# Patient Record
Sex: FEMALE | Race: BLACK OR AFRICAN AMERICAN | Employment: FULL TIME | URBAN - METROPOLITAN AREA
[De-identification: names, ages, dates, MRNs, and addresses within clinical notes are randomized per-mention and may not be internally consistent; named-entity substitution may affect disease eponyms.]

---

## 2019-05-06 ENCOUNTER — APPOINTMENT (OUTPATIENT)
Dept: GENERAL RADIOLOGY | Age: 49
End: 2019-05-06
Attending: EMERGENCY MEDICINE
Payer: COMMERCIAL

## 2019-05-06 ENCOUNTER — HOSPITAL ENCOUNTER (EMERGENCY)
Age: 49
Discharge: HOME | End: 2019-05-06
Attending: EMERGENCY MEDICINE
Payer: COMMERCIAL

## 2019-05-06 VITALS
RESPIRATION RATE: 16 BRPM | HEART RATE: 80 BPM | TEMPERATURE: 98.2 F | BODY MASS INDEX: 26.53 KG/M2 | SYSTOLIC BLOOD PRESSURE: 141 MMHG | WEIGHT: 169 LBS | HEIGHT: 67 IN | OXYGEN SATURATION: 97 % | DIASTOLIC BLOOD PRESSURE: 76 MMHG

## 2019-05-06 DIAGNOSIS — M25.511 ACUTE PAIN OF RIGHT SHOULDER: ICD-10-CM
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
## 2019-05-06 PROCEDURE — 72050 X-RAY EXAM NECK SPINE 4/5VWS: CPT

## 2019-05-06 PROCEDURE — 99284 EMERGENCY DEPT VISIT MOD MDM: CPT | Performed by: NURSE PRACTITIONER

## 2019-05-06 PROCEDURE — A4565 SLINGS: HCPCS

## 2019-05-06 PROCEDURE — 73030 X-RAY EXAM OF SHOULDER: CPT

## 2019-05-06 PROCEDURE — 71100 X-RAY EXAM RIBS UNI 2 VIEWS: CPT

## 2019-05-06 RX ORDER — ATORVASTATIN CALCIUM 10 MG
10 TABLET ORAL DAILY
COMMUNITY

## 2019-05-06 RX ORDER — MELOXICAM 7.5 MG/1
7.5 TABLET ORAL DAILY
Qty: 7 TAB | Refills: 0 | Status: SHIPPED | OUTPATIENT
Start: 2019-05-06 | End: 2019-05-13

## 2019-05-06 RX ORDER — CYCLOBENZAPRINE HCL 5 MG
10 TABLET ORAL
Qty: 30 TAB | Refills: 0 | Status: SHIPPED | OUTPATIENT
Start: 2019-05-06
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
## 2019-05-06 NOTE — LETTER
[facility] EMERGENCY DEPT One [address] [id number] [phone] Work/School Note Date: 5/6/2019 To Whom It May concern: 
 
Claudio Seymour was seen and treated today in the emergency room by the following provider(s): 
Attending Provider: Omayra Dutton MD 
Nurse Practitioner: Serena Berger NP. Claudio Seymour may return to work on Wednesday. Sincerely, Jimenez Mayo NP

## 2019-05-06 NOTE — ED NOTES
I have reviewed discharge instructions with the patient. The patient verbalized understanding. Patient left ED via Discharge Method: ambulatory to Home with co-worker. Opportunity for questions and clarification provided. Patient given 3 scripts. To continue your aftercare when you leave the hospital, you may receive an automated call from our care team to check in on how you are doing. This is a free service and part of our promise to provide the best care and service to meet your aftercare needs.  If you have questions, or wish to unsubscribe from this service please call [phone]. Thank you for Choosing our [employer] Emergency Department.
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
## 2019-11-13 ENCOUNTER — PATIENT OUTREACH (OUTPATIENT)
Dept: OTHER | Age: 49
End: 2019-11-13

## 2019-11-13 NOTE — PROGRESS NOTES
Patient on report as eligible for Case Management. Unable to leave message on voicemail, as I received a message stating not accepting calls at this time. Will attempt to contact again to offer [phone] Management services. Home

## 2019-11-14 ENCOUNTER — PATIENT OUTREACH (OUTPATIENT)
Dept: OTHER | Age: 49
End: 2019-11-14

## 2019-11-14 NOTE — PROGRESS NOTES
Patient identified as eligible for [address] [place] [address] services. Second telephone outreach attempted. Left discreet voicemail with this CM confidential contact information. Will send UTR letter.

## 2019-11-14 NOTE — LETTER
11/14/2019 11:38 AM 
 
Ms. Osie Simmonds [address] [facility] [id number] Re: Osie Simmonds 1970 Dear Ms. Lindsay My name is Catherine Hawk, Employee Care Manager for [employer] and I have been trying to reach you. The Employee Care Management [facility]) program is a free-of-charge confidential service provided to our employees and their family members covered by the [employer]. The program will provide an employee and his/her family with the [employer] expertise to assist in navigating the health care delivery system, provider services, and their overall care needsso as to assure and improve health care interactions and enhance the quality of life. This program is designed to provide you with the opportunity to have a [employer] care manager partner with you for the following services: 
 
 1) when you come home from the hospital or emergency room 2) when help is needed to manage your disease 3) when you need assistance coordinating services or appointments [employer] is dedicated to empowering the good health of its community and improving the quality of care and care experiences for employees and their families. We are committed to safeguarding patient confidentiality and privacy, assuring that every employee has the respect he or she deserves in managing their health. The information shared with your care manager will not be shared with anyone else aside from those you identify as part of your care team, and will only be used to assist you with any identified care needs. Please contact me if you would like this service provided to you. Sincerely, Hanna Morales RN, BSN  Employee Care Manager [address] [place] [address] [facility] [phone]  F [phone]  [email or web address] [email or web address]

## 2019-11-21 ENCOUNTER — PATIENT OUTREACH (OUTPATIENT)
Dept: OTHER | Age: 49
End: 2019-11-21

## 2019-11-21 NOTE — PROGRESS NOTES
Thirdd attempt to reach patient for [facility] Program, and discharge assessment. Discreet VM left. UTR letter sent on 11/14. Will resolve if I don't hear back in the next few weeks.

## 2019-12-12 ENCOUNTER — DOCUMENTATION ONLY (OUTPATIENT)
Dept: OTHER | Age: 49
End: 2019-12-12

## 2020-06-24 ENCOUNTER — HOSPITAL ENCOUNTER (INPATIENT)
Age: 50
LOS: 5 days | Discharge: HOME | DRG: 177 | End: 2020-06-29
Attending: INTERNAL MEDICINE | Admitting: INTERNAL MEDICINE
Payer: COMMERCIAL

## 2020-06-24 DIAGNOSIS — I10 ESSENTIAL HYPERTENSION: ICD-10-CM

## 2020-06-24 DIAGNOSIS — Z99.89 OSA ON CPAP: ICD-10-CM

## 2020-06-24 DIAGNOSIS — U07.1 COVID-19 VIRUS INFECTION: ICD-10-CM

## 2020-06-24 DIAGNOSIS — G47.33 OSA ON CPAP: ICD-10-CM

## 2020-06-24 DIAGNOSIS — E11.9 TYPE 2 DIABETES MELLITUS WITHOUT COMPLICATION, WITHOUT LONG-TERM CURRENT USE OF INSULIN (HCC): ICD-10-CM

## 2020-06-24 DIAGNOSIS — J96.01 ACUTE RESPIRATORY FAILURE WITH HYPOXIA (HCC): ICD-10-CM

## 2020-06-24 PROBLEM — J96.90 RESPIRATORY FAILURE (HCC): Status: ACTIVE | Noted: 2020-06-24

## 2020-06-24 LAB
ALBUMIN SERPL-MCNC: 3.2 G/DL (ref 3.5–5)
ALBUMIN/GLOB SERPL: 0.8 {RATIO} (ref 1.2–3.5)
ALP SERPL-CCNC: 93 U/L (ref 50–136)
ALT SERPL-CCNC: 47 U/L (ref 12–65)
ANION GAP SERPL CALC-SCNC: 8 MMOL/L (ref 7–16)
APTT PPP: 26.4 SEC (ref 24.3–35.4)
AST SERPL-CCNC: 38 U/L (ref 15–37)
BASOPHILS # BLD: 0 K/UL (ref 0–0.2)
BASOPHILS NFR BLD: 0 % (ref 0–2)
BILIRUB SERPL-MCNC: 0.5 MG/DL (ref 0.2–1.1)
BUN SERPL-MCNC: 8 MG/DL (ref 6–23)
CALCIUM SERPL-MCNC: 8.2 MG/DL (ref 8.3–10.4)
CHLORIDE SERPL-SCNC: 104 MMOL/L (ref 98–107)
CO2 SERPL-SCNC: 29 MMOL/L (ref 21–32)
CREAT SERPL-MCNC: 0.69 MG/DL (ref 0.6–1)
D DIMER PPP FEU-MCNC: 1.25 UG/ML(FEU)
DIFFERENTIAL METHOD BLD: ABNORMAL
EOSINOPHIL # BLD: 0 K/UL (ref 0–0.8)
EOSINOPHIL NFR BLD: 0 % (ref 0.5–7.8)
ERYTHROCYTE [DISTWIDTH] IN BLOOD BY AUTOMATED COUNT: 14.9 % (ref 11.9–14.6)
FERRITIN SERPL-MCNC: 492 NG/ML (ref 8–388)
FIBRINOGEN PPP-MCNC: 645 MG/DL (ref 190–501)
GLOBULIN SER CALC-MCNC: 4.2 G/DL (ref 2.3–3.5)
GLUCOSE BLD STRIP.AUTO-MCNC: 95 MG/DL (ref 65–100)
GLUCOSE SERPL-MCNC: 93 MG/DL (ref 65–100)
HCT VFR BLD AUTO: 42.9 % (ref 35.8–46.3)
HGB BLD-MCNC: 13.7 G/DL (ref 11.7–15.4)
IMM GRANULOCYTES # BLD AUTO: 0 K/UL (ref 0–0.5)
IMM GRANULOCYTES NFR BLD AUTO: 0 % (ref 0–5)
INR PPP: 0.9
LDH SERPL L TO P-CCNC: 371 U/L (ref 100–190)
LYMPHOCYTES # BLD: 2.1 K/UL (ref 0.5–4.6)
LYMPHOCYTES NFR BLD: 35 % (ref 13–44)
MCH RBC QN AUTO: 26 PG (ref 26.1–32.9)
MCHC RBC AUTO-ENTMCNC: 31.9 G/DL (ref 31.4–35)
MCV RBC AUTO: 81.4 FL (ref 79.6–97.8)
MONOCYTES # BLD: 0.3 K/UL (ref 0.1–1.3)
MONOCYTES NFR BLD: 5 % (ref 4–12)
NEUTS SEG # BLD: 3.5 K/UL (ref 1.7–8.2)
NEUTS SEG NFR BLD: 60 % (ref 43–78)
NRBC # BLD: 0 K/UL (ref 0–0.2)
PLATELET # BLD AUTO: 296 K/UL (ref 150–450)
PLATELET COMMENTS,PCOM: ADEQUATE
PMV BLD AUTO: 12.5 FL (ref 9.4–12.3)
POTASSIUM SERPL-SCNC: 3.6 MMOL/L (ref 3.5–5.1)
PROT SERPL-MCNC: 7.4 G/DL (ref 6.3–8.2)
PROTHROMBIN TIME: 12.9 SEC (ref 12–14.7)
RBC # BLD AUTO: 5.27 M/UL (ref 4.05–5.2)
RBC MORPH BLD: ABNORMAL
SODIUM SERPL-SCNC: 141 MMOL/L (ref 136–145)
WBC # BLD AUTO: 5.9 K/UL (ref 4.3–11.1)
WBC MORPH BLD: ABNORMAL

## 2020-06-24 PROCEDURE — 83615 LACTATE (LD) (LDH) ENZYME: CPT

## 2020-06-24 PROCEDURE — 77030034850

## 2020-06-24 PROCEDURE — 74011000250 HC RX REV CODE- 250

## 2020-06-24 PROCEDURE — 85379 FIBRIN DEGRADATION QUANT: CPT

## 2020-06-24 PROCEDURE — 36415 COLL VENOUS BLD VENIPUNCTURE: CPT

## 2020-06-24 PROCEDURE — 65620000000 HC RM CCU GENERAL

## 2020-06-24 PROCEDURE — 85025 COMPLETE CBC W/AUTO DIFF WBC: CPT

## 2020-06-24 PROCEDURE — 99223 1ST HOSP IP/OBS HIGH 75: CPT | Performed by: INTERNAL MEDICINE

## 2020-06-24 PROCEDURE — 86900 BLOOD TYPING SEROLOGIC ABO: CPT

## 2020-06-24 PROCEDURE — 82962 GLUCOSE BLOOD TEST: CPT

## 2020-06-24 PROCEDURE — 80053 COMPREHEN METABOLIC PANEL: CPT

## 2020-06-24 PROCEDURE — 77010033711 HC HIGH FLOW OXYGEN

## 2020-06-24 PROCEDURE — 85610 PROTHROMBIN TIME: CPT

## 2020-06-24 PROCEDURE — 94640 AIRWAY INHALATION TREATMENT: CPT

## 2020-06-24 PROCEDURE — 85730 THROMBOPLASTIN TIME PARTIAL: CPT

## 2020-06-24 PROCEDURE — 74011250637 HC RX REV CODE- 250/637: Performed by: INTERNAL MEDICINE

## 2020-06-24 PROCEDURE — 94664 DEMO&/EVAL PT USE INHALER: CPT

## 2020-06-24 PROCEDURE — 82728 ASSAY OF FERRITIN: CPT

## 2020-06-24 PROCEDURE — 85384 FIBRINOGEN ACTIVITY: CPT

## 2020-06-24 RX ORDER — NALOXONE HYDROCHLORIDE 0.4 MG/ML
0.2 INJECTION, SOLUTION INTRAMUSCULAR; INTRAVENOUS; SUBCUTANEOUS AS NEEDED
Status: DISCONTINUED | OUTPATIENT
Start: 2020-06-24 | End: 2020-06-29 | Stop reason: HOSPADM

## 2020-06-24 RX ORDER — LOPERAMIDE HYDROCHLORIDE 2 MG/1
2 CAPSULE ORAL
Status: DISCONTINUED | OUTPATIENT
Start: 2020-06-24 | End: 2020-06-29 | Stop reason: HOSPADM

## 2020-06-24 RX ORDER — ALBUTEROL SULFATE 0.83 MG/ML
2.5 SOLUTION RESPIRATORY (INHALATION)
Status: DISCONTINUED | OUTPATIENT
Start: 2020-06-24 | End: 2020-06-29 | Stop reason: HOSPADM

## 2020-06-24 RX ORDER — ATORVASTATIN CALCIUM 10 MG/1
10 TABLET, FILM COATED ORAL DAILY
Status: DISCONTINUED | OUTPATIENT
Start: 2020-06-25 | End: 2020-06-29 | Stop reason: HOSPADM

## 2020-06-24 RX ORDER — ALBUTEROL SULFATE 0.83 MG/ML
SOLUTION RESPIRATORY (INHALATION)
Status: COMPLETED
Start: 2020-06-24 | End: 2020-06-24

## 2020-06-24 RX ORDER — HEPARIN SODIUM 5000 [USP'U]/ML
7500 INJECTION, SOLUTION INTRAVENOUS; SUBCUTANEOUS EVERY 8 HOURS
Status: DISCONTINUED | OUTPATIENT
Start: 2020-06-24 | End: 2020-06-24 | Stop reason: ALTCHOICE

## 2020-06-24 RX ORDER — ACETAMINOPHEN 325 MG/1
650 TABLET ORAL
Status: DISCONTINUED | OUTPATIENT
Start: 2020-06-24 | End: 2020-06-25

## 2020-06-24 RX ORDER — HYDROCODONE BITARTRATE AND HOMATROPINE METHYLBROMIDE 1.5; 5 MG/5ML; MG/5ML
5 SYRUP ORAL
Status: DISCONTINUED | OUTPATIENT
Start: 2020-06-24 | End: 2020-06-29 | Stop reason: HOSPADM

## 2020-06-24 RX ORDER — CYCLOBENZAPRINE HCL 10 MG
10 TABLET ORAL
Status: DISCONTINUED | OUTPATIENT
Start: 2020-06-24 | End: 2020-06-29 | Stop reason: HOSPADM

## 2020-06-24 RX ORDER — ACETAMINOPHEN 650 MG/1
650 SUPPOSITORY RECTAL
Status: DISCONTINUED | OUTPATIENT
Start: 2020-06-24 | End: 2020-06-29 | Stop reason: HOSPADM

## 2020-06-24 RX ORDER — ONDANSETRON 2 MG/ML
4 INJECTION INTRAMUSCULAR; INTRAVENOUS
Status: DISCONTINUED | OUTPATIENT
Start: 2020-06-24 | End: 2020-06-29 | Stop reason: HOSPADM

## 2020-06-24 RX ORDER — ENOXAPARIN SODIUM 100 MG/ML
40 INJECTION SUBCUTANEOUS EVERY 12 HOURS
Status: DISCONTINUED | OUTPATIENT
Start: 2020-06-25 | End: 2020-06-29 | Stop reason: HOSPADM

## 2020-06-24 RX ORDER — ALBUTEROL SULFATE 90 UG/1
2 AEROSOL, METERED RESPIRATORY (INHALATION)
Status: DISCONTINUED | OUTPATIENT
Start: 2020-06-24 | End: 2020-06-24 | Stop reason: SDUPTHER

## 2020-06-24 RX ORDER — SODIUM CHLORIDE 9 MG/ML
250 INJECTION, SOLUTION INTRAVENOUS AS NEEDED
Status: DISCONTINUED | OUTPATIENT
Start: 2020-06-24 | End: 2020-06-29 | Stop reason: HOSPADM

## 2020-06-24 RX ORDER — SODIUM CHLORIDE 9 MG/ML
50 INJECTION, SOLUTION INTRAVENOUS DAILY
Status: DISCONTINUED | OUTPATIENT
Start: 2020-06-25 | End: 2020-06-27

## 2020-06-24 RX ORDER — SODIUM CHLORIDE 0.9 % (FLUSH) 0.9 %
10 SYRINGE (ML) INJECTION AS NEEDED
Status: DISCONTINUED | OUTPATIENT
Start: 2020-06-24 | End: 2020-06-29 | Stop reason: HOSPADM

## 2020-06-24 RX ORDER — ACETAMINOPHEN 325 MG/1
650 TABLET ORAL
Status: DISCONTINUED | OUTPATIENT
Start: 2020-06-24 | End: 2020-06-29 | Stop reason: HOSPADM

## 2020-06-24 RX ADMIN — ALBUTEROL SULFATE 2.5 MG: 2.5 SOLUTION RESPIRATORY (INHALATION) at 21:50

## 2020-06-24 RX ADMIN — ACETAMINOPHEN 650 MG: 325 TABLET, FILM COATED ORAL at 23:50

## 2020-06-24 RX ADMIN — ALBUTEROL SULFATE 2.5 MG: 0.83 SOLUTION RESPIRATORY (INHALATION) at 21:50

## 2020-06-24 RX ADMIN — LOPERAMIDE HYDROCHLORIDE 2 MG: 2 CAPSULE ORAL at 23:50

## 2020-06-24 NOTE — PROGRESS NOTES
Patient arrived to CCU, transferred to bed, placed on monitor. NSR on monitor, BP stable, SpO2 95% on 10L HFNC, frequent nonproductive cough. Dual skin assessment completed with Janki LEE, patient's skin is intact, incontinent care provided and CHG bath given- patient c/o diarrhea and incontinence with coughing that started PTA.

## 2020-06-24 NOTE — PROGRESS NOTES
Dr. Yeni Salinas notified of admission. No orders in [facility] at this time.     Admissions department notified of arrival.

## 2020-06-24 NOTE — H&P
History and Physical/Consult  Seamus Carver    2020    Date of Admission:  2020      Subjective:     Patient is a 44 y.o. AA female presents with respiratory failure. Patient is 64-year-old female with multiple medical problems including hypertension, diabetes mellitus type 2, hyperlipidemia, obstructive sleep apnea, obesity, who presented to the hospital with complaints of fever, cough and shortness of breath. Patient also reported nausea and few episodes of vomiting. She says family member tested positive for COVID-19. Rapid test in the emergency room was positive. CT of the chest showed bilateral airspace disease in the lower lobe and groundglass opacity throughout the reminder of the lungs. Her inflammatory markers were elevated including CRP is 7.0, , ferritin 351, d-dimer 0.67. Interleukin-6 level was collected but results are pending. Patient oxygen saturation was in the high 80s. Was started on 2 L of oxygen via nasal cannula. Admitted to hospitalist service for further management. Patient was placed on proper isolation precautions. Discussed use of convalescent plasma and Remdesivir with the patient. She agreed to proceed with both. She had her first dose of Remdesivir yesterday 2020 and getting her second dose this afternoon. She also was transfused 1 unit of convalescent plasma around 4 AM today 2020. Patient oxygen requirements worsened last night, she was moved to the intensive care unit for close monitoring. Transitioned to Oxymizer and is currently on 12 L/min. Patient would like to be a transfer to [facility] in [facility]. She is being admitted to [facility] ICU for further care    Prior to Admission Medications   Prescriptions Last Dose Informant Patient Reported? Taking? Methyl Salicylate-Menthol (SALONPAS) 10-3 % ptmd   No No   Si Patch by Apply Externally route daily.    atorvastatin (LIPITOR) 10 mg tablet   Yes No   Sig: Take 10 mg by mouth daily.   cyclobenzaprine (FLEXERIL) 5 mg tablet   No No   Sig: Take 2 Tabs by mouth three (3) times daily as needed for Muscle Spasm(s). Facility-Administered Medications: None       Review of Systems  Denies:  sweats, fatigue, malaise, anorexia, weight loss   Denies: blurry vision, loss of vision, eye pain, photophobia  Denies: hearing loss, ringing in the ears, earache, epistaxis  Denies: chest pain, palpitations, syncope, orthopnea, paroxysmal nocturnal dyspnea, claudication  Denies: dysphagia, odynophagia, diarrhea, constipation, abdominal pain, jaundice, melena   Denies: frequency, dysuria, nocturia, urinary incontinence, stones, hematuria  Denies: polydipsia/polyuria, skin changes, temperature intolerance, unexpected weight gain  Denies: back pain, joint pain, joint swelling, muscle pain, muscle weakness  Denies: bleeding problems, blood transfusions, bruising, pallor, swollen lymph nodes  Denies: headache, dysarthria, blurred vision, diplopia,seizure, focal deficits. Admits to:  Fever, cough , SOB and nausea and vomiting.         Past Medical History:   Diagnosis Date    Arthritis     Diabetes ([facility]     Hypertension     Stroke [facility])      Past Surgical History:   Procedure Laterality Date    HX CHOLECYSTECTOMY      HX GYN  2007    Hysterectomy    HX HEENT  2002    Tonsilectomy    HX ORTHOPAEDIC  2015    left hip ligament repair    HX ORTHOPAEDIC  2017    Right hip ligament repair     Social History     Socioeconomic History    Marital status: SINGLE     Spouse name: Not on file    Number of children: Not on file    Years of education: Not on file    Highest education level: Not on file   Occupational History    Not on file   Social Needs    Financial resource strain: Not on file    Food insecurity     Worry: Not on file     Inability: Not on file    Transportation needs     Medical: Not on file     Non-medical: Not on file   Tobacco Use    Smoking status: Never Smoker    Smokeless tobacco: Never Used   Substance and Sexual Activity    Alcohol use: Never     Frequency: Never    Drug use: Never    Sexual activity: Not on file   Lifestyle    Physical activity     Days per week: Not on file     Minutes per session: Not on file    Stress: Not on file   Relationships    Social connections     Talks on phone: Not on file     Gets together: Not on file     Attends Holiness service: Not on file     Active member of club or organization: Not on file     Attends meetings of clubs or organizations: Not on file     Relationship status: Not on file    Intimate partner violence     Fear of current or ex partner: Not on file     Emotionally abused: Not on file     Physically abused: Not on file     Forced sexual activity: Not on file   Other Topics Concern    Not on file   Social History Narrative    Not on file     Family History   Problem Relation Age of Onset    Cancer Mother     Heart Disease Father     Heart Disease Sister     Diabetes Sister     Stroke Brother     Heart Disease Brother     Cancer Maternal Aunt      No Known Allergies    No current facility-administered medications for this encounter. COMPARISON:   CR I XR CHEST PA AND LAT 2/26/2016 2:22 PM     FINDINGS:   Lungs: Moderate patchy airspace disease/consolidation lower lobes bilaterally. Patchy ground-glass airspace disease throughout the remainder of the lungs   bilaterally. Pleural space: Unremarkable. No pneumothorax. No pleural effusion. Heart: Unremarkable. No cardiomegaly. No pericardial effusion. Aorta: Unremarkable. No aortic aneurysm. Lymph nodes: Calcified mediastinal nodes and several calcified lung granuloma   consistent with previous granulomatous disease. Mild bilateral hilar   mediastinal adenopathy. Gallbladder and bile ducts: Gallbladder surgically absent. Bones/joints: Unremarkable. No acute fracture. Soft tissues: Unremarkable. IMPRESSION:   1.  Mild bilateral hilar mediastinal adenopathy. 2. Moderate patchy airspace disease/consolidation lower lobes bilaterally. Patchy ground-glass airspace disease throughout the remainder of the lungs   bilaterally. THIS DOCUMENT HAS BEEN ELECTRONICALLY SIGNED BY JOSE LUIS Weber MD  Objective: There were no vitals filed for this visit. PHYSICAL EXAM     Gen- the patient is well developed and in no acute distress on HFNC at 10 L with O2sat of 94%  HEENT- PERRL, EOMI, no scleral icterus       nose without alar flaring or epistaxis                  oral muscosa moist without cyanosis  Neck- no JVD or retractions  Lungs- CTA anteriorly  Heart- RRR without M,G,R  Abd- soft and non-tender; with positive bowel sounds. Ext- warm without cyanosis. There is no lower leg edema. Skin- no jaundice or rashes, no wounds   Neuro- alert and oriented x 3. No gross sensorimotor deficits are present. Assessment:  (Medical Decision Making)      Patient Active Problem List   Diagnosis Code    Acute respiratory failure with hypoxia (HCC) J96.01    COVID-19 virus infection U07.1    Diabetes ([facility] E11.9    Hypertension I10       Plan:  (Medical Decision Making)        Hospital Problems  Never Reviewed          Codes Class Noted POA    * (Principal) Acute respiratory failure with hypoxia ([facility] ICD-10-CM: J96.01  ICD-9-CM: 518.81  6/24/2020 Unknown    Supplement O2 and prone if needed to keep O2 sat >90%    COVID-19 virus infection ICD-10-CM: U07.1  ICD-9-CM: 079.89  6/24/2020 Unknown    S/P 2 days of remdesivir- for 3 more days of Rx  Convalescent plasma  Dexamethasone if O2 requirement is increasing    Diabetes (HCC) ICD-10-CM: E11.9  ICD-9-CM: 250.00  Unknown Unknown    Diabetic diet and SSI, patient is on trulicity at home.    DVT prop[devin Henson MD

## 2020-06-25 LAB
ABO + RH BLD: NORMAL
ALBUMIN SERPL-MCNC: 3 G/DL (ref 3.5–5)
ALBUMIN/GLOB SERPL: 0.7 {RATIO} (ref 1.2–3.5)
ALP SERPL-CCNC: 85 U/L (ref 50–136)
ALT SERPL-CCNC: 44 U/L (ref 12–65)
ANION GAP SERPL CALC-SCNC: 2 MMOL/L (ref 7–16)
APPEARANCE UR: CLEAR
APTT PPP: 25.9 SEC (ref 24.3–35.4)
AST SERPL-CCNC: 29 U/L (ref 15–37)
BACTERIA URNS QL MICRO: ABNORMAL /HPF
BASOPHILS # BLD: 0 K/UL (ref 0–0.2)
BASOPHILS NFR BLD: 0 % (ref 0–2)
BILIRUB SERPL-MCNC: 0.5 MG/DL (ref 0.2–1.1)
BILIRUB UR QL: NEGATIVE
BLOOD GROUP ANTIBODIES SERPL: NORMAL
BUN SERPL-MCNC: 8 MG/DL (ref 6–23)
CALCIUM SERPL-MCNC: 8.3 MG/DL (ref 8.3–10.4)
CHLORIDE SERPL-SCNC: 106 MMOL/L (ref 98–107)
CO2 SERPL-SCNC: 36 MMOL/L (ref 21–32)
COLOR UR: ABNORMAL
CREAT SERPL-MCNC: 0.65 MG/DL (ref 0.6–1)
D DIMER PPP FEU-MCNC: 1.25 UG/ML(FEU)
DIFFERENTIAL METHOD BLD: ABNORMAL
EOSINOPHIL # BLD: 0.1 K/UL (ref 0–0.8)
EOSINOPHIL NFR BLD: 1 % (ref 0.5–7.8)
ERYTHROCYTE [DISTWIDTH] IN BLOOD BY AUTOMATED COUNT: 14.8 % (ref 11.9–14.6)
FIBRINOGEN PPP-MCNC: 597 MG/DL (ref 190–501)
GLOBULIN SER CALC-MCNC: 4.4 G/DL (ref 2.3–3.5)
GLUCOSE BLD STRIP.AUTO-MCNC: 104 MG/DL (ref 65–100)
GLUCOSE BLD STRIP.AUTO-MCNC: 131 MG/DL (ref 65–100)
GLUCOSE BLD STRIP.AUTO-MCNC: 140 MG/DL (ref 65–100)
GLUCOSE BLD STRIP.AUTO-MCNC: 98 MG/DL (ref 65–100)
GLUCOSE SERPL-MCNC: 97 MG/DL (ref 65–100)
GLUCOSE UR STRIP.AUTO-MCNC: NEGATIVE MG/DL
HCT VFR BLD AUTO: 43.8 % (ref 35.8–46.3)
HGB BLD-MCNC: 13.1 G/DL (ref 11.7–15.4)
HGB UR QL STRIP: ABNORMAL
IMM GRANULOCYTES # BLD AUTO: 0 K/UL (ref 0–0.5)
IMM GRANULOCYTES NFR BLD AUTO: 0 % (ref 0–5)
INR PPP: 1
KETONES UR QL STRIP.AUTO: 15 MG/DL
LEUKOCYTE ESTERASE UR QL STRIP.AUTO: NEGATIVE
LYMPHOCYTES # BLD: 1.8 K/UL (ref 0.5–4.6)
LYMPHOCYTES NFR BLD: 29 % (ref 13–44)
MCH RBC QN AUTO: 24.9 PG (ref 26.1–32.9)
MCHC RBC AUTO-ENTMCNC: 29.9 G/DL (ref 31.4–35)
MCV RBC AUTO: 83.1 FL (ref 79.6–97.8)
MONOCYTES # BLD: 0.4 K/UL (ref 0.1–1.3)
MONOCYTES NFR BLD: 6 % (ref 4–12)
MUCOUS THREADS URNS QL MICRO: ABNORMAL /LPF
NEUTS SEG # BLD: 4 K/UL (ref 1.7–8.2)
NEUTS SEG NFR BLD: 64 % (ref 43–78)
NITRITE UR QL STRIP.AUTO: NEGATIVE
NRBC # BLD: 0 K/UL (ref 0–0.2)
OTHER OBSERVATIONS,UCOM: ABNORMAL
PH UR STRIP: 6 [PH] (ref 5–9)
PLATELET # BLD AUTO: 316 K/UL (ref 150–450)
PMV BLD AUTO: 12.9 FL (ref 9.4–12.3)
POTASSIUM SERPL-SCNC: 3.2 MMOL/L (ref 3.5–5.1)
PROT SERPL-MCNC: 7.4 G/DL (ref 6.3–8.2)
PROT UR STRIP-MCNC: 30 MG/DL
PROTHROMBIN TIME: 13.2 SEC (ref 12–14.7)
RBC # BLD AUTO: 5.27 M/UL (ref 4.05–5.2)
RBC #/AREA URNS HPF: ABNORMAL /HPF
SODIUM SERPL-SCNC: 144 MMOL/L (ref 136–145)
SP GR UR REFRACTOMETRY: 1.03 (ref 1–1.02)
SPECIMEN EXP DATE BLD: NORMAL
UROBILINOGEN UR QL STRIP.AUTO: 1 EU/DL (ref 0.2–1)
WBC # BLD AUTO: 6.2 K/UL (ref 4.3–11.1)

## 2020-06-25 PROCEDURE — 74011000258 HC RX REV CODE- 258: Performed by: INTERNAL MEDICINE

## 2020-06-25 PROCEDURE — 85379 FIBRIN DEGRADATION QUANT: CPT

## 2020-06-25 PROCEDURE — 85610 PROTHROMBIN TIME: CPT

## 2020-06-25 PROCEDURE — 65620000000 HC RM CCU GENERAL

## 2020-06-25 PROCEDURE — 99291 CRITICAL CARE FIRST HOUR: CPT | Performed by: INTERNAL MEDICINE

## 2020-06-25 PROCEDURE — 74011250636 HC RX REV CODE- 250/636: Performed by: INTERNAL MEDICINE

## 2020-06-25 PROCEDURE — 74011000250 HC RX REV CODE- 250: Performed by: INTERNAL MEDICINE

## 2020-06-25 PROCEDURE — 82962 GLUCOSE BLOOD TEST: CPT

## 2020-06-25 PROCEDURE — 77010033711 HC HIGH FLOW OXYGEN

## 2020-06-25 PROCEDURE — 85730 THROMBOPLASTIN TIME PARTIAL: CPT

## 2020-06-25 PROCEDURE — 94761 N-INVAS EAR/PLS OXIMETRY MLT: CPT

## 2020-06-25 PROCEDURE — 74011250637 HC RX REV CODE- 250/637: Performed by: INTERNAL MEDICINE

## 2020-06-25 PROCEDURE — 87086 URINE CULTURE/COLONY COUNT: CPT

## 2020-06-25 PROCEDURE — 81001 URINALYSIS AUTO W/SCOPE: CPT

## 2020-06-25 PROCEDURE — 85384 FIBRINOGEN ACTIVITY: CPT

## 2020-06-25 PROCEDURE — 85025 COMPLETE CBC W/AUTO DIFF WBC: CPT

## 2020-06-25 PROCEDURE — 77030027138 HC INCENT SPIROMETER -A

## 2020-06-25 PROCEDURE — 80053 COMPREHEN METABOLIC PANEL: CPT

## 2020-06-25 RX ORDER — ASCORBIC ACID 500 MG
1000 TABLET ORAL 2 TIMES DAILY
Status: DISCONTINUED | OUTPATIENT
Start: 2020-06-25 | End: 2020-06-28

## 2020-06-25 RX ORDER — DEXAMETHASONE SODIUM PHOSPHATE 100 MG/10ML
6 INJECTION INTRAMUSCULAR; INTRAVENOUS DAILY
Status: DISCONTINUED | OUTPATIENT
Start: 2020-06-25 | End: 2020-06-29 | Stop reason: HOSPADM

## 2020-06-25 RX ORDER — UREA 10 %
220 LOTION (ML) TOPICAL 2 TIMES DAILY
Status: DISCONTINUED | OUTPATIENT
Start: 2020-06-25 | End: 2020-06-28

## 2020-06-25 RX ADMIN — Medication 2 SPRAY: at 22:36

## 2020-06-25 RX ADMIN — HYDROCODONE BITARTRATE AND HOMATROPINE METHYLBROMIDE 5 ML: 5; 1.5 SOLUTION ORAL at 05:04

## 2020-06-25 RX ADMIN — POTASSIUM BICARBONATE 40 MEQ: 782 TABLET, EFFERVESCENT ORAL at 17:20

## 2020-06-25 RX ADMIN — DEXAMETHASONE SODIUM PHOSPHATE 6 MG: 10 INJECTION INTRAMUSCULAR; INTRAVENOUS at 10:00

## 2020-06-25 RX ADMIN — ATORVASTATIN CALCIUM 10 MG: 10 TABLET, FILM COATED ORAL at 09:00

## 2020-06-25 RX ADMIN — LOPERAMIDE HYDROCHLORIDE 2 MG: 2 CAPSULE ORAL at 22:35

## 2020-06-25 RX ADMIN — Medication 220 MG: at 17:21

## 2020-06-25 RX ADMIN — Medication 2 SPRAY: at 03:00

## 2020-06-25 RX ADMIN — HYDROCODONE BITARTRATE AND HOMATROPINE METHYLBROMIDE 5 ML: 5; 1.5 SOLUTION ORAL at 22:35

## 2020-06-25 RX ADMIN — POTASSIUM BICARBONATE 40 MEQ: 782 TABLET, EFFERVESCENT ORAL at 10:00

## 2020-06-25 RX ADMIN — ENOXAPARIN SODIUM 40 MG: 40 INJECTION SUBCUTANEOUS at 17:21

## 2020-06-25 RX ADMIN — ACETAMINOPHEN 650 MG: 325 TABLET, FILM COATED ORAL at 22:35

## 2020-06-25 RX ADMIN — SODIUM CHLORIDE 50 ML: 900 INJECTION, SOLUTION INTRAVENOUS at 17:20

## 2020-06-25 RX ADMIN — LOPERAMIDE HYDROCHLORIDE 2 MG: 2 CAPSULE ORAL at 05:29

## 2020-06-25 RX ADMIN — Medication: at 23:00

## 2020-06-25 RX ADMIN — Medication 1000 MG: at 17:20

## 2020-06-25 RX ADMIN — Medication 1000 MG: at 14:06

## 2020-06-25 RX ADMIN — REMDESIVIR 100 MG: 5 INJECTION INTRAVENOUS at 17:21

## 2020-06-25 RX ADMIN — Medication: at 23:45

## 2020-06-25 RX ADMIN — ENOXAPARIN SODIUM 40 MG: 40 INJECTION SUBCUTANEOUS at 05:04

## 2020-06-25 RX ADMIN — Medication 2 SPRAY: at 20:00

## 2020-06-25 NOTE — PROGRESS NOTES
Follow up from a healthcare power of  consult earlier this afternoon. Called and spoke to patient by phone. Patient stated that she isn't feeling well and does not wish to complete a HCPOA at this time, will call spiritual care if she needs assistance.     Eugene MONREAL

## 2020-06-25 NOTE — ACP (ADVANCE CARE PLANNING)
Power of [employer] for [employer] received request from Case Management staff to offer assistance with the [address]. Spoke with staff to ensure that patient was sufficiently alert and oriented to proceed with consult. I called Ms. Montoya on the phone and she informed me that she did not have a [address] document and a pen. I informed Ms. Montoya that I needed to provide the HCPOA document and pen to the nurse in CCU to share with her and that I would call her back to proceed with completing the HCPOA document. Rev.  Slime Macias MDiv, BS  Board Certified

## 2020-06-25 NOTE — PROGRESS NOTES
Problem: Falls - Risk of  Goal: *Absence of Falls  Description: Document Oumar Marciano Fall Risk and appropriate interventions in the flowsheet.   Outcome: Progressing Towards Goal  Note: Fall Risk Interventions:  Mobility Interventions: Assess mobility with egress test, Bed/chair exit alarm, OT consult for ADLs, PT Consult for mobility concerns, PT Consult for assist device competence, Utilize walker, cane, or other assistive device, Strengthening exercises (ROM-active/passive)              Elimination Interventions: Bed/chair exit alarm, Call light in reach, Stay With Me (per policy), Toilet paper/wipes in reach, Toileting schedule/hourly rounds              Problem: Patient Education: Go to Patient Education Activity  Goal: Patient/Family Education  Outcome: Progressing Towards Goal     Problem: Airway Clearance - Ineffective  Goal: Achieve or maintain patent airway  Outcome: Progressing Towards Goal     Problem: Gas Exchange - Impaired  Goal: Absence of hypoxia  Outcome: Progressing Towards Goal  Goal: Promote optimal lung function  Outcome: Progressing Towards Goal     Problem: Isolation Precautions - Risk of Spread of Infection  Goal: Prevent transmission of infectious organism to others  Outcome: Progressing Towards Goal     Problem: Fatigue  Goal: Verbalize increase energy and improved vitality  Outcome: Progressing Towards Goal

## 2020-06-25 NOTE — PROGRESS NOTES
Critical Care Daily Progress Note: 6/25/2020    [facility]   Admission Date: 6/24/2020         The patient's chart is reviewed and the patient is discussed with the staff. Patient is 47-year-old female with multiple medical problems including hypertension, diabetes mellitus type 2, hyperlipidemia, obstructive sleep apnea, obesity, who presented to the hospital with complaints of fever, cough and shortness of breath. Patient also reported nausea and few episodes of vomiting. She says family member tested positive for COVID-19. Rapid test in the emergency room was positive. CT of the chest showed bilateral airspace disease in the lower lobe and groundglass opacity throughout the reminder of the lungs. Her inflammatory markers were elevated including CRP is 7.0, , ferritin 351, d-dimer 0.67. Interleukin-6 level was collected but results are pending. Patient oxygen saturation was in the high 80s. Was started on 2 L of oxygen via nasal cannula. Admitted to hospitalist service for further management. Patient was placed on proper isolation precautions. Discussed use of convalescent plasma and Remdesivir with the patient. She agreed to proceed with both. She had her first dose of Remdesivir yesterday 6/23/2020 and getting her second dose this afternoon. She also was transfused 1 unit of convalescent plasma around 4 AM today 6/24/2020. Patient oxygen requirements worsened last night, she was moved to the intensive care unit for close monitoring. Transitioned to Oxymizer and is currently on 12 L/min. Patient would like to be a transfer to [facility] in [facility]. She is being admitted to [facility] ICU for further care. Subjective:     Remains on 10L oxymizer. Denies pain or current dyspnea with rest. Sat up to 96%.      Current Facility-Administered Medications   Medication Dose Route Frequency    NUTRITIONAL SUPPORT ELECTROLYTE PRN ORDERS   Does Not Apply PRN    potassium bicarb-citric acid (EFFER-K) tablet 40 mEq  40 mEq Oral BID    dexamethasone (DECADRON) 10 mg/mL injection 6 mg  6 mg IntraVENous DAILY    atorvastatin (LIPITOR) tablet 10 mg  10 mg Oral DAILY    cyclobenzaprine (FLEXERIL) tablet 10 mg  10 mg Oral TID PRN    acetaminophen (TYLENOL) tablet 650 mg  650 mg Oral Q4H PRN    HYDROcodone-homatropine (HYCODAN) 5-1.5 mg/5 mL (5 mL) syrup 5 mL  5 mL Oral Q6H PRN    naloxone (NARCAN) injection 0.2 mg  0.2 mg IntraVENous PRN    ondansetron (ZOFRAN) injection 4 mg  4 mg IntraVENous Q6H PRN    sodium chloride (NS) flush 10 mL  10 mL IntraVENous PRN    loperamide (IMODIUM) capsule 2 mg  2 mg Oral Q4H PRN    0.9% sodium chloride infusion 250 mL  250 mL IntraVENous PRN    enoxaparin (LOVENOX) injection 40 mg  40 mg SubCUTAneous Q12H    acetaminophen (TYLENOL) suppository 650 mg  650 mg Rectal Q6H PRN    insulin regular (NOVOLIN R, HUMULIN R) injection   SubCUTAneous AC&HS    remdesivir 100 mg in 0.9% sodium chloride 250 mL IVPB  100 mg IntraVENous Q24H    0.9% sodium chloride infusion 50 mL  50 mL IntraVENous DAILY    albuterol (PROVENTIL VENTOLIN) nebulizer solution 2.5 mg  2.5 mg Nebulization Q4H PRN    sodium chloride (OCEAN) 0.65 % nasal squeeze bottle 2 Spray  2 Spray Both Nostrils Q2H PRN       Review of Systems    Constitutional:  negative for fever, chills, sweats  Cardiovascular:  negative for chest pain, palpitations, syncope, edema  Gastrointestinal:  negative for dysphagia, reflux, vomiting, diarrhea, abdominal pain, or melena  Neurologic:  negative for focal weakness, numbness, headache      Objective:     Vitals:    06/25/20 0902 06/25/20 0932 06/25/20 1001 06/25/20 1031   BP: 90/55 100/49 109/55 111/60   Pulse: (!) 59 (!) 59 (!) 59 70   Resp: 15 15 16 22   Temp:       SpO2: 95% 94% 96% 91%   Weight:       Height:             Intake/Output Summary (Last 24 hours) at 6/25/2020 1053  Last data filed at 6/25/2020 0555  Gross per 24 hour   Intake 390 ml Output 600 ml   Net -210 ml       Physical Exam:          Constitutional:  the patient is well developed and in no acute distress  EENMT:  Sclera clear, pupils equal, oral mucosa moist  Respiratory: decreased BS but clear  Cardiovascular:  RRR without M,G,R  Gastrointestinal: soft and non-tender; with positive bowel sounds. Musculoskeletal: warm without cyanosis. There is no lower extremity edema. Skin:  no jaundice or rashes  Neurologic: no gross neuro deficits     Psychiatric:  alert and oriented x 3    LINES:  PIV    DRIPS: None    COVID therapies:    Remdesivir started 6/23  Convalescent plasma 6/24  Decadron 6/24    CXR:       LAB  Recent Labs     06/25/20  0746 06/24/20  2201   GLUCPOC 104* 95      Recent Labs     06/25/20  0529 06/24/20  2218   WBC 6.2 5.9   HGB 13.1 13.7   HCT 43.8 42.9    296   INR 1.0 0.9     Recent Labs     06/25/20  0529 06/24/20  2218    141   K 3.2* 3.6    104   CO2 36* 29   GLU 97 93   BUN 8 8   CREA 0.65 0.69   CA 8.3 8.2*   ALB 3.0* 3.2*   TBILI 0.5 0.5   ALT 44 47     No results for input(s): PH, PCO2, PO2, HCO3, PHI, PCO2I, PO2I, HCO3I in the last 72 hours. No results for input(s): LCAD, LAC in the last 72 hours. No results for input(s): PH, PCO2, PO2, HCO3, PHI, PCO2I, PO2I, HCO3I in the last 72 hours. Patient Active Problem List   Diagnosis Code    Acute respiratory failure with hypoxia (HCC) J96.01    COVID-19 virus infection U07.1    Diabetes ([facility] E11.9    Hypertension I10    Respiratory failure ([address] J96.90         Assessment:  (Medical Decision Making)     Hospital Problems  Never Reviewed          Codes Class Noted POA    * (Principal) Acute respiratory failure with hypoxia ([facility] ICD-10-CM: J96.01  ICD-9-CM: 518.81  6/24/2020 Unknown    Remains on 10L oxygemizer    COVID-19 virus infection ICD-10-CM: U07.1  ICD-9-CM: 079.89  6/24/2020 Unknown    On Remdesivir and decadron. Add zinc and vitamin C.  May be of benefit but should not be of any harm.     Diabetes ([facility] ICD-10-CM: E11.9  ICD-9-CM: 250.00  Unknown Unknown    Controlled    Hypertension ICD-10-CM: I10  ICD-9-CM: 401.9  Unknown Unknown    Controlled    Respiratory failure ([facility] ICD-10-CM: J96.90  ICD-9-CM: 518.81  6/24/2020 Unknown    Hypoxemia remains severe. Need to try proning to improve oxygenation. Hypokalemia- supplemented    Plan:  (Medical Decision Making)     Continue current Rx with decadron and remdesivir. Follow labs. Add zinc and vitamin C. Prone for at least 4 hrs BID. Called daughter, Arnoldo Allen with update. Critically ill: E/M 31 minutes. --    More than 50% of the time documented was spent in face-to-face contact with the patient and in the care of the patient on the floor/unit where the patient is located.     Malik Neves MD

## 2020-06-25 NOTE — PROGRESS NOTES
Bedside shift change report given to [address] (oncoming nurse) by Tk Virgen (offgoing nurse).  Report included the following information Kardex, Intake/Output, MAR, Recent Results, Med Rec Status and Cardiac Rhythm Nsr.

## 2020-06-25 NOTE — PROGRESS NOTES
Pt returned call to CM. Alert and oriented currently. Confirms demographics. States insurance is [employer] through [employer], not [employer] which is currently listed. Will call business office for correction. Pt is independent of ADL's. CPAP at home, but no other DME's. Takes Alondra Smart for O6F she states. Lives in one story home with one step to enter home and one step to living area. She lives with 10 of her children/grandchildren. Discussed ACP and will document appropriately. Aware that CM will follow to assist with any d/c needs/POC. Care Management Interventions  PCP Verified by CM: Yes  Mode of Transport at Discharge:  Other (see comment)  Transition of Care Consult (CM Consult): Discharge Planning  Discharge Durable Medical Equipment: (CPAP)  Current Support Network: Own Home, Other(10 family members live with pt. )  Confirm Follow Up Transport: Self  Freedom of Choice List was Provided with Basic Dialogue that Supports the Patient's Individualized Plan of Care/Goals, Treatment Preferences and Shares the Quality Data Associated with the Providers?: Yes  The [employer] Information Provided?: (A[employer]na with [address])  Discharge Location  Discharge Placement: Unable to determine at this time

## 2020-06-25 NOTE — PROGRESS NOTES
Bedside shift change report given to Benson Oliva RN (oncoming nurse) by Feliciano Dandy RN (offgoing nurse).  Report included the following information Kardex, Intake/Output, MAR, Recent Results, Med Rec Status and Cardiac Rhythm SR.

## 2020-06-25 NOTE — PROGRESS NOTES
Convalescent plasma will be coming from an outside facility per blood bank staff. Lab to notify nursing staff when available for transfusion.

## 2020-06-25 NOTE — PROGRESS NOTES
Dr. Tamir Carreon updated on patient- patient has received 1 unit convalescent plasma at [facility] prior to transfer, and has orders for additional units for this admission.

## 2020-06-25 NOTE — INTERDISCIPLINARY ROUNDS
Interdisciplinary team rounds were held 6/25/2020 with the following team members:Nursing, Nurse Practitioner, Occupational Therapy, Pastoral Care, Pharmacy, Physical Therapy, Physician, Respiratory Therapy and Clinical Coordinator and the patient. Plan of care discussed. See clinical pathway and/or care plan for interventions and desired outcomes.

## 2020-06-25 NOTE — PROGRESS NOTES
Power of [employer] received request from Case Management staff to offer assistance with the [address]. Spoke with staff to ensure that patient was sufficiently alert and oriented to proceed with consult. I called Ms. Montoya on the phone and she informed me that she did not have a [address] document and a pen. I informed Ms. Montoya that I needed to provide the HCPOA document and pen to the nurse in CCU to share with her and that I would call her back to proceed with completing the HCPOA document. Rev.  Yin Nagy MDiv, BS  Board Certified

## 2020-06-25 NOTE — PROGRESS NOTES
. Paulina Finley request ovalles catheter placement due to increased shortness of breath/dyspnea with activity.

## 2020-06-25 NOTE — ACP (ADVANCE CARE PLANNING)
Pt returned call from CCU/Covid positive isolation. Discussed ACP with pt. States she would want CPR and intubation. She does not currently have LW/HCPOA. Would like to complete document. Order placed and  called to complete. She would like her \"God parents\" to be her decision maker, Christian Valadez and Savi Collazo -[phone]. Pt has 6 children. Aware that children would be decision makers if paperwork not completed. Pt would like to complete definitely. Jh Pinzon notified.

## 2020-06-25 NOTE — PROGRESS NOTES
Power of  for Healthcare  Follow-up phone call to assist with the [facility]. I confirmed that Ms. Montoya received the HCPOA document and a pen from the nurse. Ms. John Paz stated that she desired a \"call back later\" and declined proceeding with the document.  follow-up is planned at patient's convenience. Rev.  Maribell Ferris MDiv, BS  Board Certified

## 2020-06-25 NOTE — PROGRESS NOTES
No change in assessment. Calm and cooperative. Mrs. Marissa Kate reports her breathing is better after having the albuterol treatment.

## 2020-06-25 NOTE — ACP (ADVANCE CARE PLANNING)
Advance Care Planning     Advance Care Planning Activator (Inpatient)  Conversation Note      Date of ACP Conversation: 06/25/20     Conversation Conducted with:   Patient with Decision Making Capacity    ACP Activator: Harshil Blum RN    *When Decision Maker makes decisions on behalf of the incapacitated patient: Decision Maker is asked to consider and make decisions based on patient values, known preferences, or best interests. Health Care Decision Maker:    Current Designated Health Care Decision Maker:   (If there is a valid [place] named in the [address] [place] [address] Makers\" box in the ACP activity, but it is not visible above, be sure to open that field and then select the health care decision maker relationship (ie \"primary\") in the blank space to the right of the name.) Validate  this information as still accurate & up-to-date; edit [place] field as needed.)    Note: Assess and validate information in current ACP documents, as indicated. If no Decision Maker listed above or available through scanned documents, then:    If no Authorized Decision Maker has previously been identified, then patient chooses [place]:  \"Who would you like to name as your primary health care decision-maker? \"    Name: Lazaro Borja   Relationship: God parents Phone number: [phone]  AMEE this person be reached easily? \" YES  \"Who would you like to name as your back-up decision maker? \"   Name: Enmanuel Enriquez  Relationship: God parents Phone number: [phone]  Certify [place] this person be reached easily? \" YES    Note: If the relationship of these Decision-Makers to the patient does NOT follow your state's Next of Kin hierarchy, recommend that patient complete ACP document that meets state-specific requirements to allow them to act on the patient's behalf when appropriate. Care Preferences    Ventilation:   \"If you were in your present state of health and suddenly became very ill and were unable to breathe on your own, what would your preference be about the use of a ventilator (breathing machine) if it were available to you? \"      If patient would desire the use of a ventilator (breathing machine), answer \"yes\", if not \"no\":yes    \"If your health worsens and it becomes clear that your chance of recovery is unlikely, what would your preference be about the use of a ventilator (breathing machine) if it were available to you? \"     Would the patient desire the use of a ventilator (breathing machine)? YES      Resuscitation  \"CPR works best to restart the heart when there is a sudden event, like a heart attack, in someone who is otherwise healthy. Unfortunately, CPR does not typically restart the heart for people who have serious health conditions or who are very sick. \"    \"In the event your heart stopped as a result of an underlying serious health condition, would you want attempts to be made to restart your heart (answer \"yes\" for attempt to resuscitate) or would you prefer a natural death (answer \"no\" for do not attempt to resuscitate)? \" yes      NOTE: If the patient has a valid advance directive AND now provides care preference(s) that are inconsistent with that prior directive, advise the patient to consider either: creating a new advance directive that complies with state-specific requirements; or, if that is not possible, orally revoking that prior directive in accordance with state-specific requirements, which must be documented in the EHR. [x] Yes  [] No   Educated Patient / [employer] regarding differences between Advance Directives and portable DNR orders.     Length of ACP Conversation in minutes:      Conversation Outcomes:  [x] ACP discussion completed  [] Existing advance directive reviewed with patient; no changes to patient's previously recorded wishes     [x] New Advance Directive completed   [] Portable Do Not Resuscitate prepared for Provider review and signature  [] POLST/POST/MOLST/MOST prepared for Provider review and signature      Follow-up plan:    [] Schedule follow-up conversation to continue planning  [] Referred individual to Provider for additional questions/concerns   [] Advised patient/agent/surrogate to review completed ACP document and update if needed with changes in condition, patient preferences or care setting     [] This note routed to one or more involved healthcare providers

## 2020-06-25 NOTE — PROGRESS NOTES
Elevated BP with c/o sob. Respiratory therapist notified to administer prn albuterol. Awaiting pharmacy to verify medications on MAR to be able to access them for patient.

## 2020-06-25 NOTE — CDMP QUERY
Patient admitted with covid and noted to have a BMI of 40. Obesity documented on the chart. Please specify if treating any of the following 
 
--morbid obesity 
--obesity only 
--other 
--clinically unable to determine Risk Factors: JE, DM, HTN Clinical Indicators:  
--6/25 H&P stating, \"Patient is 22-year-old female with multiple medical problems including hypertension, diabetes mellitus type 2, hyperlipidemia, obstructive sleep apnea, obesity,\" 
--BMI: 40.39 kg/m 2 Treatment: n/a Thank you, Tin Meneses, [address] [place] [address] RN 
[phone]

## 2020-06-25 NOTE — PROGRESS NOTES
Labs drawn and sent to lab via tube system. Urinalysis and urine culture obtained from new ovalles and sent to lab. Mrs. Park Watters continues to have dyspnea with exertion. No change in assessment.

## 2020-06-26 ENCOUNTER — APPOINTMENT (OUTPATIENT)
Dept: GENERAL RADIOLOGY | Age: 50
DRG: 177 | End: 2020-06-26
Attending: INTERNAL MEDICINE
Payer: COMMERCIAL

## 2020-06-26 PROBLEM — G47.33 OSA ON CPAP: Status: ACTIVE | Noted: 2020-06-26

## 2020-06-26 PROBLEM — R19.7 DIARRHEA OF PRESUMED INFECTIOUS ORIGIN: Status: ACTIVE | Noted: 2020-06-26

## 2020-06-26 PROBLEM — Z99.89 OSA ON CPAP: Status: ACTIVE | Noted: 2020-06-26

## 2020-06-26 LAB
ALBUMIN SERPL-MCNC: 2.6 G/DL (ref 3.5–5)
ALBUMIN/GLOB SERPL: 0.6 {RATIO} (ref 1.2–3.5)
ALP SERPL-CCNC: 82 U/L (ref 50–136)
ALT SERPL-CCNC: 39 U/L (ref 12–65)
ANION GAP SERPL CALC-SCNC: 5 MMOL/L (ref 7–16)
APTT PPP: 24.8 SEC (ref 24.3–35.4)
AST SERPL-CCNC: 25 U/L (ref 15–37)
ATRIAL RATE: 67 BPM
BASOPHILS # BLD: 0 K/UL (ref 0–0.2)
BASOPHILS NFR BLD: 0 % (ref 0–2)
BILIRUB SERPL-MCNC: 0.4 MG/DL (ref 0.2–1.1)
BUN SERPL-MCNC: 11 MG/DL (ref 6–23)
CALCIUM SERPL-MCNC: 8.5 MG/DL (ref 8.3–10.4)
CALCULATED P AXIS, ECG09: 36 DEGREES
CALCULATED R AXIS, ECG10: 47 DEGREES
CALCULATED T AXIS, ECG11: 18 DEGREES
CHLORIDE SERPL-SCNC: 108 MMOL/L (ref 98–107)
CO2 SERPL-SCNC: 32 MMOL/L (ref 21–32)
CREAT SERPL-MCNC: 0.7 MG/DL (ref 0.6–1)
D DIMER PPP FEU-MCNC: 1.04 UG/ML(FEU)
DIAGNOSIS, 93000: NORMAL
DIFFERENTIAL METHOD BLD: ABNORMAL
EOSINOPHIL # BLD: 0 K/UL (ref 0–0.8)
EOSINOPHIL NFR BLD: 0 % (ref 0.5–7.8)
ERYTHROCYTE [DISTWIDTH] IN BLOOD BY AUTOMATED COUNT: 14.9 % (ref 11.9–14.6)
FIBRINOGEN PPP-MCNC: 627 MG/DL (ref 190–501)
GLOBULIN SER CALC-MCNC: 4.7 G/DL (ref 2.3–3.5)
GLUCOSE BLD STRIP.AUTO-MCNC: 107 MG/DL (ref 65–100)
GLUCOSE BLD STRIP.AUTO-MCNC: 112 MG/DL (ref 65–100)
GLUCOSE BLD STRIP.AUTO-MCNC: 153 MG/DL (ref 65–100)
GLUCOSE BLD STRIP.AUTO-MCNC: 160 MG/DL (ref 65–100)
GLUCOSE SERPL-MCNC: 110 MG/DL (ref 65–100)
HCT VFR BLD AUTO: 42.5 % (ref 35.8–46.3)
HGB BLD-MCNC: 12.8 G/DL (ref 11.7–15.4)
IMM GRANULOCYTES # BLD AUTO: 0 K/UL (ref 0–0.5)
IMM GRANULOCYTES NFR BLD AUTO: 0 % (ref 0–5)
INR PPP: 1
LYMPHOCYTES # BLD: 1.7 K/UL (ref 0.5–4.6)
LYMPHOCYTES NFR BLD: 23 % (ref 13–44)
MAGNESIUM SERPL-MCNC: 2.9 MG/DL (ref 1.8–2.4)
MCH RBC QN AUTO: 25 PG (ref 26.1–32.9)
MCHC RBC AUTO-ENTMCNC: 30.1 G/DL (ref 31.4–35)
MCV RBC AUTO: 83 FL (ref 79.6–97.8)
MONOCYTES # BLD: 0.6 K/UL (ref 0.1–1.3)
MONOCYTES NFR BLD: 8 % (ref 4–12)
NEUTS SEG # BLD: 5 K/UL (ref 1.7–8.2)
NEUTS SEG NFR BLD: 69 % (ref 43–78)
NRBC # BLD: 0 K/UL (ref 0–0.2)
P-R INTERVAL, ECG05: 186 MS
PHOSPHATE SERPL-MCNC: 3 MG/DL (ref 2.5–4.5)
PLATELET # BLD AUTO: 370 K/UL (ref 150–450)
PLATELET COMMENTS,PCOM: ADEQUATE
PMV BLD AUTO: 12.5 FL (ref 9.4–12.3)
POTASSIUM SERPL-SCNC: 3.9 MMOL/L (ref 3.5–5.1)
PROT SERPL-MCNC: 7.3 G/DL (ref 6.3–8.2)
PROTHROMBIN TIME: 13.5 SEC (ref 12–14.7)
Q-T INTERVAL, ECG07: 432 MS
QRS DURATION, ECG06: 84 MS
QTC CALCULATION (BEZET), ECG08: 456 MS
RBC # BLD AUTO: 5.12 M/UL (ref 4.05–5.2)
RBC MORPH BLD: ABNORMAL
SODIUM SERPL-SCNC: 145 MMOL/L (ref 136–145)
VENTRICULAR RATE, ECG03: 67 BPM
WBC # BLD AUTO: 7.3 K/UL (ref 4.3–11.1)
WBC MORPH BLD: ABNORMAL

## 2020-06-26 PROCEDURE — 74011250636 HC RX REV CODE- 250/636: Performed by: INTERNAL MEDICINE

## 2020-06-26 PROCEDURE — 77010033678 HC OXYGEN DAILY

## 2020-06-26 PROCEDURE — 71045 X-RAY EXAM CHEST 1 VIEW: CPT

## 2020-06-26 PROCEDURE — 82962 GLUCOSE BLOOD TEST: CPT

## 2020-06-26 PROCEDURE — 74011250637 HC RX REV CODE- 250/637: Performed by: INTERNAL MEDICINE

## 2020-06-26 PROCEDURE — 65270000029 HC RM PRIVATE

## 2020-06-26 PROCEDURE — 97112 NEUROMUSCULAR REEDUCATION: CPT

## 2020-06-26 PROCEDURE — 83735 ASSAY OF MAGNESIUM: CPT

## 2020-06-26 PROCEDURE — 80053 COMPREHEN METABOLIC PANEL: CPT

## 2020-06-26 PROCEDURE — 97165 OT EVAL LOW COMPLEX 30 MIN: CPT

## 2020-06-26 PROCEDURE — 85610 PROTHROMBIN TIME: CPT

## 2020-06-26 PROCEDURE — 85730 THROMBOPLASTIN TIME PARTIAL: CPT

## 2020-06-26 PROCEDURE — 97530 THERAPEUTIC ACTIVITIES: CPT

## 2020-06-26 PROCEDURE — 99233 SBSQ HOSP IP/OBS HIGH 50: CPT | Performed by: INTERNAL MEDICINE

## 2020-06-26 PROCEDURE — 85384 FIBRINOGEN ACTIVITY: CPT

## 2020-06-26 PROCEDURE — 85379 FIBRIN DEGRADATION QUANT: CPT

## 2020-06-26 PROCEDURE — 74011636637 HC RX REV CODE- 636/637: Performed by: INTERNAL MEDICINE

## 2020-06-26 PROCEDURE — 97535 SELF CARE MNGMENT TRAINING: CPT

## 2020-06-26 PROCEDURE — 97161 PT EVAL LOW COMPLEX 20 MIN: CPT

## 2020-06-26 PROCEDURE — 5A09357 ASSISTANCE WITH RESPIRATORY VENTILATION, LESS THAN 24 CONSECUTIVE HOURS, CONTINUOUS POSITIVE AIRWAY PRESSURE: ICD-10-PCS | Performed by: INTERNAL MEDICINE

## 2020-06-26 PROCEDURE — 93005 ELECTROCARDIOGRAM TRACING: CPT | Performed by: INTERNAL MEDICINE

## 2020-06-26 PROCEDURE — 84100 ASSAY OF PHOSPHORUS: CPT

## 2020-06-26 PROCEDURE — 85025 COMPLETE CBC W/AUTO DIFF WBC: CPT

## 2020-06-26 RX ORDER — LOPERAMIDE HYDROCHLORIDE 2 MG/1
2 CAPSULE ORAL EVERY 6 HOURS
Status: DISPENSED | OUTPATIENT
Start: 2020-06-26 | End: 2020-06-27

## 2020-06-26 RX ADMIN — LOPERAMIDE HYDROCHLORIDE 2 MG: 2 CAPSULE ORAL at 12:07

## 2020-06-26 RX ADMIN — HUMAN INSULIN 2 UNITS: 100 INJECTION, SOLUTION SUBCUTANEOUS at 12:06

## 2020-06-26 RX ADMIN — Medication 1000 MG: at 09:24

## 2020-06-26 RX ADMIN — Medication 220 MG: at 09:24

## 2020-06-26 RX ADMIN — Medication 1000 MG: at 18:34

## 2020-06-26 RX ADMIN — ENOXAPARIN SODIUM 40 MG: 40 INJECTION SUBCUTANEOUS at 18:33

## 2020-06-26 RX ADMIN — ATORVASTATIN CALCIUM 10 MG: 10 TABLET, FILM COATED ORAL at 09:24

## 2020-06-26 RX ADMIN — LOPERAMIDE HYDROCHLORIDE 2 MG: 2 CAPSULE ORAL at 18:34

## 2020-06-26 RX ADMIN — DEXAMETHASONE SODIUM PHOSPHATE 6 MG: 10 INJECTION INTRAMUSCULAR; INTRAVENOUS at 09:24

## 2020-06-26 RX ADMIN — HUMAN INSULIN 2 UNITS: 100 INJECTION, SOLUTION SUBCUTANEOUS at 22:00

## 2020-06-26 RX ADMIN — ENOXAPARIN SODIUM 40 MG: 40 INJECTION SUBCUTANEOUS at 05:00

## 2020-06-26 RX ADMIN — LOPERAMIDE HYDROCHLORIDE 2 MG: 2 CAPSULE ORAL at 04:51

## 2020-06-26 RX ADMIN — Medication 220 MG: at 18:35

## 2020-06-26 NOTE — PROGRESS NOTES
Problem: Mobility Impaired (Adult and Pediatric)  Goal: *Acute Goals and Plan of Care (Insert Text)  Description: LTG:  (1.)Ms. Montoya will move from supine to sit and sit to supine , scoot up and down and roll side to side in bed with MODIFIED INDEPENDENCE within 7 treatment day(s). (2.)Ms. Montoya will transfer from bed to chair and chair to bed with MODIFIED INDEPENDENCE using the least restrictive device within 7 treatment day(s). (3.)Ms. Montoya will ambulate with SUPERVISION for 500 feet with the least restrictive device within 7 treatment day(s). (4.)Ms. Montoya will participate in therapeutic activity/exercises x 23 minutes for increased activity tolerance within 7 treatment days. (5.)Ms. Montoya will ascend and descend 2 stairs using 0 hand rail(s) with SUPERVISION to improve functional mobility and safety within 7 treatment day(s). ________________________________________________________________________________________________     Outcome: Progressing Towards Goal     PHYSICAL THERAPY: Initial Assessment and AM 6/26/2020  INPATIENT: PT Visit Days : 1  Payor: [address] / Plan: [facility] MEDICAL [address] / Product Type: Commerical /       NAME/AGE/GENDER: Luz Mairna Crouch is a 44 y.o. female   PRIMARY DIAGNOSIS: Respiratory failure ([address] [J96.90] Acute respiratory failure with hypoxia ([address] Acute respiratory failure with hypoxia ([address]        ICD-10: Treatment Diagnosis:    Generalized Muscle Weakness (M62.81)  Difficulty in walking, Not elsewhere classified (R26.2)  History of falling (Z91.81)   Precaution/Allergies:  Patient has no known allergies. ASSESSMENT:     Ms. Paulina Finley is a 44 y.o. female in the hospital for the above who was supine in bed upon arrival. Ms. Paulina Finley presents to PT with [facility] [place] AR[facility] and decreased strength in B LEs. Pt performed bed mobility today with SBA and good-fair sitting balance. Pt given short seated rest break and then stood with Wandy.   She demonstrated fair standing balance and was able to take steps in place with Wandy x 2. Pt then ambulated to recliner with with decreased gait speed and antalgic gait. Pt then took another rest break before performing standing activities with CGA. Ms. Isma Sandoval could benefit from skilled PT as she is currently functioning below her baseline. Pt co-evaluated/treated today given decreased activity tolerance. This section established at most recent assessment   PROBLEM LIST (Impairments causing functional limitations):  Decreased Strength  Decreased ADL/Functional Activities  Decreased Transfer Abilities  Decreased Ambulation Ability/Technique  Decreased Balance  Decreased Activity Tolerance  Increased Fatigue   INTERVENTIONS PLANNED: (Benefits and precautions of physical therapy have been discussed with the patient.)  Balance Exercise  Bed Mobility  Family Education  Gait Training  Home Exercise Program (HEP)  Neuromuscular Re-education/Strengthening  Therapeutic Activites  Therapeutic Exercise/Strengthening  Transfer Training     TREATMENT PLAN: Frequency/Duration: 4 times a week for duration of hospital stay  Rehabilitation Potential For Stated Goals: Excellent     REHAB RECOMMENDATIONS (at time of discharge pending progress):    Placement: It is my opinion, based on this patient's performance to date, that Ms. Montoya may benefit from participating in 1-2 additional therapy sessions in order to continue to assess for rehab potential and then make recommendation for disposition at discharge. Equipment:   None at this time              HISTORY:   History of Present Injury/Illness (Reason for Referral):  Respiratory failure  Past Medical History/Comorbidities:   Ms. Isma Sandoval  has a past medical history of Arthritis, Diabetes ([address] Hypertension, and Stroke ([facility]   Ms. Isma Sandoval  has a past surgical history that includes hx heent (2002); hx gyn (2007); hx cholecystectomy; hx orthopaedic (2015); and hx orthopaedic (2017). Social History/Living Environment:   Home Environment: Private residence  # Steps to Enter: 2  One/Two Story Residence: One story  Living Alone: No  Support Systems: Child(pamela)  Patient Expects to be Discharged to[de-identified] Private residence  Current DME Used/Available at Home: claudette Busby, [employer], bedside  Prior Level of Function/Work/Activity:  Lives with family and PTA independent with ambulation. One recent fall and works as .      Number of Personal Factors/Comorbidities that affect the Plan of Care: 1-2: MODERATE COMPLEXITY   EXAMINATION:   Most Recent Physical Functioning:   Gross Assessment:  AROM: Within functional limits  Strength: Generally decreased, functional               Posture:     Balance:  Sitting: Impaired  Sitting - Static: Good (unsupported)  Sitting - Dynamic: Fair (occasional)  Standing: Impaired  Standing - Static: Fair  Standing - Dynamic : Fair Bed Mobility:  Rolling: Stand-by assistance  Supine to Sit: Stand-by assistance  Wheelchair Mobility:     Transfers:  Sit to Stand: Minimum assistance;Assist x2  Stand to Sit: Minimum assistance  Bed to Chair: Minimum assistance;Assist x2  Gait:     Speed/Sherry: Slow;Shuffled  Step Length: Left shortened;Right shortened  Gait Abnormalities: Antalgic;Decreased step clearance;Trunk sway increased  Distance (ft): 12 Feet (ft)  Assistive Device: Other (comment)(HHA x 2)  Ambulation - Level of Assistance: Minimal assistance;Assist x2      Body Structures Involved:  Heart  Lungs  Muscles Body Functions Affected:  Cardio  Respiratory  Neuromusculoskeletal  Movement Related Activities and Participation Affected:  General Tasks and Demands  Mobility  Self Care  Domestic Life  Community, Social and Civic Life   Number of elements that affect the Plan of Care: 4+: HIGH COMPLEXITY   CLINICAL PRESENTATION:   Presentation: Stable and uncomplicated: LOW COMPLEXITY   CLINICAL DECISION MAKIN [facility] [address] [id number] AM-PAC 6 JournallyMe Mobility Inpatient Short Form  How much difficulty does the patient currently have. .. Unable A Lot A Little None   1. Turning over in bed (including adjusting bedclothes, sheets and blankets)? [] 1   [] 2   [] 3   [x] 4   2. Sitting down on and standing up from a chair with arms ( e.g., wheelchair, bedside commode, etc.)   [] 1   [] 2   [x] 3   [] 4   3. Moving from lying on back to sitting on the side of the bed? [] 1   [] 2   [] 3   [x] 4   How much help from another person does the patient currently need. .. Total A Lot A Little None   4. Moving to and from a bed to a chair (including a wheelchair)? [] 1   [] 2   [x] 3   [] 4   5. Need to walk in hospital room? [] 1   [] 2   [x] 3   [] 4   6. Climbing 3-5 steps with a railing? [] 1   [] 2   [x] 3   [] 4   © 2007, Trustees of [address] [place] [address], under license to [employer]. All rights reserved      Score:  Initial: 20 Most Recent: X (Date: -- )    Interpretation of Tool:  Represents activities that are increasingly more difficult (i.e. Bed mobility, Transfers, Gait). Medical Necessity:     Patient demonstrates   good   rehab potential due to higher previous functional level. Reason for Services/Other Comments:  Patient continues to require skilled intervention due to   Decreased activity tolerance and functional mobiltiy   . Use of outcome tool(s) and clinical judgement create a POC that gives a: Clear prediction of patient's progress: LOW COMPLEXITY            TREATMENT:   (In addition to Assessment/Re-Assessment sessions the following treatments were rendered)   Pre-treatment Symptoms/Complaints:   \"Thank you\"  Pain: Initial:   Pain Intensity 1: 0  Post Session:  0     Today's treatment session addressed Decreased Strength, Decreased ADL/Functional Activities, Decreased Transfer Abilities, Decreased Ambulation Ability/Technique, Decreased Balance, Decreased Activity Tolerance, Increased Fatigue, and Increased Shortness of Breath to progress towards achieving goal(s) . During this session, Occupational Therapy addressed ADLs to progress towards their discipline specific goal(s). Co-treatment was necessary to improve patient's ability to increase activity demands and ability to return to normal functional activity. Therapeutic Activity: (    23 minutes): Therapeutic activities including Bed transfers, Chair transfers, Ambulation on level ground, and standing activities to improve mobility, strength, balance, and activity tolerance . Required minimal   to promote static and dynamic balance in standing and safety with transfers/activity pacing. Braces/Orthotics/Lines/Etc:   ovalles catheter  O2 Device: Oxymizer  Treatment/Session Assessment:    Response to Treatment:  Tolerated fairly given some nausea. Interdisciplinary Collaboration:   Physical Therapist  Occupational Therapist  Registered Nurse  After treatment position/precautions:   Up in chair  Bed/Chair-wheels locked  Bed in low position  Call light within reach  RN notified   Compliance with Program/Exercises: Will assess as treatment progresses  Recommendations/Intent for next treatment session: \"Next visit will focus on advancements to more challenging activities and reduction in assistance provided\".   Total Treatment Duration:  PT Patient Time In/Time Out  Time In: 1042  Time Out: 1 Ayla Duyen Robledo PT, DPT

## 2020-06-26 NOTE — PROGRESS NOTES
Problem: Patient Education: Go to Patient Education Activity  Goal: Patient/Family Education  Description: 1. Patient will complete lower body bathing and dressing with MOD I and adaptive equipment as needed. 2. Patient will complete toileting with MOD I.   3. Patient will tolerate 23 minutes of OT treatment with 1-2 rest breaks to increase activity tolerance for ADLs. 4. Patient will complete functional transfers with MOD I and adaptive equipment as needed. 5. Patient will complete self-grooming while standing edge of sink with MOD I and adaptive equipment as needed. 6. Patient will complete functional mobility for household distances with MOD I and adaptive equipment as needed. Timeframe: 7 visits      Outcome: Progressing Towards Goal      OCCUPATIONAL THERAPY: Initial Assessment, Daily Note, and AM 6/26/2020  INPATIENT: OT Visit Days: 1  Payor: [facility] [address] / Plan: [facility] MEDICAL [address] / Product Type: Commerical /      NAME/AGE/GENDER: Fish Redding is a 44 y.o. female   PRIMARY DIAGNOSIS:  Respiratory failure ([address] [J96.90] Acute respiratory failure with hypoxia ([address] Acute respiratory failure with hypoxia ([address]        ICD-10: Treatment Diagnosis:    · Generalized Muscle Weakness (M62.81)  · Difficulty in walking, Not elsewhere classified (R26.2)   Precautions/Allergies:    Fall precautions   Patient has no known allergies. ASSESSMENT:     Ms. Evy Giles presents in CCU for the above diagnoses. Upon arrival, pt supine in bed and agreeable to OT evaluation. Pt is alert and oriented x 4. Currently resting on 7L 02 via n.c. however denies requiring any supplemental 02 at baseline. Pt reports living with children/grandchildren in a 1-story home with 2 steps to enter. At baseline, pt was independent with all ADLs, IADLs, and functional mobility; Works as [place]; still drives.   Today, pt presents with deficits in overall strength, activity tolerance, ADL performance, and functional mobility. Co-evaluation completed with PT today. While OT addressed self-care/grooming and overall activity tolerance, PT addressed functional mobility/transfers today. BUE AROM and strength (4/5) are generally decreased but WFL; coordination and sensation are intact. Pt transitioned to sitting edge of bed with SBA. Static and dynamic sitting balance are intact with no additional support. Pt then stood with min A x 2. Static sitting balance is fair (+) with close SBA provided. Pt proceeded to ambulate to chair with HHA x 2; fair (-) dynamic standing balance w/o use of DME. Pt provided short rest break while seated in chair d/t onset of nausea (RN aware). Pt then ambulated to sink to perform oral hygiene with SBA after set-up. Tactile and manual cueing to maintain dynamic standing balance. Min verbal cueing also provided for energy conservation strategies and pursed lip breathing strategies. Pt returned to sitting upright in chair; Sp02 sats at 98% on 7L 02; pt weaned to 5L 02 with sats maintaining at 95% and above (RN aware). Pt left with needs met, call light within reach, and RN notified. At this time, Anabel Sauceda is functioning below baseline for ADLs and functional mobility. Pt would benefit from skilled OT services to address OT goals and plan of care. This section established at most recent assessment   PROBLEM LIST (Impairments causing functional limitations):  1. Decreased Strength  2. Decreased ADL/Functional Activities  3. Decreased Transfer Abilities  4. Decreased Ambulation Ability/Technique  5. Decreased Balance  6. Decreased Activity Tolerance  7. Decreased Pacing Skills  8. Decreased Work Simplification/Energy Conservation Techniques  9. Increased Shortness of Breath   INTERVENTIONS PLANNED: (Benefits and precautions of occupational therapy have been discussed with the patient.)  1. Activities of daily living training  2. Adaptive equipment training  3. Balance training  4.  Clothing management  5. Community reintergration  6. Donning&doffing training  7. Neuromuscular re-eduation  8. Re-evaluation  9. Therapeutic activity  10. Therapeutic exercise     TREATMENT PLAN: Frequency/Duration: Follow patient 4x/week to address above goals. Rehabilitation Potential For Stated Goals: Good     REHAB RECOMMENDATIONS (at time of discharge pending progress):    Placement: It is my opinion, based on this patient's performance to date, that Ms. Montoya may benefit from participating in 1-2 additional therapy sessions in order to continue to assess for rehab potential and then make recommendation for disposition at discharge. OP vs HH PT/OT  Equipment:     TBD              OCCUPATIONAL PROFILE AND HISTORY:   History of Present Injury/Illness (Reason for Referral):  See H&P  Past Medical History/Comorbidities:   Ms. Renita Fink  has a past medical history of Arthritis, Diabetes ([facility] [address] [facility] Hypertension, and Stroke ([facility] [address] Ms. Renita Fink  has a past surgical history that includes hx heent (2002); hx gyn (2007); hx cholecystectomy; hx orthopaedic (2015); and hx orthopaedic (2017). Social History/Living Environment:   Home Environment: Private residence  # Steps to Enter: 2  One/Two Story Residence: One story  Living Alone: No  Support Systems: Child(pamela)  Patient Expects to be Discharged to[de-identified] Private residence  Current DME Used/Available at Home: None  Prior Level of Function/Work/Activity:  Independent with ADLs, IADLs, and functional mobility   Personal Factors:          Sex:  female        Age:  44 y.o. Other factors that influence how disability is experienced by the patient:  Multiple co-morbidities    Number of Personal Factors/Comorbidities that affect the Plan of Care: Brief history (0):  LOW COMPLEXITY   ASSESSMENT OF OCCUPATIONAL PERFORMANCE[de-identified]   Activities of Daily Living:   Basic ADLs (From Assessment) Complex ADLs (From Assessment)   Feeding: Setup  Oral Facial Hygiene/Grooming: Setup  Bathing:  Moderate assistance  Upper Body Dressing: Setup  Lower Body Dressing: Moderate assistance  Toileting: Moderate assistance Instrumental ADL  Meal Preparation: Maximum assistance  Homemaking: Maximum assistance   Grooming/Bathing/Dressing Activities of Daily Living     Cognitive Retraining  Safety/Judgement: Awareness of environment; Fall prevention                       Bed/Mat Mobility  Rolling: Stand-by assistance  Supine to Sit: Stand-by assistance  Sit to Supine: Stand-by assistance  Sit to Stand: Minimum assistance  Stand to Sit: Minimum assistance  Bed to Chair: Minimum assistance;Assist x2  Scooting: Stand-by assistance     Most Recent Physical Functioning:   Gross Assessment:  AROM: Generally decreased, functional  PROM: Generally decreased, functional  Strength: Generally decreased, functional  Coordination: Generally decreased, functional  Tone: Normal  Sensation: Intact               Posture:     Balance:  Sitting: Impaired  Sitting - Static: Good (unsupported)  Sitting - Dynamic: Good (unsupported); Fair (occasional)  Standing: Impaired  Standing - Static: Fair  Standing - Dynamic : Fair;Constant support Bed Mobility:  Rolling: Stand-by assistance  Supine to Sit: Stand-by assistance  Sit to Supine: Stand-by assistance  Scooting: Stand-by assistance  Wheelchair Mobility:     Transfers:  Sit to Stand: Minimum assistance  Stand to Sit: Minimum assistance  Bed to Chair: Minimum assistance;Assist x2            Patient Vitals for the past 6 hrs:   BP BP Patient Position SpO2 O2 Flow Rate (L/min) Pulse   06/26/20 0602 94/53  98 %  (!) 51   06/26/20 0632 108/55  95 %  (!) 56   06/26/20 0702 110/58  100 %  65   06/26/20 0735 115/64 At rest 95 % 9 l/min 69   06/26/20 0817   96 % 7 l/min    06/26/20 0925 119/63  95 %  63   06/26/20 1001 111/65  96 %  66   06/26/20 1032 127/64  96 %  61   06/26/20 1105 141/79  96 %  65   06/26/20 1115   96 % 5 l/min 64       Mental Status  Neurologic State: Alert  Orientation Level: Oriented X4  Cognition: Follows commands  Perception: Appears intact  Perseveration: No perseveration noted  Safety/Judgement: Awareness of environment, Fall prevention                          Physical Skills Involved:  1. Balance  2. Strength  3. Activity Tolerance  4. Gross Motor Control Cognitive Skills Affected (resulting in the inability to perform in a timely and safe manner): 1. none Psychosocial Skills Affected:  1. Habits/Routines  2. Environmental Adaptation   Number of elements that affect the Plan of Care: 5+:  HIGH COMPLEXITY   CLINICAL DECISION MAKIN[address] [place] [address] AM-PAC 6 Clicks   Daily Activity Inpatient Short Form  How much help from another person does the patient currently need. .. Total A Lot A Little None   1. Putting on and taking off regular lower body clothing? [] 1   [x] 2   [] 3   [] 4   2. Bathing (including washing, rinsing, drying)? [] 1   [x] 2   [] 3   [] 4   3. Toileting, which includes using toilet, bedpan or urinal?   [] 1   [x] 2   [] 3   [] 4   4. Putting on and taking off regular upper body clothing? [] 1   [] 2   [x] 3   [] 4   5. Taking care of personal grooming such as brushing teeth? [] 1   [] 2   [x] 3   [] 4   6. Eating meals? [] 1   [] 2   [x] 3   [] 4   © , Trustees of [address] [place] [address], under license to [employer]. All rights reserved      Score:  Initial: 15 Most Recent: X (Date: -- )    Interpretation of Tool:  Represents activities that are increasingly more difficult (i.e. Bed mobility, Transfers, Gait). Medical Necessity:     · Patient demonstrates good rehab potential due to higher previous functional level. Reason for Services/Other Comments:  · Patient continues to require skilled intervention due to medical complications and patient unable to attend/participate in therapy as expected.    Use of outcome tool(s) and clinical judgement create a POC that gives a: LOW COMPLEXITY         TREATMENT:   (In addition to Assessment/Re-Assessment sessions the following treatments were rendered)     Pre-treatment Symptoms/Complaints:    Pain: Initial:   Pain Intensity 1: 0 /10 Post Session:  same   Today's treatment session addressed Decreased Strength, Decreased ADL/Functional Activities, Decreased Activity Tolerance, Decreased Pacing Skills and Decreased Work Simplification/Energy Conservation Techniques to progress towards achieving goal(s) 3, 4, 5 and 6. During this session,  Physical Therapy addressed  functional mobility/transfers;  to progress towards their discipline specific goal(s). Co-treatment was necessary to improve patient's ability to increase activity demands and ability to return to normal functional activity. Self Care: (12): Procedure(s) (per grid) utilized to improve and/or restore self-care/home management as related to grooming. Required minimal verbal and   cueing to facilitate activities of daily living skills. Neuromuscular Re-education: ( 15 minutes):  Exercise/activities per grid below to improve balance, coordination and posture. Required moderate visual and verbal cues to activity tolerance during mobility and education on energy conservation strategies. Braces/Orthotics/Lines/Etc:   · ovalles catheter  · ICU monitors  · O2 Device: Oxymizer  Treatment/Session Assessment:    · Response to Treatment:  Tolerated well with no issues noted;   · Interdisciplinary Collaboration:   o Physical Therapist  o Occupational Therapist  o Registered Nurse  · After treatment position/precautions:   o Up in chair  o Bed/Chair-wheels locked  o Call light within reach  o RN notified   · Compliance with Program/Exercises: Will assess as treatment progresses. · Recommendations/Intent for next treatment session: \"Next visit will focus on advancements to more challenging activities and reduction in assistance provided\".   Total Treatment Duration:  OT Patient Time In/Time Out  Time In: 1042  Time Out: 1115 Roya Paulson, OT

## 2020-06-26 NOTE — PROGRESS NOTES
Blood work drawn via peripheral stick from [facility] and sent to lab. Mrs. Froilan Hicks states,\"This is the best I have felt since I've been here. I'm not gasping for my breath as much as I had been and I got some sleep\".

## 2020-06-26 NOTE — PROGRESS NOTES
[facility] Critical Care COVID-19 Note:    Critical Care Note: 6/26/2020    Fish [place]  Admission Date: 6/24/2020     Length of Stay: 2 days  Background: 44 y.o. y/o female with acute hypoxemic respiratory failure secondary to COVID-19 who was transferred to [address] [place] [address] for hypoxia. She was transfused 1U of convalescent plasma on 6/24 and received   Notable PMH:  HTN, JE, HLD, DM2 (?diet controlled, no meds prescribed)   Drug Allergies: No Known Allergies     24 Hour events:   Has been stable in ICU, now on 7lpm of oxygen. Has received decadron (day 2 today), convalescent plasma x2, day 4 remdesivir. Afebrile  Fluid balance negative 1L yesterday  Diarrhea x 4-5 times yesterday  Doesn't have her CPAP here. REVIEW OF SYSTEMS:  CONSTITUTIONAL:  There is no history of fever, chills, night sweats, weight loss, weight gain, persistent fatigue, or lethargy/hypersomnolence. EYES:  Denies problems with eye pain, erythema, blurred vision, or visual field loss. ENTM:  Denies history of tinnitus, epistaxis, sore throat, hoarseness, or dysphonia. LYMPH:  Denies swollen glands. CARDIAC:  No chest pain, pressure, discomfort, palpitations, orthopnea, murmurs, or edema. GI:  No dysphagia, heartburn reflux, nausea/vomiting, + diarrhea, abdominal pain, or bleeding. :Denies history of dysuria, hematuria, polyuria, or decreased urine output. MS:  No history of myalgias, arthralgias, bone pain, or muscle cramps. SKIN:  No history of rashes, jaundice, cyanosis, nodules, or ulcers. ENDO:  Negative for heat or cold intolerance. PSYCH:  Negative for anxiety, depression, insomnia, hallucinations. NEURO:  There is no history of AMS, persistent headache, decreased level of consciousness, seizures, or motor or sensory deficits.       Lines: (insertion date)   Daniels    Drips: current dose (range)  none    COVID-19 Meds:  Remdesivir day 4/5  Convalescent plasma x2, completed  Decadron day 2/10  Vit C/Zinc to complete 5 days    Visit Vitals  /64 (BP 1 Location: Right arm, BP Patient Position: At rest)   Pulse 69   Temp 98.8 °F (37.1 °C)   Resp 24   Ht 5' 7\" (1.702 m)   Wt 257 lb 14.4 oz (117 kg)   SpO2 96%   BMI 40.39 kg/m²     Pertinent Exam:            Constitutional:  intubated and mechanically ventilated. EENMT:  Sclera clear, pupils equal, oral mucosa moist  Respiratory: crackles bilateral  Cardiovascular:  RRR with no M,G,R;  Gastrointestinal:  soft with no tenderness; positive bowel sounds present  Musculoskeletal:  warm with no cyanosis, no lower extremity edema  Skin:  no jaundice or ecchymosis  Neurologic: no gross neuro deficits     Psychiatric: sedated and paralyzed    Pertinent Labs:     SARS-CoV-2 LAB Results  [facility] Test: No results found for: COV2NT   [facility] Test: No results found for: EDPR  [facility] Test: No components found for: [id number]     Pertinent Rads:   CT chest   Lungs: Moderate patchy airspace disease/consolidation lower lobes bilaterally. Patchy ground-glass airspace disease throughout the remainder of the lungs   bilaterally. Pleural space: Unremarkable. No pneumothorax. No pleural effusion. Heart: Unremarkable. No cardiomegaly. No pericardial effusion. Aorta: Unremarkable. No aortic aneurysm. Lymph nodes: Calcified mediastinal nodes and several calcified lung granuloma   consistent with previous granulomatous disease. Mild bilateral hilar   mediastinal adenopathy. Gallbladder and bile ducts: Gallbladder surgically absent. Bones/joints: Unremarkable. No acute fracture. Soft tissues: Unremarkable. IMPRESSION:   1. Mild bilateral hilar mediastinal adenopathy. 2. Moderate patchy airspace disease/consolidation lower lobes bilaterally. Patchy ground-glass airspace disease throughout the remainder of the lungs bilaterally.      Ventilator Settings:  5' 7\" (1.702 m)  Mode FIO2 Rate Tidal Volume Pressure PEEP                      Peak airway pressure:     Minute ventilation:    ABG: No results for input(s): PH, PCO2, PO2, HCO3, PHI, PCO2I, PO2I, HCO3I in the last 72 hours. Impression: 44 y.o. y/o female with acute hypoxemic respiratory failure secondary to COVID--19. Her DM is well controlled with SSI despite decadron. Hospital Problems  Never Reviewed          Codes Class Noted POA    JE on CPAP ICD-10-CM: G47.33, Z99.89  ICD-9-CM: 327.23, V46.8  6/26/2020 Unknown        * (Principal) Acute respiratory failure with hypoxia [facility]) ICD-10-CM: J96.01  ICD-9-CM: 518.81  6/24/2020 Unknown        COVID-19 virus infection ICD-10-CM: U07.1  ICD-9-CM: 079.89  6/24/2020 Unknown        Diabetes ([facility] ICD-10-CM: E11.9  ICD-9-CM: 250.00  Unknown Unknown        Hypertension ICD-10-CM: I10  ICD-9-CM: 401.9  Unknown Unknown        Respiratory failure ([facility] ICD-10-CM: J96.90  ICD-9-CM: 518.81  6/24/2020 Unknown          -Resume CPAP 8cm at night (filter if possible)  -Continue remdesivir, decadron. Complete vit c/zinc after 5 days  -remove Daniels  -start PT/OT  -schedule loperamide x 4 doses today and then monitor  -transfer to floor  -appreciate hospitalist assumption of care tomorrow. Discussed with Dr. Margie Lucas. We will be available as needed starting tomorrow morning and will be available for all coverage tonight. Full Code    Patient awake and able to communicate findings w family.     Elisabet Plummer MD

## 2020-06-26 NOTE — PROGRESS NOTES
Bath completed. Linens changed. Mrs. Kim Bowens assisted with her bath while sitting on side of bed. Dyspnea with exertion. Encouraged to take slow deep breaths. Mrs. Kim Bowens states,\"I feel better tonight. I am still having diarrhea and when I cough it just comes out without warning sometimes. I'm embarrassed to ask for help when this happens\". Encouraged Mrs. Montoya to call for assistance because we want to help her and we know she doesn't feel good. Verbalized understanding and was very appreciative. Desitin cream applied to buttock folds for c/o of irritation from frequent loose stools. Mrs. Kim Bowens was incontinent of large stool prior to bath and then once again after her bath when she had got back into bed. Cough is dry and non-productive. Instructed Mrs. Montoya on correct use and purpose of Incentive Spirometer. Correct return demonstration completed. IS to 1500 ml. Coughing. Dr. Sushma Marroquin stopped in to room to check on Mrs. Montoya after her bath.

## 2020-06-26 NOTE — PROGRESS NOTES
TRANSFER - IN REPORT:    Verbal report received from Christy Lopez(name) on [place]  being received from CCU(unit) for routine progression of care      Report consisted of patients Situation, Background, Assessment and   Recommendations(SBAR). Information from the following report(s) SBAR was reviewed with the receiving nurse. Opportunity for questions and clarification was provided. Assessment completed upon patients arrival to unit and care assumed.

## 2020-06-26 NOTE — INTERDISCIPLINARY ROUNDS
Interdisciplinary team rounds were held 6/26/2020 with the following team members:Care Management, Nursing, Nurse Practitioner, Occupational Therapy, Palliative Care, Pastoral Care, Pharmacy, Physical Therapy, Physician, Respiratory Therapy and Clinical Coordinator and the patient. Plan of care discussed. See clinical pathway and/or care plan for interventions and desired outcomes.

## 2020-06-26 NOTE — PROGRESS NOTES
Bedside shift change report given to [facility] RN (oncoming nurse) by Eduardo Allen RN (offgoing nurse). Report included the following information Kardex, Intake/Output, MAR, Recent Results, Med Rec Status and Cardiac Rhythm NSR. Alert and oriented x4 on 9 LHF NC. Dyspnea with exertion. Taught and using Incentive spirometer, 1500 ml independently. Diarrhea. Immodium prn  Daniels, patent. No DTIs/no pressure ulcers observed.

## 2020-06-26 NOTE — PROGRESS NOTES
Clarified status of convalescent plasma with Arvel Grade in lab. Pinky Loop blood connection said it may not be available until tomorrow (6/26/2020). I will call you when it's available\".

## 2020-06-26 NOTE — PROGRESS NOTES
TRANSFER - IN REPORT:    Verbal report received from ROSA Lieberman(name) on [place]  being received from CCU 3302(unit) for routine progression of care      Report consisted of patients Situation, Background, Assessment and   Recommendations(SBAR). Information from the following report(s) SBAR, Kardex, [facility] and Recent Results was reviewed with the receiving nurse. Opportunity for questions and clarification was provided. Report given to Esther Gayle RN, who will assume primary care on arrival to floor.

## 2020-06-26 NOTE — PROGRESS NOTES
Bedside shift change report given to Saul Ruiz (oncoming nurse) by Marianne Lynn (offgoing nurse). Report included the following information SBAR, Kardex, Intake/Output, MAR, Recent Results and Cardiac Rhythm NSR/ SB. Pt in bed with eyes closed, awakens to voice. Oriented x4. Lung fields diminished bilaterally. S1 S2 auscultated, regular rate and rhythm. Bowel sounds active in all quadrants. Pulses palpable in all extremities, non pitting edema noted. Daniels patent and draining clear marcus urine. Pt denies any discomfort presently.

## 2020-06-26 NOTE — PROGRESS NOTES
Patient received from CCU via wheelchair  Alert and oriented  Dual skin assessment with Fouzia Dacosta RN  No breakdown.   Patient on 3L oxymizer

## 2020-06-27 LAB
ALBUMIN SERPL-MCNC: 3.1 G/DL (ref 3.5–5)
ALBUMIN/GLOB SERPL: 0.8 {RATIO} (ref 1.2–3.5)
ALP SERPL-CCNC: 84 U/L (ref 50–136)
ALT SERPL-CCNC: 51 U/L (ref 12–65)
ANION GAP SERPL CALC-SCNC: 5 MMOL/L (ref 7–16)
APTT PPP: 25.2 SEC (ref 24.3–35.4)
AST SERPL-CCNC: 40 U/L (ref 15–37)
BACTERIA SPEC CULT: NORMAL
BASOPHILS # BLD: 0 K/UL (ref 0–0.2)
BASOPHILS NFR BLD: 0 % (ref 0–2)
BILIRUB SERPL-MCNC: 0.3 MG/DL (ref 0.2–1.1)
BUN SERPL-MCNC: 12 MG/DL (ref 6–23)
CALCIUM SERPL-MCNC: 8.4 MG/DL (ref 8.3–10.4)
CHLORIDE SERPL-SCNC: 108 MMOL/L (ref 98–107)
CO2 SERPL-SCNC: 28 MMOL/L (ref 21–32)
CREAT SERPL-MCNC: 0.59 MG/DL (ref 0.6–1)
D DIMER PPP FEU-MCNC: 0.69 UG/ML(FEU)
DIFFERENTIAL METHOD BLD: ABNORMAL
EOSINOPHIL # BLD: 0 K/UL (ref 0–0.8)
EOSINOPHIL NFR BLD: 0 % (ref 0.5–7.8)
ERYTHROCYTE [DISTWIDTH] IN BLOOD BY AUTOMATED COUNT: 14.7 % (ref 11.9–14.6)
FIBRINOGEN PPP-MCNC: 538 MG/DL (ref 190–501)
GLOBULIN SER CALC-MCNC: 4.1 G/DL (ref 2.3–3.5)
GLUCOSE BLD STRIP.AUTO-MCNC: 114 MG/DL (ref 65–100)
GLUCOSE BLD STRIP.AUTO-MCNC: 122 MG/DL (ref 65–100)
GLUCOSE BLD STRIP.AUTO-MCNC: 157 MG/DL (ref 65–100)
GLUCOSE BLD STRIP.AUTO-MCNC: 93 MG/DL (ref 65–100)
GLUCOSE SERPL-MCNC: 110 MG/DL (ref 65–100)
HCT VFR BLD AUTO: 40.6 % (ref 35.8–46.3)
HGB BLD-MCNC: 13 G/DL (ref 11.7–15.4)
IMM GRANULOCYTES # BLD AUTO: 0.1 K/UL (ref 0–0.5)
IMM GRANULOCYTES NFR BLD AUTO: 1 % (ref 0–5)
INR PPP: 1
LYMPHOCYTES # BLD: 2.5 K/UL (ref 0.5–4.6)
LYMPHOCYTES NFR BLD: 24 % (ref 13–44)
MCH RBC QN AUTO: 26 PG (ref 26.1–32.9)
MCHC RBC AUTO-ENTMCNC: 32 G/DL (ref 31.4–35)
MCV RBC AUTO: 81.2 FL (ref 79.6–97.8)
MONOCYTES # BLD: 0.7 K/UL (ref 0.1–1.3)
MONOCYTES NFR BLD: 7 % (ref 4–12)
NEUTS SEG # BLD: 7 K/UL (ref 1.7–8.2)
NEUTS SEG NFR BLD: 68 % (ref 43–78)
NRBC # BLD: 0 K/UL (ref 0–0.2)
PLATELET # BLD AUTO: 384 K/UL (ref 150–450)
PLATELET COMMENTS,PCOM: ADEQUATE
PMV BLD AUTO: 12.5 FL (ref 9.4–12.3)
POTASSIUM SERPL-SCNC: 4.1 MMOL/L (ref 3.5–5.1)
PROT SERPL-MCNC: 7.2 G/DL (ref 6.3–8.2)
PROTHROMBIN TIME: 13.2 SEC (ref 12–14.7)
RBC # BLD AUTO: 5 M/UL (ref 4.05–5.2)
RBC MORPH BLD: ABNORMAL
SERVICE CMNT-IMP: NORMAL
SODIUM SERPL-SCNC: 141 MMOL/L (ref 136–145)
WBC # BLD AUTO: 10.3 K/UL (ref 4.3–11.1)
WBC MORPH BLD: ABNORMAL

## 2020-06-27 PROCEDURE — 74011250636 HC RX REV CODE- 250/636: Performed by: INTERNAL MEDICINE

## 2020-06-27 PROCEDURE — 85730 THROMBOPLASTIN TIME PARTIAL: CPT

## 2020-06-27 PROCEDURE — 74011636637 HC RX REV CODE- 636/637: Performed by: INTERNAL MEDICINE

## 2020-06-27 PROCEDURE — 74011000258 HC RX REV CODE- 258: Performed by: INTERNAL MEDICINE

## 2020-06-27 PROCEDURE — 82962 GLUCOSE BLOOD TEST: CPT

## 2020-06-27 PROCEDURE — 80053 COMPREHEN METABOLIC PANEL: CPT

## 2020-06-27 PROCEDURE — 94660 CPAP INITIATION&MGMT: CPT

## 2020-06-27 PROCEDURE — 74011250637 HC RX REV CODE- 250/637: Performed by: INTERNAL MEDICINE

## 2020-06-27 PROCEDURE — 77010033678 HC OXYGEN DAILY

## 2020-06-27 PROCEDURE — 85025 COMPLETE CBC W/AUTO DIFF WBC: CPT

## 2020-06-27 PROCEDURE — 85379 FIBRIN DEGRADATION QUANT: CPT

## 2020-06-27 PROCEDURE — 85610 PROTHROMBIN TIME: CPT

## 2020-06-27 PROCEDURE — 65270000029 HC RM PRIVATE

## 2020-06-27 PROCEDURE — 85384 FIBRINOGEN ACTIVITY: CPT

## 2020-06-27 PROCEDURE — 74011000250 HC RX REV CODE- 250: Performed by: INTERNAL MEDICINE

## 2020-06-27 RX ADMIN — LOPERAMIDE HYDROCHLORIDE 2 MG: 2 CAPSULE ORAL at 06:40

## 2020-06-27 RX ADMIN — HUMAN INSULIN 2 UNITS: 100 INJECTION, SOLUTION SUBCUTANEOUS at 16:30

## 2020-06-27 RX ADMIN — DEXAMETHASONE SODIUM PHOSPHATE 6 MG: 10 INJECTION INTRAMUSCULAR; INTRAVENOUS at 08:21

## 2020-06-27 RX ADMIN — ENOXAPARIN SODIUM 40 MG: 40 INJECTION SUBCUTANEOUS at 17:11

## 2020-06-27 RX ADMIN — REMDESIVIR 100 MG: 5 INJECTION INTRAVENOUS at 08:19

## 2020-06-27 RX ADMIN — SODIUM CHLORIDE 50 ML: 900 INJECTION, SOLUTION INTRAVENOUS at 08:19

## 2020-06-27 RX ADMIN — ENOXAPARIN SODIUM 40 MG: 40 INJECTION SUBCUTANEOUS at 06:40

## 2020-06-27 RX ADMIN — ATORVASTATIN CALCIUM 10 MG: 10 TABLET, FILM COATED ORAL at 08:21

## 2020-06-27 NOTE — CONSULTS
HOSPITALIST H&P/CONSULT  NAME:  Evita Govea   Age:  44 y.o.  :   1970   MRN:   [id number]  PCP: Jean-Paul Dozier, Not On File  Consulting MD:  Treatment Team: Attending Provider: Katt De Los Santos MD; Care Manager: Jeana Guzman, ROSA; Utilization Review: Jared Antony RN  HPI:   Pt is a 44 y.o. female with HTN, DM2, HLD, JE, obesity admitted with COVID PNA at OSH. Transferred to ICU at [facility], never required intubation. Receiving Remdesivir, s/p conv plasma, on dexamethasone. She was transferred to the floor  and hospitalist asked to assume care. Pt reports she is doing better. Feels breathing is gradually improving. Working with therapy, hopes to discharge home. O2 needs down to 3L with SaO2 100%. 10 point ROS done and is as stated in HPI or otherwise negative  Past Medical History:   Diagnosis Date    Arthritis     Diabetes ([facility] [address]     Hypertension     Stroke [facility])       Past Surgical History:   Procedure Laterality Date    HX CHOLECYSTECTOMY      HX GYN  2007    Hysterectomy    HX HEENT  2002    Tonsilectomy    HX ORTHOPAEDIC  2015    left hip ligament repair    HX ORTHOPAEDIC  2017    Right hip ligament repair      Prior to Admission Medications   Prescriptions Last Dose Informant Patient Reported? Taking? Methyl Salicylate-Menthol (SALONPAS) 10-3 % ptmd   No No   Si Patch by Apply Externally route daily. atorvastatin (LIPITOR) 10 mg tablet   Yes No   Sig: Take 10 mg by mouth daily. cyclobenzaprine (FLEXERIL) 5 mg tablet   No No   Sig: Take 2 Tabs by mouth three (3) times daily as needed for Muscle Spasm(s).       Facility-Administered Medications: None     No Known Allergies   Social History     Tobacco Use    Smoking status: Never Smoker    Smokeless tobacco: Never Used   Substance Use Topics    Alcohol use: Never     Frequency: Never      Family History   Problem Relation Age of Onset    Cancer Mother     Heart Disease Father     Heart Disease Sister     Diabetes Sister     Stroke Brother     Heart Disease Brother     Cancer Maternal Aunt       All history personally reviewed  Objective:     Patient Vitals for the past 24 hrs:   Temp Pulse Resp BP SpO2   06/27/20 0734 97.7 °F (36.5 °C) (!) 54 16 99/64 100 %   06/27/20 0322 97.7 °F (36.5 °C) (!) 54 15 123/75 98 %   06/26/20 2255 98.1 °F (36.7 °C) 63 24 113/75 96 %   06/26/20 2200     94 %   06/26/20 1916 97.4 °F (36.3 °C) 66 22 124/75 95 %   06/26/20 1527 98.1 °F (36.7 °C) 80 22 133/83 97 %   06/26/20 1433  77 19 114/66 96 %   06/26/20 1403  74 22 116/77 94 %   06/26/20 1333  67 15 112/65 94 %   06/26/20 1302  73 20 116/58 96 %   06/26/20 1233  74 19 117/69 97 %   06/26/20 1210 98.1 °F (36.7 °C) 67 (!) 31 134/74 96 %   06/26/20 1115  64 25  96 %   06/26/20 1105  65  141/79 96 %   06/26/20 1054    118/67    06/26/20 1053  61 (!) 32  94 %   06/26/20 1032  61 19 127/64 96 %   06/26/20 1001  66 18 111/65 96 %   06/26/20 0925  63 21 119/63 95 %     Oxygen Therapy  O2 Sat (%): 100 % (06/27/20 0734)  Pulse via Oximetry: 72 beats per minute (06/26/20 2200)  O2 Device: Oxymizer (06/27/20 0734)  O2 Flow Rate (L/min): 3 l/min (06/27/20 0734)  FIO2 (%): 35 % (06/26/20 2200)  Physical Exam:  General:    Alert, cooperative, no distress, appears stated age. Head:   Normocephalic, without obvious abnormality, atraumatic. Nose:  Nares normal. No drainage or sinus tenderness. Lungs:   Crackles bilaterally  Heart:   Regular rate and rhythm,  no murmur, rub or gallop. Abdomen:   Soft, non-tender. Not distended. Bowel sounds normal.   Extremities: No cyanosis. No edema. No clubbing  Skin:     Texture, turgor normal. No rashes or lesions.   Not Jaundiced  Neurologic: Alert and oriented x 3, no focal deficits   Data Review:   Recent Results (from the past 24 hour(s))   EKG, 12 LEAD, INITIAL    Collection Time: 06/26/20 11:32 AM   Result Value Ref Range    Ventricular Rate 67 BPM    Atrial Rate 67 BPM    P-R Interval 186 ms    QRS Duration 84 ms    Q-T Interval 432 ms    QTC Calculation (Bezet) 456 ms    Calculated P Axis 36 degrees    Calculated R Axis 47 degrees    Calculated T Axis 18 degrees    Diagnosis       Normal sinus rhythm  Nonspecific T wave abnormality  Abnormal ECG  No previous ECGs available  Confirmed by LOUISA ECHAVARRIA (), Rich Ludwig ([id number]) on 6/26/2020 12:25:20 PM     GLUCOSE, POC    Collection Time: 06/26/20 12:05 PM   Result Value Ref Range    Glucose (POC) 153 (H) 65 - 100 mg/dL   GLUCOSE, POC    Collection Time: 06/26/20  4:37 PM   Result Value Ref Range    Glucose (POC) 112 (H) 65 - 100 mg/dL   GLUCOSE, POC    Collection Time: 06/26/20  8:33 PM   Result Value Ref Range    Glucose (POC) 160 (H) 65 - 100 mg/dL   PROTHROMBIN TIME + INR    Collection Time: 06/27/20  3:51 AM   Result Value Ref Range    Prothrombin time 13.2 12.0 - 14.7 sec    INR 1.0     PTT    Collection Time: 06/27/20  3:51 AM   Result Value Ref Range    aPTT 25.2 24.3 - 35.4 SEC   FIBRINOGEN    Collection Time: 06/27/20  3:51 AM   Result Value Ref Range    Fibrinogen 538 (H) 190 - 501 mg/dL   D DIMER    Collection Time: 06/27/20  3:51 AM   Result Value Ref Range    D DIMER 0.69 (HH) <0.56 ug/ml(FEU)   CBC WITH AUTOMATED DIFF    Collection Time: 06/27/20  3:51 AM   Result Value Ref Range    WBC 10.3 4.3 - 11.1 K/uL    RBC 5.00 4.05 - 5.2 M/uL    HGB 13.0 11.7 - 15.4 g/dL    HCT 40.6 35.8 - 46.3 %    MCV 81.2 79.6 - 97.8 FL    MCH 26.0 (L) 26.1 - 32.9 PG    MCHC 32.0 31.4 - 35.0 g/dL    RDW 14.7 (H) 11.9 - 14.6 %    PLATELET 989 950 - 777 K/uL    MPV 12.5 (H) 9.4 - 12.3 FL    ABSOLUTE NRBC 0.00 0.0 - 0.2 K/uL    DF PENDING    METABOLIC PANEL, COMPREHENSIVE    Collection Time: 06/27/20  3:51 AM   Result Value Ref Range    Sodium 141 136 - 145 mmol/L    Potassium 4.1 3.5 - 5.1 mmol/L    Chloride 108 (H) 98 - 107 mmol/L    CO2 28 21 - 32 mmol/L    Anion gap 5 (L) 7 - 16 mmol/L    Glucose 110 (H) 65 - 100 mg/dL    BUN 12 6 - 23 MG/DL    Creatinine 0.59 (L) 0.6 - 1.0 MG/DL    GFR est AA >60 >60 ml/min/1.73m2    GFR est non-AA >60 >60 ml/min/1.73m2    Calcium 8.4 8.3 - 10.4 MG/DL    Bilirubin, total 0.3 0.2 - 1.1 MG/DL    ALT (SGPT) 51 12 - 65 U/L    AST (SGOT) 40 (H) 15 - 37 U/L    Alk. phosphatase 84 50 - 136 U/L    Protein, total 7.2 6.3 - 8.2 g/dL    Albumin 3.1 (L) 3.5 - 5.0 g/dL    Globulin 4.1 (H) 2.3 - 3.5 g/dL    A-G Ratio 0.8 (L) 1.2 - 3.5     GLUCOSE, POC    Collection Time: 06/27/20  6:01 AM   Result Value Ref Range    Glucose (POC) 93 65 - 100 mg/dL     Imaging Radu Guiles /Studies   Xr Chest Sngl V    Result Date: 6/26/2020  IMPRESSION: Small areas of ill-defined groundglass infiltrate left lower and midlung        Assessment and Plan:      Active Hospital Problems    Diagnosis Date Noted    JE on CPAP 06/26/2020    Diarrhea of presumed infectious origin 06/26/2020    Acute respiratory failure with hypoxia ([facility] 06/24/2020    COVID-19 virus infection 06/24/2020    Respiratory failure ([facility] 06/24/2020    Diabetes ([facility]     Hypertension        Acute hypoxic resp failure  COVID PNA  - s/p conv plasma 6/24  - Remdesivir started 6/23  - Dexamethasone through 7/4  - Vit C/Zinc  - wean O2 as tolerated    HTN  BP ok on no meds  - monitor    DM2  - SSI    Proph: Lovenox  Code status: full code  Dispo: hopefully home in 2-3 days  Risk: moderate    Signed By: Mao Chan MD     June 27, 2020

## 2020-06-27 NOTE — PROGRESS NOTES
Patient stable with no complaints at this time. Patient is resting in bed with eyes closed. Respirations even and unlabored with heart rate regular. Call light within reach and patient instructed to call if assistance is needed.   Report to be given to oncoming RN 11-7

## 2020-06-27 NOTE — PROGRESS NOTES
Patient resting in bed, alert and oriented, cooperative with care. Patient on 3 liters of Oxygen via NC. Patient denies pain or distress, safety measures in place, call light within reach.

## 2020-06-27 NOTE — PROGRESS NOTES
Date of Outreach Update:  Birgit Montoya's chart was reviewed. Pt not physically assessed due to droplet plus precautions. MEWS Score: 2 (06/27/20 1524)    Vitals:    06/27/20 0322 06/27/20 0734 06/27/20 1110 06/27/20 1524   BP: 123/75 99/64 118/78 99/62   Pulse: (!) 54 (!) 54 (!) 59 (!) 57   Resp: 15 16 16 18   Temp: 97.7 °F (36.5 °C) 97.7 °F (36.5 °C) 97.9 °F (36.6 °C) 98.2 °F (36.8 °C)   SpO2: 98% 100% 97% 99%   Weight:       Height:              Previous Outreach assessment, chart, labs, and progress notes have been reviewed. There have been no significant clinical changes since the completion of the last dated Outreach assessment. Will continue to follow up per outreach protocol.     Signed By:   Naif Cervantes RN    June 27, 2020 7:15 PM

## 2020-06-27 NOTE — PROGRESS NOTES
Patient resting in bed with no complaints at this time. Patient is alert and orientated with no distress noted. IV intact and patent with no s/s of infection noted. Respirations even and unlabored with heart rate regular. Patient able to ambulate independently without assistance, although patient instructed to call for assistance. Bed in low locked position with call light within reach. Patient instructed to call if assistance is needed. Will continue to monitor.

## 2020-06-27 NOTE — PROGRESS NOTES
END OF SHIFT NOTE:    INTAKE/OUTPUT  06/26 0701 - 06/27 0700  In: 720 [P.O.:720]  Out: 1125 [Urine:1125]  Voiding: YES  Catheter: NO  Drain:              Flatus: Patient does have flatus present. Stool:  0 occurrences. Characteristics:  Stool Assessment  Stool Color: Brown  Stool Appearance: Loose  Stool Amount: Small  Stool Source/Status: Incontinence, Rectum    Emesis: 0 occurrences. Characteristics:        VITAL SIGNS  Patient Vitals for the past 12 hrs:   Temp Pulse Resp BP SpO2   06/27/20 0322 97.7 °F (36.5 °C) (!) 54 15 123/75 98 %   06/26/20 2255 98.1 °F (36.7 °C) 63 24 113/75 96 %   06/26/20 2200     94 %   06/26/20 1916 97.4 °F (36.3 °C) 66 22 124/75 95 %       Pain Assessment  Pain Intensity 1: 0 (06/26/20 2008)  Pain Location 1: Chest, Abdomen(\"I'm sore from all this trouble trying to breathe\".)  Pain Intervention(s) 1: Medication (see MAR)  Patient Stated Pain Goal: 0    Ambulating  Yes weakly; holding onto furniture to make it to restroom. Dry cough. Slept intermittently last night. Shift report given to oncoming nurse at the bedside.     Saida Hernandez RN

## 2020-06-27 NOTE — PROGRESS NOTES
Critical Care Outreach Nurse Progress Report:    Subjective: Chart reviewed secondary to transfer from CCU. Patient not physically assessed due to droplet plus precautions. MEWS Score: 2 (06/26/20 2255)    Vitals:    06/26/20 2200 06/26/20 2255 06/27/20 0322 06/27/20 0734   BP:  113/75 123/75 99/64   Pulse:  63 (!) 54 (!) 54   Resp:  24 15 16   Temp:  98.1 °F (36.7 °C) 97.7 °F (36.5 °C) 97.7 °F (36.5 °C)   SpO2: 94% 96% 98% 100%   Weight:       Height:            LAB DATA:    Recent Labs     06/27/20  0351 06/26/20  0433 06/25/20  0529    145 144   K 4.1 3.9 3.2*   * 108* 106   CO2 28 32 36*   AGAP 5* 5* 2*   * 110* 97   BUN 12 11 8   CREA 0.59* 0.70 0.65   GFRAA >60 >60 >60   GFRNA >60 >60 >60   CA 8.4 8.5 8.3   MG  --  2.9*  --    PHOS  --  3.0  --    ALB 3.1* 2.6* 3.0*   TP 7.2 7.3 7.4   GLOB 4.1* 4.7* 4.4*   AGRAT 0.8* 0.6* 0.7*   ALT 51 39 44        Recent Labs     06/27/20  0351 06/26/20  0433 06/25/20  0529   WBC 10.3 7.3 6.2   HGB 13.0 12.8 13.1   HCT 40.6 42.5 43.8    370 316        Objective:     Pain Intensity 1: 0 (06/27/20 0730)  Pain Location 1: Chest, Abdomen(\"I'm sore from all this trouble trying to breathe\".)  Pain Intervention(s) 1: Medication (see MAR)  Patient Stated Pain Goal: 0    Assessment: No new concerns identified. Plan: Will continue to follow per outreach protocol.

## 2020-06-28 LAB
GLUCOSE BLD STRIP.AUTO-MCNC: 102 MG/DL (ref 65–100)
GLUCOSE BLD STRIP.AUTO-MCNC: 134 MG/DL (ref 65–100)
GLUCOSE BLD STRIP.AUTO-MCNC: 201 MG/DL (ref 65–100)
GLUCOSE BLD STRIP.AUTO-MCNC: 209 MG/DL (ref 65–100)

## 2020-06-28 PROCEDURE — 74011250637 HC RX REV CODE- 250/637: Performed by: INTERNAL MEDICINE

## 2020-06-28 PROCEDURE — 74011250636 HC RX REV CODE- 250/636: Performed by: INTERNAL MEDICINE

## 2020-06-28 PROCEDURE — 74011636637 HC RX REV CODE- 636/637: Performed by: INTERNAL MEDICINE

## 2020-06-28 PROCEDURE — 74011000258 HC RX REV CODE- 258: Performed by: INTERNAL MEDICINE

## 2020-06-28 PROCEDURE — 65270000029 HC RM PRIVATE

## 2020-06-28 PROCEDURE — 82962 GLUCOSE BLOOD TEST: CPT

## 2020-06-28 PROCEDURE — 74011000250 HC RX REV CODE- 250: Performed by: INTERNAL MEDICINE

## 2020-06-28 RX ADMIN — ENOXAPARIN SODIUM 40 MG: 40 INJECTION SUBCUTANEOUS at 05:58

## 2020-06-28 RX ADMIN — ENOXAPARIN SODIUM 40 MG: 40 INJECTION SUBCUTANEOUS at 16:41

## 2020-06-28 RX ADMIN — HYDROCODONE BITARTRATE AND HOMATROPINE METHYLBROMIDE 5 ML: 5; 1.5 SOLUTION ORAL at 12:20

## 2020-06-28 RX ADMIN — DEXAMETHASONE SODIUM PHOSPHATE 6 MG: 10 INJECTION INTRAMUSCULAR; INTRAVENOUS at 08:07

## 2020-06-28 RX ADMIN — ATORVASTATIN CALCIUM 10 MG: 10 TABLET, FILM COATED ORAL at 08:07

## 2020-06-28 RX ADMIN — Medication 10 ML: at 21:32

## 2020-06-28 RX ADMIN — REMDESIVIR 100 MG: 5 INJECTION INTRAVENOUS at 10:20

## 2020-06-28 RX ADMIN — Medication 10 ML: at 05:58

## 2020-06-28 RX ADMIN — HUMAN INSULIN 4 UNITS: 100 INJECTION, SOLUTION SUBCUTANEOUS at 16:30

## 2020-06-28 NOTE — PROGRESS NOTES
[facility] CRITICAL CARE OUTREACH NURSE PROGRESS REPORT      SUBJECTIVE: Called to assess patient secondary to outreach protocol. MEWS Score: 1 (06/27/20 2224)  Vitals:    06/27/20 1524 06/27/20 1925 06/27/20 2224 06/28/20 0349   BP: 99/62 105/48 154/80 100/65   Pulse: (!) 57 63 84 (!) 46   Resp: 18 20 20 20   Temp: 98.2 °F (36.8 °C) 97.8 °F (36.6 °C) 97.8 °F (36.6 °C) 98.8 °F (37.1 °C)   SpO2: 99% 100% 94% 100%   Weight:       Height:            LAB DATA:    Recent Labs     06/27/20 0351 06/26/20 0433 06/25/20  0529    145 144   K 4.1 3.9 3.2*   * 108* 106   CO2 28 32 36*   AGAP 5* 5* 2*   * 110* 97   BUN 12 11 8   CREA 0.59* 0.70 0.65   GFRAA >60 >60 >60   GFRNA >60 >60 >60   CA 8.4 8.5 8.3   MG  --  2.9*  --    PHOS  --  3.0  --    ALB 3.1* 2.6* 3.0*   TP 7.2 7.3 7.4   GLOB 4.1* 4.7* 4.4*   AGRAT 0.8* 0.6* 0.7*   ALT 51 39 44        Recent Labs     06/27/20 0351 06/26/20  0433 06/25/20  0529   WBC 10.3 7.3 6.2   HGB 13.0 12.8 13.1   HCT 40.6 42.5 43.8    370 316          OBJECTIVE: Pt chart accessed remotely due to Covid-19. Pain Assessment  Pain Intensity 1: 0 (06/27/20 2224)  Pain Location 1: Chest, Abdomen(\"I'm sore from all this trouble trying to breathe\".)  Pain Intervention(s) 1: Medication (see MAR)  Patient Stated Pain Goal: 0                                 ASSESSMENT:  Pt vitals are stable. 3L NC O2 requirements. HR noted to be bradycardic at rest.     PLAN:  Will continue to monitor per protocol.

## 2020-06-28 NOTE — PROGRESS NOTES
SBAR from [facility]. Patient in stable condition with resps even/unlabored. NAD noted. Patient on oxygen via oxymizer. Call light within reach, patient encouraged to call nurse prn assist. Will continue to monitor per policy. Droplet plus precautions intact. Appropriate PPE available and in use.

## 2020-06-28 NOTE — PROGRESS NOTES
Patient resting in bed, alert and oriented, cooperative with care. Patient denies pain or distress, breathing even and unlabored, safety measures in place, call light within reach.

## 2020-06-28 NOTE — PROGRESS NOTES
Hospitalist Progress Note    2020  Admit Date: 2020  6:57 PM   NAME: Larry Coffman   :  1970   MRN:  [id number]   Attending: Jovita Silverio MD  PCP:  Tamera Garces, Not On File    SUBJECTIVE:   Pt is a 44 y.o. female with HTN, DM2, HLD, JE, obesity admitted with COVID PNA at OSH. Transferred to ICU at [facility], never required intubation. Receiving Remdesivir, s/p conv plasma, on dexamethasone. She was transferred to the floor . Pt reports she is feeling better every day. SOB, appetite, strength all improving.       Review of Systems negative with exception of pertinent positives noted above  PHYSICAL EXAM     Patient Vitals for the past 24 hrs:   Temp Pulse Resp BP SpO2   20 0823 97.9 °F (36.6 °C) (!) 58 18 137/77 100 %   20 0410  66  124/82    20 0349 98.8 °F (37.1 °C) (!) 46 20 100/65 100 %   20 2224 97.8 °F (36.6 °C) 84 20 154/80 94 %   20 1925 97.8 °F (36.6 °C) 63 20 105/48 100 %   20 1524 98.2 °F (36.8 °C) (!) 57 18 99/62 99 %   20 1110 97.9 °F (36.6 °C) (!) 59 16 118/78 97 %     Oxygen Therapy  O2 Sat (%): 100 % (20 0823)  Pulse via Oximetry: 72 beats per minute (20)  O2 Device: Oxymizer (20)  O2 Flow Rate (L/min): 3 l/min (20 0734)  FIO2 (%): 35 % (20)    Intake/Output Summary (Last 24 hours) at 2020 0920  Last data filed at 2020 0830  Gross per 24 hour   Intake 360 ml   Output 1300 ml   Net -940 ml      General:          Alert, cooperative, no distress, appears stated age. Lungs:             Crackles bilaterally  Heart:              Regular rate and rhythm,  no murmur, rub or gallop. Abdomen:        Soft, non-tender. Not distended. Bowel sounds normal.   Extremities:     No cyanosis. No edema. No clubbing  Skin:                Texture, turgor normal. No rashes or lesions.   Not Jaundiced  Neurologic:      Alert and oriented x 3, no focal deficits     Recent Results (from the past 24 hour(s)) GLUCOSE, POC    Collection Time: 06/27/20 11:44 AM   Result Value Ref Range    Glucose (POC) 122 (H) 65 - 100 mg/dL   GLUCOSE, POC    Collection Time: 06/27/20  4:01 PM   Result Value Ref Range    Glucose (POC) 157 (H) 65 - 100 mg/dL   GLUCOSE, POC    Collection Time: 06/27/20  8:47 PM   Result Value Ref Range    Glucose (POC) 114 (H) 65 - 100 mg/dL   GLUCOSE, POC    Collection Time: 06/28/20  5:57 AM   Result Value Ref Range    Glucose (POC) 102 (H) 65 - 100 mg/dL       Micro: All Micro Results     Procedure Component Value Units Date/Time    CULTURE, URINE [[id number]] Collected:  06/25/20 0529    Order Status:  Completed Specimen:  Urine Updated:  06/27/20 0732     Special Requests: NO SPECIAL REQUESTS        Culture result: NO GROWTH 2 DAYS             Other studies last 24 hours:  No results found. All imaging, labs reviewed.     ASSESSMENT      Hospital Problems as of 6/28/2020 Never Reviewed          Codes Class Noted - Resolved POA    JE on CPAP ICD-10-CM: G47.33, Z99.89  ICD-9-CM: 327.23, V46.8  6/26/2020 - Present Unknown        Diarrhea of presumed infectious origin ICD-10-CM: R19.7  ICD-9-CM: 009.3  6/26/2020 - Present Unknown        Acute respiratory failure with hypoxia [facility]) ICD-10-CM: J96.01  ICD-9-CM: 518.81  6/24/2020 - Present Unknown        * (Principal) COVID-19 virus infection ICD-10-CM: U07.1  ICD-9-CM: 079.89  6/24/2020 - Present Unknown        Diabetes ([facility] ICD-10-CM: E11.9  ICD-9-CM: 250.00  Unknown - Present Unknown        Hypertension ICD-10-CM: I10  ICD-9-CM: 401.9  Unknown - Present Unknown        Respiratory failure ([facility] ICD-10-CM: J96.90  ICD-9-CM: 518.81  6/24/2020 - Present Unknown            Acute hypoxic resp failure  COVID PNA  - s/p conv plasma 6/24  - Remdesivir started 6/23  - Dexamethasone through 7/4  - completed Vit C/Zinc  - wean O2 as tolerated  - PT/OT     HTN  BP ok on no meds  - monitor     DM2  - SSI     Proph: Lovenox  Code status: full code    Signed By: Raymond Ames MD     June 28, 2020

## 2020-06-28 NOTE — PROGRESS NOTES
Patient remains in stable condition with respirations even/unlabored. No acute distress noted. No needs noted or voiced at this time. Safety measures in place. Patient on oxymizer. Call light remains within reach. Preparing to give report to oncoming shift.

## 2020-06-28 NOTE — PROGRESS NOTES
Date of Outreach Update:  Birgit Montoya's chart was reviewed. Patient not physically assessed due to droplet plus precautions. MEWS Score: 1 (06/28/20 1106)  Vitals:    06/28/20 0349 06/28/20 0410 06/28/20 0823 06/28/20 1106   BP: 100/65 124/82 137/77 115/70   Pulse: (!) 46 66 (!) 58 (!) 54   Resp: 20 18 18   Temp: 98.8 °F (37.1 °C)  97.9 °F (36.6 °C) 98 °F (36.7 °C)   SpO2: 100%  100% 100%   Weight:  115.7 kg (255 lb 1.6 oz)     Height:             Pain Assessment  Pain Intensity 1: 0 (06/28/20 0807)  Pain Location 1: Chest, Abdomen(\"I'm sore from all this trouble trying to breathe\".)  Pain Intervention(s) 1: Medication (see MAR)  Patient Stated Pain Goal: 0      Previous Outreach assessment has been reviewed. There have been no significant clinical changes since the completion of the last dated Outreach assessment. Will continue to follow up per outreach protocol.     Signed By:   Xavi Santiago RN    June 28, 2020 11:13 AM

## 2020-06-29 VITALS
HEART RATE: 61 BPM | DIASTOLIC BLOOD PRESSURE: 80 MMHG | WEIGHT: 255.1 LBS | RESPIRATION RATE: 19 BRPM | HEIGHT: 67 IN | BODY MASS INDEX: 40.04 KG/M2 | TEMPERATURE: 98.1 F | SYSTOLIC BLOOD PRESSURE: 120 MMHG | OXYGEN SATURATION: 95 %

## 2020-06-29 LAB
GLUCOSE BLD STRIP.AUTO-MCNC: 112 MG/DL (ref 65–100)
GLUCOSE BLD STRIP.AUTO-MCNC: 121 MG/DL (ref 65–100)

## 2020-06-29 PROCEDURE — 74011250636 HC RX REV CODE- 250/636: Performed by: INTERNAL MEDICINE

## 2020-06-29 PROCEDURE — 77010033678 HC OXYGEN DAILY

## 2020-06-29 PROCEDURE — 74011250637 HC RX REV CODE- 250/637: Performed by: INTERNAL MEDICINE

## 2020-06-29 PROCEDURE — 82962 GLUCOSE BLOOD TEST: CPT

## 2020-06-29 PROCEDURE — 94760 N-INVAS EAR/PLS OXIMETRY 1: CPT

## 2020-06-29 RX ORDER — HYDROCODONE BITARTRATE AND HOMATROPINE METHYLBROMIDE 1.5; 5 MG/5ML; MG/5ML
5 SYRUP ORAL
Qty: 100 ML | Refills: 0 | Status: SHIPPED | OUTPATIENT
Start: 2020-06-29 | End: 2020-07-04

## 2020-06-29 RX ORDER — DEXAMETHASONE 6 MG/1
6 TABLET ORAL
Qty: 5 TAB | Refills: 0 | Status: SHIPPED | OUTPATIENT
Start: 2020-06-29 | End: 2020-07-04

## 2020-06-29 RX ADMIN — DEXAMETHASONE SODIUM PHOSPHATE 6 MG: 10 INJECTION INTRAMUSCULAR; INTRAVENOUS at 08:31

## 2020-06-29 RX ADMIN — ENOXAPARIN SODIUM 40 MG: 40 INJECTION SUBCUTANEOUS at 05:47

## 2020-06-29 RX ADMIN — ATORVASTATIN CALCIUM 10 MG: 10 TABLET, FILM COATED ORAL at 08:31

## 2020-06-29 RX ADMIN — Medication 10 ML: at 05:48

## 2020-06-29 NOTE — PROGRESS NOTES
Patient refuses to wear V60 at night while sleeping. Patient feels the pressure is to much and that the oxygen she is on is enough. Patient states she does wear CPAP at home, but does not want to wear our machine. Patient has been weaned to 4L oxymizer and her oxygen saturation is 98% on current settings. Will continue to monitor throughout the evening.

## 2020-06-29 NOTE — PROGRESS NOTES
Awaiting RT to do ambulatory sat so patient can be discharged. RT hasn't been to see patient yet. RT notified to come see patient.

## 2020-06-29 NOTE — PROGRESS NOTES
Patient discharged to POV via WC. Pt in stable condition. Resps even/unlabored. NAD. No further needs noted.

## 2020-06-29 NOTE — PROGRESS NOTES
Chart accessed at the request of the patient regarding COVID result. She requested the result be forwarded to her employer/insurance company. Unfortunately, the positive result is in [place] as it was performed at an outside facility and is view only. Explained this information to the patient and she voices understanding. States she will contact the testing facility.

## 2020-06-29 NOTE — DISCHARGE INSTRUCTIONS
Patient Education     Patient Education        10 Things to Do When You Have COVID-19    Stay home. Don't go to school, work, or public areas. And don't use public transportation, ride-shares, or taxis unless you have no choice. Leave your home only if you need to get medical care. But call the doctor's office first so they know you're coming. And wear a cloth face cover. Ask before leaving isolation. Talk with your doctor or other health professional about when it will be safe for you to leave isolation. Wear a cloth face cover when you are around other people. It can help stop the spread of the virus when you cough or sneeze. Limit contact with people in your home. If possible, stay in a separate bedroom and use a separate bathroom. Avoid contact with pets and other animals. If possible, have a friend or family member care for them while you're sick. Cover your mouth and nose with a tissue when you cough or sneeze. Then throw the tissue in the trash right away. Wash your hands often, especially after you cough or sneeze. Use soap and water, and scrub for at least 20 seconds. If soap and water aren't available, use an alcohol-based hand . Don't share personal household items. These include bedding, towels, cups and glasses, and eating utensils. Clean and disinfect your home every day. Use household  or disinfectant wipes or sprays. Take special care to clean things that you grab with your hands. These include doorknobs, remote controls, phones, and handles on your refrigerator and microwave. And don't forget countertops, tabletops, bathrooms, and computer keyboards. Take acetaminophen (Tylenol) to relieve fever and body aches. Read and follow all instructions on the label. Current as of: May 8, 2020               Content Version: 12.5  © 8867-8232 Healthwise, Incorporated.    Care instructions adapted under license by [employer] (which disclaims liability or warranty for this information). If you have questions about a medical condition or this instruction, always ask your healthcare professional. [place] [address] any warranty or liability for your use of this information. Learning How To Care for Someone Who Has COVID-19  Things to know  Most people who get COVID-19 will recover with time and home care. Here are some things to know if you're caring for someone who's sick. · Treat the symptoms. Common symptoms include a fever, coughing, and feeling short of breath. Urge the person to get extra rest and drink plenty of fluids to replace fluids lost from fever. To reduce a fever, offer acetaminophen (such as Tylenol). It may also help with muscle aches. Read and follow all instructions on the label. · Watch for signs that the illness is getting worse. The person may need medical care if they're getting sicker (for example, if it's hard to breathe). But call the doctor's office before you go. They can tell you what to do. Call 911 or emergency services if the person has any of these symptoms:  ? Severe trouble breathing or shortness of breath. ? Constant pain or pressure in their chest.  ? Confusion, or trouble thinking clearly. ? A blue tint to their lips or face. Some people are more likely to get very sick and need medical care. Call the doctor as soon as symptoms start if the person you're caring for is over 72, smokes, or has a serious health problem, like asthma, heart disease, diabetes, or an immune system problem. · Protect yourself and others. The virus spreads easily from person to person, so take extra care to avoid catching or spreading the infection. ? Keep the sick person away from others as much as you can. § Have the person stay in one room. If you can, give them their own bathroom to use. § Have only one person take care of them. Keep other people--and pets--out of the sickroom.   § Have the person wear a cloth face cover around other people. This includes when anyone is in the room with them or if they leave their room (for example, to go to the bathroom). If the face cover makes it harder for the sick person to breathe, the other person should wear a face cover. § Don't share personal items. These include dishes, cups, towels, and bedding. ? Wash your hands often and well. Use soap and water, and scrub for at least 20 seconds. This is especially important after you've been around the sick person or touched things they've touched. If soap and water aren't handy, use a hand  with at least 60% alcohol. ? Avoid touching your mouth, nose, and eyes. ? Take care with the person's laundry. It's okay to wash the sick person's laundry with yours. If you have them, wear disposable gloves when handling their dirty laundry, and wash your hands well after you touch it. Wash items in the warmest water allowed for the fabric type, and dry them completely. ? Clean high-touch items every day and anytime the sick person touches them. These include doorknobs, light switches, toilets, counters, and remote controls. Use a household disinfectant or a homemade bleach solution. (Follow the directions on the label.) If the sick person has their own room, have them disinfect it every day. ? Limit visitors to your home. To help protect family and friends, stay in touch with them only by phone or computer. Current as of: May 8, 2020               Content Version: 12.5  © 2006-2020 Healthwise, Incorporated. Care instructions adapted under license by [employer] (which disclaims liability or warranty for this information). If you have questions about a medical condition or this instruction, always ask your healthcare professional. [address] any warranty or liability for your use of this information.

## 2020-06-29 NOTE — PROGRESS NOTES
SBAR from [place], [facility]. Patient in stable condition with resps even/unlabored. NAD noted. Patient on oxymizer at this time. Call light within reach, patient encouraged to call nurse prn assist. Will continue to monitor per policy. Droplet plus precautions intact. Appropriate PPE available and in use.

## 2020-06-29 NOTE — PROGRESS NOTES
MD entered room and patient had taken oxygen off completely. MD checked patient SpO2 and patient is 97% on room air. Oxygen left off.

## 2020-06-29 NOTE — DISCHARGE SUMMARY
Hospitalist Discharge Summary     Patient ID:  Fredrick Johnson  [id number]  1 y.o.  1970  Admit date: 6/24/2020  6:57 PM  Discharge date and time: 6/29/2020  Attending: Lincoln Floyd MD  PCP:  Raquel Cha, Not On File  Treatment Team: Attending Provider: Lincoln Floyd MD; Utilization Review: Esperanza Samson RN; Consulting Provider: Lincoln Floyd MD; Physical Therapist: Tyrel Quiñones PT, DPT; Occupational Therapist: Philip Epley, OT; Care Manager: Efraín Clemons    Principal Diagnosis COVID-19 virus infection   Principal Problem:    COVID-19 virus infection (6/24/2020)    Active Problems:    Acute respiratory failure with hypoxia ([facility]) (6/24/2020)      Diabetes ([facility]) ()      Hypertension ()      Respiratory failure ([facility]) (6/24/2020)      JE on CPAP (6/26/2020)      Diarrhea of presumed infectious origin (6/26/2020)             Hospital Course:  Please refer to the admission H&P for details of presentation. In summary, Pt is a 51 y. o. female with HTN, DM2, HLD, JE, obesity admitted with COVID PNA at OSH. Transferred to ICU at [facility], never required intubation. Receiving Remdesivir, s/p conv plasma, on dexamethasone. She was transferred to the floor 6/26. She has continued to improve and on day of discharge, SaO2 good on RA both at rest and with ambulation. She is ambulating about her room independently and looking forward to discharge.     Acute hypoxic resp failure  COVID PNA  - s/p conv plasma 6/24  - Remdesivir started 6/23  - Dexamethasone through 7/4  - completed Vit C/Zinc  - Xarelto 10mg daily x 2 weeks at discharge for thromboembolic prophylaxis in setting of COVID    Significant Diagnostic Studies:   XR CHEST SNGL V   Final Result   IMPRESSION: Small areas of ill-defined groundglass infiltrate left lower and   midlung              Labs: Results:       Chemistry Recent Labs     06/27/20  0351   *      K 4.1   *   CO2 28   BUN 12   CREA 0.59*   CA 8.4   AGAP 5*   AP 84 TP 7.2   ALB 3.1*   GLOB 4.1*   AGRAT 0.8*      CBC w/Diff Recent Labs     06/27/20 0351   WBC 10.3   RBC 5.00   HGB 13.0   HCT 40.6      GRANS 68   LYMPH 24   EOS 0*      Cardiac Enzymes No results for input(s): CPK, CKND1, MARIAELENA in the last 72 hours. No lab exists for component: CKRMB, TROIP   Coagulation Recent Labs     06/27/20 0351   PTP 13.2   INR 1.0   APTT 25.2       Lipid Panel No results found for: CHOL, CHOLPOCT, CHOLX, CHLST, CHOLV, 824968, HDL, HDLP, LDL, LDLC, DLDLP, 869322, VLDLC, VLDL, TGLX, TRIGL, TRIGP, TGLPOCT, CHHD, CHHDX   BNP No results for input(s): BNPP in the last 72 hours. Liver Enzymes Recent Labs     06/27/20 0351   TP 7.2   ALB 3.1*   AP 84      Thyroid Studies No results found for: T4, T3U, TSH, TSHEXT         Discharge Exam:  Visit Vitals  /80   Pulse 61   Temp 98.1 °F (36.7 °C)   Resp 19   Ht 5' 7\" (1.702 m)   Wt 115.7 kg (255 lb 1.6 oz)   SpO2 95%   BMI 39.95 kg/m²     General:          Alert, cooperative, no distress, appears stated age. [place]  Lungs:             Crackles bilaterally  Heart:              Regular rate and rhythm,  no murmur, rub or gallop. Abdomen:        Soft, non-tender. Not distended.  Bowel sounds normal.   Extremities:     No cyanosis.  No edema. No clubbing  Skin:                Texture, turgor normal. No rashes or lesions.  Not Jaundiced  Neurologic:      Alert and oriented x 3, no focal deficits     Disposition: home  Discharge Condition: stable  Patient Instructions:   Current Discharge Medication List      START taking these medications    Details   dexAMETHasone (DECADRON) 6 mg tablet Take 1 Tab by mouth Daily (before breakfast) for 5 days. Qty: 5 Tab, Refills: 0      HYDROcodone-homatropine (HYCODAN) 5-1.5 mg/5 mL (5 mL) syrup Take 5 mL by mouth every six (6) hours as needed for Cough for up to 5 days. Max Daily Amount: 20 mL.   Qty: 100 mL, Refills: 0    Associated Diagnoses: [id number] virus infection      rivaroxaban (Xarelto) 10 mg tablet Take 1 Tab by mouth daily (with breakfast). Qty: 14 Tab, Refills: 0         CONTINUE these medications which have NOT CHANGED    Details   atorvastatin (LIPITOR) 10 mg tablet Take 10 mg by mouth daily. cyclobenzaprine (FLEXERIL) 5 mg tablet Take 2 Tabs by mouth three (3) times daily as needed for Muscle Spasm(s). Qty: 30 Tab, Refills: 0      Methyl Salicylate-Menthol (SALONPAS) 10-3 % ptmd 1 Patch by Apply Externally route daily.   Qty: 12 Patch, Refills: 0             Activity: Activity as tolerated  Diet: Regular Diet  Wound Care: None needed    Follow-up with PCP in 1 week  ·     Time spent to discharge patient 31 minutes  Signed:  Dean Nuno MD  6/29/2020  12:03 PM

## 2020-06-29 NOTE — PROGRESS NOTES
Patient is discharging to home today with no social or CM needs. SW did provide Xarelto copay assistance card as this is a new prescription for patient. Care Management Interventions  PCP Verified by CM: Yes  Mode of Transport at Discharge: Other (see comment)  Transition of Care Consult (CM Consult): Discharge Planning  Discharge Durable Medical Equipment: No  Physical Therapy Consult: Yes  Occupational Therapy Consult: Yes  Current Support Network: Own Home, Other(10 family members live with pt.  Katy Li [phone] -and [phone])  Confirm Follow Up Transport: Self  Freedom of Choice List was Provided with Basic Dialogue that Supports the Patient's Individualized Plan of Care/Goals, Treatment Preferences and Shares the Quality Data Associated with the Providers?: Yes  [employer] Information Provided?: ([employer] with [facility])  Discharge Location  Discharge Placement: Home

## 2020-06-29 NOTE — PROGRESS NOTES
Oxygen Qualifier       Room air: SpO2 with O2 and liter flow   Resting SpO2  98%  not needed   Ambulating SpO2  99%  not needed       Completed by:    Adelia Stapleton

## 2020-06-29 NOTE — PROGRESS NOTES
Discharge instructions explained to patient. patient verbalized understanding of all instructions both written and verbal. Discharge medications reviewed and Rx given as appropriate. All questions asked/answered. PIV removed and bandage applied.

## 2020-06-30 ENCOUNTER — PATIENT OUTREACH (OUTPATIENT)
Dept: OTHER | Age: 50
End: 2020-06-30

## 2020-06-30 ENCOUNTER — PATIENT OUTREACH (OUTPATIENT)
Dept: CASE MANAGEMENT | Age: 50
End: 2020-06-30

## 2020-06-30 NOTE — PROGRESS NOTES
Patient contacted regarding COVID-19 diagnosis. Discussed COVID-19 related testing which was available at this time. Test results were positive. Patient informed of results, if available? yes     LPN Care Coordinator  contacted the patient by telephone to perform post discharge assessment. Verified name and  with patient as identifiers. Provided introduction to self, and explanation of the CTN/ACM role, and reason for call due to risk factors for infection and/or exposure to COVID-19. Symptoms reviewed with patient who verbalized the following symptoms: cough. Due to no new or worsening symptoms encounter was not routed to provider for escalation. Discussed follow-up appointments. If no appointment was previously scheduled, appointment scheduling offered: yes  [address] [place] [address] follow up appointment(s): No future appointments. Non-[facility] follow up appointment(s): None scheduled       Patient has following risk factors of: acute respiratory failure and diabetes. CC reviewed discharge instructions, medical action plan and red flags such as increased shortness of breath, increasing fever and signs of decompensation with patient who verbalized understanding. Discussed exposure protocols and quarantine with CDC Guidelines What to do if you are sick with coronavirus disease .  Patient was given an opportunity for questions and concerns. The patient agrees to contact the [facility] exposure line [phone], [facility] department [address] [place] [address]: ([phone]) and PCP office for questions related to their healthcare. CC provided contact information for future needs. Reviewed and educated patient on any new and changed medications related to discharge diagnosis.     Patient/family/caregiver given information for [facility] and agrees to enroll no  Patient's preferred e-mail:  N/A  Patient's preferred phone number: [phone]  Based on Loop alert triggers, patient will be contacted by nurse care manager for worsening symptoms. Plan for follow-up call in 5-7 days based on severity of symptoms and risk factors.

## 2020-06-30 NOTE — PROGRESS NOTES
Patient identified as eligible for Employee Care Management. Care Manager contacted the patient, verified  and zip code as identifiers. Provided introduction and explanation of the Nurse Care Manager role. Agreed to CM follow-up and assistance. CM initial assessment completed. See Notes from [facility], LPN, for COVID assessment; This CM will continue FU on COVID in addition to other chronic conditions ipacting current clinical status;      47-year-old female presented to [facility] ED on 20 with complaints of fever, cough and shortness of breath., nausea and few episodes of vomiting. · Positive COVID-19 Rapid test [place];  · CT chest:  bilateral airspace disease BLL and groundglass opacity throughout the reminder of the lungs;  · Elevated inflammatory markers   · CRP is 7.0, ,   · ferritin 351,   · d-dimer 0.67.   · O2 saturation high 80s, started O2  2 LPM NC;  · Andreea:    · convalescent plasma 1 unit;   · Remdesivir first dose 2020;  · Resp Failure, oxygen needs worsened, ICU;    · Oxymizer currently on 12 L/min. Transfer from [facility] to [facility] ICU on 20;  Diagnosis:  COVID Pneumonia  · Continues Remdesivir  · Continues Dexamethasone through ;  · Completed Vit C / Zinc and convalescent plasma 1u;   · Tx to floor, SaO2 good on RA and ambulation;  · DC to Home on 2020;        Past Medical History:   Diagnosis Date    Arthritis     Diabetes ([facility]     Hypertension     Stroke ([facility]          Sepsis Condition Focused Assessment    Patient reported vital signs and important numbers to know:    Last /68   Pulse for 60 seconds 98   Temp 99.2   Pain 0-10 None   Location/pain characteristics None   Last daily weight in lbs 255lbs  (BMI 39)     In the last 24 hour have you experienced;     Fever no    Low body temperature no    Chills or shaking no    Sweating no    Fast heart rate no    Fast breathing no    Dizziness/lightheadedness no    Confusion or unusual change in mental status no    Diarrhea yes    Nausea no    Vomiting no    Shortness of breath or difficulty breathing Reports much improved, but SOB with ambulation    Less urine output no    Cold, clammy, and pale skin no     Skin rash or skin color changes no  Skin- any open wounds or incisions? No  Recent urinary catheter? Yes  Does patient have incentive spirometer? yes  If yes, how frequently is patient using incentive spirometer? hourly    Medications:   New Medications at Discharge:   · Xarelto 10mg po every day x 14 days;  · New, review of side effects to monitor and report;  · Review proper administration;   · Teach back successful, knowledgeable;  · Dexamethasone 6mg po ac breakfast x 5 days;  · Reviewed BS elevations with this;  · Check BS readings more freq;  · Hycodan syrup 5cc prn cough;  · Review side effects, drowsiness; Changed Medications at Discharge:  None  Discontinued Medications at Discharge:  None      Barriers Identified / Support system:  patient      Discharge Instructions:  Reviewed discharge instructions with patient. Patient verbalizes understanding of discharge instructions and importance of follow-up care. Barriers/Challenges to Care:   []Decline in memory    []  Language barrier     []  Emotional   [x]  Limited mobility  []  Lack of motivation   [] Vision, hearing or cognitive impairment  [x] Knowledge    [] Financial  []  Lack of support  []  Pain []  Other   []  None    CM Intitial Plan of Care (Support to self management skills)    1.  Altered  Gas Exchange  Goal:  Demonstrates behaviors to self-manage symptoms;  · groundglass pattern on recent CT Chest  · +COVID pneumonia;  · Action Plan:   Noted some SOB and hoarseness while speaking during our call;   Teach patient to monitor what increases and decreases his symptoms;   Teach and review techniques to improve endurance with breathing, conserve energy;   Assess possible need for Pulmonary Rehab at later date after COVID / symptoms resolved;    2. Activity Intolerance    Goal: Demonstrates improved endurance in activity with self-management of SOB;   · Review of energy improving Actions:  · Reach for a high fiber, high protein snack instead of simple sugar carbs;  · Avoid or limit caffeine and energy drinks;  · Be active and participate in at least 10 minutes of exercise a day;  · Keep hydrated with water and not sodas or fruit juices;  · Encourage a daily multivitamin;    · Encourage at least 8 hours of sleep each night    Review and discussion of initial plan of care with patient, who has provided input to plan, verbalized understanding and agrees with current goals. Upcoming appointments:   · Initial FU appt with PCP at [facility] on 07/10/2020;  · May need to schedule FU COVID testing as well;    Reviewed [place] Loop Information with patient explaining the purpose and how it can be used to update our clinical team of changes in symptoms. Patient agreed to enrollment, providing:  Patient's preferred e-mail:  [email or web address]  Patient's preferred phone number: [phone]  Based on Loop alert triggers, patient will be contacted by nurse care manager for worsening symptoms. Patient provided opportunity to ask further questions. No other clinical, social, or functional needs identified. Patient verbalized understanding of all information discussed. Patient received my contact information for any further questions, concerns, or needs.     Plan next call:  FU I 1-2 days for support with continued SOB;

## 2020-07-01 ENCOUNTER — PATIENT OUTREACH (OUTPATIENT)
Dept: OTHER | Age: 50
End: 2020-07-01

## 2020-07-01 NOTE — PROGRESS NOTES
Patient contacted in follow up of hospital stay for +COVID. Care Transition Nurse/ Ambulatory Care Manager contacted the  patient by telephone to perform follow-up assessment. Verified name and  with patient as identifiers. Patient has following risk factors of: pneumonia, acute respiratory failure, immunocompromised and diabetes. Symptoms reviewed with patient who verbalized the following symptoms: fatigue, shortness of breath, diarrhea and no worsening symptoms. Reports she is feeling no worse today, was able to sleep some during the night, continues with SOB with walking/activity, noted continued hoarseness with speaking;  FU appt remains on  at this time;        Due to no new or worsening symptoms encounter was not routed to provider for escalation. Provider PCP, Dr. Malcolm Valente, [place], [place],  is outside [facility]. Education reviewed with patient regarding infection prevention and monitoring for worsening signs and symptoms of  COVID-19 as well as when to seek medical attention. patient verbalized understanding. Discussed exposure protocols and quarantine from [address] you at higher risk for severe illness  and given an opportunity for questions and concerns. The patient agrees to contact her PCP office for questions related to their healthcare. [facility] provided contact information for future reference. From CDC: Are you at higher risk for severe illness? · Wash your hands often. · Avoid close contact (6 feet, which is about two arm lengths) with people who are sick. · Put distance between yourself and other people if COVID-19 is spreading in your community. · Clean and disinfect frequently touched surfaces. · Avoid all cruise travel and non-essential air travel. · Call your healthcare professional if you have concerns about COVID-19 and your underlying condition or if you are sick.     For more information on steps you can take to protect yourself, see CDC's How to [place] for follow-up call in 3-5 days based on severity of symptoms and risk factors.

## 2020-07-09 ENCOUNTER — PATIENT OUTREACH (OUTPATIENT)
Dept: OTHER | Age: 50
End: 2020-07-09

## 2020-07-13 ENCOUNTER — PATIENT OUTREACH (OUTPATIENT)
Dept: OTHER | Age: 50
End: 2020-07-13

## 2020-07-13 NOTE — PROGRESS NOTES
Patient contacted regarding COVID-19 diagnosis. Discussed COVID-19 related testing which was available at this time. Test results were positive. Patient informed of results, if available? yes     Ambulatory Care Manager contacted the patient by telephone to perform post discharge assessment. Verified name and  with patient as identifiers. Provided introduction to self, and explanation of the CTN/ACM role, and reason for call due to risk factors for infection and/or exposure to COVID-19. Symptoms reviewed, patient verbalized the following symptoms: some continued shortness of breath during with activity, but returning slowly to PLOF. No new symptoms;    Reports better, much improved; Saw her PCP, Dr. Jaye Hernandez, in [place], [place], last Friday; Had FU COVID testing, results remain positive for that test 07/10/20; Next FU testing with be through [facility], with whom patient has already initiated FU. Next COVID testing this Friday;  Needs two negative FU results; Patient has following risk factors of: pneumonia, acute respiratory failure, immunocompromised and diabetes.      Ambulatory Care Manager reviewed red flags such as increased shortness of breath, increasing fever and signs of decompensation with patient who verbalized understanding. Discussed exposure protocols and quarantine with CDC Guidelines What to do if you are sick with coronavirus disease .  Patient was given an opportunity for questions and concerns. The patient agrees to contact the [facility] exposure line [phone], [address]: ([phone]) and PCP office for questions related to their healthcare. CTN/ACM provided contact information for future needs. Reviewed and educated patient on any new and changed medications related to discharge diagnosis.  There were no changes;    Plan for follow-up call in 5-7 days   FU on next COVID testing in one week;

## 2020-07-23 ENCOUNTER — PATIENT OUTREACH (OUTPATIENT)
Dept: OTHER | Age: 50
End: 2020-07-23

## 2020-07-23 NOTE — PROGRESS NOTES
Patient contacted in follow up of 2020 COVID-19 + test results; Discussed repeat COVID-19 testing which was available at this time. Test results were negative. Patient informed of results by lab, yes     Ambulatory Care Manager contacted the patient by telephone to perform follow-up assessment. Verified name and  with patient as identifiers. 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 Symptoms reviewed with patient who verbalized the following symptoms: nasal drip;. Due to no new or worsening symptoms encounter was not routed to provider for escalation. Education provided regarding infection prevention, and signs and symptoms of COVID-19 and when to seek medical attention with patient who verbalized understanding. Discussed exposure protocols and quarantine from [address] you at higher risk for severe illness  and given an opportunity for questions and concerns. The patient agrees to contact the COVID-19 hotline [phone] or PCP office for questions related to their healthcare. CTN/ACM provided contact information for future reference. From CDC: Are you at higher risk for severe illness?  Wash your hands often.  Avoid close contact (6 feet, which is about two arm lengths) with people who are sick.  Put distance between yourself and other people if COVID-19 is spreading in your community.  Clean and disinfect frequently touched surfaces.  Avoid all cruise travel and non-essential air travel.  Call your healthcare professional if you have concerns about COVID-19 and your underlying condition or if you are sick. For more information on steps you can take to protect yourself, see CDC's How to [place] for follow-up call in 3-5 days based on severity of symptoms and risk factors.   Call on Monday to see if symptoms improved so she could return to work following her negative initial COVID results;

## 2020-07-27 ENCOUNTER — PATIENT OUTREACH (OUTPATIENT)
Dept: OTHER | Age: 50
End: 2020-07-27

## 2020-07-28 ENCOUNTER — PATIENT OUTREACH (OUTPATIENT)
Dept: OTHER | Age: 50
End: 2020-07-28

## 2020-07-28 NOTE — PROGRESS NOTES
Patient contacted in follow up of 2020 COVID-19 + test results; Discussed repeat COVID-19 testing which was available at this time. Test results were negative. Patient informed of results by lab, yes      Ambulatory Care Manager contacted the patient by telephone to perform follow-up assessment. Verified name and  with patient as identifiers. 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   Ready to resolve if no new needs;

## 2020-08-11 ENCOUNTER — PATIENT OUTREACH (OUTPATIENT)
Dept: OTHER | Age: 50
End: 2020-08-11

## 2020-08-11 NOTE — PROGRESS NOTES
Patient resolved from Transition of Care episode on 08/11/2020  Discussed COVID-19 related testing which was available at this time. Test results were negative. Patient informed of results, if available? yes     Patient/family has been provided the following resources and education related to COVID-19:                         Signs, symptoms and red flags related to COVID-19            CDC exposure and quarantine guidelines            [facility] exposure contact - [phone]            Contact for their local Department of Health                 Patient currently reports that the following symptoms have improved:  Asymptomatic and has returned to work. No further outreach scheduled with this CTN/ACM/LPN/HC. Episode of Care resolved. Patient has this CTN/ACM/LPN/HC contact information if future needs arise.

## 2021-07-23 LAB
LEFT VENTRICULAR EJECTION FRACTION MODE: NORMAL
LV EF: 55 %

## 2022-03-18 PROBLEM — Z99.89 OSA ON CPAP: Status: ACTIVE | Noted: 2020-06-26

## 2022-03-18 PROBLEM — J96.01 ACUTE RESPIRATORY FAILURE WITH HYPOXIA (HCC): Status: ACTIVE | Noted: 2020-06-24

## 2022-03-18 PROBLEM — G47.33 OSA ON CPAP: Status: ACTIVE | Noted: 2020-06-26

## 2022-03-19 PROBLEM — U07.1 COVID-19 VIRUS INFECTION: Status: ACTIVE | Noted: 2020-06-24

## 2022-03-19 PROBLEM — R19.7 DIARRHEA OF PRESUMED INFECTIOUS ORIGIN: Status: ACTIVE | Noted: 2020-06-26

## 2022-03-19 PROBLEM — J96.90 RESPIRATORY FAILURE (HCC): Status: ACTIVE | Noted: 2020-06-24

## 2022-10-01 ENCOUNTER — HOSPITAL ENCOUNTER (EMERGENCY)
Dept: ULTRASOUND IMAGING | Age: 52
Discharge: HOME | End: 2022-10-04

## 2022-10-01 ENCOUNTER — APPOINTMENT (OUTPATIENT)
Dept: GENERAL RADIOLOGY | Age: 52
End: 2022-10-01

## 2022-10-01 ENCOUNTER — HOSPITAL ENCOUNTER (EMERGENCY)
Age: 52
Discharge: HOME | End: 2022-10-01
Attending: EMERGENCY MEDICINE

## 2022-10-01 VITALS
SYSTOLIC BLOOD PRESSURE: 143 MMHG | HEART RATE: 86 BPM | OXYGEN SATURATION: 97 % | TEMPERATURE: 98.2 F | BODY MASS INDEX: 43.16 KG/M2 | DIASTOLIC BLOOD PRESSURE: 93 MMHG | HEIGHT: 67 IN | RESPIRATION RATE: 16 BRPM | WEIGHT: 275 LBS

## 2022-10-01 DIAGNOSIS — M79.604 RIGHT LEG PAIN: Primary | ICD-10-CM

## 2022-10-01 LAB
ALBUMIN SERPL-MCNC: 4.1 G/DL (ref 3.5–5)
ALBUMIN/GLOB SERPL: 1.1 {RATIO} (ref 1.2–3.5)
ALP SERPL-CCNC: 117 U/L (ref 50–136)
ALT SERPL-CCNC: 31 U/L (ref 12–65)
ANION GAP SERPL CALC-SCNC: 4 MMOL/L (ref 4–13)
AST SERPL-CCNC: 16 U/L (ref 15–37)
BASOPHILS # BLD: 0.1 K/UL (ref 0–0.2)
BASOPHILS NFR BLD: 1 % (ref 0–2)
BILIRUB SERPL-MCNC: 0.3 MG/DL (ref 0.2–1.1)
BUN SERPL-MCNC: 10 MG/DL (ref 6–23)
CALCIUM SERPL-MCNC: 9.6 MG/DL (ref 8.3–10.4)
CHLORIDE SERPL-SCNC: 105 MMOL/L (ref 101–110)
CO2 SERPL-SCNC: 29 MMOL/L (ref 21–32)
CREAT SERPL-MCNC: 0.8 MG/DL (ref 0.6–1)
DIFFERENTIAL METHOD BLD: ABNORMAL
EOSINOPHIL # BLD: 0.2 K/UL (ref 0–0.8)
EOSINOPHIL NFR BLD: 2 % (ref 0.5–7.8)
ERYTHROCYTE [DISTWIDTH] IN BLOOD BY AUTOMATED COUNT: 15.9 % (ref 11.9–14.6)
GLOBULIN SER CALC-MCNC: 3.9 G/DL (ref 2.3–3.5)
GLUCOSE SERPL-MCNC: 126 MG/DL (ref 65–100)
HCT VFR BLD AUTO: 44.4 % (ref 35.8–46.3)
HGB BLD-MCNC: 13.8 G/DL (ref 11.7–15.4)
IMM GRANULOCYTES # BLD AUTO: 0 K/UL (ref 0–0.5)
IMM GRANULOCYTES NFR BLD AUTO: 0 % (ref 0–5)
INR PPP: 0.9
LYMPHOCYTES # BLD: 3.4 K/UL (ref 0.5–4.6)
LYMPHOCYTES NFR BLD: 30 % (ref 13–44)
MCH RBC QN AUTO: 26.2 PG (ref 26.1–32.9)
MCHC RBC AUTO-ENTMCNC: 31.1 G/DL (ref 31.4–35)
MCV RBC AUTO: 84.4 FL (ref 79.6–97.8)
MONOCYTES # BLD: 0.8 K/UL (ref 0.1–1.3)
MONOCYTES NFR BLD: 7 % (ref 4–12)
NEUTS SEG # BLD: 6.6 K/UL (ref 1.7–8.2)
NEUTS SEG NFR BLD: 60 % (ref 43–78)
NRBC # BLD: 0 K/UL (ref 0–0.2)
PLATELET # BLD AUTO: 331 K/UL (ref 150–450)
PMV BLD AUTO: 11.8 FL (ref 9.4–12.3)
POTASSIUM SERPL-SCNC: 3.5 MMOL/L (ref 3.5–5.1)
PROT SERPL-MCNC: 8 G/DL (ref 6.3–8.2)
PROTHROMBIN TIME: 12.4 SEC (ref 12.6–14.5)
RBC # BLD AUTO: 5.26 M/UL (ref 4.05–5.2)
SODIUM SERPL-SCNC: 138 MMOL/L (ref 136–145)
WBC # BLD AUTO: 11 K/UL (ref 4.3–11.1)

## 2022-10-01 PROCEDURE — 85025 COMPLETE CBC W/AUTO DIFF WBC: CPT

## 2022-10-01 PROCEDURE — 99284 EMERGENCY DEPT VISIT MOD MDM: CPT

## 2022-10-01 PROCEDURE — 80053 COMPREHEN METABOLIC PANEL: CPT

## 2022-10-01 PROCEDURE — 73590 X-RAY EXAM OF LOWER LEG: CPT

## 2022-10-01 PROCEDURE — 85610 PROTHROMBIN TIME: CPT

## 2022-10-01 PROCEDURE — 93971 EXTREMITY STUDY: CPT

## 2022-10-01 RX ORDER — DICLOFENAC POTASSIUM 50 MG/1
50 TABLET, FILM COATED ORAL 3 TIMES DAILY
Qty: 15 TABLET | Refills: 3 | Status: SHIPPED | OUTPATIENT
Start: 2022-10-01

## 2022-10-01 ASSESSMENT — ENCOUNTER SYMPTOMS
VOMITING: 0
BACK PAIN: 0
ABDOMINAL PAIN: 0
COLOR CHANGE: 0
NAUSEA: 0
CHEST TIGHTNESS: 0
SHORTNESS OF BREATH: 0
COUGH: 0

## 2022-10-01 ASSESSMENT — PAIN - FUNCTIONAL ASSESSMENT: PAIN_FUNCTIONAL_ASSESSMENT: 0-10

## 2022-10-01 ASSESSMENT — PAIN DESCRIPTION - LOCATION: LOCATION: LEG

## 2022-10-01 ASSESSMENT — PAIN DESCRIPTION - ORIENTATION: ORIENTATION: RIGHT

## 2022-10-01 ASSESSMENT — PAIN SCALES - GENERAL: PAINLEVEL_OUTOF10: 10

## 2022-10-01 NOTE — ED NOTES
I have reviewed discharge instructions with the patient. The patient verbalized understanding. Patient left ED via Discharge Method: ambulatory to Home    Opportunity for questions and clarification provided. Patient given 1 scripts. To continue your aftercare when you leave the hospital, you may receive an automated call from our care team to check in on how you are doing.  This is a free service and part of our promise to provide the best care and service to meet your aftercare needs. \" If you have questions, or wish to unsubscribe from this service please call [phone].  Thank you for Choosing our [facility].       Gina Agustin RN  10/01/22 [id number]

## 2022-10-01 NOTE — DISCHARGE INSTRUCTIONS
Your ultrasound was negative for blood clot and your x-ray was negative also. Please follow-up with your primary care doctor, as you may need further work-up to include an MRI of the extremity. Please return to the ED for fever, increased swelling, shortness of breath, or other concerns.

## 2022-10-01 NOTE — ED TRIAGE NOTES
Pt arrives via pov with complaints of right lower leg pain and swelling. Reports noticed it yesterday. Reports painful with ambulation.

## 2022-10-01 NOTE — ED PROVIDER NOTES
Emergency Department Provider Note                   PCP:                None Provider               Age: 45 y.o. Sex: female       ICD-10-CM    1. Right leg pain  M79.604           DISPOSITION Decision To Discharge 10/01/2022 06:36:35 PM        MDM  Number of Diagnoses or Management Options  Right leg pain: new, needed workup  Diagnosis management comments: 45year old female presents with anterior right leg pain, negative venous duplex and tib/fib in the ED. Will discharge patient home with pain medication and follow up with primary care. Amount and/or Complexity of Data Reviewed  Clinical lab tests: ordered and reviewed  Tests in the radiology section of CPT®: ordered and reviewed    Risk of Complications, Morbidity, and/or Mortality  Presenting problems: moderate  Diagnostic procedures: moderate  Management options: moderate    Patient Progress  Patient progress: stable       ED Course as of 10/01/22 1849   Sat Oct 01, 2022   1730 Venous duplex negative for DVT or Baker's cyst.  Will obtain tib-fib x-ray. [CJ]   1823 Tib/fib reviewed by Dr. Kobe Fregoso and unremarkable. Will discharge pt and follow-up with primary care. [CJ]      ED Course User Index  [CJ] MILAN Boone - CNP        Orders Placed This Encounter   Procedures    XR TIBIA FIBULA RIGHT (2 VIEWS)    CBC with Auto Differential    CMP    Protime-INR    Saline lock IV    Vascular duplex lower extremity venous right        Medications - No data to display    New Prescriptions    DICLOFENAC (CATAFLAM) 50 MG TABLET    Take 1 tablet by mouth 3 times daily        Judie Escalona is a 45 y.o. female who presents to the Emergency Department with chief complaint of    Chief Complaint   Patient presents with    Leg Pain      57-year-old female presents with right anterior lower leg pain onset 2 days ago. Reports the pain is shooting in nature and worse with palpation or bearing weight. She denies injury to the extremity.   States that she has a history of bilateral lower extremity swelling which she was previously on Lasix for but has not filled the prescription recently. She feels that the swelling is different than her previous swelling. Denies wound, rash, drainage from the site. The history is provided by the patient. Leg Injury  Pain details:     Quality:  Aching and shooting    Radiates to:  Does not radiate    Severity:  Moderate    Onset quality:  Sudden    Duration:  2 days    Timing:  Constant    Progression:  Worsening  Chronicity:  New  Relieved by:  Nothing  Worsened by:  Bearing weight, exercise and activity  Ineffective treatments:  None tried  Associated symptoms: swelling    Associated symptoms: no back pain, no decreased ROM, no fatigue, no fever, no itching, no muscle weakness, no neck pain, no numbness, no stiffness and no tingling    Risk factors: obesity    Risk factors: no concern for non-accidental trauma, no frequent fractures, no known bone disorder and no recent illness        Review of Systems   Constitutional:  Positive for activity change. Negative for diaphoresis, fatigue and fever. Respiratory:  Negative for cough, chest tightness and shortness of breath. Cardiovascular:  Positive for leg swelling (Anterior right lower leg). Negative for chest pain and palpitations. Gastrointestinal:  Negative for abdominal pain, nausea and vomiting. Genitourinary:  Negative for difficulty urinating, dysuria, flank pain, frequency, hematuria, menstrual problem, urgency and vaginal bleeding. Musculoskeletal:  Positive for arthralgias. Negative for back pain, joint swelling, myalgias, neck pain and stiffness. Skin:  Negative for color change, itching, pallor, rash and wound. Neurological:  Negative for dizziness, weakness, numbness and headaches. Psychiatric/Behavioral:  Negative for agitation, behavioral problems and confusion. The patient is not nervous/anxious. All other systems reviewed and are negative.     Past Medical History:   Diagnosis Date    Arthritis     Diabetes ([address]     Hypertension     Stroke [facility])         Past Surgical History:   Procedure Laterality Date    CHOLECYSTECTOMY      GYN  2007    Hysterectomy    HEENT  2002    Tonsilectomy    ORTHOPEDIC SURGERY  2015    left hip ligament repair    ORTHOPEDIC SURGERY  2017    Right hip ligament repair        Family History   Problem Relation Age of Onset    Heart Disease Brother     Heart Disease Father     Cancer Mother     Diabetes Sister     Cancer Maternal Aunt     Heart Disease Sister     Stroke Brother         Social History     Socioeconomic History    Marital status: Single     Spouse name: None    Number of children: None    Years of education: None    Highest education level: None   Tobacco Use    Smoking status: Never    Smokeless tobacco: Never   Substance and Sexual Activity    Alcohol use: Never    Drug use: Never         Patient has no known allergies. Previous Medications    ATORVASTATIN (LIPITOR) 10 MG TABLET    Take 10 mg by mouth daily    CYCLOBENZAPRINE (FLEXERIL) 5 MG TABLET    Take 10 mg by mouth 3 times daily as needed    RIVAROXABAN (XARELTO) 10 MG TABS TABLET    Take 10 mg by mouth daily (with breakfast)        Vitals signs and nursing note reviewed. Patient Vitals for the past 4 hrs:   Temp Pulse Resp BP SpO2   10/01/22 1815 -- 86 16 (!) 143/93 97 %   10/01/22 1604 98.2 °F (36.8 °C) 84 16 (!) 160/106 99 %          Physical Exam  Vitals and nursing note reviewed. Constitutional:       General: She is not in acute distress. Appearance: Normal appearance. She is not ill-appearing, toxic-appearing or diaphoretic. HENT:      Head: Normocephalic and atraumatic. Right Ear: External ear normal.      Left Ear: External ear normal.      Nose: Nose normal. No congestion or rhinorrhea. Mouth/Throat:      Mouth: Mucous membranes are moist.      Pharynx: No oropharyngeal exudate or posterior oropharyngeal erythema.    Eyes: Extraocular Movements: Extraocular movements intact. Pupils: Pupils are equal, round, and reactive to light. Cardiovascular:      Rate and Rhythm: Normal rate and regular rhythm. Pulses: Normal pulses. Heart sounds: Normal heart sounds. No murmur heard. No friction rub. No gallop. Pulmonary:      Effort: Pulmonary effort is normal. No respiratory distress. Breath sounds: Normal breath sounds. No stridor. No wheezing, rhonchi or rales. Abdominal:      General: Abdomen is flat. Bowel sounds are normal. There is no distension. Palpations: Abdomen is soft. There is no mass. Musculoskeletal:         General: Swelling present. No tenderness, deformity or signs of injury. Normal range of motion. Cervical back: Normal range of motion. No rigidity or tenderness. Right lower leg: Swelling present. No edema. Left lower leg: No edema. Legs:       Comments: Well defined compressible mass, no rash, no ecchymosis   Skin:     General: Skin is warm and dry. Coloration: Skin is not jaundiced or pale. Findings: No bruising or erythema. Neurological:      General: No focal deficit present. Mental Status: She is alert and oriented to person, place, and time. Cranial Nerves: No cranial nerve deficit. Sensory: No sensory deficit. Motor: No weakness. Coordination: Coordination normal.   Psychiatric:         Mood and Affect: Mood normal.         Behavior: Behavior normal.         Thought Content: Thought content normal.         Judgment: Judgment normal.        Procedures    Results for orders placed or performed during the hospital encounter of 10/01/22   XR TIBIA FIBULA RIGHT (2 VIEWS)    Narrative    History: Anterior right leg pain with swelling    2 views right leg    FINDINGS: No fracture, dislocation, or additional acute bony abnormality. Degenerative change of the patellofemoral compartment noted. Impression    No acute findings. CBC with Auto Differential   Result Value Ref Range    WBC 11.0 4.3 - 11.1 K/uL    RBC 5.26 (H) 4.05 - 5.2 M/uL    Hemoglobin 13.8 11.7 - 15.4 g/dL    Hematocrit 44.4 35.8 - 46.3 %    MCV 84.4 79.6 - 97.8 FL    MCH 26.2 26.1 - 32.9 PG    MCHC 31.1 (L) 31.4 - 35.0 g/dL    RDW 15.9 (H) 11.9 - 14.6 %    Platelets 201 645 - 315 K/uL    MPV 11.8 9.4 - 12.3 FL    nRBC 0.00 0.0 - 0.2 K/uL    Differential Type AUTOMATED      Seg Neutrophils 60 43 - 78 %    Lymphocytes 30 13 - 44 %    Monocytes 7 4.0 - 12.0 %    Eosinophils % 2 0.5 - 7.8 %    Basophils 1 0.0 - 2.0 %    Immature Granulocytes 0 0.0 - 5.0 %    Segs Absolute 6.6 1.7 - 8.2 K/UL    Absolute Lymph # 3.4 0.5 - 4.6 K/UL    Absolute Mono # 0.8 0.1 - 1.3 K/UL    Absolute Eos # 0.2 0.0 - 0.8 K/UL    Basophils Absolute 0.1 0.0 - 0.2 K/UL    Absolute Immature Granulocyte 0.0 0.0 - 0.5 K/UL   CMP   Result Value Ref Range    Sodium 138 136 - 145 mmol/L    Potassium 3.5 3.5 - 5.1 mmol/L    Chloride 105 101 - 110 mmol/L    CO2 29 21 - 32 mmol/L    Anion Gap 4 4 - 13 mmol/L    Glucose 126 (H) 65 - 100 mg/dL    BUN 10 6 - 23 MG/DL    Creatinine 0.80 0.6 - 1.0 MG/DL    GFR African American >60 >60 ml/min/1.73m2    GFR Non- >60 >60 ml/min/1.73m2    Calcium 9.6 8.3 - 10.4 MG/DL    Total Bilirubin 0.3 0.2 - 1.1 MG/DL    ALT 31 12 - 65 U/L    AST 16 15 - 37 U/L    Alk Phosphatase 117 50 - 136 U/L    Total Protein 8.0 6.3 - 8.2 g/dL    Albumin 4.1 3.5 - 5.0 g/dL    Globulin 3.9 (H) 2.3 - 3.5 g/dL    Albumin/Globulin Ratio 1.1 (L) 1.2 - 3.5     Protime-INR   Result Value Ref Range    Protime 12.4 (L) 12.6 - 14.5 sec    INR 0.9     Vascular duplex lower extremity venous right    Narrative    HISTORY: Right lower extremity swelling and pain    Exam: Right lower extremity ultrasound    Technique: Realtime grayscale and color Doppler imaging is performed in the  longitudinal and transverse planes.     FINDINGS: There is normal flow, augmentation, and compressibility within the  deep venous system from the groin to popliteal fossa. Posterior tibial vein and  peroneal vein are also normal. No Baker's cyst.    IMPRESSIONS: No evidence of deep venous thrombosis within the right lower  extremity          XR TIBIA FIBULA RIGHT (2 VIEWS)   Final Result   No acute findings. Vascular duplex lower extremity venous right   Final Result                          Voice dictation software was used during the making of this note. This software is not perfect and grammatical and other typographical errors may be present. This note has not been completely proofread for errors.      Pablito Jackson, APRN - CNP  10/01/22 0168

## 2023-09-13 ENCOUNTER — OFFICE VISIT (OUTPATIENT)
Dept: OCCUPATIONAL MEDICINE | Age: 53
End: 2023-09-13

## 2023-09-13 VITALS
SYSTOLIC BLOOD PRESSURE: 121 MMHG | HEIGHT: 67 IN | RESPIRATION RATE: 18 BRPM | WEIGHT: 293 LBS | BODY MASS INDEX: 45.99 KG/M2 | DIASTOLIC BLOOD PRESSURE: 81 MMHG | TEMPERATURE: 97.8 F | OXYGEN SATURATION: 94 % | HEART RATE: 80 BPM

## 2023-09-13 DIAGNOSIS — I50.32 CHRONIC HEART FAILURE WITH PRESERVED EJECTION FRACTION (HCC): ICD-10-CM

## 2023-09-13 DIAGNOSIS — M54.50 ACUTE LEFT-SIDED LOW BACK PAIN WITHOUT SCIATICA: ICD-10-CM

## 2023-09-13 DIAGNOSIS — I50.32 CHRONIC HEART FAILURE WITH PRESERVED EJECTION FRACTION (HCC): Primary | ICD-10-CM

## 2023-09-13 DIAGNOSIS — M79.89 SWELLING OF BOTH HANDS: ICD-10-CM

## 2023-09-13 DIAGNOSIS — R73.03 PREDIABETES: ICD-10-CM

## 2023-09-13 DIAGNOSIS — M79.89 SWELLING OF BOTH LOWER EXTREMITIES: ICD-10-CM

## 2023-09-13 PROBLEM — R29.898 WEAKNESS OF RIGHT LOWER EXTREMITY: Status: ACTIVE | Noted: 2018-03-21

## 2023-09-13 PROBLEM — I87.8 VENOUS STASIS: Status: ACTIVE | Noted: 2017-08-02

## 2023-09-13 PROBLEM — E55.9 VITAMIN D DEFICIENCY: Status: ACTIVE | Noted: 2019-11-19

## 2023-09-13 PROBLEM — T14.8XXA MUSCLE STRAIN: Status: ACTIVE | Noted: 2022-06-28

## 2023-09-13 PROBLEM — I69.920 APHASIA FOLLOWING UNSPECIFIED CEREBROVASCULAR DISEASE: Status: ACTIVE | Noted: 2018-03-05

## 2023-09-13 PROBLEM — F32.0 CURRENT MILD EPISODE OF MAJOR DEPRESSIVE DISORDER WITHOUT PRIOR EPISODE (HCC): Status: ACTIVE | Noted: 2018-05-17

## 2023-09-13 PROBLEM — G62.9 PERIPHERAL NEUROPATHY: Status: ACTIVE | Noted: 2023-01-17

## 2023-09-13 PROBLEM — F43.21 GRIEF REACTION: Status: ACTIVE | Noted: 2018-04-18

## 2023-09-13 PROBLEM — R41.841 COGNITIVE COMMUNICATION DEFICIT: Status: ACTIVE | Noted: 2018-03-27

## 2023-09-13 PROBLEM — R25.2 MUSCLE CRAMPS: Status: ACTIVE | Noted: 2021-07-23

## 2023-09-13 PROBLEM — Z87.39 HISTORY OF AVASCULAR NECROSIS OF CAPITAL FEMORAL EPIPHYSIS: Status: ACTIVE | Noted: 2021-06-07

## 2023-09-13 PROBLEM — G47.33 OBSTRUCTIVE SLEEP APNEA SYNDROME: Status: ACTIVE | Noted: 2018-06-15

## 2023-09-13 PROBLEM — M25.852 FEMOROACETABULAR IMPINGEMENT OF LEFT HIP: Status: ACTIVE | Noted: 2017-04-03

## 2023-09-13 PROBLEM — R73.9 HYPERGLYCEMIA: Status: ACTIVE | Noted: 2017-08-02

## 2023-09-13 PROBLEM — I10 HYPERTENSION: Status: ACTIVE | Noted: 2021-10-01

## 2023-09-13 PROBLEM — I63.9 STROKE OF UNUSUAL ETIOLOGY (HCC): Status: ACTIVE | Noted: 2018-03-05

## 2023-09-13 PROBLEM — E11.9 TYPE 2 DIABETES MELLITUS WITHOUT COMPLICATION (HCC): Status: ACTIVE | Noted: 2018-12-05

## 2023-09-13 PROBLEM — F43.20 GRIEF REACTION: Status: ACTIVE | Noted: 2018-04-18

## 2023-09-13 PROBLEM — R60.0 BILATERAL EDEMA OF LOWER EXTREMITY: Status: ACTIVE | Noted: 2017-08-02

## 2023-09-13 PROBLEM — M87.059 AVASCULAR NECROSIS OF BONE OF HIP (HCC): Status: ACTIVE | Noted: 2017-01-30

## 2023-09-13 PROBLEM — E78.1 HYPERTRIGLYCERIDEMIA: Status: ACTIVE | Noted: 2018-03-05

## 2023-09-13 PROBLEM — R15.9 INCONTINENCE OF FECES: Status: ACTIVE | Noted: 2021-07-07

## 2023-09-13 PROBLEM — E66.01 MORBID OBESITY (HCC): Status: ACTIVE | Noted: 2020-06-23

## 2023-09-13 LAB
BASOPHILS # BLD: 0 K/UL (ref 0–0.2)
BASOPHILS NFR BLD: 0 % (ref 0–2)
DIFFERENTIAL METHOD BLD: ABNORMAL
EOSINOPHIL # BLD: 0.3 K/UL (ref 0–0.8)
EOSINOPHIL NFR BLD: 2 % (ref 0.5–7.8)
ERYTHROCYTE [DISTWIDTH] IN BLOOD BY AUTOMATED COUNT: 16.2 % (ref 11.9–14.6)
ERYTHROCYTE [SEDIMENTATION RATE] IN BLOOD: 26 MM/HR (ref 0–30)
HCT VFR BLD AUTO: 42.4 % (ref 35.8–46.3)
HGB BLD-MCNC: 12.7 G/DL (ref 11.7–15.4)
IMM GRANULOCYTES # BLD AUTO: 0.1 K/UL (ref 0–0.5)
IMM GRANULOCYTES NFR BLD AUTO: 1 % (ref 0–5)
LYMPHOCYTES # BLD: 3.4 K/UL (ref 0.5–4.6)
LYMPHOCYTES NFR BLD: 27 % (ref 13–44)
MCH RBC QN AUTO: 25.7 PG (ref 26.1–32.9)
MCHC RBC AUTO-ENTMCNC: 30 G/DL (ref 31.4–35)
MCV RBC AUTO: 85.8 FL (ref 82–102)
MONOCYTES # BLD: 0.7 K/UL (ref 0.1–1.3)
MONOCYTES NFR BLD: 5 % (ref 4–12)
NEUTS SEG # BLD: 8.2 K/UL (ref 1.7–8.2)
NEUTS SEG NFR BLD: 65 % (ref 43–78)
NRBC # BLD: 0 K/UL (ref 0–0.2)
PLATELET # BLD AUTO: 325 K/UL (ref 150–450)
PMV BLD AUTO: 12.2 FL (ref 9.4–12.3)
RBC # BLD AUTO: 4.94 M/UL (ref 4.05–5.2)
WBC # BLD AUTO: 12.5 K/UL (ref 4.3–11.1)

## 2023-09-13 PROCEDURE — 3079F DIAST BP 80-89 MM HG: CPT

## 2023-09-13 PROCEDURE — 3074F SYST BP LT 130 MM HG: CPT

## 2023-09-13 PROCEDURE — 99214 OFFICE O/P EST MOD 30 MIN: CPT

## 2023-09-13 RX ORDER — DICLOFENAC POTASSIUM 50 MG/1
50 TABLET, FILM COATED ORAL 3 TIMES DAILY
Qty: 21 TABLET | Refills: 0 | Status: SHIPPED | OUTPATIENT
Start: 2023-09-13 | End: 2023-09-20

## 2023-09-13 RX ORDER — POTASSIUM CHLORIDE 750 MG/1
20 CAPSULE, EXTENDED RELEASE ORAL DAILY
Qty: 180 CAPSULE | Refills: 0 | Status: SHIPPED | OUTPATIENT
Start: 2023-09-13 | End: 2023-12-12

## 2023-09-13 RX ORDER — FUROSEMIDE 20 MG/1
20 TABLET ORAL DAILY
Qty: 30 TABLET | Refills: 24 | COMMUNITY
Start: 2021-09-09 | End: 2023-09-13 | Stop reason: SDUPTHER

## 2023-09-13 RX ORDER — POTASSIUM CHLORIDE 750 MG/1
20 CAPSULE, EXTENDED RELEASE ORAL DAILY
COMMUNITY
Start: 2021-09-09 | End: 2023-09-13 | Stop reason: SDUPTHER

## 2023-09-13 RX ORDER — FUROSEMIDE 20 MG/1
20 TABLET ORAL 2 TIMES DAILY
Qty: 180 TABLET | Refills: 0 | Status: SHIPPED | OUTPATIENT
Start: 2023-09-13 | End: 2023-12-12

## 2023-09-13 RX ORDER — CYCLOBENZAPRINE HCL 5 MG
10 TABLET ORAL 2 TIMES DAILY PRN
Qty: 28 TABLET | Refills: 0 | Status: SHIPPED | OUTPATIENT
Start: 2023-09-13 | End: 2023-09-20

## 2023-09-13 ASSESSMENT — ENCOUNTER SYMPTOMS
CONSTIPATION: 0
VOMITING: 0
SHORTNESS OF BREATH: 1
CHEST TIGHTNESS: 0
BOWEL INCONTINENCE: 0
WHEEZING: 0
SORE THROAT: 0
DIARRHEA: 0
NAUSEA: 0
COUGH: 0
ABDOMINAL PAIN: 0
RHINORRHEA: 0
BACK PAIN: 1

## 2023-09-13 ASSESSMENT — PATIENT HEALTH QUESTIONNAIRE - PHQ9
2. FEELING DOWN, DEPRESSED OR HOPELESS: 0
1. LITTLE INTEREST OR PLEASURE IN DOING THINGS: 0
SUM OF ALL RESPONSES TO PHQ QUESTIONS 1-9: 0
SUM OF ALL RESPONSES TO PHQ9 QUESTIONS 1 & 2: 0
SUM OF ALL RESPONSES TO PHQ QUESTIONS 1-9: 0

## 2023-09-14 ENCOUNTER — TELEPHONE (OUTPATIENT)
Dept: OCCUPATIONAL MEDICINE | Age: 53
End: 2023-09-14

## 2023-09-14 LAB
ALBUMIN SERPL-MCNC: 3.5 G/DL (ref 3.5–5)
ALBUMIN/GLOB SERPL: 0.8 (ref 0.4–1.6)
ALP SERPL-CCNC: 112 U/L (ref 50–136)
ALT SERPL-CCNC: 44 U/L (ref 12–65)
ANION GAP SERPL CALC-SCNC: 5 MMOL/L (ref 2–11)
AST SERPL-CCNC: 17 U/L (ref 15–37)
BILIRUB SERPL-MCNC: 0.3 MG/DL (ref 0.2–1.1)
BUN SERPL-MCNC: 8 MG/DL (ref 6–23)
CALCIUM SERPL-MCNC: 9 MG/DL (ref 8.3–10.4)
CHLORIDE SERPL-SCNC: 107 MMOL/L (ref 101–110)
CO2 SERPL-SCNC: 27 MMOL/L (ref 21–32)
CREAT SERPL-MCNC: 1 MG/DL (ref 0.6–1)
CRP SERPL-MCNC: 1.1 MG/DL (ref 0–0.9)
EST. AVERAGE GLUCOSE BLD GHB EST-MCNC: 143 MG/DL
GLOBULIN SER CALC-MCNC: 4.2 G/DL (ref 2.8–4.5)
GLUCOSE SERPL-MCNC: 97 MG/DL (ref 65–100)
HBA1C MFR BLD: 6.6 % (ref 4.8–5.6)
NT PRO BNP: 23 PG/ML (ref 5–125)
POTASSIUM SERPL-SCNC: 3.7 MMOL/L (ref 3.5–5.1)
PROT SERPL-MCNC: 7.7 G/DL (ref 6.3–8.2)
SODIUM SERPL-SCNC: 139 MMOL/L (ref 133–143)

## 2023-09-14 NOTE — TELEPHONE ENCOUNTER
Called patient to follow up on her back pain and report results of blood work done yesterday. Patient states that she feels a little better and that she slept somewhat better last night with the Flexeril. Overall, her back pain is better than what it's been. Reviewed patient's blood work with her over the phone - explained that it is overall good with the patient having no issues with her kidney or liver function, BNP level normal, electrolytes normal, and slight elevation in CRP but normal ESR (inflammatory markers). Patient's hemoglobin A1C is now in the diabetic range at 6.6% and she will need to follow up with her primary care provider regarding this.      Recent Results (from the past 24 hour(s))   CBC with Auto Differential    Collection Time: 09/13/23 10:14 PM   Result Value Ref Range    WBC 12.5 (H) 4.3 - 11.1 K/uL    RBC 4.94 4.05 - 5.2 M/uL    Hemoglobin 12.7 11.7 - 15.4 g/dL    Hematocrit 42.4 35.8 - 46.3 %    MCV 85.8 82 - 102 FL    MCH 25.7 (L) 26.1 - 32.9 PG    MCHC 30.0 (L) 31.4 - 35.0 g/dL    RDW 16.2 (H) 11.9 - 14.6 %    Platelets 219 558 - 507 K/uL    MPV 12.2 9.4 - 12.3 FL    nRBC 0.00 0.0 - 0.2 K/uL    Differential Type AUTOMATED      Neutrophils % 65 43 - 78 %    Lymphocytes % 27 13 - 44 %    Monocytes % 5 4.0 - 12.0 %    Eosinophils % 2 0.5 - 7.8 %    Basophils % 0 0.0 - 2.0 %    Immature Granulocytes 1 0.0 - 5.0 %    Neutrophils Absolute 8.2 1.7 - 8.2 K/UL    Lymphocytes Absolute 3.4 0.5 - 4.6 K/UL    Monocytes Absolute 0.7 0.1 - 1.3 K/UL    Eosinophils Absolute 0.3 0.0 - 0.8 K/UL    Basophils Absolute 0.0 0.0 - 0.2 K/UL    Absolute Immature Granulocyte 0.1 0.0 - 0.5 K/UL   Comprehensive Metabolic Panel    Collection Time: 09/13/23 10:15 PM   Result Value Ref Range    Sodium 139 133 - 143 mmol/L    Potassium 3.7 3.5 - 5.1 mmol/L    Chloride 107 101 - 110 mmol/L    CO2 27 21 - 32 mmol/L    Anion Gap 5 2 - 11 mmol/L    Glucose 97 65 - 100 mg/dL    BUN 8 6 - 23 MG/DL    Creatinine 1.00 0.6 -

## 2023-09-18 ENCOUNTER — OFFICE VISIT (OUTPATIENT)
Age: 53
End: 2023-09-18
Payer: COMMERCIAL

## 2023-09-18 VITALS
BODY MASS INDEX: 45.99 KG/M2 | SYSTOLIC BLOOD PRESSURE: 136 MMHG | HEART RATE: 76 BPM | HEIGHT: 67 IN | WEIGHT: 293 LBS | DIASTOLIC BLOOD PRESSURE: 88 MMHG

## 2023-09-18 DIAGNOSIS — E78.1 HYPERTRIGLYCERIDEMIA: ICD-10-CM

## 2023-09-18 DIAGNOSIS — I10 PRIMARY HYPERTENSION: Primary | ICD-10-CM

## 2023-09-18 DIAGNOSIS — R06.02 SHORTNESS OF BREATH: ICD-10-CM

## 2023-09-18 PROCEDURE — 99204 OFFICE O/P NEW MOD 45 MIN: CPT | Performed by: INTERNAL MEDICINE

## 2023-09-18 PROCEDURE — 3075F SYST BP GE 130 - 139MM HG: CPT | Performed by: INTERNAL MEDICINE

## 2023-09-18 PROCEDURE — 3079F DIAST BP 80-89 MM HG: CPT | Performed by: INTERNAL MEDICINE

## 2023-09-18 PROCEDURE — 93000 ELECTROCARDIOGRAM COMPLETE: CPT | Performed by: INTERNAL MEDICINE

## 2023-09-18 NOTE — PROGRESS NOTES
[address] [address] [address]  [address], [facility], [address]  PHONE: [phone]           HISTORY AND PHYSICAL          SUBJECTIVE:   Candelaria Cottrell is a 52 y.o. female seen for a consultation visit regarding the following:     Chief Complaint   Patient presents with    New Patient    Shortness of Breath    Swelling            HPI:    Orthopnea since covid 2021. Le edema as well . No pnd. Jackson increased as well. No cp. NO palpitations or syncope. She has severe dyspnea on exertion with any activity and has to stop even walking short distances      Allergies   Allergen Reactions    Metformin Rash    Penicillins Rash     Past Medical History:   Diagnosis Date    Arthritis     Diabetes ([address] )     Hypertension     Stroke [facility])      Past Surgical History:   Procedure Laterality Date    CHOLECYSTECTOMY      GYN  2007    Hysterectomy    HEENT  2002    Tonsilectomy    ORTHOPEDIC SURGERY  2015    left hip ligament repair    ORTHOPEDIC SURGERY  2017    Right hip ligament repair     Family History   Problem Relation Age of Onset    Heart Disease Brother     Heart Disease Father     Cancer Mother     Diabetes Sister     Cancer Maternal Aunt     Heart Disease Sister     Stroke Brother      Social History     Tobacco Use    Smoking status: Never    Smokeless tobacco: Never   Substance Use Topics    Alcohol use: Never         Current Outpatient Medications:     Misc.  Devices (CPAP MACHINE) MISC, Use as Instructed, Disp: , Rfl:     diclofenac (CATAFLAM) 50 MG tablet, Take 1 tablet by mouth 3 times daily for 7 days, Disp: 21 tablet, Rfl: 0    cyclobenzaprine (FLEXERIL) 5 MG tablet, Take 2 tablets by mouth 2 times daily as needed for Muscle spasms, Disp: 28 tablet, Rfl: 0    potassium chloride (MICRO-K) 10 MEQ extended release capsule, Take 2 capsules by mouth daily, Disp: 180 capsule, Rfl: 0    furosemide (LASIX) 20 MG tablet, Take 1 tablet by mouth 2 times daily, Disp: 180 tablet, Rfl: 0    ROS:    Constitution:

## 2023-10-26 ENCOUNTER — OFFICE VISIT (OUTPATIENT)
Dept: FAMILY MEDICINE CLINIC | Facility: CLINIC | Age: 53
End: 2023-10-26
Payer: COMMERCIAL

## 2023-10-26 VITALS
BODY MASS INDEX: 45.96 KG/M2 | SYSTOLIC BLOOD PRESSURE: 124 MMHG | OXYGEN SATURATION: 98 % | HEIGHT: 67 IN | WEIGHT: 292.8 LBS | HEART RATE: 80 BPM | TEMPERATURE: 98.5 F | DIASTOLIC BLOOD PRESSURE: 86 MMHG

## 2023-10-26 DIAGNOSIS — E55.9 VITAMIN D DEFICIENCY: ICD-10-CM

## 2023-10-26 DIAGNOSIS — M62.830 BACK SPASM: ICD-10-CM

## 2023-10-26 DIAGNOSIS — Z23 FLU VACCINE NEED: ICD-10-CM

## 2023-10-26 DIAGNOSIS — Z12.31 BREAST CANCER SCREENING BY MAMMOGRAM: ICD-10-CM

## 2023-10-26 DIAGNOSIS — I10 PRIMARY HYPERTENSION: Primary | ICD-10-CM

## 2023-10-26 DIAGNOSIS — K13.79 SORE MOUTH: ICD-10-CM

## 2023-10-26 DIAGNOSIS — Z12.11 COLON CANCER SCREENING: ICD-10-CM

## 2023-10-26 DIAGNOSIS — E66.01 MORBID OBESITY (HCC): ICD-10-CM

## 2023-10-26 DIAGNOSIS — E11.9 TYPE 2 DIABETES MELLITUS WITHOUT COMPLICATION, WITHOUT LONG-TERM CURRENT USE OF INSULIN (HCC): ICD-10-CM

## 2023-10-26 DIAGNOSIS — G47.33 OBSTRUCTIVE SLEEP APNEA SYNDROME: ICD-10-CM

## 2023-10-26 DIAGNOSIS — F32.0 CURRENT MILD EPISODE OF MAJOR DEPRESSIVE DISORDER WITHOUT PRIOR EPISODE (HCC): ICD-10-CM

## 2023-10-26 PROBLEM — R19.7 DIARRHEA OF PRESUMED INFECTIOUS ORIGIN: Status: RESOLVED | Noted: 2020-06-26 | Resolved: 2023-10-26

## 2023-10-26 PROBLEM — J96.01 ACUTE RESPIRATORY FAILURE WITH HYPOXIA (HCC): Status: RESOLVED | Noted: 2020-06-24 | Resolved: 2023-10-26

## 2023-10-26 PROBLEM — J96.90 RESPIRATORY FAILURE (HCC): Status: RESOLVED | Noted: 2020-06-24 | Resolved: 2023-10-26

## 2023-10-26 LAB
CREAT UR-MCNC: 271 MG/DL
MICROALBUMIN UR-MCNC: 4.21 MG/DL
MICROALBUMIN/CREAT UR-RTO: 16 MG/G (ref 0–30)

## 2023-10-26 PROCEDURE — 3078F DIAST BP <80 MM HG: CPT | Performed by: NURSE PRACTITIONER

## 2023-10-26 PROCEDURE — 3044F HG A1C LEVEL LT 7.0%: CPT | Performed by: NURSE PRACTITIONER

## 2023-10-26 PROCEDURE — 99204 OFFICE O/P NEW MOD 45 MIN: CPT | Performed by: NURSE PRACTITIONER

## 2023-10-26 PROCEDURE — 3074F SYST BP LT 130 MM HG: CPT | Performed by: NURSE PRACTITIONER

## 2023-10-26 PROCEDURE — 90471 IMMUNIZATION ADMIN: CPT | Performed by: NURSE PRACTITIONER

## 2023-10-26 PROCEDURE — 90674 CCIIV4 VAC NO PRSV 0.5 ML IM: CPT | Performed by: NURSE PRACTITIONER

## 2023-10-26 RX ORDER — CYCLOBENZAPRINE HCL 5 MG
5 TABLET ORAL 3 TIMES DAILY PRN
Qty: 30 TABLET | Refills: 0 | Status: SHIPPED | OUTPATIENT
Start: 2023-10-26 | End: 2023-11-05

## 2023-10-26 SDOH — HEALTH STABILITY: PHYSICAL HEALTH: ON AVERAGE, HOW MANY MINUTES DO YOU ENGAGE IN EXERCISE AT THIS LEVEL?: 0 MIN

## 2023-10-26 SDOH — ECONOMIC STABILITY: HOUSING INSECURITY
IN THE LAST 12 MONTHS, WAS THERE A TIME WHEN YOU DID NOT HAVE A STEADY PLACE TO SLEEP OR SLEPT IN A SHELTER (INCLUDING NOW)?: NO

## 2023-10-26 SDOH — ECONOMIC STABILITY: FOOD INSECURITY: WITHIN THE PAST 12 MONTHS, YOU WORRIED THAT YOUR FOOD WOULD RUN OUT BEFORE YOU GOT MONEY TO BUY MORE.: NEVER TRUE

## 2023-10-26 SDOH — HEALTH STABILITY: PHYSICAL HEALTH: ON AVERAGE, HOW MANY DAYS PER WEEK DO YOU ENGAGE IN MODERATE TO STRENUOUS EXERCISE (LIKE A BRISK WALK)?: 5 DAYS

## 2023-10-26 SDOH — ECONOMIC STABILITY: FOOD INSECURITY: WITHIN THE PAST 12 MONTHS, THE FOOD YOU BOUGHT JUST DIDN'T LAST AND YOU DIDN'T HAVE MONEY TO GET MORE.: NEVER TRUE

## 2023-10-26 SDOH — ECONOMIC STABILITY: INCOME INSECURITY: HOW HARD IS IT FOR YOU TO PAY FOR THE VERY BASICS LIKE FOOD, HOUSING, MEDICAL CARE, AND HEATING?: NOT VERY HARD

## 2023-10-26 ASSESSMENT — COLUMBIA-SUICIDE SEVERITY RATING SCALE - C-SSRS
7. DID THIS OCCUR IN THE LAST THREE MONTHS: NO
3. HAVE YOU BEEN THINKING ABOUT HOW YOU MIGHT KILL YOURSELF?: NO
4. HAVE YOU HAD THESE THOUGHTS AND HAD SOME INTENTION OF ACTING ON THEM?: NO
5. HAVE YOU STARTED TO WORK OUT OR WORKED OUT THE DETAILS OF HOW TO KILL YOURSELF? DO YOU INTEND TO CARRY OUT THIS PLAN?: NO

## 2023-10-26 ASSESSMENT — PATIENT HEALTH QUESTIONNAIRE - PHQ9
5. POOR APPETITE OR OVEREATING: 0
SUM OF ALL RESPONSES TO PHQ QUESTIONS 1-9: 0
SUM OF ALL RESPONSES TO PHQ QUESTIONS 1-9: 0
9. THOUGHTS THAT YOU WOULD BE BETTER OFF DEAD, OR OF HURTING YOURSELF: 0
3. TROUBLE FALLING OR STAYING ASLEEP: 0
7. TROUBLE CONCENTRATING ON THINGS, SUCH AS READING THE NEWSPAPER OR WATCHING TELEVISION: 0
6. FEELING BAD ABOUT YOURSELF - OR THAT YOU ARE A FAILURE OR HAVE LET YOURSELF OR YOUR FAMILY DOWN: 0
SUM OF ALL RESPONSES TO PHQ QUESTIONS 1-9: 0
SUM OF ALL RESPONSES TO PHQ QUESTIONS 1-9: 0
SUM OF ALL RESPONSES TO PHQ9 QUESTIONS 1 & 2: 0
10. IF YOU CHECKED OFF ANY PROBLEMS, HOW DIFFICULT HAVE THESE PROBLEMS MADE IT FOR YOU TO DO YOUR WORK, TAKE CARE OF THINGS AT HOME, OR GET ALONG WITH OTHER PEOPLE: 0
8. MOVING OR SPEAKING SO SLOWLY THAT OTHER PEOPLE COULD HAVE NOTICED. OR THE OPPOSITE, BEING SO FIGETY OR RESTLESS THAT YOU HAVE BEEN MOVING AROUND A LOT MORE THAN USUAL: 0
4. FEELING TIRED OR HAVING LITTLE ENERGY: 0
SUM OF ALL RESPONSES TO PHQ QUESTIONS 1-9: 0
2. FEELING DOWN, DEPRESSED OR HOPELESS: 0
SUM OF ALL RESPONSES TO PHQ QUESTIONS 1-9: 0
1. LITTLE INTEREST OR PLEASURE IN DOING THINGS: 0

## 2023-10-26 ASSESSMENT — ENCOUNTER SYMPTOMS
VOMITING: 0
DIARRHEA: 1
CONSTIPATION: 0
BACK PAIN: 1
NAUSEA: 0
BLOOD IN STOOL: 0
ABDOMINAL PAIN: 0

## 2023-10-26 ASSESSMENT — SOCIAL DETERMINANTS OF HEALTH (SDOH)
WITHIN THE LAST YEAR, HAVE TO BEEN RAPED OR FORCED TO HAVE ANY KIND OF SEXUAL ACTIVITY BY YOUR PARTNER OR EX-PARTNER?: NO
WITHIN THE LAST YEAR, HAVE YOU BEEN KICKED, HIT, SLAPPED, OR OTHERWISE PHYSICALLY HURT BY YOUR PARTNER OR EX-PARTNER?: NO
WITHIN THE LAST YEAR, HAVE YOU BEEN AFRAID OF YOUR PARTNER OR EX-PARTNER?: NO
WITHIN THE LAST YEAR, HAVE YOU BEEN HUMILIATED OR EMOTIONALLY ABUSED IN OTHER WAYS BY YOUR PARTNER OR EX-PARTNER?: NO

## 2023-10-26 NOTE — PROGRESS NOTES
complication, without long-term current use of insulin (HCC)  -     Microalbumin / Creatinine Urine Ratio; Future  4. Obstructive sleep apnea syndrome  5. Morbid obesity ([address])  6. Vitamin D deficiency  7. Breast cancer screening by mammogram  -     [facility] DIGITAL SCREEN W OR WO CAD BILATERAL; Future  8. Colon cancer screening  -     [address] [facility]  9. Sore mouth  -     Magic Mouthwash (MIRACLE MOUTHWASH); Swish and spit 5 mLs 4 times daily as needed for Irritation, Disp-240 mL, R-0Note to Pharmacy: Mix with 1 part diphenhydramine 12.5 mg per 5 ml elixir, 1 part Maalox, and 1 part 2% viscous lidocaine. Normal  10. Back spasm  -     cyclobenzaprine (FLEXERIL) 5 MG tablet; Take 1 tablet by mouth 3 times daily as needed for Muscle spasms, Disp-30 tablet, R-0Normal  11. Flu vaccine need  -     Influenza, FLUCELVAX, (age 10 mo+), IM, Preservative Free, 0.5 mL   BP stable, no changes. Pt denies current depression. Pt unable to tolerate metformin. Added Dulaglutide; no hx or family hx of endocrine or thyroid cancer; no hx of pancreatitis. Pt has f/u with pulmonary regarding cpap. Recheck labs in December including vit d level. Encouraged healthy diet; diabetic diet and regular exercise. Follow up:  sent Mounjaro due to Dulaglutide PA denied by insurance. Other: Due to significant time constraints, very limited time, we could not discuss any further health maintenance topics today. Patient informed, we will call with blood work results within one week. If you have not heard regarding results in over a week, please contact office. You can also review results on [employer]. Follow-up and Dispositions    Return in about 2 months (around 12/26/2023) for Diabetes, Medicare Wellness.          MILAN Alexis - CNP

## 2023-10-27 ENCOUNTER — TELEPHONE (OUTPATIENT)
Dept: FAMILY MEDICINE CLINIC | Facility: CLINIC | Age: 53
End: 2023-10-27

## 2023-10-27 DIAGNOSIS — E11.9 TYPE 2 DIABETES MELLITUS WITHOUT COMPLICATION, WITHOUT LONG-TERM CURRENT USE OF INSULIN (HCC): Primary | ICD-10-CM

## 2023-10-27 RX ORDER — TIRZEPATIDE 5 MG/.5ML
5 INJECTION, SOLUTION SUBCUTANEOUS WEEKLY
Qty: 2 ML | Refills: 1 | Status: SHIPPED | OUTPATIENT
Start: 2023-10-27 | End: 2023-12-28 | Stop reason: SDUPTHER

## 2023-10-27 RX ORDER — TIRZEPATIDE 2.5 MG/.5ML
2.5 INJECTION, SOLUTION SUBCUTANEOUS WEEKLY
Qty: 2 ML | Refills: 0 | Status: SHIPPED | OUTPATIENT
Start: 2023-10-27 | End: 2023-12-28 | Stop reason: DRUGHIGH

## 2023-10-27 NOTE — TELEPHONE ENCOUNTER
PA denied for her trulicity as her plan will not cover it. CM has list of approved medications. If you could try sending Katia Arroyo I will follow up with the pharmacy.

## 2023-11-08 ENCOUNTER — OFFICE VISIT (OUTPATIENT)
Age: 53
End: 2023-11-08
Payer: COMMERCIAL

## 2023-11-08 VITALS
SYSTOLIC BLOOD PRESSURE: 130 MMHG | DIASTOLIC BLOOD PRESSURE: 86 MMHG | HEART RATE: 72 BPM | WEIGHT: 293 LBS | BODY MASS INDEX: 45.99 KG/M2 | HEIGHT: 67 IN

## 2023-11-08 DIAGNOSIS — E78.1 HYPERTRIGLYCERIDEMIA: ICD-10-CM

## 2023-11-08 DIAGNOSIS — I50.32 CHRONIC HEART FAILURE WITH PRESERVED EJECTION FRACTION (HCC): Primary | ICD-10-CM

## 2023-11-08 DIAGNOSIS — I10 PRIMARY HYPERTENSION: ICD-10-CM

## 2023-11-08 PROCEDURE — 3075F SYST BP GE 130 - 139MM HG: CPT | Performed by: INTERNAL MEDICINE

## 2023-11-08 PROCEDURE — 3079F DIAST BP 80-89 MM HG: CPT | Performed by: INTERNAL MEDICINE

## 2023-11-08 PROCEDURE — 99213 OFFICE O/P EST LOW 20 MIN: CPT | Performed by: INTERNAL MEDICINE

## 2023-11-16 ENCOUNTER — OFFICE VISIT (OUTPATIENT)
Dept: GASTROENTEROLOGY | Age: 53
End: 2023-11-16

## 2023-11-16 ENCOUNTER — PREP FOR PROCEDURE (OUTPATIENT)
Dept: GASTROENTEROLOGY | Age: 53
End: 2023-11-16

## 2023-11-16 VITALS
HEIGHT: 67 IN | OXYGEN SATURATION: 99 % | HEART RATE: 68 BPM | BODY MASS INDEX: 45.83 KG/M2 | WEIGHT: 292 LBS | SYSTOLIC BLOOD PRESSURE: 143 MMHG | TEMPERATURE: 98 F | DIASTOLIC BLOOD PRESSURE: 86 MMHG

## 2023-11-16 DIAGNOSIS — Z12.11 ENCOUNTER FOR SCREENING COLONOSCOPY: ICD-10-CM

## 2023-11-16 DIAGNOSIS — Z12.11 ENCOUNTER FOR SCREENING COLONOSCOPY: Primary | ICD-10-CM

## 2023-11-16 RX ORDER — SODIUM CHLORIDE 0.9 % (FLUSH) 0.9 %
5-40 SYRINGE (ML) INJECTION EVERY 12 HOURS SCHEDULED
Status: CANCELLED | OUTPATIENT
Start: 2023-11-16

## 2023-11-16 RX ORDER — SODIUM CHLORIDE 9 MG/ML
25 INJECTION, SOLUTION INTRAVENOUS PRN
Status: CANCELLED | OUTPATIENT
Start: 2023-11-16

## 2023-11-16 RX ORDER — ONDANSETRON 4 MG/1
4 TABLET, ORALLY DISINTEGRATING ORAL EVERY 4 HOURS PRN
Qty: 10 TABLET | Refills: 0 | Status: SHIPPED | OUTPATIENT
Start: 2023-11-16

## 2023-11-16 RX ORDER — SODIUM CHLORIDE 0.9 % (FLUSH) 0.9 %
5-40 SYRINGE (ML) INJECTION PRN
Status: CANCELLED | OUTPATIENT
Start: 2023-11-16

## 2023-11-16 NOTE — PROGRESS NOTES
Brianne Johns (:  1970) is a 52 y.o. female new patient referred to our office for evaluation of the following chief complaint(s):  Follow-up and New Patient           ASSESSMENT/PLAN:  1. Encounter for screening colonoscopy  -     ondansetron (ZOFRAN-ODT) 4 MG disintegrating tablet; Take 1 tablet by mouth every 4 hours as needed for Nausea or Vomiting, Disp-10 tablet, R-0Normal       Schedule surveillance. Will give PRN Zofran for concerns around tolerating the prep. Subjective   SUBJECTIVE/OBJECTIVE  Brianne Johns is a 52y.o. year old female with PMH is pertinent for HTN, NIDDM, HFpEF, CVA 2018, EMIL[place], [place] with BMI 46.36, hypertriglyceridemia, arthritis, peripheral neuropathy. Surgical history is pertinent for cholecystectomy, hysterectomy. Med list is pertinent for [facility] weekly. Patient was referred to our office for colon cancer screening. Referral notes reviewed from 10/26. No prior colonoscopy records discovered on chart review. Stress test 23 reviewed; low risk. Today patient denies abdominal pain, nausea, vomiting, diarrhea, constipation, melena, hematochezia, indigestion, reflux, dysphagia, odynophagia. Patient denies family hx of GI malignancy or IBD. Denies hx tobacco or alcohol use. Denies [address] She takes Aleve 2 pills 2-3 days a week for hip pain.      Past Medical History:   Diagnosis Date    Acute respiratory failure with hypoxia ([address]) 2020    Arthritis     Diabetes ([address])     Diarrhea of presumed infectious origin 2020    Hypertension     Respiratory failure ([address]) 2020    Stroke [facility])        Past Surgical History:   Procedure Laterality Date    CHOLECYSTECTOMY      GYN  2007    Hysterectomy    HEENT  2002    Tonsilectomy    ORTHOPEDIC SURGERY  2015    left hip ligament repair    ORTHOPEDIC SURGERY  2017    Right hip ligament repair        Allergies   Allergen Reactions    Metformin Rash    Penicillins Rash        Family History   Problem Relation Age of Onset    Heart

## 2023-11-20 ENCOUNTER — TELEPHONE (OUTPATIENT)
Dept: GASTROENTEROLOGY | Age: 53
End: 2023-11-20

## 2023-11-20 NOTE — TELEPHONE ENCOUNTER
PA completed and Cover my Meds determined No prior PA needed for this medication ondansetron (ZOFRAN-ODT) 4 MG disintegrating tablet

## 2023-12-04 NOTE — PERIOP NOTE
Patient name, , and procedure verified. Type: 1a; abbreviated assessment per anesthesia guidelines    Labs per anesthesia: none    Instructed will receive notificattion the day before procedure by the GI Lab of time of arrival for [facility] cases, and by Pre-op Department for [place] [address] OF [facility] cases. Arrival times should be called by 5 pm. If no phone is received the patient should contact their respective hospital. The GI lab telephone number is [phone] and ES Pre-op is [phone]. Follow diet and prep instructions per office including NPO status. If patient has NOT received instructions from office patient is advised to call surgeon office, verbalizes understanding. Bath or shower the night before and the am of surgery with non-mositurizing soap. No lotions, oils, powders, cologne on skin. No make up, eye make up or jewelry. Wear loose fitting comfortable, clean clothing. Must have adult present in building the entire time . TAKE ONLY THE FOLLOWING MEDICATION ON THE DAY OF THE PROCEDURE : none    MEDICATIONS TO HOLD : mounjaro    The following discharge instructions reviewed : medication given during procedure may cause drowsiness for several hours, therefore, patient must not drive or operate machinery for remainder of the day. Patient may not drink alcohol on the day of your procedure, and should resume regular diet and activity unless otherwise directed. You may experience abdominal distention for several hours that is relieved by the passage of gas. Contact your physician if you have any of the following: fever or chills, severe abdominal pain or excessive amount of bleeding or a large amount when having a bowel movement. Occasional specks of blood with bowel movement would not be unusual.      You may be required to pay a deductible or co-pay on the day of your procedure.  You can pre-pay by calling [phone] if your surgery is at the [facility] or [phone] if your surgery is at the [facility]

## 2023-12-06 NOTE — PROGRESS NOTES
Patient contacted to verify procedure, doctor, NPO orders, and place/time of arrival ([id number] for 0945 procedure). Contact made with patient. No s/sx of COVID. Medications and history reviewed in chart by this RN, no concerns noted.

## 2023-12-07 ENCOUNTER — HOSPITAL ENCOUNTER (OUTPATIENT)
Age: 53
Setting detail: OUTPATIENT SURGERY
Discharge: HOME | End: 2023-12-07
Attending: INTERNAL MEDICINE | Admitting: INTERNAL MEDICINE
Payer: COMMERCIAL

## 2023-12-07 ENCOUNTER — ANESTHESIA (OUTPATIENT)
Dept: ENDOSCOPY | Age: 53
End: 2023-12-07
Payer: COMMERCIAL

## 2023-12-07 ENCOUNTER — ANESTHESIA EVENT (OUTPATIENT)
Dept: ENDOSCOPY | Age: 53
End: 2023-12-07
Payer: COMMERCIAL

## 2023-12-07 VITALS
OXYGEN SATURATION: 100 % | HEIGHT: 67 IN | BODY MASS INDEX: 45.83 KG/M2 | RESPIRATION RATE: 16 BRPM | SYSTOLIC BLOOD PRESSURE: 123 MMHG | DIASTOLIC BLOOD PRESSURE: 63 MMHG | TEMPERATURE: 98.2 F | HEART RATE: 69 BPM | WEIGHT: 292 LBS

## 2023-12-07 LAB
GLUCOSE BLD STRIP.AUTO-MCNC: 98 MG/DL (ref 65–100)
SERVICE CMNT-IMP: NORMAL

## 2023-12-07 PROCEDURE — 2580000003 HC RX 258: Performed by: ANESTHESIOLOGY

## 2023-12-07 PROCEDURE — 3700000001 HC ADD 15 MINUTES (ANESTHESIA): Performed by: INTERNAL MEDICINE

## 2023-12-07 PROCEDURE — 2709999900 HC NON-CHARGEABLE SUPPLY: Performed by: INTERNAL MEDICINE

## 2023-12-07 PROCEDURE — 2580000003 HC RX 258

## 2023-12-07 PROCEDURE — 7100000010 HC PHASE II RECOVERY - FIRST 15 MIN: Performed by: INTERNAL MEDICINE

## 2023-12-07 PROCEDURE — 82962 GLUCOSE BLOOD TEST: CPT

## 2023-12-07 PROCEDURE — 6360000002 HC RX W HCPCS

## 2023-12-07 PROCEDURE — 3700000000 HC ANESTHESIA ATTENDED CARE: Performed by: INTERNAL MEDICINE

## 2023-12-07 PROCEDURE — 7100000011 HC PHASE II RECOVERY - ADDTL 15 MIN: Performed by: INTERNAL MEDICINE

## 2023-12-07 PROCEDURE — 3609027000 HC COLONOSCOPY: Performed by: INTERNAL MEDICINE

## 2023-12-07 PROCEDURE — 2500000003 HC RX 250 WO HCPCS

## 2023-12-07 RX ORDER — SODIUM CHLORIDE 0.9 % (FLUSH) 0.9 %
5-40 SYRINGE (ML) INJECTION PRN
Status: DISCONTINUED | OUTPATIENT
Start: 2023-12-07 | End: 2023-12-07 | Stop reason: HOSPADM

## 2023-12-07 RX ORDER — LIDOCAINE HYDROCHLORIDE 10 MG/ML
1 INJECTION, SOLUTION INFILTRATION; PERINEURAL ONCE
Status: DISCONTINUED | OUTPATIENT
Start: 2023-12-07 | End: 2023-12-07 | Stop reason: HOSPADM

## 2023-12-07 RX ORDER — SODIUM CHLORIDE 0.9 % (FLUSH) 0.9 %
5-40 SYRINGE (ML) INJECTION EVERY 12 HOURS SCHEDULED
Status: DISCONTINUED | OUTPATIENT
Start: 2023-12-07 | End: 2023-12-07 | Stop reason: HOSPADM

## 2023-12-07 RX ORDER — SODIUM CHLORIDE 9 MG/ML
25 INJECTION, SOLUTION INTRAVENOUS PRN
Status: DISCONTINUED | OUTPATIENT
Start: 2023-12-07 | End: 2023-12-07 | Stop reason: HOSPADM

## 2023-12-07 RX ORDER — LIDOCAINE HYDROCHLORIDE 20 MG/ML
INJECTION, SOLUTION EPIDURAL; INFILTRATION; INTRACAUDAL; PERINEURAL PRN
Status: DISCONTINUED | OUTPATIENT
Start: 2023-12-07 | End: 2023-12-07 | Stop reason: SDUPTHER

## 2023-12-07 RX ORDER — SODIUM CHLORIDE, SODIUM LACTATE, POTASSIUM CHLORIDE, CALCIUM CHLORIDE 600; 310; 30; 20 MG/100ML; MG/100ML; MG/100ML; MG/100ML
INJECTION, SOLUTION INTRAVENOUS CONTINUOUS
Status: DISCONTINUED | OUTPATIENT
Start: 2023-12-07 | End: 2023-12-07 | Stop reason: HOSPADM

## 2023-12-07 RX ORDER — SODIUM CHLORIDE, SODIUM LACTATE, POTASSIUM CHLORIDE, CALCIUM CHLORIDE 600; 310; 30; 20 MG/100ML; MG/100ML; MG/100ML; MG/100ML
INJECTION, SOLUTION INTRAVENOUS CONTINUOUS PRN
Status: DISCONTINUED | OUTPATIENT
Start: 2023-12-07 | End: 2023-12-07 | Stop reason: SDUPTHER

## 2023-12-07 RX ORDER — PROPOFOL 10 MG/ML
INJECTION, EMULSION INTRAVENOUS PRN
Status: DISCONTINUED | OUTPATIENT
Start: 2023-12-07 | End: 2023-12-07 | Stop reason: SDUPTHER

## 2023-12-07 RX ORDER — SODIUM CHLORIDE 9 MG/ML
INJECTION, SOLUTION INTRAVENOUS PRN
Status: DISCONTINUED | OUTPATIENT
Start: 2023-12-07 | End: 2023-12-07 | Stop reason: HOSPADM

## 2023-12-07 RX ADMIN — PROPOFOL 180 MCG/KG/MIN: 10 INJECTION, EMULSION INTRAVENOUS at 09:55

## 2023-12-07 RX ADMIN — SODIUM CHLORIDE, SODIUM LACTATE, POTASSIUM CHLORIDE, AND CALCIUM CHLORIDE: 600; 310; 30; 20 INJECTION, SOLUTION INTRAVENOUS at 09:52

## 2023-12-07 RX ADMIN — LIDOCAINE HYDROCHLORIDE 50 MG: 20 INJECTION, SOLUTION EPIDURAL; INFILTRATION; INTRACAUDAL; PERINEURAL at 09:54

## 2023-12-07 RX ADMIN — PROPOFOL 100 MG: 10 INJECTION, EMULSION INTRAVENOUS at 09:54

## 2023-12-07 RX ADMIN — SODIUM CHLORIDE, POTASSIUM CHLORIDE, SODIUM LACTATE AND CALCIUM CHLORIDE: 600; 310; 30; 20 INJECTION, SOLUTION INTRAVENOUS at 09:38

## 2023-12-07 ASSESSMENT — PAIN - FUNCTIONAL ASSESSMENT: PAIN_FUNCTIONAL_ASSESSMENT: NONE - DENIES PAIN

## 2023-12-07 NOTE — PROCEDURES
Operative Report    Patient: Jay Parsons MRN: [id number]      YOB: 1970  Age: 52 y.o. Sex: female            Indications:  screening for colon cancer [unfilled]     Preoperative Evaluation: The patient was evaluated prior to the procedure in the GI lab admission area, the patient ASA was recorded . Consent was obtained from the patient with the risk of perforation bleeding and aspiration. Anesthesia: MONICA-per anesthesia    Complications: None; patient tolerated the procedure well. EBL -insignificant      Procedure: The patient was sedated in the left lateral decubitus position. Scope was advanced from the rectum to the cecum. Evaluation was performed to the cecum twice. The scope was withdrawn to the rectum, retroflexed view was performed. The rectal exam was normal.  Preparation was good Hastings score of 2/3/2:7 . Findings:   Exam to the cecum. Normal cecum ascending and transverse mucosa. 5 mm flat whitish descending colon polyp that was cold snared blood loss. Retrieval.  Normal sigmoid and rectal mucosa      Postoperative Diagnosis: Descending colon polyp    05418 Colonoscopy, Flexible; with removal of tumor(s), polyp(s), or other lesion(s) by snare technique      Recommendations:   - Repeat colonoscopy in 7 years.       Signed By:  Chika Sullivan MD     December 7, 2023

## 2023-12-07 NOTE — INTERVAL H&P NOTE
Update History & Physical    The patient's History and Physical of November 16,  was reviewed with the patient and I examined the patient. There was no change. The surgical site was confirmed by the patient and me. Plan: The risks, benefits, expected outcome, and alternative to the recommended procedure have been discussed with the patient. Patient understands and wants to proceed with the procedure.      Electronically signed by Tia Lagunas MD on 12/7/2023 at 9:32 AM

## 2023-12-13 ENCOUNTER — TELEPHONE (OUTPATIENT)
Dept: FAMILY MEDICINE CLINIC | Facility: CLINIC | Age: 53
End: 2023-12-13

## 2023-12-15 NOTE — TELEPHONE ENCOUNTER
Patient last seen 10/26/23. Mounjaro started at that visit d/t insurance not covering Dulaglutide. Called [facility] & spoke to pharmacist, who states they have refills remaining on patient's Mounjaro. Should be filled & ready for  tomorrow after 11am.   Called patient, notifying of above. She verbalized understanding & thanked.

## 2023-12-16 PROBLEM — Z12.11 ENCOUNTER FOR SCREENING COLONOSCOPY: Status: RESOLVED | Noted: 2023-11-16 | Resolved: 2023-12-16

## 2023-12-28 ENCOUNTER — OFFICE VISIT (OUTPATIENT)
Dept: FAMILY MEDICINE CLINIC | Facility: CLINIC | Age: 53
End: 2023-12-28
Payer: COMMERCIAL

## 2023-12-28 ENCOUNTER — TELEPHONE (OUTPATIENT)
Dept: GASTROENTEROLOGY | Age: 53
End: 2023-12-28

## 2023-12-28 VITALS
OXYGEN SATURATION: 98 % | TEMPERATURE: 98.3 F | SYSTOLIC BLOOD PRESSURE: 118 MMHG | DIASTOLIC BLOOD PRESSURE: 80 MMHG | HEART RATE: 68 BPM

## 2023-12-28 DIAGNOSIS — E78.1 HYPERTRIGLYCERIDEMIA: ICD-10-CM

## 2023-12-28 DIAGNOSIS — E78.5 DYSLIPIDEMIA (HIGH LDL; LOW HDL): ICD-10-CM

## 2023-12-28 DIAGNOSIS — E55.9 VITAMIN D DEFICIENCY: ICD-10-CM

## 2023-12-28 DIAGNOSIS — I10 PRIMARY HYPERTENSION: ICD-10-CM

## 2023-12-28 DIAGNOSIS — E11.9 TYPE 2 DIABETES MELLITUS WITHOUT COMPLICATION, WITHOUT LONG-TERM CURRENT USE OF INSULIN (HCC): ICD-10-CM

## 2023-12-28 DIAGNOSIS — J02.9 ACUTE PHARYNGITIS, UNSPECIFIED ETIOLOGY: Primary | ICD-10-CM

## 2023-12-28 PROBLEM — U07.1 COVID-19 VIRUS INFECTION: Status: RESOLVED | Noted: 2020-06-24 | Resolved: 2023-12-28

## 2023-12-28 PROBLEM — M87.059 AVASCULAR NECROSIS OF BONE OF HIP (HCC): Status: RESOLVED | Noted: 2017-01-30 | Resolved: 2023-12-28

## 2023-12-28 LAB
25(OH)D3 SERPL-MCNC: 8.1 NG/ML (ref 30–100)
ANION GAP SERPL CALC-SCNC: 3 MMOL/L (ref 2–11)
BUN SERPL-MCNC: 8 MG/DL (ref 6–23)
CALCIUM SERPL-MCNC: 9.3 MG/DL (ref 8.3–10.4)
CHLORIDE SERPL-SCNC: 109 MMOL/L (ref 103–113)
CHOLEST SERPL-MCNC: 183 MG/DL
CO2 SERPL-SCNC: 28 MMOL/L (ref 21–32)
CREAT SERPL-MCNC: 0.7 MG/DL (ref 0.6–1)
ERYTHROCYTE [DISTWIDTH] IN BLOOD BY AUTOMATED COUNT: 15.8 % (ref 11.9–14.6)
EXP DATE SOLUTION: NORMAL
EXP DATE SWAB: NORMAL
EXPIRATION DATE: NORMAL
GLUCOSE SERPL-MCNC: 117 MG/DL (ref 65–100)
GROUP A STREP ANTIGEN, POC: NEGATIVE
HBA1C MFR BLD: 6.6 %
HCT VFR BLD AUTO: 42.1 % (ref 35.8–46.3)
HDLC SERPL: 5.4
HGB BLD-MCNC: 13 G/DL (ref 11.7–15.4)
INFLUENZA A ANTIGEN, POC: NEGATIVE
INFLUENZA B ANTIGEN, POC: NEGATIVE
LDLC SERPL CALC-MCNC: 110.4 MG/DL
LOT NUMBER POC: NORMAL
LOT NUMBER SOLUTION: NORMAL
LOT NUMBER SWAB: NORMAL
MCH RBC QN AUTO: 26 PG (ref 26.1–32.9)
MCHC RBC AUTO-ENTMCNC: 30.9 G/DL (ref 31.4–35)
MCV RBC AUTO: 84.2 FL (ref 82–102)
NRBC # BLD: 0 K/UL (ref 0–0.2)
PLATELET # BLD AUTO: 333 K/UL (ref 150–450)
PMV BLD AUTO: 12.1 FL (ref 9.4–12.3)
POTASSIUM SERPL-SCNC: 4.5 MMOL/L (ref 3.5–5.1)
RBC # BLD AUTO: 5 M/UL (ref 4.05–5.2)
SARS-COV-2 RNA, POC: NEGATIVE
SODIUM SERPL-SCNC: 140 MMOL/L (ref 136–146)
TRIGL SERPL-MCNC: 193 MG/DL (ref 35–150)
VALID INTERNAL CONTROL, POC: YES
VALID INTERNAL CONTROL, POC: YES
VLDLC SERPL CALC-MCNC: 38.6 MG/DL (ref 6–23)
WBC # BLD AUTO: 9 K/UL (ref 4.3–11.1)

## 2023-12-28 PROCEDURE — 3074F SYST BP LT 130 MM HG: CPT | Performed by: NURSE PRACTITIONER

## 2023-12-28 PROCEDURE — 87880 STREP A ASSAY W/OPTIC: CPT | Performed by: NURSE PRACTITIONER

## 2023-12-28 PROCEDURE — 87635 SARS-COV-2 COVID-19 AMP PRB: CPT | Performed by: NURSE PRACTITIONER

## 2023-12-28 PROCEDURE — 83036 HEMOGLOBIN GLYCOSYLATED A1C: CPT | Performed by: NURSE PRACTITIONER

## 2023-12-28 PROCEDURE — 3044F HG A1C LEVEL LT 7.0%: CPT | Performed by: NURSE PRACTITIONER

## 2023-12-28 PROCEDURE — 99214 OFFICE O/P EST MOD 30 MIN: CPT | Performed by: NURSE PRACTITIONER

## 2023-12-28 PROCEDURE — 3079F DIAST BP 80-89 MM HG: CPT | Performed by: NURSE PRACTITIONER

## 2023-12-28 PROCEDURE — 87502 INFLUENZA DNA AMP PROBE: CPT | Performed by: NURSE PRACTITIONER

## 2023-12-28 RX ORDER — ERGOCALCIFEROL 1.25 MG/1
50000 CAPSULE ORAL WEEKLY
Qty: 12 CAPSULE | Refills: 0 | Status: SHIPPED | OUTPATIENT
Start: 2023-12-28

## 2023-12-28 RX ORDER — ROSUVASTATIN CALCIUM 5 MG/1
5 TABLET, COATED ORAL NIGHTLY
Qty: 30 TABLET | Refills: 3 | Status: SHIPPED | OUTPATIENT
Start: 2023-12-28

## 2023-12-28 RX ORDER — TIRZEPATIDE 5 MG/.5ML
5 INJECTION, SOLUTION SUBCUTANEOUS WEEKLY
Qty: 6 ML | Refills: 2 | Status: SHIPPED | OUTPATIENT
Start: 2023-12-28

## 2023-12-28 NOTE — PROGRESS NOTES
Shakira Bo is a 52 y.o. female who presents today for the following:  Chief Complaint   Patient presents with    Follow-up     Pt presents today for a FU    Congestion     Pt presents with congestion in her chest, cough, sore throat, diarrhea, chills and excessive perspiration x 4 days. Allergies   Allergen Reactions    Metformin Rash and Angioedema    Penicillins Anaphylaxis and Rash     Throat swelling and thick tongue. Current Outpatient Medications   Medication Sig Dispense Refill    Tirzepatide (MOUNJARO) 5 MG/0.5ML SOPN SC injection Inject 0.5 mLs into the skin once a week 6 mL 2    ondansetron (ZOFRAN-ODT) 4 MG disintegrating tablet Take 1 tablet by mouth every 4 hours as needed for Nausea or Vomiting 10 tablet 0    Misc. Devices (CPAP MACHINE) MISC Use as Instructed      Magic Mouthwash (MIRACLE MOUTHWASH) Swish and spit 5 mLs 4 times daily as needed for Irritation (Patient not taking: Reported on 12/7/2023) 240 mL 0     No current facility-administered medications for this visit.        Past Medical History:   Diagnosis Date    Acute respiratory failure with hypoxia ([address]) 06/24/2020    Arthritis     Avascular necrosis of bone of hip ([address]) 01/30/2017    CHF (congestive heart failure) ([address])     [address] Echo LVEF 55-65%    COVID-19 virus infection 06/24/2020    Diabetes ([address])     Diarrhea of presumed infectious origin 06/26/2020    H/O echocardiogram 07/2021    LVEF 55-65%    Hypertension     EMILIA on CPAP     Respiratory failure ([address]) 06/24/2020    Stroke ([address]) 03/2018    no residual       Past Surgical History:   Procedure Laterality Date    CHOLECYSTECTOMY  2008    COLONOSCOPY N/A 12/7/2023    COLORECTAL CANCER SCREENING, NOT HIGH RISK/Polyp performed by Kulwant Salmeron MD at [facility] ([facility])  2007    ORTHOPEDIC SURGERY  2015    left hip ligament repair    ORTHOPEDIC SURGERY  2017    Right hip ligament repair    TONSILLECTOMY  1999       Social History

## 2023-12-28 NOTE — TELEPHONE ENCOUNTER
Called pt and LVM with results from colonoscopy and to call back with any questions. Procedure: The patient was sedated in the left lateral decubitus position. Scope was advanced from the rectum to the cecum. Evaluation was performed to the cecum twice. The scope was withdrawn to the rectum, retroflexed view was performed. The rectal exam was normal.  Preparation was good Oral score of 2/3/2:7 . Findings:   Exam to the cecum. Normal cecum ascending and transverse mucosa. 5 mm flat whitish descending colon polyp that was cold snared blood loss. Retrieval.  Normal sigmoid and rectal mucosa     Postoperative Diagnosis: Descending colon polyp     Recommendations:   - Repeat colonoscopy in 7 years.

## 2023-12-28 NOTE — PATIENT INSTRUCTIONS
Counseled patient that symptoms are consistent with viral URI. These type of infections typically resolve within 1-2 weeks. No antibiotics are needed and will not be helpful. Recommend supportive care of humidifier, honey, mucinex, tylenol, ibuprofen, rest and increased fluid intake. Advised to call for fever, worsening shortness of breath, chest pain, worsening persistent sinus pain or severe ear pain. Cough may linger for several weeks. Advised to call if symptoms worsen.

## 2023-12-29 NOTE — RESULT ENCOUNTER NOTE
Her LDL or bad cholesterol was elevated and triglycerides elevated and good cholesterol HDL was low.  I sent in a prescription rosuvastatin to take every evening for her for her cholesterol and for primary prevention of heart disease with her diabetes.  Her vitamin D was low and so I sent in a prescription for vitamin D supplement to take weekly for 12 weeks and then it is recommended that she take a daily over-the-counter supplement 1000 units daily.  She should include calcium rich foods in her diet as well.  Other labs look good.    Please let me know if patient has any further questions or concerns.

## 2024-01-30 ENCOUNTER — TELEPHONE (OUTPATIENT)
Dept: FAMILY MEDICINE CLINIC | Facility: CLINIC | Age: 54
End: 2024-01-30

## 2024-01-30 NOTE — TELEPHONE ENCOUNTER
Patient called to request a refill for the med Mounjaro 5 mg, patient stated she is currently out of the med. Patient would like to have it sent to the [facility] at [address] [place] [address].

## 2024-01-31 NOTE — TELEPHONE ENCOUNTER
Spoke with pharmacy. Pt has refills available but due to the nationwide shortage, the pharmacy does not have any in stock. Informed pt to look around and let us know if she finds pharmacy with Rx in stock and we will send there.

## 2024-04-29 ASSESSMENT — PATIENT HEALTH QUESTIONNAIRE - PHQ9
6. FEELING BAD ABOUT YOURSELF - OR THAT YOU ARE A FAILURE OR HAVE LET YOURSELF OR YOUR FAMILY DOWN: NOT AT ALL
4. FEELING TIRED OR HAVING LITTLE ENERGY: NOT AT ALL
SUM OF ALL RESPONSES TO PHQ9 QUESTIONS 1 & 2: 1
5. POOR APPETITE OR OVEREATING: NOT AT ALL
3. TROUBLE FALLING OR STAYING ASLEEP: NOT AT ALL
9. THOUGHTS THAT YOU WOULD BE BETTER OFF DEAD, OR OF HURTING YOURSELF: NOT AT ALL
2. FEELING DOWN, DEPRESSED OR HOPELESS: NOT AT ALL
SUM OF ALL RESPONSES TO PHQ QUESTIONS 1-9: 1
5. POOR APPETITE OR OVEREATING: NOT AT ALL
7. TROUBLE CONCENTRATING ON THINGS, SUCH AS READING THE NEWSPAPER OR WATCHING TELEVISION: NOT AT ALL
2. FEELING DOWN, DEPRESSED OR HOPELESS: NOT AT ALL
SUM OF ALL RESPONSES TO PHQ QUESTIONS 1-9: 1
10. IF YOU CHECKED OFF ANY PROBLEMS, HOW DIFFICULT HAVE THESE PROBLEMS MADE IT FOR YOU TO DO YOUR WORK, TAKE CARE OF THINGS AT HOME, OR GET ALONG WITH OTHER PEOPLE: NOT DIFFICULT AT ALL
SUM OF ALL RESPONSES TO PHQ QUESTIONS 1-9: 1
10. IF YOU CHECKED OFF ANY PROBLEMS, HOW DIFFICULT HAVE THESE PROBLEMS MADE IT FOR YOU TO DO YOUR WORK, TAKE CARE OF THINGS AT HOME, OR GET ALONG WITH OTHER PEOPLE: NOT DIFFICULT AT ALL
8. MOVING OR SPEAKING SO SLOWLY THAT OTHER PEOPLE COULD HAVE NOTICED. OR THE OPPOSITE - BEING SO FIDGETY OR RESTLESS THAT YOU HAVE BEEN MOVING AROUND A LOT MORE THAN USUAL: NOT AT ALL
1. LITTLE INTEREST OR PLEASURE IN DOING THINGS: SEVERAL DAYS
1. LITTLE INTEREST OR PLEASURE IN DOING THINGS: SEVERAL DAYS
3. TROUBLE FALLING OR STAYING ASLEEP: NOT AT ALL
8. MOVING OR SPEAKING SO SLOWLY THAT OTHER PEOPLE COULD HAVE NOTICED. OR THE OPPOSITE, BEING SO FIGETY OR RESTLESS THAT YOU HAVE BEEN MOVING AROUND A LOT MORE THAN USUAL: NOT AT ALL
9. THOUGHTS THAT YOU WOULD BE BETTER OFF DEAD, OR OF HURTING YOURSELF: NOT AT ALL
6. FEELING BAD ABOUT YOURSELF - OR THAT YOU ARE A FAILURE OR HAVE LET YOURSELF OR YOUR FAMILY DOWN: NOT AT ALL
SUM OF ALL RESPONSES TO PHQ QUESTIONS 1-9: 1
SUM OF ALL RESPONSES TO PHQ QUESTIONS 1-9: 1
4. FEELING TIRED OR HAVING LITTLE ENERGY: NOT AT ALL
7. TROUBLE CONCENTRATING ON THINGS, SUCH AS READING THE NEWSPAPER OR WATCHING TELEVISION: NOT AT ALL

## 2024-04-30 ENCOUNTER — OFFICE VISIT (OUTPATIENT)
Dept: FAMILY MEDICINE CLINIC | Facility: CLINIC | Age: 54
End: 2024-04-30
Payer: COMMERCIAL

## 2024-04-30 ENCOUNTER — HOSPITAL ENCOUNTER (OUTPATIENT)
Dept: GENERAL RADIOLOGY | Age: 54
Discharge: HOME | End: 2024-05-03
Payer: COMMERCIAL

## 2024-04-30 VITALS
HEART RATE: 76 BPM | TEMPERATURE: 98.2 F | OXYGEN SATURATION: 97 % | DIASTOLIC BLOOD PRESSURE: 96 MMHG | SYSTOLIC BLOOD PRESSURE: 124 MMHG | WEIGHT: 290 LBS | BODY MASS INDEX: 45.42 KG/M2

## 2024-04-30 DIAGNOSIS — M79.89 LEG SWELLING: ICD-10-CM

## 2024-04-30 DIAGNOSIS — E11.9 TYPE 2 DIABETES MELLITUS WITHOUT COMPLICATION, WITHOUT LONG-TERM CURRENT USE OF INSULIN (HCC): Primary | ICD-10-CM

## 2024-04-30 DIAGNOSIS — F32.0 CURRENT MILD EPISODE OF MAJOR DEPRESSIVE DISORDER WITHOUT PRIOR EPISODE (HCC): ICD-10-CM

## 2024-04-30 DIAGNOSIS — M54.50 CHRONIC LOW BACK PAIN, UNSPECIFIED BACK PAIN LATERALITY, UNSPECIFIED WHETHER SCIATICA PRESENT: ICD-10-CM

## 2024-04-30 DIAGNOSIS — R06.00 DYSPNEA, UNSPECIFIED TYPE: ICD-10-CM

## 2024-04-30 DIAGNOSIS — G62.9 PERIPHERAL POLYNEUROPATHY: ICD-10-CM

## 2024-04-30 DIAGNOSIS — M25.50 MULTIPLE JOINT PAIN: ICD-10-CM

## 2024-04-30 DIAGNOSIS — R06.2 WHEEZING: ICD-10-CM

## 2024-04-30 DIAGNOSIS — E55.9 VITAMIN D DEFICIENCY: ICD-10-CM

## 2024-04-30 DIAGNOSIS — G89.29 CHRONIC LOW BACK PAIN, UNSPECIFIED BACK PAIN LATERALITY, UNSPECIFIED WHETHER SCIATICA PRESENT: ICD-10-CM

## 2024-04-30 DIAGNOSIS — E78.1 HYPERTRIGLYCERIDEMIA: ICD-10-CM

## 2024-04-30 DIAGNOSIS — I10 PRIMARY HYPERTENSION: ICD-10-CM

## 2024-04-30 DIAGNOSIS — Z86.73 HISTORY OF STROKE: ICD-10-CM

## 2024-04-30 DIAGNOSIS — J84.10 LUNG GRANULOMA (HCC): ICD-10-CM

## 2024-04-30 DIAGNOSIS — E11.9 TYPE 2 DIABETES MELLITUS WITHOUT COMPLICATION, WITHOUT LONG-TERM CURRENT USE OF INSULIN (HCC): ICD-10-CM

## 2024-04-30 DIAGNOSIS — E78.5 DYSLIPIDEMIA (HIGH LDL; LOW HDL): ICD-10-CM

## 2024-04-30 LAB
25(OH)D3 SERPL-MCNC: 11.3 NG/ML (ref 30–100)
ALBUMIN SERPL-MCNC: 4.1 G/DL (ref 3.5–5)
ALBUMIN/GLOB SERPL: 1.2 (ref 1–1.9)
ALP SERPL-CCNC: 109 U/L (ref 35–104)
ALT SERPL-CCNC: 34 U/L (ref 12–65)
ANION GAP SERPL CALC-SCNC: 11 MMOL/L (ref 9–18)
AST SERPL-CCNC: 26 U/L (ref 15–37)
BILIRUB SERPL-MCNC: 0.3 MG/DL (ref 0–1.2)
BUN SERPL-MCNC: 9 MG/DL (ref 6–23)
CALCIUM SERPL-MCNC: 9.5 MG/DL (ref 8.8–10.2)
CHLORIDE SERPL-SCNC: 105 MMOL/L (ref 98–107)
CHOLEST SERPL-MCNC: 200 MG/DL (ref 0–200)
CO2 SERPL-SCNC: 26 MMOL/L (ref 20–28)
CREAT SERPL-MCNC: 0.73 MG/DL (ref 0.6–1.1)
GLOBULIN SER CALC-MCNC: 3.4 G/DL (ref 2.3–3.5)
GLUCOSE SERPL-MCNC: 109 MG/DL (ref 70–99)
HBA1C MFR BLD: 6.3 %
HDLC SERPL-MCNC: 37 MG/DL (ref 40–60)
HDLC SERPL: 5.4 (ref 0–5)
LDLC SERPL CALC-MCNC: 130 MG/DL (ref 0–100)
MAGNESIUM SERPL-MCNC: 2 MG/DL (ref 1.8–2.4)
NT PRO BNP: <36 PG/ML (ref 0–125)
POTASSIUM SERPL-SCNC: 4.3 MMOL/L (ref 3.5–5.1)
PROT SERPL-MCNC: 7.5 G/DL (ref 6.3–8.2)
SODIUM SERPL-SCNC: 142 MMOL/L (ref 136–145)
TRIGL SERPL-MCNC: 163 MG/DL (ref 0–150)
TSH W FREE THYROID IF ABNORMAL: 1.66 UIU/ML (ref 0.27–4.2)
VLDLC SERPL CALC-MCNC: 33 MG/DL (ref 6–23)

## 2024-04-30 PROCEDURE — 3074F SYST BP LT 130 MM HG: CPT | Performed by: NURSE PRACTITIONER

## 2024-04-30 PROCEDURE — 3080F DIAST BP >= 90 MM HG: CPT | Performed by: NURSE PRACTITIONER

## 2024-04-30 PROCEDURE — 99214 OFFICE O/P EST MOD 30 MIN: CPT | Performed by: NURSE PRACTITIONER

## 2024-04-30 PROCEDURE — 83036 HEMOGLOBIN GLYCOSYLATED A1C: CPT | Performed by: NURSE PRACTITIONER

## 2024-04-30 PROCEDURE — 71046 X-RAY EXAM CHEST 2 VIEWS: CPT

## 2024-04-30 RX ORDER — GABAPENTIN 300 MG/1
300 CAPSULE ORAL 3 TIMES DAILY
Qty: 270 CAPSULE | Refills: 1 | Status: SHIPPED | OUTPATIENT
Start: 2024-04-30 | End: 2024-10-27

## 2024-04-30 RX ORDER — ASPIRIN 81 MG/1
81 TABLET ORAL DAILY
Qty: 90 TABLET | Refills: 1 | Status: SHIPPED | OUTPATIENT
Start: 2024-04-30

## 2024-04-30 RX ORDER — ALBUTEROL SULFATE 90 UG/1
2 AEROSOL, METERED RESPIRATORY (INHALATION) 4 TIMES DAILY PRN
Qty: 18 G | Refills: 1 | Status: SHIPPED | OUTPATIENT
Start: 2024-04-30

## 2024-04-30 RX ORDER — HYDROCHLOROTHIAZIDE 12.5 MG/1
12.5 CAPSULE, GELATIN COATED ORAL EVERY MORNING
Qty: 30 CAPSULE | Refills: 0 | Status: SHIPPED | OUTPATIENT
Start: 2024-04-30

## 2024-04-30 RX ORDER — ERGOCALCIFEROL 1.25 MG/1
50000 CAPSULE ORAL WEEKLY
Qty: 12 CAPSULE | Refills: 0 | Status: SHIPPED | OUTPATIENT
Start: 2024-04-30

## 2024-04-30 RX ORDER — ROSUVASTATIN CALCIUM 10 MG/1
10 TABLET, COATED ORAL NIGHTLY
Qty: 90 TABLET | Refills: 2 | Status: SHIPPED | OUTPATIENT
Start: 2024-04-30

## 2024-04-30 RX ORDER — FUROSEMIDE 10 MG/ML
10 INJECTION INTRAMUSCULAR; INTRAVENOUS
COMMUNITY
End: 2024-04-30 | Stop reason: ALTCHOICE

## 2024-04-30 RX ORDER — LIDOCAINE 50 MG/G
1 PATCH TOPICAL DAILY
Qty: 30 PATCH | Refills: 0 | Status: SHIPPED | OUTPATIENT
Start: 2024-04-30 | End: 2024-05-30

## 2024-04-30 ASSESSMENT — ENCOUNTER SYMPTOMS
BACK PAIN: 1
GASTROINTESTINAL NEGATIVE: 1
SHORTNESS OF BREATH: 1
SORE THROAT: 0
VOICE CHANGE: 1
EYES NEGATIVE: 1
WHEEZING: 1
TROUBLE SWALLOWING: 0
COUGH: 1

## 2024-04-30 NOTE — RESULT ENCOUNTER NOTE
Imaging was normal for acute findings.  Stable benign calcified granulomas in both lungs.  We will have pulmonary evaluate and make recommendations.  If patient has any additional questions or concerns, please let me know.

## 2024-04-30 NOTE — PROGRESS NOTES
Tricia Sellers is a 54 y.o. female who presents today for the following:  Chief Complaint   Patient presents with    Follow-up     Pt presents today for FU. Pt would like to switch off her mounjaro. Pt has been having some swelling in feet and hands.        Allergies   Allergen Reactions    Metformin Rash and Angioedema    Penicillins Anaphylaxis and Rash     Throat swelling and thick tongue.       Current Outpatient Medications   Medication Sig Dispense Refill    Semaglutide,0.25 or 0.5MG/DOS, 2 MG/3ML SOPN Inject 0.25 mg subcutaneously every 7 days for four weeks, then increase dose to 0.5 mg every 7 days for four weeks.  Call for increase in dose if tolerated thereafter. 3 mL 1    gabapentin (NEURONTIN) 300 MG capsule Take 1 capsule by mouth 3 times daily for 180 days. Start 300 mg 1 cap tonight, then 300 mg 1 cap twice a day tomorrow, and then increase to 300 mg 1 cap three times a day thereafter if tolerated.  May cause drowsiness.  Avoid driving or operating hazardous machinery if drowsy. 270 capsule 1    albuterol sulfate HFA (VENTOLIN HFA) 108 (90 Base) MCG/ACT inhaler Inhale 2 puffs into the lungs 4 times daily as needed for Wheezing 18 g 1    hydroCHLOROthiazide 12.5 MG capsule Take 1 capsule by mouth every morning 30 capsule 0    aspirin 81 MG EC tablet Take 1 tablet by mouth daily 90 tablet 1    lidocaine (LIDODERM) 5 % Place 1 patch onto the skin daily 12 hours on, 12 hours off. 30 patch 0    Misc. Devices (CPAP MACHINE) MISC Use as Instructed      vitamin D (ERGOCALCIFEROL) 1.25 MG (03712 UT) CAPS capsule Take 1 capsule by mouth once a week 12 capsule 0    rosuvastatin (CRESTOR) 10 MG tablet Take 1 tablet by mouth nightly 90 tablet 2     No current facility-administered medications for this visit.       Past Medical History:   Diagnosis Date    Acute respiratory failure with hypoxia ([facility]) 06/24/2020    Arthritis     Avascular necrosis of bone of hip ([facility]) 01/30/2017    CHF (congestive heart

## 2024-05-01 ENCOUNTER — TELEPHONE (OUTPATIENT)
Dept: FAMILY MEDICINE CLINIC | Facility: CLINIC | Age: 54
End: 2024-05-01

## 2024-05-01 LAB
RHEUMATOID FACT SER QL LA: POSITIVE
RHEUMATOID FACT TITR SER LA: NORMAL {TITER}

## 2024-05-01 NOTE — RESULT ENCOUNTER NOTE
Vit d improved, but still low.  I resent refill on the weekly dose to take for 12 weeks and then she should take a vit d supplement 1000 units daily on week 13.  LDL elevated, HDL low, and triglycerides elevated.  Avoid fried, fast, or processed foods and foods high in saturated fat/cholesterol.  Consume dietary fiber 20-30 grams daily.  Exercise 150 minutes/week.  I increased her crestor to 10 mg.    Please let me know if patient has any further questions or concerns.

## 2024-05-02 LAB — ANA SER QL: NEGATIVE

## 2024-05-06 NOTE — RESULT ENCOUNTER NOTE
Rheumatoid factor positive and LIZET autoimmune marker negative.  Referred to [facility] rheumatology.  Please reach out if not scheduled in the next 10 business days.    Please let me know if patient has any further questions or concerns.

## 2024-05-15 ENCOUNTER — OFFICE VISIT (OUTPATIENT)
Dept: FAMILY MEDICINE CLINIC | Facility: CLINIC | Age: 54
End: 2024-05-15
Payer: COMMERCIAL

## 2024-05-15 VITALS
SYSTOLIC BLOOD PRESSURE: 120 MMHG | WEIGHT: 293 LBS | HEART RATE: 90 BPM | BODY MASS INDEX: 46.36 KG/M2 | DIASTOLIC BLOOD PRESSURE: 84 MMHG | OXYGEN SATURATION: 96 % | TEMPERATURE: 98.6 F

## 2024-05-15 DIAGNOSIS — E11.40 TYPE 2 DIABETES MELLITUS WITH DIABETIC NEUROPATHY, WITHOUT LONG-TERM CURRENT USE OF INSULIN (HCC): ICD-10-CM

## 2024-05-15 DIAGNOSIS — G62.9 PERIPHERAL POLYNEUROPATHY: ICD-10-CM

## 2024-05-15 DIAGNOSIS — M54.50 CHRONIC LOW BACK PAIN, UNSPECIFIED BACK PAIN LATERALITY, UNSPECIFIED WHETHER SCIATICA PRESENT: ICD-10-CM

## 2024-05-15 DIAGNOSIS — M79.89 LEG SWELLING: ICD-10-CM

## 2024-05-15 DIAGNOSIS — R06.00 DYSPNEA, UNSPECIFIED TYPE: ICD-10-CM

## 2024-05-15 DIAGNOSIS — I10 PRIMARY HYPERTENSION: ICD-10-CM

## 2024-05-15 DIAGNOSIS — M25.50 MULTIPLE JOINT PAIN: ICD-10-CM

## 2024-05-15 DIAGNOSIS — G89.29 CHRONIC LOW BACK PAIN, UNSPECIFIED BACK PAIN LATERALITY, UNSPECIFIED WHETHER SCIATICA PRESENT: ICD-10-CM

## 2024-05-15 DIAGNOSIS — Z79.899 ENCOUNTER FOR MONITORING DIURETIC THERAPY: ICD-10-CM

## 2024-05-15 DIAGNOSIS — Z51.81 ENCOUNTER FOR MONITORING DIURETIC THERAPY: ICD-10-CM

## 2024-05-15 DIAGNOSIS — Z79.899 ON POTASSIUM WASTING DIURETIC THERAPY: ICD-10-CM

## 2024-05-15 DIAGNOSIS — I10 PRIMARY HYPERTENSION: Primary | ICD-10-CM

## 2024-05-15 DIAGNOSIS — E11.9 TYPE 2 DIABETES MELLITUS WITHOUT COMPLICATION, WITHOUT LONG-TERM CURRENT USE OF INSULIN (HCC): ICD-10-CM

## 2024-05-15 PROBLEM — I50.32 CHRONIC HEART FAILURE WITH PRESERVED EJECTION FRACTION (HCC): Status: RESOLVED | Noted: 2020-10-26 | Resolved: 2024-05-15

## 2024-05-15 LAB
ANION GAP SERPL CALC-SCNC: 11 MMOL/L (ref 9–18)
BUN SERPL-MCNC: 12 MG/DL (ref 6–23)
CALCIUM SERPL-MCNC: 9.7 MG/DL (ref 8.8–10.2)
CHLORIDE SERPL-SCNC: 101 MMOL/L (ref 98–107)
CO2 SERPL-SCNC: 26 MMOL/L (ref 20–28)
CREAT SERPL-MCNC: 0.99 MG/DL (ref 0.6–1.1)
GLUCOSE SERPL-MCNC: 145 MG/DL (ref 70–99)
POTASSIUM SERPL-SCNC: 4.1 MMOL/L (ref 3.5–5.1)
SODIUM SERPL-SCNC: 139 MMOL/L (ref 136–145)

## 2024-05-15 PROCEDURE — 3079F DIAST BP 80-89 MM HG: CPT | Performed by: NURSE PRACTITIONER

## 2024-05-15 PROCEDURE — 99214 OFFICE O/P EST MOD 30 MIN: CPT | Performed by: NURSE PRACTITIONER

## 2024-05-15 PROCEDURE — 3074F SYST BP LT 130 MM HG: CPT | Performed by: NURSE PRACTITIONER

## 2024-05-15 RX ORDER — FUROSEMIDE 20 MG/1
20 TABLET ORAL DAILY PRN
COMMUNITY

## 2024-05-15 RX ORDER — HYDROCHLOROTHIAZIDE 12.5 MG/1
12.5 CAPSULE, GELATIN COATED ORAL EVERY MORNING
Qty: 90 CAPSULE | Refills: 2 | Status: SHIPPED | OUTPATIENT
Start: 2024-05-15

## 2024-05-15 RX ORDER — POTASSIUM CHLORIDE 750 MG/1
10 TABLET, EXTENDED RELEASE ORAL DAILY
Qty: 90 TABLET | Refills: 2 | Status: SHIPPED | OUTPATIENT
Start: 2024-05-15

## 2024-05-15 ASSESSMENT — ENCOUNTER SYMPTOMS
WHEEZING: 1
COUGH: 1

## 2024-05-15 NOTE — PROGRESS NOTES
Tricia Sellers is a 54 y.o. female who presents today for the following:  Chief Complaint   Patient presents with    Follow-up     Pt presents today for FU. PT was not able to  ozempic wondering about switching back to trulicity.        Allergies   Allergen Reactions    Metformin Rash and Angioedema    Penicillins Anaphylaxis and Rash     Throat swelling and thick tongue.       Current Outpatient Medications   Medication Sig Dispense Refill    dulaglutide (TRULICITY) 0.75 MG/0.5ML SOPN SC injection Inject 0.5 mLs into the skin once a week Call week before last dose if tolerating to get refill and dose increased. 2 mL 0    hydroCHLOROthiazide 12.5 MG capsule Take 1 capsule by mouth every morning 90 capsule 2    potassium chloride (KLOR-CON M) 10 MEQ extended release tablet Take 1 tablet by mouth daily 90 tablet 2    furosemide (LASIX) 20 MG tablet Take 1 tablet by mouth daily as needed (leg swelling) Pt instructed to take home lasix 20 mg 1 tab orally daily as needed for breakthrough swelling.      gabapentin (NEURONTIN) 300 MG capsule Take 1 capsule by mouth 3 times daily for 180 days. Start 300 mg 1 cap tonight, then 300 mg 1 cap twice a day tomorrow, and then increase to 300 mg 1 cap three times a day thereafter if tolerated.  May cause drowsiness.  Avoid driving or operating hazardous machinery if drowsy. 270 capsule 1    albuterol sulfate HFA (VENTOLIN HFA) 108 (90 Base) MCG/ACT inhaler Inhale 2 puffs into the lungs 4 times daily as needed for Wheezing 18 g 1    aspirin 81 MG EC tablet Take 1 tablet by mouth daily 90 tablet 1    vitamin D (ERGOCALCIFEROL) 1.25 MG (66011 UT) CAPS capsule Take 1 capsule by mouth once a week 12 capsule 0    rosuvastatin (CRESTOR) 10 MG tablet Take 1 tablet by mouth nightly 90 tablet 2    Misc. Devices (CPAP MACHINE) MISC Use as Instructed      Semaglutide,0.25 or 0.5MG/DOS, 2 MG/3ML SOPN Inject 0.25 mg subcutaneously every 7 days for four weeks, then increase dose to

## 2024-05-16 NOTE — RESULT ENCOUNTER NOTE
Labs look good.  Added potassium 10 meq 1 tab daily.  Sent to pharmacy.  Please let me know if pt has any additional questions.  Keep scheduled appointment.

## 2024-06-03 ENCOUNTER — APPOINTMENT (OUTPATIENT)
Dept: CT IMAGING | Age: 54
End: 2024-06-03
Payer: COMMERCIAL

## 2024-06-03 ENCOUNTER — APPOINTMENT (OUTPATIENT)
Dept: GENERAL RADIOLOGY | Age: 54
End: 2024-06-03
Payer: COMMERCIAL

## 2024-06-03 ENCOUNTER — HOSPITAL ENCOUNTER (EMERGENCY)
Age: 54
Discharge: HOME | End: 2024-06-03
Attending: STUDENT IN AN ORGANIZED HEALTH CARE EDUCATION/TRAINING PROGRAM
Payer: COMMERCIAL

## 2024-06-03 VITALS
BODY MASS INDEX: 45.04 KG/M2 | SYSTOLIC BLOOD PRESSURE: 144 MMHG | RESPIRATION RATE: 20 BRPM | WEIGHT: 287 LBS | TEMPERATURE: 98.3 F | HEART RATE: 77 BPM | DIASTOLIC BLOOD PRESSURE: 104 MMHG | HEIGHT: 67 IN | OXYGEN SATURATION: 97 %

## 2024-06-03 DIAGNOSIS — M25.552 LEFT HIP PAIN: Primary | ICD-10-CM

## 2024-06-03 PROCEDURE — 99284 EMERGENCY DEPT VISIT MOD MDM: CPT

## 2024-06-03 PROCEDURE — 73502 X-RAY EXAM HIP UNI 2-3 VIEWS: CPT

## 2024-06-03 PROCEDURE — 6370000000 HC RX 637 (ALT 250 FOR IP): Performed by: STUDENT IN AN ORGANIZED HEALTH CARE EDUCATION/TRAINING PROGRAM

## 2024-06-03 PROCEDURE — 73700 CT LOWER EXTREMITY W/O DYE: CPT

## 2024-06-03 RX ORDER — HYDROCODONE BITARTRATE AND ACETAMINOPHEN 5; 325 MG/1; MG/1
1 TABLET ORAL
Status: COMPLETED | OUTPATIENT
Start: 2024-06-03 | End: 2024-06-03

## 2024-06-03 RX ORDER — ACETAMINOPHEN 500 MG
1000 TABLET ORAL
Status: DISCONTINUED | OUTPATIENT
Start: 2024-06-03 | End: 2024-06-03

## 2024-06-03 RX ADMIN — HYDROCODONE BITARTRATE AND ACETAMINOPHEN 1 TABLET: 5; 325 TABLET ORAL at 17:13

## 2024-06-03 ASSESSMENT — PAIN SCALES - GENERAL
PAINLEVEL_OUTOF10: 10
PAINLEVEL_OUTOF10: 8
PAINLEVEL_OUTOF10: 8

## 2024-06-03 ASSESSMENT — PAIN DESCRIPTION - LOCATION
LOCATION: HIP

## 2024-06-03 ASSESSMENT — PAIN DESCRIPTION - ORIENTATION
ORIENTATION: LEFT

## 2024-06-03 ASSESSMENT — PAIN DESCRIPTION - DESCRIPTORS: DESCRIPTORS: DULL

## 2024-06-03 ASSESSMENT — LIFESTYLE VARIABLES
HOW OFTEN DO YOU HAVE A DRINK CONTAINING ALCOHOL: NEVER
HOW MANY STANDARD DRINKS CONTAINING ALCOHOL DO YOU HAVE ON A TYPICAL DAY: PATIENT DOES NOT DRINK

## 2024-06-03 ASSESSMENT — PAIN - FUNCTIONAL ASSESSMENT: PAIN_FUNCTIONAL_ASSESSMENT: 0-10

## 2024-06-03 NOTE — ED TRIAGE NOTES
Pt arrives from 9th floor.  Pt reports getting up to walk and having no strength in left leg.  Pt stumbled but did not fall.  Pt was caught by RN.  Pt reports left hip pain and acute left leg swelling after incident.

## 2024-06-03 NOTE — DISCHARGE INSTRUCTIONS
Alternate Tylenol and Motrin as needed for discomfort.  Follow-up with orthopedics this week.  Use your crutches and walker for ambulating.  Return to work when you feel you are able to ambulate safely and are not in significant discomfort.

## 2024-06-03 NOTE — ED NOTES
Patient mobility status  with mild difficulty. Provider aware     I have reviewed discharge instructions with the patient.  The patient verbalized understanding.    Patient left ED via Discharge Method: wheelchair to Home with coworker    Opportunity for questions and clarification provided.     Patient given 0 scripts.           Neelam Lazo RN  06/03/24 1920

## 2024-06-04 ENCOUNTER — CARE COORDINATION (OUTPATIENT)
Dept: OTHER | Facility: CLINIC | Age: 54
End: 2024-06-04

## 2024-06-04 NOTE — CARE COORDINATION
Ambulatory Care Coordination Note    LPN CC attempted to reach patient for introduction to Associate Care Management related to ER visit on 6/3/24 for hip pain. HIPAA compliant message left requesting a return phone call at patient convenience.     Plan for follow-up call in 1-2 days      Future Appointments   Date Time Provider Department Center   8/12/2024  8:30 AM [facility]   8/20/2024  8:50 AM Lois Menchaca MD [facility] AMB

## 2024-06-05 ENCOUNTER — CARE COORDINATION (OUTPATIENT)
Dept: OTHER | Facility: CLINIC | Age: 54
End: 2024-06-05

## 2024-06-05 ENCOUNTER — TELEPHONE (OUTPATIENT)
Dept: ORTHOPEDIC SURGERY | Age: 54
End: 2024-06-05

## 2024-06-05 NOTE — CARE COORDINATION
Ambulatory Care Coordination Note     2024 11:20 AM     Patient Current Location:  Home: [facility] [place] [address]     This patient was received as a referral from [facility] .    ACM contacted the patient by telephone. Verified name and  with patient as identifiers. Provided introduction to self, and explanation of the ACM role.   Patient accepted care management services at this time.          ACM: Preeti Whitaker LPN     Challenges to be reviewed by the provider   Additional needs identified to be addressed with provider Yes  Needs assistance with scheduling appointment with Orthopedic Surgeon               Method of communication with provider: phone.    Care Summary Note: Patient is 53 yo female seen in the ER at [facility] on 6/3/24 due to sudden onset of left hip pain with weakness of leg and difficulty walking, after standing from seated position. Patient has history of previous surgery to hip. Work up in ER with xray and CT of hip did not show a fx or acute problem. Patient was discharged with plans for referral to Orthopedic Surgeon and advised to use crutches or walker for ambulating, which she has at home and patient elected to use OTC pain medications.   Patient reports to day she continues to have pain and is taking Tylenol and Advil. [facility] with Dr. Martinez, who performed previous surgery, however was unable to schedule with that office. Patient agreeable for [facility] to call [facility] to schedule visit. Appointment scheduled for  at 2:40pm. Patient was added to cancellation list in hopes of a sooner appointment. Patient notified of appointment.     Offered patient enrollment in the Remote Patient Monitoring (RPM) program for in-home monitoring: Patient is not eligible for RPM program because: insurance coverage.     Assessments Completed:       2024    11:29 AM   Amb Fall Risk Assessment and TUG

## 2024-06-05 NOTE — TELEPHONE ENCOUNTER
ENMAM to schedule appointment with Dr. Calderon tomorrow morning, 6/6/24. Left direct call back number.

## 2024-06-06 ENCOUNTER — OFFICE VISIT (OUTPATIENT)
Dept: ORTHOPEDIC SURGERY | Age: 54
End: 2024-06-06
Payer: COMMERCIAL

## 2024-06-06 DIAGNOSIS — M25.552 LEFT HIP PAIN: Primary | ICD-10-CM

## 2024-06-06 PROCEDURE — 99203 OFFICE O/P NEW LOW 30 MIN: CPT | Performed by: ORTHOPAEDIC SURGERY

## 2024-06-06 RX ORDER — METHYLPREDNISOLONE 4 MG/1
TABLET ORAL
Qty: 1 KIT | Refills: 0 | Status: SHIPPED | OUTPATIENT
Start: 2024-06-06 | End: 2024-06-12

## 2024-06-06 NOTE — PROGRESS NOTES
Progress Note    Patient: Tricia Sellers MRN: [id number]  SSN: [id number]    YOB: 1970  Age: 54 y.o.  Sex: female        6/6/2024      Subjective:     Patient is here today complaining of significant left hip pain.  She has had issues with both of her hips in the past.  She has a history of having a right hip arthroscopy by Dr. Martinez at [address] [place] [address] in 2017 on the right side in 2015 on the left side.  It also looks like she had an ultrasound-guided injection on her right hip in July 2021.  She says she has not fallen or done anything she did sort of stepped awkwardly so she cannot had this strain to her left hip and since that time is really been bothering her quite a bit.  She says her leg was swollen and she thinks the swelling is gotten a bit better the pain is getting little bit better and she is able to bear weight little more comfortably.  She has had plain film x-rays as well as a CT scan that showed no evidence of any fractures.  She does have some degenerative changes in both hips and she says has been told this before right little bit worse than left.  She does localize the pain more down in her groin and on the side of her hip.  She does not have any gout that she knows of.  She says she does have family members that have had some episodes of gout before.    Objective:     There were no vitals filed for this visit.       Physical Exam:     Skin - no open wounds or pressure sores  Motor and sensory function intact in LEFT LOWER extremity  Pulses palpable in LEFT LOWER extremity     XRAY FINDINGS:  I have reviewed her plain film x-rays as well as her CT scan.  This showed no evidence of any fractures or dislocations.  She does have findings consistent with her history with some degenerative changes in both hips right little worse than the left.    Assessment:     Left hip pain with history of hip impingement    Plan:     Soft talked about a couple of things.  That she does have this history I

## 2024-06-08 DIAGNOSIS — E11.9 TYPE 2 DIABETES MELLITUS WITHOUT COMPLICATION, WITHOUT LONG-TERM CURRENT USE OF INSULIN (HCC): ICD-10-CM

## 2024-06-10 RX ORDER — DULAGLUTIDE 0.75 MG/.5ML
INJECTION, SOLUTION SUBCUTANEOUS
Qty: 2 ML | Refills: 0 | Status: SHIPPED | OUTPATIENT
Start: 2024-06-10

## 2024-06-13 ENCOUNTER — HOSPITAL ENCOUNTER (OUTPATIENT)
Age: 54
Discharge: HOME | End: 2024-06-15
Payer: COMMERCIAL

## 2024-06-13 DIAGNOSIS — M25.552 LEFT HIP PAIN: ICD-10-CM

## 2024-06-13 PROCEDURE — 73721 MRI JNT OF LWR EXTRE W/O DYE: CPT

## 2024-06-19 ENCOUNTER — CARE COORDINATION (OUTPATIENT)
Dept: OTHER | Facility: CLINIC | Age: 54
End: 2024-06-19

## 2024-06-19 NOTE — CARE COORDINATION
Ambulatory Care Coordination Note    ACM attempted to reach patient for care management follow up call regarding hip pain. HIPAA compliant message left requesting a return phone call at patient convenience.     Plan for follow-up call in 10-14 days    Future Appointments   Date Time Provider Department Center   7/11/2024  2:40 PM Cinthya Lyons PA [facility] AMB   8/12/2024  8:30 AM Brenda Valencia MD [facility] AMB   8/20/2024  8:50 AM Lois Menchaca MD [facility] AMB

## 2024-06-20 DIAGNOSIS — I10 PRIMARY HYPERTENSION: ICD-10-CM

## 2024-06-20 DIAGNOSIS — M79.89 LEG SWELLING: ICD-10-CM

## 2024-06-20 RX ORDER — HYDROCHLOROTHIAZIDE 12.5 MG/1
12.5 CAPSULE, GELATIN COATED ORAL EVERY MORNING
Qty: 90 CAPSULE | Refills: 2 | OUTPATIENT
Start: 2024-06-20

## 2024-07-05 ENCOUNTER — CARE COORDINATION (OUTPATIENT)
Dept: OTHER | Facility: CLINIC | Age: 54
End: 2024-07-05

## 2024-07-05 NOTE — CARE COORDINATION
Surgery [phone]    8/12/2024 8:30 AM (Arrive by 8:15 AM) Brenda Valencia MD Rheumatology [phone]    8/20/2024 8:50 AM Lois Menchaca MD Pulmonology [phone]            Follow Up:   No further Ambulatory Care Management follow-up scheduled at this time.  patient  has Ambulatory Care Manager's contact information for any further questions, concerns or needs.

## 2024-07-11 ENCOUNTER — OFFICE VISIT (OUTPATIENT)
Dept: ORTHOPEDIC SURGERY | Age: 54
End: 2024-07-11
Payer: COMMERCIAL

## 2024-07-11 VITALS — BODY MASS INDEX: 45.99 KG/M2 | WEIGHT: 293 LBS | HEIGHT: 67 IN

## 2024-07-11 DIAGNOSIS — M70.62 GREATER TROCHANTERIC BURSITIS OF BOTH HIPS: ICD-10-CM

## 2024-07-11 DIAGNOSIS — M70.61 GREATER TROCHANTERIC BURSITIS OF BOTH HIPS: ICD-10-CM

## 2024-07-11 DIAGNOSIS — M25.552 LEFT HIP PAIN: Primary | ICD-10-CM

## 2024-07-11 DIAGNOSIS — M16.11 PRIMARY OSTEOARTHRITIS OF RIGHT HIP: ICD-10-CM

## 2024-07-11 PROCEDURE — 99204 OFFICE O/P NEW MOD 45 MIN: CPT | Performed by: PHYSICIAN ASSISTANT

## 2024-07-11 PROCEDURE — 20610 DRAIN/INJ JOINT/BURSA W/O US: CPT | Performed by: PHYSICIAN ASSISTANT

## 2024-07-11 RX ORDER — TRIAMCINOLONE ACETONIDE 40 MG/ML
40 INJECTION, SUSPENSION INTRA-ARTICULAR; INTRAMUSCULAR ONCE
Status: COMPLETED | OUTPATIENT
Start: 2024-07-11 | End: 2024-07-11

## 2024-07-11 RX ADMIN — TRIAMCINOLONE ACETONIDE 40 MG: 40 INJECTION, SUSPENSION INTRA-ARTICULAR; INTRAMUSCULAR at 15:23

## 2024-07-11 NOTE — PROGRESS NOTES
Name: Tricia Sellers  YOB: 1970  Gender: female  MRN: [id number]    CC:  Bilateral hip pain    HPI: Tricia Sellers is a 54 y.o. female who presents with bilateral hip pain    She has had pain for a long time. She had surgery on both hips to repair \" torn ligaments\" by Dr. Jere Martinez in 2016. She has pain with walking, sitting, and standing.  She states her pain is on the outside of her hips it is gets worse with walking.  She states that she does feel weaker with her gait.  She notes that laying on both her sides have increased pain.    ROS/Meds/PSH/PMH/FH/SH: I personally reviewed the patients standard intake form.  Below are the pertinents    Tobacco:  reports that she has never smoked. She has never used smokeless tobacco.  Diabetes: diabetic-insulin dependent  Other: History of stroke, hypertension, obstructive sleep apnea, obesity    Physical Examination:  General: no acute distress  Lungs: breathing easily  CV: regular rhythm by pulse  Right Hip: No pain with logroll.  Tender to palpate along the greater trochanteric bursa.  5 out of 5 abduction and adduction strength when compared to opposite side.  No pain with MAURILIO or FADIR in the groin but she does have pain along her TFL with these movements.  Calf is soft and nontender to palpation.  Dorsi and plantarflexion mechanism intact.    Left Hip: No pain with logroll.  Tender to palpate along the greater trochanteric bursa.  5 out of 5 abduction and adduction strength when compared to opposite side.  No pain with MAURILIO or FADIR in the groin but she does have pain along her TFL with these movements.  Calf is soft and nontender to palpation.  Dorsi and plantarflexion mechanism intact.    Imaging:     MRI of the left hip from June 13, 2024 reviewed.  No acute findings present.  No specific labral tear noted.  No avascular necrosis.  Mild arthritic changes present. No joint effusion noted.     X-rays reviewed from outside source noted today on

## 2024-07-12 ENCOUNTER — TELEPHONE (OUTPATIENT)
Dept: ORTHOPEDIC SURGERY | Age: 54
End: 2024-07-12

## 2024-07-12 NOTE — TELEPHONE ENCOUNTER
I called patient today to make her a 2 month follow up with Cinthya Lyons. Her mailbox is full and I was unable to leave a message. She needs a 2 month follow up.

## 2024-07-22 ENCOUNTER — EVALUATION (OUTPATIENT)
Age: 54
End: 2024-07-22
Payer: COMMERCIAL

## 2024-07-22 DIAGNOSIS — I10 PRIMARY HYPERTENSION: ICD-10-CM

## 2024-07-22 DIAGNOSIS — M79.89 LEG SWELLING: ICD-10-CM

## 2024-07-22 DIAGNOSIS — E11.9 TYPE 2 DIABETES MELLITUS WITHOUT COMPLICATION, WITHOUT LONG-TERM CURRENT USE OF INSULIN (HCC): ICD-10-CM

## 2024-07-22 DIAGNOSIS — M25.552 BILATERAL HIP PAIN: Primary | ICD-10-CM

## 2024-07-22 DIAGNOSIS — M70.61 GREATER TROCHANTERIC BURSITIS OF BOTH HIPS: ICD-10-CM

## 2024-07-22 DIAGNOSIS — M25.552 LEFT HIP PAIN: ICD-10-CM

## 2024-07-22 DIAGNOSIS — M25.551 BILATERAL HIP PAIN: Primary | ICD-10-CM

## 2024-07-22 DIAGNOSIS — M70.62 GREATER TROCHANTERIC BURSITIS OF BOTH HIPS: ICD-10-CM

## 2024-07-22 DIAGNOSIS — Z86.73 HISTORY OF STROKE: ICD-10-CM

## 2024-07-22 PROCEDURE — 97110 THERAPEUTIC EXERCISES: CPT

## 2024-07-22 PROCEDURE — 97161 PT EVAL LOW COMPLEX 20 MIN: CPT

## 2024-07-22 NOTE — PROGRESS NOTES
[facility]  [address] [place] [address]  Dept: [phone]      Physical Therapy Initial Assessment     Referring MD: Cinthya Lyons PA  Diagnosis:     ICD-10-CM    1. Bilateral hip pain  M25.551     M25.552       2. Greater trochanteric bursitis of both hips  M70.61     M70.62          Surgery: Previous hx of hip surgery    Therapy precautions:None  Co-morbidities affecting plan of care: Arthritis, Obesity, Back pain    Payor: Payor: R /  /  /  Billing pattern: Commercial- substantial/midpoint time each CPT  Total Timed Procedure Codes: 15 min, Total Time: 40 min Modifier needed: No  Episode visit count:  1     PERTINENT MEDICAL HISTORY     Past medical and surgical history:   Past Medical History:   Diagnosis Date    Acute respiratory failure with hypoxia ([facility]) 06/24/2020    Arthritis     Avascular necrosis of bone of hip ([facility]) 01/30/2017    CHF (congestive heart failure) ([facility])     [address] Echo LVEF 55-65%    COVID-19 virus infection 06/24/2020    Diabetes ([facility])     Diarrhea of presumed infectious origin 06/26/2020    H/O echocardiogram 07/2021    LVEF 55-65%    Hypertension     Obesity     EMILIA on CPAP     Respiratory failure ([facility]) 06/24/2020    Stroke ([facility]) 03/2018    no residual    Type 2 diabetes mellitus without complication ([facility]) 6/2020      Past Surgical History:   Procedure Laterality Date    CARDIAC VALVE REPLACEMENT      CHOLECYSTECTOMY  2008    COLONOSCOPY N/A 12/07/2023    COLORECTAL CANCER SCREENING, NOT HIGH RISK/Polyp performed by Rodo Garcias MD at [facility] ENDOSCOPY    HYSTERECTOMY (CERVIX STATUS UNKNOWN)  2007    HYSTERECTOMY, TOTAL ABDOMINAL (CERVIX REMOVED)      ORTHOPEDIC SURGERY  2015    left hip ligament repair    ORTHOPEDIC SURGERY  2017    Right hip ligament repair    TONSILLECTOMY  1999    TOTAL HIP ARTHROPLASTY      TUBAL LIGATION  11/26/1989     Medications: reviewed in chart   Allergies:   Allergies   Allergen Reactions    Metformin Rash and Angioedema

## 2024-07-23 ENCOUNTER — OFFICE VISIT (OUTPATIENT)
Dept: RHEUMATOLOGY | Age: 54
End: 2024-07-23
Payer: COMMERCIAL

## 2024-07-23 VITALS
RESPIRATION RATE: 20 BRPM | BODY MASS INDEX: 45.67 KG/M2 | DIASTOLIC BLOOD PRESSURE: 76 MMHG | HEART RATE: 80 BPM | SYSTOLIC BLOOD PRESSURE: 128 MMHG | HEIGHT: 67 IN | WEIGHT: 291 LBS

## 2024-07-23 DIAGNOSIS — M79.672 FOOT PAIN, BILATERAL: ICD-10-CM

## 2024-07-23 DIAGNOSIS — G47.33 OBSTRUCTIVE SLEEP APNEA SYNDROME: ICD-10-CM

## 2024-07-23 DIAGNOSIS — R06.02 SHORTNESS OF BREATH ON EXERTION: ICD-10-CM

## 2024-07-23 DIAGNOSIS — G89.29 CHRONIC HIP PAIN, BILATERAL: ICD-10-CM

## 2024-07-23 DIAGNOSIS — Z79.1 NSAID LONG-TERM USE: ICD-10-CM

## 2024-07-23 DIAGNOSIS — I10 PRIMARY HYPERTENSION: ICD-10-CM

## 2024-07-23 DIAGNOSIS — M79.643 INTERMITTENT PAIN AND SWELLING OF HAND: ICD-10-CM

## 2024-07-23 DIAGNOSIS — R76.8 RHEUMATOID FACTOR POSITIVE: ICD-10-CM

## 2024-07-23 DIAGNOSIS — Z83.2 FAMILY HISTORY OF SARCOIDOSIS: ICD-10-CM

## 2024-07-23 DIAGNOSIS — M79.89 INTERMITTENT PAIN AND SWELLING OF HAND: ICD-10-CM

## 2024-07-23 DIAGNOSIS — H04.123 DRY EYES, BILATERAL: ICD-10-CM

## 2024-07-23 DIAGNOSIS — E55.9 VITAMIN D DEFICIENCY: ICD-10-CM

## 2024-07-23 DIAGNOSIS — M87.00 AVN (AVASCULAR NECROSIS OF BONE) (HCC): ICD-10-CM

## 2024-07-23 DIAGNOSIS — E11.40 TYPE 2 DIABETES MELLITUS WITH DIABETIC NEUROPATHY, WITHOUT LONG-TERM CURRENT USE OF INSULIN (HCC): ICD-10-CM

## 2024-07-23 DIAGNOSIS — R76.8 RHEUMATOID FACTOR POSITIVE: Primary | ICD-10-CM

## 2024-07-23 DIAGNOSIS — E11.9 TYPE 2 DIABETES MELLITUS WITHOUT COMPLICATION, WITHOUT LONG-TERM CURRENT USE OF INSULIN (HCC): ICD-10-CM

## 2024-07-23 DIAGNOSIS — Z86.73 HISTORY OF COMPLETED STROKE: ICD-10-CM

## 2024-07-23 DIAGNOSIS — M79.671 FOOT PAIN, BILATERAL: ICD-10-CM

## 2024-07-23 DIAGNOSIS — E66.01 CLASS 3 SEVERE OBESITY WITH BODY MASS INDEX (BMI) OF 45.0 TO 49.9 IN ADULT, UNSPECIFIED OBESITY TYPE, UNSPECIFIED WHETHER SERIOUS COMORBIDITY PRESENT (HCC): ICD-10-CM

## 2024-07-23 DIAGNOSIS — M25.552 CHRONIC HIP PAIN, BILATERAL: ICD-10-CM

## 2024-07-23 DIAGNOSIS — M25.551 CHRONIC HIP PAIN, BILATERAL: ICD-10-CM

## 2024-07-23 LAB
25(OH)D3 SERPL-MCNC: 17.3 NG/ML (ref 30–100)
ALBUMIN SERPL-MCNC: 4.3 G/DL (ref 3.5–5)
ALBUMIN/GLOB SERPL: 1.2 (ref 1–1.9)
ALP SERPL-CCNC: 114 U/L (ref 35–104)
ALT SERPL-CCNC: 39 U/L (ref 12–65)
ANION GAP SERPL CALC-SCNC: 15 MMOL/L (ref 9–18)
AST SERPL-CCNC: 27 U/L (ref 15–37)
BASOPHILS # BLD: 0.1 K/UL (ref 0–0.2)
BASOPHILS NFR BLD: 0 % (ref 0–2)
BILIRUB SERPL-MCNC: 0.3 MG/DL (ref 0–1.2)
BUN SERPL-MCNC: 20 MG/DL (ref 6–23)
CALCIUM SERPL-MCNC: 10.8 MG/DL (ref 8.8–10.2)
CHLORIDE SERPL-SCNC: 98 MMOL/L (ref 98–107)
CO2 SERPL-SCNC: 25 MMOL/L (ref 20–28)
CREAT SERPL-MCNC: 0.89 MG/DL (ref 0.6–1.1)
DIFFERENTIAL METHOD BLD: ABNORMAL
EOSINOPHIL # BLD: 0.1 K/UL (ref 0–0.8)
EOSINOPHIL NFR BLD: 1 % (ref 0.5–7.8)
ERYTHROCYTE [DISTWIDTH] IN BLOOD BY AUTOMATED COUNT: 16.5 % (ref 11.9–14.6)
ERYTHROCYTE [SEDIMENTATION RATE] IN BLOOD: 11 MM/HR (ref 0–30)
GLOBULIN SER CALC-MCNC: 3.6 G/DL (ref 2.3–3.5)
GLUCOSE SERPL-MCNC: 103 MG/DL (ref 70–99)
HAV IGM SER QL: NONREACTIVE
HBV CORE IGM SER QL: NONREACTIVE
HBV SURFACE AG SER QL: NONREACTIVE
HCT VFR BLD AUTO: 46.1 % (ref 35.8–46.3)
HCV AB SER QL: NONREACTIVE
HGB BLD-MCNC: 14.6 G/DL (ref 11.7–15.4)
IMM GRANULOCYTES # BLD AUTO: 0.1 K/UL (ref 0–0.5)
IMM GRANULOCYTES NFR BLD AUTO: 1 % (ref 0–5)
LYMPHOCYTES # BLD: 4.2 K/UL (ref 0.5–4.6)
LYMPHOCYTES NFR BLD: 24 % (ref 13–44)
MCH RBC QN AUTO: 26.3 PG (ref 26.1–32.9)
MCHC RBC AUTO-ENTMCNC: 31.7 G/DL (ref 31.4–35)
MCV RBC AUTO: 83.1 FL (ref 82–102)
MONOCYTES # BLD: 0.9 K/UL (ref 0.1–1.3)
MONOCYTES NFR BLD: 5 % (ref 4–12)
NEUTS SEG # BLD: 12.4 K/UL (ref 1.7–8.2)
NEUTS SEG NFR BLD: 69 % (ref 43–78)
NRBC # BLD: 0 K/UL (ref 0–0.2)
PLATELET # BLD AUTO: 384 K/UL (ref 150–450)
PMV BLD AUTO: 11.6 FL (ref 9.4–12.3)
POTASSIUM SERPL-SCNC: 4.5 MMOL/L (ref 3.5–5.1)
PROT SERPL-MCNC: 7.8 G/DL (ref 6.3–8.2)
RBC # BLD AUTO: 5.55 M/UL (ref 4.05–5.2)
SODIUM SERPL-SCNC: 138 MMOL/L (ref 136–145)
WBC # BLD AUTO: 17.8 K/UL (ref 4.3–11.1)

## 2024-07-23 PROCEDURE — 99205 OFFICE O/P NEW HI 60 MIN: CPT | Performed by: INTERNAL MEDICINE

## 2024-07-23 PROCEDURE — 3074F SYST BP LT 130 MM HG: CPT | Performed by: INTERNAL MEDICINE

## 2024-07-23 PROCEDURE — 3078F DIAST BP <80 MM HG: CPT | Performed by: INTERNAL MEDICINE

## 2024-07-23 RX ORDER — MELOXICAM 15 MG/1
15 TABLET ORAL DAILY
Qty: 30 TABLET | Refills: 0 | Status: SHIPPED | OUTPATIENT
Start: 2024-07-23

## 2024-07-23 NOTE — PROGRESS NOTES
tenderness.  No synovitis.   Non tender Achilles tendon     Feet: Most tenderness.  No synovitis.   Pitting edema +1 generalized    REVIEW OF RECORDS:  The patients previous records were reviewed including notes (from Kellen Aguilera) as well as    Laboratory evaluation:  Lab Results   Component Value Date    LIZET Negative 04/30/2024     No components found for: \"RHEUMFACTOR\"  Lab Results   Component Value Date    SEDRATE 11 07/23/2024     Lab Results   Component Value Date    CRP 1.1 (H) 09/13/2023         Radiographic evaluation:      ASSESSMENT AND PLAN:  Tricia Sellers is a 54 y.o. female that is here for evaluation of abnormal labs.  her problems include:    This is a 54-year-old female with history of AVN bilateral hip, stroke, hypertension, hyperlipidemia, diabetes, EMILIA, obesity presenting for evaluation and management of 7 months of bilateral hand, foot pain and swelling.  Morning stiffness 15 to 20 minutes.  There is family history of sarcoidosis and patient's daughter who I manage with Humira.  Associated sicca and bilateral eyes for which she was started on Restasis.  She has been using ibuprofen or naproxen infrequently without improvement in symptoms.  Exam shows tenderness along all small joints in the absence of significant synovitis-there is synovial hypertrophy right #3 MCP.    Lab data shows RF 1: 64, negative LIZET direct.    Initiate meloxicam 15 Mg daily.  Discontinue ibuprofen, naproxen.  Patient will monitor blood pressure at home and contact PCP if high numbers. Advised patient of side effects including hypertension, increased cardiovascular risk of MI, heart failure, stroke, renal & liver toxicity, GI upset and bleeding.    Differential includes rheumatoid arthritis, sarcoidosis, Sjogren's syndrome.    Given reading material on Plaquenil versus methotrexate to consider based on workup.    Request records from ophthalmology [place] optometrist Frandy Guallpa.  Consider Sjogren's testing      1.

## 2024-07-23 NOTE — PATIENT INSTRUCTIONS
Vitamin D 2000U daily   Dr Valencia assistant:    [phone] (CT scan, you will be called once scan is approved)    Patient Education        methotrexate (oral)  Pronunciation:  meth oh TREX ate  Brand:  Trexall, Xatmep  What is the most important information I should know about methotrexate?  Methotrexate may cause injury or death to an unborn baby and should not be used during pregnancy to treat arthritis or psoriasis. Methotrexate is sometimes used to treat cancer during pregnancy. Tell your doctor if you are pregnant or plan to become pregnant.  Do not use methotrexate to treat psoriasis or rheumatoid arthritis if you have low blood cell counts, a weak immune system, alcoholism or chronic liver disease, or if you are breastfeeding.  YOU MAY NOT NEED TO TAKE METHOTREXATE EVERY DAY.  Some people have  after incorrectly taking methotrexate every day. You must use the correct dose for your condition.   Methotrexate can cause serious or fatal side effects. Tell your doctor if you have diarrhea, mouth sores, cough, shortness of breath, upper stomach pain, dark urine, numbness or tingling, muscle weakness, confusion, seizure, or skin rash that spreads and causes blistering and peeling.  What is methotrexate?  Methotrexate is used to treat leukemia and certain types of cancer of the breast, skin, head and neck, lung, or uterus.  Methotrexate is also used to treat severe psoriasis and rheumatoid arthritis in adults.  Methotrexate is also used to treat active polyarticular-course juvenile rheumatoid arthritis in children.  Methotrexate is sometimes given when other medicines have not been effective.  Methotrexate may also be used for purposes not listed in this medication guide.  What should I discuss with my healthcare provider before taking methotrexate?  You should not use methotrexate if you are allergic to it. You may not be able to take methotrexate if you have:  alcoholism, cirrhosis,

## 2024-07-24 RX ORDER — DULAGLUTIDE 0.75 MG/.5ML
INJECTION, SOLUTION SUBCUTANEOUS
Qty: 2 ML | Refills: 0 | OUTPATIENT
Start: 2024-07-24

## 2024-07-24 NOTE — TELEPHONE ENCOUNTER
Aspirin was last sent to pharmacy on 4/30/24 for a 90-day supply w/ 1 refill.   HCTZ last sent to pharmacy on 5/15/24 for a 90-day supply w/ 2 refills. Patient should be fine on these two medications.   However, the Trulicity was last sent to pharmacy on 6/10/24 for 2mL (30-day supply) with no refills. If patient is to continue this medication, she will need a refill on it.   Patient was scheduled to follow up in 4 months (would be August), however that appt was never made. Called patient to schedule appt, no answer. LMOM requesting a return call. Also sent message in [employer].

## 2024-07-25 ENCOUNTER — HOSPITAL ENCOUNTER (OUTPATIENT)
Dept: CT IMAGING | Age: 54
Discharge: HOME | End: 2024-07-25
Payer: COMMERCIAL

## 2024-07-25 ENCOUNTER — CLINICAL DOCUMENTATION (OUTPATIENT)
Age: 54
End: 2024-07-25

## 2024-07-25 ENCOUNTER — TELEPHONE (OUTPATIENT)
Dept: FAMILY MEDICINE CLINIC | Facility: CLINIC | Age: 54
End: 2024-07-25

## 2024-07-25 ENCOUNTER — OFFICE VISIT (OUTPATIENT)
Dept: FAMILY MEDICINE CLINIC | Facility: CLINIC | Age: 54
End: 2024-07-25
Payer: COMMERCIAL

## 2024-07-25 VITALS
BODY MASS INDEX: 44.95 KG/M2 | WEIGHT: 287 LBS | OXYGEN SATURATION: 96 % | DIASTOLIC BLOOD PRESSURE: 76 MMHG | TEMPERATURE: 98.9 F | HEART RATE: 78 BPM | SYSTOLIC BLOOD PRESSURE: 118 MMHG

## 2024-07-25 DIAGNOSIS — R10.9 LEFT FLANK PAIN: ICD-10-CM

## 2024-07-25 DIAGNOSIS — D72.829 LEUKOCYTOSIS, UNSPECIFIED TYPE: ICD-10-CM

## 2024-07-25 DIAGNOSIS — R82.90 ABNORMAL FINDING ON URINALYSIS: ICD-10-CM

## 2024-07-25 DIAGNOSIS — R31.29 MICROSCOPIC HEMATURIA: ICD-10-CM

## 2024-07-25 DIAGNOSIS — M79.89 LEG SWELLING: ICD-10-CM

## 2024-07-25 DIAGNOSIS — E11.9 TYPE 2 DIABETES MELLITUS WITHOUT COMPLICATION, WITHOUT LONG-TERM CURRENT USE OF INSULIN (HCC): ICD-10-CM

## 2024-07-25 DIAGNOSIS — R10.9 FLANK PAIN: ICD-10-CM

## 2024-07-25 DIAGNOSIS — R25.2 CRAMPS, MUSCLE, GENERAL: ICD-10-CM

## 2024-07-25 DIAGNOSIS — I10 PRIMARY HYPERTENSION: ICD-10-CM

## 2024-07-25 DIAGNOSIS — R31.9 URINARY TRACT INFECTION WITH HEMATURIA, SITE UNSPECIFIED: ICD-10-CM

## 2024-07-25 DIAGNOSIS — N39.0 URINARY TRACT INFECTION WITH HEMATURIA, SITE UNSPECIFIED: ICD-10-CM

## 2024-07-25 DIAGNOSIS — Z86.73 HISTORY OF STROKE: ICD-10-CM

## 2024-07-25 DIAGNOSIS — K52.9 COLITIS: Primary | ICD-10-CM

## 2024-07-25 DIAGNOSIS — R82.90 ABNORMAL FINDING ON URINALYSIS: Primary | ICD-10-CM

## 2024-07-25 DIAGNOSIS — R10.11 RIGHT UPPER QUADRANT ABDOMINAL PAIN: ICD-10-CM

## 2024-07-25 LAB
BASOPHILS # BLD: 0.1 K/UL (ref 0–0.2)
BASOPHILS NFR BLD: 1 % (ref 0–2)
BILIRUBIN, URINE, POC: NEGATIVE
BLOOD URINE, POC: NORMAL
CK SERPL-CCNC: 155 U/L (ref 21–215)
CRP SERPL HS-MCNC: 3.9 MG/L (ref 0–3)
DIFFERENTIAL METHOD BLD: ABNORMAL
EOSINOPHIL # BLD: 0.1 K/UL (ref 0–0.8)
EOSINOPHIL NFR BLD: 1 % (ref 0.5–7.8)
ERYTHROCYTE [DISTWIDTH] IN BLOOD BY AUTOMATED COUNT: 16.8 % (ref 11.9–14.6)
GLUCOSE URINE, POC: NEGATIVE
HCT VFR BLD AUTO: 47.9 % (ref 35.8–46.3)
HGB BLD-MCNC: 15.2 G/DL (ref 11.7–15.4)
IMM GRANULOCYTES # BLD AUTO: 0.1 K/UL (ref 0–0.5)
IMM GRANULOCYTES NFR BLD AUTO: 0 % (ref 0–5)
KETONES, URINE, POC: NEGATIVE
LEUKOCYTE ESTERASE, URINE, POC: NEGATIVE
LIPASE SERPL-CCNC: 28 U/L (ref 13–60)
LYMPHOCYTES # BLD: 3.8 K/UL (ref 0.5–4.6)
LYMPHOCYTES NFR BLD: 28 % (ref 13–44)
MAGNESIUM SERPL-MCNC: 2.3 MG/DL (ref 1.8–2.4)
MCH RBC QN AUTO: 26.5 PG (ref 26.1–32.9)
MCHC RBC AUTO-ENTMCNC: 31.7 G/DL (ref 31.4–35)
MCV RBC AUTO: 83.6 FL (ref 82–102)
MONOCYTES # BLD: 0.8 K/UL (ref 0.1–1.3)
MONOCYTES NFR BLD: 6 % (ref 4–12)
NEUTS SEG # BLD: 8.6 K/UL (ref 1.7–8.2)
NEUTS SEG NFR BLD: 64 % (ref 43–78)
NITRITE, URINE, POC: POSITIVE
NRBC # BLD: 0 K/UL (ref 0–0.2)
PH, URINE, POC: 5.5 (ref 4.6–8)
PLATELET # BLD AUTO: 382 K/UL (ref 150–450)
PMV BLD AUTO: 10.7 FL (ref 9.4–12.3)
PROTEIN,URINE, POC: NEGATIVE
RBC # BLD AUTO: 5.73 M/UL (ref 4.05–5.2)
SPECIFIC GRAVITY, URINE, POC: >=1.03 (ref 1–1.03)
URINALYSIS CLARITY, POC: NORMAL
URINALYSIS COLOR, POC: YELLOW
UROBILINOGEN, POC: NORMAL
WBC # BLD AUTO: 13.5 K/UL (ref 4.3–11.1)

## 2024-07-25 PROCEDURE — 3074F SYST BP LT 130 MM HG: CPT | Performed by: NURSE PRACTITIONER

## 2024-07-25 PROCEDURE — 3078F DIAST BP <80 MM HG: CPT | Performed by: NURSE PRACTITIONER

## 2024-07-25 PROCEDURE — 99214 OFFICE O/P EST MOD 30 MIN: CPT | Performed by: NURSE PRACTITIONER

## 2024-07-25 PROCEDURE — 74178 CT ABD&PLV WO CNTR FLWD CNTR: CPT

## 2024-07-25 PROCEDURE — 6360000004 HC RX CONTRAST MEDICATION: Performed by: NURSE PRACTITIONER

## 2024-07-25 PROCEDURE — 81003 URINALYSIS AUTO W/O SCOPE: CPT | Performed by: NURSE PRACTITIONER

## 2024-07-25 RX ORDER — ASPIRIN 81 MG/1
81 TABLET ORAL DAILY
Qty: 90 TABLET | Refills: 2 | Status: SHIPPED | OUTPATIENT
Start: 2024-07-25

## 2024-07-25 RX ORDER — HYDROCHLOROTHIAZIDE 12.5 MG/1
12.5 CAPSULE, GELATIN COATED ORAL EVERY MORNING
Qty: 90 CAPSULE | Refills: 2 | OUTPATIENT
Start: 2024-07-25

## 2024-07-25 RX ORDER — DULAGLUTIDE 0.75 MG/.5ML
INJECTION, SOLUTION SUBCUTANEOUS
Qty: 2 ML | Refills: 5 | Status: SHIPPED | OUTPATIENT
Start: 2024-07-25

## 2024-07-25 RX ORDER — HYDROCHLOROTHIAZIDE 12.5 MG/1
12.5 CAPSULE, GELATIN COATED ORAL EVERY MORNING
Qty: 90 CAPSULE | Refills: 2 | Status: SHIPPED | OUTPATIENT
Start: 2024-07-25

## 2024-07-25 RX ORDER — ASPIRIN 81 MG/1
81 TABLET ORAL DAILY
Qty: 90 TABLET | Refills: 1 | OUTPATIENT
Start: 2024-07-25

## 2024-07-25 RX ORDER — METRONIDAZOLE 500 MG/1
500 TABLET ORAL 3 TIMES DAILY
Qty: 21 TABLET | Refills: 0 | Status: SHIPPED | OUTPATIENT
Start: 2024-07-25 | End: 2024-08-01

## 2024-07-25 RX ORDER — LEVOFLOXACIN 750 MG/1
750 TABLET, FILM COATED ORAL DAILY
Qty: 7 TABLET | Refills: 0 | Status: SHIPPED | OUTPATIENT
Start: 2024-07-25 | End: 2024-08-01

## 2024-07-25 RX ORDER — DULAGLUTIDE 0.75 MG/.5ML
INJECTION, SOLUTION SUBCUTANEOUS
Qty: 2 ML | Refills: 5 | Status: SHIPPED
Start: 2024-07-25 | End: 2024-07-25 | Stop reason: SDUPTHER

## 2024-07-25 RX ADMIN — IOPAMIDOL 100 ML: 755 INJECTION, SOLUTION INTRAVENOUS at 16:12

## 2024-07-25 ASSESSMENT — ENCOUNTER SYMPTOMS
DIARRHEA: 0
BACK PAIN: 1
COUGH: 0
SHORTNESS OF BREATH: 1
NAUSEA: 0
ABDOMINAL PAIN: 0
VOMITING: 0

## 2024-07-25 NOTE — PROGRESS NOTES
cramping.  Cramping all over side, arms, legs.  Rest and movement, laying in bed has to jump out of bed.  Up and down due to the cramping.  Denies hx of pancreatitis.  Has hx of cholecystectomy.  No prior hx of cramps like this.   Saw rheumatology on 07/23/24 and labs ordered.  Noted severe elevation in WBC; denies recent steroids since one month ago.  Rheumatology ordered CT lung high resolution.  Has not been taking Crestor for some unclear reason; was taken off her med list since last visit.  She is currently working today.    Patient has hx of Type DM, HTN, osteoarthritis, stroke, EMILIA, CHF, COVID-19 in 2021 with worsening SOB since this infection (follows with pulmonology - Dr. Armenta in [place]. Patient has also had a hysterectomy, cholecystectomy, tonsillectomy, right and left hip ligament repair w/ persisting lower leg weakness even after surgery.   2018 CVA:  no residual deficits for speech and mild weakness right side.  Multiple joint pain with RA factor +, now established with rheumatology.      Specialists:   Pulmonary-referred to [facility]   Cardiology-signed off; stress test normal.  Normal lvef and perfusion on nuc with prn lasix with no further f/u recommended  Ortho-pt plans to f/u with surgeon; back pain/hips/feet.  Jasmina Zaki both hip surgery.  Burning toes started toes on right side.  GI-colonoscopy  Dentist-mouth sores/dentures  Sleep Medicine-[facility]  Rheumatology-seen as new patient:  differential includes rheumatoid arthritis, sarcoidosis, Sjogren's syndrome.              Review of Systems   Constitutional:  Positive for appetite change. Negative for chills and fever.   HENT: Negative.     Respiratory:  Positive for shortness of breath. Negative for cough.         Chronic unchanged.   Cardiovascular:  Positive for leg swelling. Negative for chest pain and palpitations.        Comes and goes   Gastrointestinal:  Negative for abdominal pain, diarrhea, nausea and

## 2024-07-25 NOTE — PROGRESS NOTES
Pt did not show for today's PT appointment. Upon chart review it appears that pt was seen in ED for progressive LE muscle spasms. It is unclear, at this time, if pt was admitted.

## 2024-07-26 LAB
ACE SERPL-CCNC: 33 U/L (ref 14–82)
CCP IGA+IGG SERPL IA-ACNC: 5 UNITS (ref 0–19)
CENTROMERE B AB SER-ACNC: <0.2 AI (ref 0–0.9)
CHROMATIN AB SERPL-ACNC: <0.2 AI (ref 0–0.9)
CRP SERPL-MCNC: 5 MG/L (ref 0–10)
DSDNA AB SER-ACNC: <1 IU/ML (ref 0–9)
ENA JO1 AB SER-ACNC: <0.2 AI (ref 0–0.9)
ENA RNP AB SER-ACNC: <0.2 AI (ref 0–0.9)
ENA SCL70 AB SER-ACNC: <0.2 AI (ref 0–0.9)
ENA SM AB SER-ACNC: <0.2 AI (ref 0–0.9)
ENA SS-A AB SER-ACNC: <0.2 AI (ref 0–0.9)
ENA SS-B AB SER-ACNC: <0.2 AI (ref 0–0.9)
Lab: NORMAL
M TB IFN-G BLD-IMP: NEGATIVE
M TB IFN-G CD4+ T-CELLS BLD-ACNC: 0.06 IU/ML
M TBIFN-G CD4+ CD8+T-CELLS BLD-ACNC: 0.04 IU/ML
QUANTIFERON CRITERIA: NORMAL
QUANTIFERON MITOGEN VALUE: >10 IU/ML
QUANTIFERON NIL VALUE: 0.05 IU/ML
RHEUMATOID FACT SERPL-ACNC: 78.4 IU/ML

## 2024-07-26 NOTE — TELEPHONE ENCOUNTER
Collaborated with collaborating physician Dr. Morales Pederson regarding assessment and plan of care; agrees.        Called patient with results of CT imaging and labs.  Will treat colitis found on CT with metronidazole; avoid alcohol/pt does not consume.  Call and f/u in the office in 10 days. Continue Levaquin.

## 2024-07-28 LAB
BACTERIA SPEC CULT: ABNORMAL
BACTERIA SPEC CULT: ABNORMAL
SERVICE CMNT-IMP: ABNORMAL

## 2024-07-29 ENCOUNTER — TREATMENT (OUTPATIENT)
Age: 54
End: 2024-07-29

## 2024-07-29 ENCOUNTER — TELEPHONE (OUTPATIENT)
Dept: FAMILY MEDICINE CLINIC | Facility: CLINIC | Age: 54
End: 2024-07-29

## 2024-07-29 DIAGNOSIS — K62.89 RECTAL PAIN: ICD-10-CM

## 2024-07-29 DIAGNOSIS — K52.9 COLITIS: Primary | ICD-10-CM

## 2024-07-29 DIAGNOSIS — R93.5 ABNORMAL COMPUTED TOMOGRAPHY OF ABDOMEN AND PELVIS: ICD-10-CM

## 2024-07-29 DIAGNOSIS — M25.552 BILATERAL HIP PAIN: Primary | ICD-10-CM

## 2024-07-29 DIAGNOSIS — K92.1 BLOOD IN STOOL: ICD-10-CM

## 2024-07-29 DIAGNOSIS — M70.61 GREATER TROCHANTERIC BURSITIS OF BOTH HIPS: ICD-10-CM

## 2024-07-29 DIAGNOSIS — M25.551 BILATERAL HIP PAIN: Primary | ICD-10-CM

## 2024-07-29 DIAGNOSIS — M70.62 GREATER TROCHANTERIC BURSITIS OF BOTH HIPS: ICD-10-CM

## 2024-07-29 LAB
ANA BY IFA: POSITIVE
Lab: ABNORMAL
SPECKLED PATTERN: ABNORMAL

## 2024-07-29 NOTE — RESULT ENCOUNTER NOTE
Urine culture grew e. Coli.  Continue the Levaquin until completion.  Is she feeling better?    Please let me know if patient has any further questions or concerns.

## 2024-07-29 NOTE — PROGRESS NOTES
[facility] ORTHOPAEDICS  [facility] [address]  Dept: [phone]     Physical Therapy Consult     Referring MD: Cinthya Lyons    Diagnosis:     ICD-10-CM    1. Bilateral hip pain  M25.551     M25.552       2. Greater trochanteric bursitis of both hips  M70.61     M70.62          Surgery: COLORECTAL CANCER SCREENING, NOT HIGH RISK/Polyp   DOS:  12/7/2023     PERTINENT MEDICAL HISTORY     Past medical and surgical history:   Past Medical History:   Diagnosis Date    Acute respiratory failure with hypoxia ([facility]) 06/24/2020    Arthritis     Avascular necrosis of bone of hip ([facility]) 01/30/2017    CHF (congestive heart failure) ([facility])     65168 Echo LVEF 55-65%    COVID-19 virus infection 06/24/2020    Diabetes ([facility])     Diarrhea of presumed infectious origin 06/26/2020    H/O echocardiogram 07/2021    LVEF 55-65%    Hypertension     Obesity     EMILIA on CPAP     Respiratory failure ([facility]) 06/24/2020    Stroke ([facility]) 03/2018    no residual    Type 2 diabetes mellitus without complication ([facility]) 6/2020      Past Surgical History:   Procedure Laterality Date    CARDIAC VALVE REPLACEMENT      pt denies    CHOLECYSTECTOMY  2008    COLONOSCOPY N/A 12/07/2023    COLORECTAL CANCER SCREENING, NOT HIGH RISK/Polyp performed by Rodo Garcias MD at [facility] ENDOSCOPY    HYSTERECTOMY (CERVIX STATUS UNKNOWN)  2007    HYSTERECTOMY, TOTAL ABDOMINAL (CERVIX REMOVED)      ORTHOPEDIC SURGERY  2015    left hip ligament repair    ORTHOPEDIC SURGERY  2017    Right hip ligament repair    TONSILLECTOMY  1999    TUBAL LIGATION  11/26/1989     Medications: reviewed in chart   Allergies:   Allergies   Allergen Reactions    Metformin Rash and Angioedema    Penicillins Anaphylaxis and Rash     Throat swelling and thick tongue.        SUBJECTIVE     Chief complaints/history of injury: Patient is a 54 y.o. female with a PMH as noted above.  She presents to PT with c/o bilateral hip pain and other abdominal painful discomfort.

## 2024-07-30 LAB — 14-3-3 ETA AG SER IA-MCNC: 0.85 NG/ML

## 2024-08-01 ENCOUNTER — CLINICAL DOCUMENTATION (OUTPATIENT)
Age: 54
End: 2024-08-01

## 2024-08-01 NOTE — PROGRESS NOTES
Pt did not show for today's PT appointment. She has attended 2/4 scheduled PT appointments, one of which she was medically unable to participate. If pt calls to schedule please confirm that she is medically well enough to participate in PT consistently in order to maximize her benefits from therapy. If not we should wait until such time as she feels capable and then I would recommend scheduling one appointment at a time.

## 2024-08-02 ENCOUNTER — HOSPITAL ENCOUNTER (OUTPATIENT)
Dept: CT IMAGING | Age: 54
Discharge: HOME | End: 2024-08-02
Attending: INTERNAL MEDICINE
Payer: COMMERCIAL

## 2024-08-02 DIAGNOSIS — R76.8 RHEUMATOID FACTOR POSITIVE: ICD-10-CM

## 2024-08-02 DIAGNOSIS — R06.02 SHORTNESS OF BREATH ON EXERTION: ICD-10-CM

## 2024-08-02 PROCEDURE — 71250 CT THORAX DX C-: CPT

## 2024-08-05 ENCOUNTER — HOSPITAL ENCOUNTER (OUTPATIENT)
Dept: GENERAL RADIOLOGY | Age: 54
Discharge: HOME | End: 2024-08-08
Payer: COMMERCIAL

## 2024-08-05 ENCOUNTER — OFFICE VISIT (OUTPATIENT)
Dept: RHEUMATOLOGY | Age: 54
End: 2024-08-05
Payer: COMMERCIAL

## 2024-08-05 ENCOUNTER — COMMUNITY OUTREACH (OUTPATIENT)
Dept: FAMILY MEDICINE CLINIC | Facility: CLINIC | Age: 54
End: 2024-08-05

## 2024-08-05 VITALS
SYSTOLIC BLOOD PRESSURE: 118 MMHG | WEIGHT: 290 LBS | RESPIRATION RATE: 20 BRPM | DIASTOLIC BLOOD PRESSURE: 72 MMHG | BODY MASS INDEX: 45.52 KG/M2 | HEIGHT: 67 IN | HEART RATE: 68 BPM

## 2024-08-05 DIAGNOSIS — Z78.0 MENOPAUSE: ICD-10-CM

## 2024-08-05 DIAGNOSIS — K62.5 COLITIS WITH RECTAL BLEEDING: ICD-10-CM

## 2024-08-05 DIAGNOSIS — M79.89 INTERMITTENT PAIN AND SWELLING OF HAND: ICD-10-CM

## 2024-08-05 DIAGNOSIS — K52.9 COLITIS WITH RECTAL BLEEDING: ICD-10-CM

## 2024-08-05 DIAGNOSIS — R76.8 RHEUMATOID FACTOR POSITIVE: ICD-10-CM

## 2024-08-05 DIAGNOSIS — R76.9 ABNORMAL IMMUNOLOGICAL FINDING IN SERUM: ICD-10-CM

## 2024-08-05 DIAGNOSIS — E55.9 VITAMIN D DEFICIENCY: ICD-10-CM

## 2024-08-05 DIAGNOSIS — E83.52 HYPERCALCEMIA: ICD-10-CM

## 2024-08-05 DIAGNOSIS — M79.643 INTERMITTENT PAIN AND SWELLING OF HAND: ICD-10-CM

## 2024-08-05 DIAGNOSIS — R76.8 RHEUMATOID FACTOR POSITIVE: Primary | ICD-10-CM

## 2024-08-05 LAB
CALCIUM SERPL-MCNC: 9.6 MG/DL (ref 8.8–10.2)
PTH-INTACT SERPL-MCNC: 35.5 PG/ML (ref 15–65)

## 2024-08-05 PROCEDURE — 73630 X-RAY EXAM OF FOOT: CPT

## 2024-08-05 PROCEDURE — 73130 X-RAY EXAM OF HAND: CPT

## 2024-08-05 PROCEDURE — 99215 OFFICE O/P EST HI 40 MIN: CPT | Performed by: INTERNAL MEDICINE

## 2024-08-05 PROCEDURE — 3074F SYST BP LT 130 MM HG: CPT | Performed by: INTERNAL MEDICINE

## 2024-08-05 PROCEDURE — 3078F DIAST BP <80 MM HG: CPT | Performed by: INTERNAL MEDICINE

## 2024-08-05 ASSESSMENT — ROUTINE ASSESSMENT OF PATIENT INDEX DATA (RAPID3)
WHEN YOU AWAKENED IN THE MORNING OVER THE LAST WEEK, PLEASE INDICATE THE AMOUNT OF TIME IT TAKES UNTIL YOU ARE AS LIMBER AS YOU WILL BE FOR THE DAY: 15 MIN
ON A SCALE OF ONE TO TEN, HOW MUCH PAIN HAVE YOU HAD BECAUSE OF YOUR CONDITION OVER THE PAST WEEK?: 5
ON A SCALE OF ONE TO TEN, CONSIDERING ALL THE WAYS IN WHICH ILLNESS AND HEALTH CONDITIONS MAY AFFECT YOU AT THIS TIME, PLEASE INDICATE BELOW HOW YOU ARE DOING:: 7
ON A SCALE OF ONE TO TEN, HOW MUCH OF A PROBLEM HAS UNUSUAL FATIGUE OR TIREDNESS BEEN FOR YOU OVER THE PAST WEEK?: 8
ON A SCALE OF ONE TO TEN, HOW DIFFICULT WAS IT FOR YOU TO COMPLETE THE LISTED DAILY PHYSICAL TASKS OVER THE LAST WEEK: 1.5

## 2024-08-05 NOTE — PATIENT INSTRUCTIONS
of children, never share your medicines with others, and use this medication only for the indication prescribed.   Every effort has been made to ensure that the information provided by [employer] is accurate, up-to-date, and complete, but no guarantee is made to that effect. Drug information contained herein may be time sensitive. [employer] information has been compiled for use by healthcare practitioners and consumers in the [place] and therefore [employer] does not warrant that uses outside of the [place] are appropriate, unless specifically indicated otherwise. [employer] drug information does not endorse drugs, diagnose patients or recommend therapy. [employer] drug information is an informational resource designed to assist licensed healthcare practitioners in caring for their patients and/or to serve consumers viewing this service as a supplement to, and not a substitute for, the expertise, skill, knowledge and judgment of healthcare practitioners. The absence of a warning for a given drug or drug combination in no way should be construed to indicate that the drug or drug combination is safe, effective or appropriate for any given patient. [employer] does not assume any responsibility for any aspect of healthcare administered with the aid of information [employer] provides. The information contained herein is not intended to cover all possible uses, directions, precautions, warnings, drug interactions, allergic reactions, or adverse effects. If you have questions about the drugs you are taking, check with your doctor, nurse or pharmacist.  Copyright 1557-8742 [employer] Version: 16.01. Revision date: 7/28/2020.  Care instructions adapted under license by [employer]. If you have questions about a medical condition or this instruction, always ask your healthcare professional. [employer], [employer] disclaims any warranty or liability for your use of this information.

## 2024-08-06 ENCOUNTER — CLINICAL DOCUMENTATION (OUTPATIENT)
Dept: RHEUMATOLOGY | Age: 54
End: 2024-08-06

## 2024-08-06 NOTE — PROGRESS NOTES
Recd medical records eye exam dr nani gald[facility]z [facility]  Dos 11/30/23.  Placed in review file.

## 2024-08-07 RX ORDER — HYDROXYCHLOROQUINE SULFATE 200 MG/1
200 TABLET, FILM COATED ORAL 2 TIMES DAILY
Qty: 180 TABLET | Refills: 1 | Status: SHIPPED | OUTPATIENT
Start: 2024-08-07

## 2024-08-07 NOTE — PROGRESS NOTES
Ophthalmology note reviewed 11-: Close denies any implants.  Tear film osmolarity performed.  Findings of blepharitis.  Will be scanned into media  
methotrexate.  Would plan for bron with biopsy to help confirm diagnosis of sarcoid prior to initiating methotrexate however.    Advised patient to start 1 tab for 2 weeks, then increase to 1 tab twice daily to reach goal dose. Referral to ophthalmology for screening.  Side effects including dizziness, headaches, GI intolerance, retinal toxicity, skin rash discussed with patient.    Discussed methotrexate dosage, usage, goals of therapy, and potential side effects/risks of treatment (Including but not limited to stomatitis, hair loss, nausea/vomitting, rash, increased risk of infection, liver damage, cytopenias, pneumonitis, and infertility).   Patient was advised not to get pregnant while on methotrexate.   Patient was advised to not drink ETOH while on methotrexate.   Patient was advised to hold the methotrexate during any infections/antibiotics   The potential SE of methotrexate were discussed and understood and the patient agrees to proceed with treatment.    Differential includes rheumatoid arthritis, sarcoidosis, Sjogren's syndrome.  Request ophthalmology notes again-not available    Hypercalcemia  Vitamin D deficiency  Check PTH today.  Increase vitamin D to 5000 units daily.  Consider endocrine referral.  Pending CT chest  -     PTH, Intact; Future    Menopause  -     DEXA BONE DENSITY AXIAL SKELETON; Future        Colitis with rectal bleeding  Noted.  Encourage patient to establish with GI and she plans to contact them.    40 minutes in total with greater than 50% in face-to-face time  Follow-up based on medications started      Brenda Valencia MD  [facility]  [address] [place] [id number] [phone]

## 2024-08-20 ENCOUNTER — OFFICE VISIT (OUTPATIENT)
Dept: PULMONOLOGY | Age: 54
End: 2024-08-20
Payer: COMMERCIAL

## 2024-08-20 VITALS
RESPIRATION RATE: 14 BRPM | HEIGHT: 67 IN | WEIGHT: 288 LBS | HEART RATE: 63 BPM | DIASTOLIC BLOOD PRESSURE: 82 MMHG | OXYGEN SATURATION: 99 % | SYSTOLIC BLOOD PRESSURE: 116 MMHG | BODY MASS INDEX: 45.2 KG/M2

## 2024-08-20 DIAGNOSIS — G47.33 OSA (OBSTRUCTIVE SLEEP APNEA): ICD-10-CM

## 2024-08-20 DIAGNOSIS — R06.02 SOB (SHORTNESS OF BREATH): Primary | ICD-10-CM

## 2024-08-20 DIAGNOSIS — E66.01 MORBID OBESITY (HCC): ICD-10-CM

## 2024-08-20 LAB
EXPIRATORY TIME: NORMAL
FEF 25-75% %PRED-PRE: NORMAL
FEF 25-75% PRED: NORMAL
FEF 25-75-PRE: NORMAL
FEV1 %PRED-PRE: 88 %
FEV1 PRED: 2.46 L
FEV1/FVC %PRED-PRE: NORMAL
FEV1/FVC PRED: NORMAL
FEV1/FVC: 81 %
FEV1: 2.16 L
FVC %PRED-PRE: 86 %
FVC PRED: 3.11 L
FVC: 2.68 L
PEF %PRED-PRE: NORMAL
PEF PRED: NORMAL
PEF-PRE: NORMAL

## 2024-08-20 PROCEDURE — 94010 BREATHING CAPACITY TEST: CPT | Performed by: INTERNAL MEDICINE

## 2024-08-20 PROCEDURE — 3074F SYST BP LT 130 MM HG: CPT | Performed by: INTERNAL MEDICINE

## 2024-08-20 PROCEDURE — 3079F DIAST BP 80-89 MM HG: CPT | Performed by: INTERNAL MEDICINE

## 2024-08-20 PROCEDURE — 99204 OFFICE O/P NEW MOD 45 MIN: CPT | Performed by: INTERNAL MEDICINE

## 2024-08-20 ASSESSMENT — ENCOUNTER SYMPTOMS: SHORTNESS OF BREATH: 1

## 2024-08-20 ASSESSMENT — PULMONARY FUNCTION TESTS
FVC_PREDICTED: 3.11
FEV1_PREDICTED: 2.46
FEV1: 2.16
FEV1_PERCENT_PREDICTED_PRE: 88
FVC_PERCENT_PREDICTED_PRE: 86
FVC: 2.68
FEV1/FVC: 81

## 2024-08-20 NOTE — PROGRESS NOTES
[address]  [place] [address]  [phone]    Patient Name:  Tricia Sellers  YOB: 1970      Office Visit 8/20/2024    CHIEF COMPLAINT:    Chief Complaint   Patient presents with    New Patient    Shortness of Breath       HISTORY OF PRESENT ILLNESS:      Patient is 54 years old female who is here today in referral of Kellen Aguilera nurse practitioner for the evaluation of shortness of breath.  Patient has noted history of lung disease.  She has never smoked she did have COVID in June 2020.  She reports she was in the CCU for 7 days however she has never been on BiPAP only on Airvo.  She did not go home on oxygen.  She reports since then she has been having shortness of breath which she feels is getting worse.  She does have shortness of breath with minimal exertion just walking from room to room or going from her house to the parking lot or just coming from the parking lot here to the office.  Even at work when she was in [place] she has to stop a lot and catch her breath.  She denies of cough very minimal.  She denies wheezing.  She has never smoked.  She never had any lung problems.  She does have sleep apnea she is on CPAP she has been on CPAP for over 10 years however she reports she has not had her sleep study in a long time.  She has significant weight gain since then and she feels her CPAP is not working as she used to.  She has not seen her sleep doctor Dr. Armenta in [place] for a few years now.  -She also reports significant weight gain over the last 10 years she says in her 40s she used to weigh 150 and she is to 280 now.  She is now trying to lose weight and she already lost few pounds.    Past Medical History:   Diagnosis Date    Acute respiratory failure with hypoxia ([facility]) 06/24/2020    Arthritis     Avascular necrosis of bone of hip ([facility]) 01/30/2017    CHF (congestive heart failure) ([facility])     [address]886 Echo LVEF 55-65%    COVID-19 virus infection 06/24/2020

## 2024-08-23 ENCOUNTER — TELEPHONE (OUTPATIENT)
Dept: ORTHOPEDIC SURGERY | Age: 54
End: 2024-08-23

## 2024-08-27 ENCOUNTER — OFFICE VISIT (OUTPATIENT)
Dept: ORTHOPEDIC SURGERY | Age: 54
End: 2024-08-27
Payer: COMMERCIAL

## 2024-08-27 DIAGNOSIS — M25.551 RIGHT HIP PAIN: Primary | ICD-10-CM

## 2024-08-27 PROCEDURE — 99214 OFFICE O/P EST MOD 30 MIN: CPT | Performed by: PHYSICIAN ASSISTANT

## 2024-08-27 RX ORDER — NAPROXEN 500 MG/1
500 TABLET ORAL 2 TIMES DAILY WITH MEALS
Qty: 60 TABLET | Refills: 1 | Status: SHIPPED | OUTPATIENT
Start: 2024-08-27

## 2024-08-27 RX ORDER — METHYLPREDNISOLONE 4 MG
TABLET, DOSE PACK ORAL
Qty: 1 KIT | Refills: 0 | Status: SHIPPED | OUTPATIENT
Start: 2024-08-27 | End: 2024-08-27 | Stop reason: ALTCHOICE

## 2024-08-27 NOTE — PROGRESS NOTES
Name: Tricia Sellers  YOB: 1970  Gender: female  MRN: [id number]    CC: Hip Pain (R)     HPI: Tricia Selelrs is a 54 y.o. female who returns for follow up on the right hip.  The previous injections to both hips was beneficial.  However last week she began having severe pain on the right side.  She does not recall any specific injury, her pain is to the anterior/lateral hip area.  She states she struggles putting weight on this right hip at this time.  She notes pain from the side down into her groin.  She denies pain that goes down the back of her leg.  She does note some pain that goes down the anterior femur.  She is here today for evaluation of right hip pain.  She states the left hip is not bothering her at this time.    She states she has had a surgery with Dr. Martinez on this right hand where he required her labrum.  I was not able to find this in the chart today.    Physical Examination:  General: no acute distress  Lungs: breathing easily  CV: regular rhythm by pulse  Right Hip:     She can form a straight leg raise but has a lot of pain with doing so.  She has some pain with logroll.  Increased pain with MAURILIO and FADIR today.  Tender to palpate over the hip flexors.  She is limited in her hip range of motion.  Calf is soft nontender palpation.  Sensation intact distally.  Dorsalis pedis pulse 2+.    Imaging:    Hip/pelvis XR: 2 views     Clinical Indication  1. Right hip pain           Report: AP pelvis and frog lateral x-ray of the Right hip demonstrates arthritic changes present.  She does have KOREY present with a cam lesion and a pincer lesion -we did not get an KOREY series on her.  She has about 2 to 3 mm of joint space present.     Impression: Arthritic changes and KOREY as above, no acute findings            Assessment:     ICD-10-CM    1. Right hip pain  M25.551 XR HIP 2-3 VW W PELVIS RIGHT          Plan:     Discussed with Ms. Sellers today that she does have arthritic changes in this

## 2024-08-28 ENCOUNTER — OFFICE VISIT (OUTPATIENT)
Dept: FAMILY MEDICINE CLINIC | Facility: CLINIC | Age: 54
End: 2024-08-28
Payer: COMMERCIAL

## 2024-08-28 ENCOUNTER — TELEPHONE (OUTPATIENT)
Age: 54
End: 2024-08-28

## 2024-08-28 VITALS
OXYGEN SATURATION: 96 % | HEART RATE: 68 BPM | SYSTOLIC BLOOD PRESSURE: 110 MMHG | BODY MASS INDEX: 45.45 KG/M2 | DIASTOLIC BLOOD PRESSURE: 64 MMHG | WEIGHT: 290.2 LBS

## 2024-08-28 DIAGNOSIS — I10 PRIMARY HYPERTENSION: ICD-10-CM

## 2024-08-28 DIAGNOSIS — Z86.73 HISTORY OF STROKE: ICD-10-CM

## 2024-08-28 DIAGNOSIS — E11.40 TYPE 2 DIABETES MELLITUS WITH DIABETIC NEUROPATHY, WITHOUT LONG-TERM CURRENT USE OF INSULIN (HCC): Primary | ICD-10-CM

## 2024-08-28 DIAGNOSIS — M79.89 LEG SWELLING: ICD-10-CM

## 2024-08-28 DIAGNOSIS — R06.2 WHEEZING: ICD-10-CM

## 2024-08-28 DIAGNOSIS — E11.9 TYPE 2 DIABETES MELLITUS WITHOUT COMPLICATION, WITHOUT LONG-TERM CURRENT USE OF INSULIN (HCC): ICD-10-CM

## 2024-08-28 DIAGNOSIS — Z23 NEED FOR PNEUMOCOCCAL VACCINE: ICD-10-CM

## 2024-08-28 DIAGNOSIS — Z79.899 ON POTASSIUM WASTING DIURETIC THERAPY: ICD-10-CM

## 2024-08-28 LAB — HBA1C MFR BLD: 6.3 %

## 2024-08-28 PROCEDURE — 90677 PCV20 VACCINE IM: CPT | Performed by: NURSE PRACTITIONER

## 2024-08-28 PROCEDURE — 83036 HEMOGLOBIN GLYCOSYLATED A1C: CPT | Performed by: NURSE PRACTITIONER

## 2024-08-28 PROCEDURE — 3078F DIAST BP <80 MM HG: CPT | Performed by: NURSE PRACTITIONER

## 2024-08-28 PROCEDURE — 3074F SYST BP LT 130 MM HG: CPT | Performed by: NURSE PRACTITIONER

## 2024-08-28 PROCEDURE — 99214 OFFICE O/P EST MOD 30 MIN: CPT | Performed by: NURSE PRACTITIONER

## 2024-08-28 PROCEDURE — 90471 IMMUNIZATION ADMIN: CPT | Performed by: NURSE PRACTITIONER

## 2024-08-28 RX ORDER — POTASSIUM CHLORIDE 750 MG/1
10 TABLET, EXTENDED RELEASE ORAL DAILY
Qty: 90 TABLET | Refills: 2 | Status: SHIPPED | OUTPATIENT
Start: 2024-08-28

## 2024-08-28 RX ORDER — ASPIRIN 81 MG/1
81 TABLET ORAL DAILY
Qty: 90 TABLET | Refills: 2 | Status: SHIPPED | OUTPATIENT
Start: 2024-08-28

## 2024-08-28 RX ORDER — ALBUTEROL SULFATE 90 UG/1
2 AEROSOL, METERED RESPIRATORY (INHALATION) 4 TIMES DAILY PRN
Qty: 18 G | Refills: 1 | Status: SHIPPED | OUTPATIENT
Start: 2024-08-28

## 2024-08-28 RX ORDER — HYDROCHLOROTHIAZIDE 12.5 MG/1
12.5 CAPSULE ORAL EVERY MORNING
Qty: 90 CAPSULE | Refills: 2 | Status: SHIPPED | OUTPATIENT
Start: 2024-08-28

## 2024-08-28 RX ORDER — DULAGLUTIDE 0.75 MG/.5ML
INJECTION, SOLUTION SUBCUTANEOUS
Qty: 2 ML | Refills: 8 | Status: SHIPPED | OUTPATIENT
Start: 2024-08-28

## 2024-08-28 ASSESSMENT — ENCOUNTER SYMPTOMS
SHORTNESS OF BREATH: 1
COUGH: 1

## 2024-08-28 NOTE — PROGRESS NOTES
Tricia Sellers is a 54 y.o. female who presents today for the following:  Chief Complaint   Patient presents with    Follow-up     Follow up, pt has a \"boil\" on groin that she believes burst, causing pain. Has been there for a week but began bleeding this morning. Pt is out of work due to hip issues who she sees ortho for, might need a hip replacement. Pt reports muscle spasms have gotten better        Allergies   Allergen Reactions    Metformin Rash and Angioedema    Penicillins Anaphylaxis and Rash     Throat swelling and thick tongue.       Current Outpatient Medications   Medication Sig Dispense Refill    dulaglutide (TRULICITY) 0.75 MG/0.5ML SOPN SC injection ADMINISTER 0.75 MG UNDER THE SKIN 1 TIME A WEEK 2 mL 8    albuterol sulfate HFA (VENTOLIN HFA) 108 (90 Base) MCG/ACT inhaler Inhale 2 puffs into the lungs 4 times daily as needed for Wheezing 18 g 1    hydroCHLOROthiazide 12.5 MG capsule Take 1 capsule by mouth every morning 90 capsule 2    potassium chloride (KLOR-CON M) 10 MEQ extended release tablet Take 1 tablet by mouth daily 90 tablet 2    aspirin 81 MG EC tablet Take 1 tablet by mouth daily 90 tablet 2    naproxen (NAPROSYN) 500 MG tablet Take 1 tablet by mouth 2 times daily (with meals) 60 tablet 1    hydroxychloroquine (PLAQUENIL) 200 MG tablet Take 1 tablet by mouth 2 times daily 180 tablet 1    vitamin D (ERGOCALCIFEROL) 1.25 MG (41599 UT) CAPS capsule Take 1 capsule by mouth once a week 12 capsule 0    Misc. Devices (CPAP MACHINE) MISC Use as Instructed       No current facility-administered medications for this visit.       Past Medical History:   Diagnosis Date    Acute respiratory failure with hypoxia ([facility]) 06/24/2020    Arthritis     Avascular necrosis of bone of hip ([facility]) 01/30/2017    CHF (congestive heart failure) ([facility])     42659 Echo LVEF 55-65%    COVID-19 virus infection 06/24/2020    Diabetes ([facility])     Diarrhea of presumed infectious origin 06/26/2020    H/O echocardiogram  third, and thumb        /64   Pulse 68   Wt 131.6 kg (290 lb 3.2 oz)   SpO2 96%   BMI 45.45 kg/m²     Physical Exam  Constitutional:       General: She is not in acute distress.     Appearance: Normal appearance. She is obese. She is not ill-appearing.   HENT:      Head: Normocephalic and atraumatic.      Right Ear: External ear normal.      Left Ear: External ear normal.   Eyes:      Extraocular Movements: Extraocular movements intact.      Conjunctiva/sclera: Conjunctivae normal.      Pupils: Pupils are equal, round, and reactive to light.   Cardiovascular:      Rate and Rhythm: Normal rate and regular rhythm.      Pulses: Normal pulses.      Heart sounds: Normal heart sounds.   Pulmonary:      Effort: Pulmonary effort is normal.      Breath sounds: Normal breath sounds.   Abdominal:      General: Bowel sounds are normal. There is no distension.      Palpations: Abdomen is soft.      Tenderness: There is no abdominal tenderness.   Musculoskeletal:         General: Normal range of motion.      Cervical back: Normal range of motion and neck supple.      Right lower leg: No edema.      Left lower leg: No edema.   Skin:     General: Skin is warm and dry.      Coloration: Skin is not jaundiced or pale.   Neurological:      General: No focal deficit present.      Mental Status: She is alert and oriented to person, place, and time.   Psychiatric:         Mood and Affect: Mood normal.         Behavior: Behavior normal.         Thought Content: Thought content normal.         Judgment: Judgment normal.          1. Type 2 diabetes mellitus with diabetic neuropathy, without long-term current use of insulin ([facility])  -     AMB POC HEMOGLOBIN A1C  2. Type 2 diabetes mellitus without complication, without long-term current use of insulin ([facility])  -     dulaglutide (TRULICITY) 0.75 MG/0.5ML SOPN SC injection; ADMINISTER 0.75 MG UNDER THE SKIN 1 TIME A WEEK, Disp-2 mL, R-8Refills added.Normal  3. Need for pneumococcal

## 2024-08-29 ENCOUNTER — OFFICE VISIT (OUTPATIENT)
Dept: ORTHOPEDIC SURGERY | Age: 54
End: 2024-08-29
Payer: COMMERCIAL

## 2024-08-29 DIAGNOSIS — M70.61 GREATER TROCHANTERIC BURSITIS OF BOTH HIPS: ICD-10-CM

## 2024-08-29 DIAGNOSIS — M70.62 GREATER TROCHANTERIC BURSITIS OF BOTH HIPS: ICD-10-CM

## 2024-08-29 DIAGNOSIS — M25.551 RIGHT HIP PAIN: Primary | ICD-10-CM

## 2024-08-29 DIAGNOSIS — M16.11 PRIMARY OSTEOARTHRITIS OF RIGHT HIP: ICD-10-CM

## 2024-08-29 PROCEDURE — 20610 DRAIN/INJ JOINT/BURSA W/O US: CPT | Performed by: ORTHOPAEDIC SURGERY

## 2024-08-29 RX ORDER — METHYLPREDNISOLONE ACETATE 40 MG/ML
40 INJECTION, SUSPENSION INTRA-ARTICULAR; INTRALESIONAL; INTRAMUSCULAR; SOFT TISSUE ONCE
Status: SHIPPED | OUTPATIENT
Start: 2024-08-29

## 2024-08-29 RX ORDER — TRIAMCINOLONE ACETONIDE 40 MG/ML
40 INJECTION, SUSPENSION INTRA-ARTICULAR; INTRAMUSCULAR ONCE
Status: COMPLETED | OUTPATIENT
Start: 2024-08-29 | End: 2024-08-29

## 2024-08-29 RX ADMIN — TRIAMCINOLONE ACETONIDE 40 MG: 40 INJECTION, SUSPENSION INTRA-ARTICULAR; INTRAMUSCULAR at 13:49

## 2024-08-29 NOTE — PROGRESS NOTES
Name: Tricia Sellers  YOB: 1970  Gender: female  MRN: [id number]      CC: Follow-up (Right hip injection )       HPI: Tricia Sellers is a 54 y.o. female with pain in the right hip.  She was referred from Cinthya Lyons for an intra-articular injection to the right hip.         Physical Examination:  General: no acute distress  Lungs: breathing easily  CV: regular rhythm by pulse  Right Hip: Pain with logroll internal/external rotation of the right hip pain with [place]        Assessment:     ICD-10-CM    1. Right hip pain  M25.551 methylPREDNISolone acetate (DEPO-MEDROL) injection 40 mg     DRAIN/INJECT LARGE JOINT/BURSA     triamcinolone acetonide (KENALOG-40) injection 40 mg      2. Greater trochanteric bursitis of both hips  M70.61 methylPREDNISolone acetate (DEPO-MEDROL) injection 40 mg    M70.62 DRAIN/INJECT LARGE JOINT/BURSA      3. Primary osteoarthritis of right hip  M16.11 methylPREDNISolone acetate (DEPO-MEDROL) injection 40 mg     DRAIN/INJECT LARGE JOINT/BURSA     triamcinolone acetonide (KENALOG-40) injection 40 mg          Plan:   Before beginning this procedure, the procedure was explained in detail. The patient's name and  were confirmed. The body part and laterality were identified and agreed upon by the patient and myself.  After verbal informed consent the Right hip was injected intra-articularly under ultrasound guidance with the curvilinear probe n the longitudinal axis from an anterolateral approach.  3 cc of 1% lidocaine was used in the skin down to the capsule until the capsule was seen to clearly distend with the needle in the femoral head neck junction off the articular surface.  We then injected 4 cc of 0.25% Marcaine and 1 cc of 40 mg Kenalog intra-articularly the capsule was seen to clearly distend.  The images were saved to the ultrasound machine.  The patient tolerated the procedure well and was given postinjection flare precautions.             Moi GUZMAN

## 2024-08-30 ENCOUNTER — NURSE ONLY (OUTPATIENT)
Dept: PULMONOLOGY | Age: 54
End: 2024-08-30
Payer: COMMERCIAL

## 2024-08-30 DIAGNOSIS — R06.02 SOB (SHORTNESS OF BREATH): Primary | ICD-10-CM

## 2024-08-30 LAB
FEV 1 , POC: 2.52 L
FEV1 % PRED, POC: 90 %
FEV1/FVC, POC: NORMAL
FVC % PRED, POC: 78 %
FVC, POC: NORMAL

## 2024-08-30 PROCEDURE — 94729 DIFFUSING CAPACITY: CPT | Performed by: INTERNAL MEDICINE

## 2024-08-30 PROCEDURE — 94726 PLETHYSMOGRAPHY LUNG VOLUMES: CPT | Performed by: INTERNAL MEDICINE

## 2024-08-30 PROCEDURE — 94060 EVALUATION OF WHEEZING: CPT | Performed by: INTERNAL MEDICINE

## 2024-08-30 ASSESSMENT — PULMONARY FUNCTION TESTS
FVC_PERCENT_PREDICTED_POC: 78
FEV1_PERCENT_PREDICTED_POC: 90

## 2024-09-03 ENCOUNTER — TELEPHONE (OUTPATIENT)
Dept: CARDIAC REHAB | Age: 54
End: 2024-09-03

## 2024-09-12 ENCOUNTER — HOSPITAL ENCOUNTER (INPATIENT)
Age: 54
LOS: 7 days | Discharge: HOME | DRG: 603 | End: 2024-09-19
Attending: GENERAL PRACTICE | Admitting: FAMILY MEDICINE
Payer: COMMERCIAL

## 2024-09-12 ENCOUNTER — APPOINTMENT (OUTPATIENT)
Dept: MRI IMAGING | Age: 54
DRG: 603 | End: 2024-09-12
Payer: COMMERCIAL

## 2024-09-12 ENCOUNTER — APPOINTMENT (OUTPATIENT)
Dept: CT IMAGING | Age: 54
DRG: 603 | End: 2024-09-12
Payer: COMMERCIAL

## 2024-09-12 ENCOUNTER — TELEPHONE (OUTPATIENT)
Dept: FAMILY MEDICINE CLINIC | Facility: CLINIC | Age: 54
End: 2024-09-12

## 2024-09-12 DIAGNOSIS — I89.8 CALCIFIED LYMPH NODES: ICD-10-CM

## 2024-09-12 DIAGNOSIS — H53.2 DIPLOPIA: ICD-10-CM

## 2024-09-12 DIAGNOSIS — L03.211 FACIAL CELLULITIS: Primary | ICD-10-CM

## 2024-09-12 LAB
ALBUMIN SERPL-MCNC: 4 G/DL (ref 3.5–5)
ALBUMIN/GLOB SERPL: 1.2 (ref 1–1.9)
ALP SERPL-CCNC: 113 U/L (ref 35–104)
ALT SERPL-CCNC: 33 U/L (ref 12–65)
ANION GAP SERPL CALC-SCNC: 11 MMOL/L (ref 9–18)
AST SERPL-CCNC: 21 U/L (ref 15–37)
BASOPHILS # BLD: 0.1 K/UL (ref 0–0.2)
BASOPHILS NFR BLD: 0 % (ref 0–2)
BILIRUB SERPL-MCNC: 0.2 MG/DL (ref 0–1.2)
BUN SERPL-MCNC: 15 MG/DL (ref 6–23)
CALCIUM SERPL-MCNC: 9.7 MG/DL (ref 8.8–10.2)
CHLORIDE SERPL-SCNC: 102 MMOL/L (ref 98–107)
CO2 SERPL-SCNC: 28 MMOL/L (ref 20–28)
CREAT SERPL-MCNC: 0.8 MG/DL (ref 0.6–1.1)
DIFFERENTIAL METHOD BLD: ABNORMAL
EOSINOPHIL # BLD: 0.2 K/UL (ref 0–0.8)
EOSINOPHIL NFR BLD: 1 % (ref 0.5–7.8)
ERYTHROCYTE [DISTWIDTH] IN BLOOD BY AUTOMATED COUNT: 16 % (ref 11.9–14.6)
GLOBULIN SER CALC-MCNC: 3.3 G/DL (ref 2.3–3.5)
GLUCOSE BLD STRIP.AUTO-MCNC: 162 MG/DL (ref 65–100)
GLUCOSE SERPL-MCNC: 84 MG/DL (ref 70–99)
HCT VFR BLD AUTO: 44.4 % (ref 35.8–46.3)
HGB BLD-MCNC: 13.7 G/DL (ref 11.7–15.4)
IMM GRANULOCYTES # BLD AUTO: 0.1 K/UL (ref 0–0.5)
IMM GRANULOCYTES NFR BLD AUTO: 1 % (ref 0–5)
LYMPHOCYTES # BLD: 2.8 K/UL (ref 0.5–4.6)
LYMPHOCYTES NFR BLD: 20 % (ref 13–44)
MCH RBC QN AUTO: 25.8 PG (ref 26.1–32.9)
MCHC RBC AUTO-ENTMCNC: 30.9 G/DL (ref 31.4–35)
MCV RBC AUTO: 83.6 FL (ref 82–102)
MONOCYTES # BLD: 0.8 K/UL (ref 0.1–1.3)
MONOCYTES NFR BLD: 6 % (ref 4–12)
NEUTS SEG # BLD: 9.9 K/UL (ref 1.7–8.2)
NEUTS SEG NFR BLD: 72 % (ref 43–78)
NRBC # BLD: 0 K/UL (ref 0–0.2)
PLATELET # BLD AUTO: 281 K/UL (ref 150–450)
PMV BLD AUTO: 10.9 FL (ref 9.4–12.3)
POTASSIUM SERPL-SCNC: 4.4 MMOL/L (ref 3.5–5.1)
PROT SERPL-MCNC: 7.4 G/DL (ref 6.3–8.2)
RBC # BLD AUTO: 5.31 M/UL (ref 4.05–5.2)
SERVICE CMNT-IMP: ABNORMAL
SODIUM SERPL-SCNC: 140 MMOL/L (ref 136–145)
WBC # BLD AUTO: 13.7 K/UL (ref 4.3–11.1)

## 2024-09-12 PROCEDURE — 6360000004 HC RX CONTRAST MEDICATION: Performed by: GENERAL PRACTICE

## 2024-09-12 PROCEDURE — 6370000000 HC RX 637 (ALT 250 FOR IP): Performed by: FAMILY MEDICINE

## 2024-09-12 PROCEDURE — 85025 COMPLETE CBC W/AUTO DIFF WBC: CPT

## 2024-09-12 PROCEDURE — 70553 MRI BRAIN STEM W/O & W/DYE: CPT

## 2024-09-12 PROCEDURE — 6360000004 HC RX CONTRAST MEDICATION: Performed by: FAMILY MEDICINE

## 2024-09-12 PROCEDURE — 1100000003 HC PRIVATE W/ TELEMETRY

## 2024-09-12 PROCEDURE — 6360000002 HC RX W HCPCS: Performed by: FAMILY MEDICINE

## 2024-09-12 PROCEDURE — 94760 N-INVAS EAR/PLS OXIMETRY 1: CPT

## 2024-09-12 PROCEDURE — 70487 CT MAXILLOFACIAL W/DYE: CPT

## 2024-09-12 PROCEDURE — 2580000003 HC RX 258: Performed by: FAMILY MEDICINE

## 2024-09-12 PROCEDURE — 94660 CPAP INITIATION&MGMT: CPT

## 2024-09-12 PROCEDURE — 80053 COMPREHEN METABOLIC PANEL: CPT

## 2024-09-12 PROCEDURE — 99285 EMERGENCY DEPT VISIT HI MDM: CPT

## 2024-09-12 PROCEDURE — A9579 GAD-BASE MR CONTRAST NOS,1ML: HCPCS | Performed by: FAMILY MEDICINE

## 2024-09-12 PROCEDURE — 2500000003 HC RX 250 WO HCPCS: Performed by: FAMILY MEDICINE

## 2024-09-12 PROCEDURE — 82962 GLUCOSE BLOOD TEST: CPT

## 2024-09-12 RX ORDER — ENOXAPARIN SODIUM 100 MG/ML
40 INJECTION SUBCUTANEOUS DAILY
Status: DISCONTINUED | OUTPATIENT
Start: 2024-09-12 | End: 2024-09-12 | Stop reason: SDUPTHER

## 2024-09-12 RX ORDER — ACETAMINOPHEN 325 MG/1
650 TABLET ORAL EVERY 6 HOURS PRN
Status: DISCONTINUED | OUTPATIENT
Start: 2024-09-12 | End: 2024-09-19 | Stop reason: HOSPADM

## 2024-09-12 RX ORDER — POTASSIUM CHLORIDE 750 MG/1
10 TABLET, EXTENDED RELEASE ORAL DAILY
Status: DISCONTINUED | OUTPATIENT
Start: 2024-09-13 | End: 2024-09-19 | Stop reason: HOSPADM

## 2024-09-12 RX ORDER — CARBOXYMETHYLCELLULOSE SODIUM 10 MG/ML
1 GEL OPHTHALMIC 4 TIMES DAILY
Status: DISCONTINUED | OUTPATIENT
Start: 2024-09-12 | End: 2024-09-19 | Stop reason: HOSPADM

## 2024-09-12 RX ORDER — ENOXAPARIN SODIUM 100 MG/ML
30 INJECTION SUBCUTANEOUS 2 TIMES DAILY
Status: DISCONTINUED | OUTPATIENT
Start: 2024-09-12 | End: 2024-09-19 | Stop reason: HOSPADM

## 2024-09-12 RX ORDER — POLYETHYLENE GLYCOL 3350 17 G/17G
17 POWDER, FOR SOLUTION ORAL DAILY PRN
Status: DISCONTINUED | OUTPATIENT
Start: 2024-09-12 | End: 2024-09-19 | Stop reason: HOSPADM

## 2024-09-12 RX ORDER — INSULIN LISPRO 100 [IU]/ML
0-4 INJECTION, SOLUTION INTRAVENOUS; SUBCUTANEOUS NIGHTLY
Status: DISCONTINUED | OUTPATIENT
Start: 2024-09-12 | End: 2024-09-19 | Stop reason: HOSPADM

## 2024-09-12 RX ORDER — ALBUTEROL SULFATE 0.83 MG/ML
2.5 SOLUTION RESPIRATORY (INHALATION) EVERY 6 HOURS PRN
Status: DISCONTINUED | OUTPATIENT
Start: 2024-09-12 | End: 2024-09-19 | Stop reason: HOSPADM

## 2024-09-12 RX ORDER — MANNITOL 20 G/100ML
0.5 INJECTION, SOLUTION INTRAVENOUS ONCE
Status: COMPLETED | OUTPATIENT
Start: 2024-09-12 | End: 2024-09-12

## 2024-09-12 RX ORDER — NAPROXEN 250 MG/1
500 TABLET ORAL 2 TIMES DAILY WITH MEALS
Status: DISCONTINUED | OUTPATIENT
Start: 2024-09-12 | End: 2024-09-19 | Stop reason: HOSPADM

## 2024-09-12 RX ORDER — ONDANSETRON 2 MG/ML
4 INJECTION INTRAMUSCULAR; INTRAVENOUS EVERY 6 HOURS PRN
Status: DISCONTINUED | OUTPATIENT
Start: 2024-09-12 | End: 2024-09-19 | Stop reason: HOSPADM

## 2024-09-12 RX ORDER — SODIUM CHLORIDE 9 MG/ML
INJECTION, SOLUTION INTRAVENOUS CONTINUOUS
Status: DISCONTINUED | OUTPATIENT
Start: 2024-09-12 | End: 2024-09-16 | Stop reason: SDUPTHER

## 2024-09-12 RX ORDER — HYDROXYCHLOROQUINE SULFATE 200 MG/1
200 TABLET, FILM COATED ORAL 2 TIMES DAILY
Status: DISCONTINUED | OUTPATIENT
Start: 2024-09-12 | End: 2024-09-19 | Stop reason: HOSPADM

## 2024-09-12 RX ORDER — ALBUTEROL SULFATE 90 UG/1
2 INHALANT RESPIRATORY (INHALATION) 4 TIMES DAILY PRN
Status: DISCONTINUED | OUTPATIENT
Start: 2024-09-12 | End: 2024-09-12 | Stop reason: CLARIF

## 2024-09-12 RX ORDER — ONDANSETRON 4 MG/1
4 TABLET, ORALLY DISINTEGRATING ORAL EVERY 8 HOURS PRN
Status: DISCONTINUED | OUTPATIENT
Start: 2024-09-12 | End: 2024-09-19 | Stop reason: HOSPADM

## 2024-09-12 RX ORDER — SODIUM CHLORIDE 0.9 % (FLUSH) 0.9 %
5-40 SYRINGE (ML) INJECTION EVERY 12 HOURS SCHEDULED
Status: DISCONTINUED | OUTPATIENT
Start: 2024-09-12 | End: 2024-09-19 | Stop reason: HOSPADM

## 2024-09-12 RX ORDER — IBUPROFEN 600 MG/1
1 TABLET ORAL PRN
Status: DISCONTINUED | OUTPATIENT
Start: 2024-09-12 | End: 2024-09-19 | Stop reason: HOSPADM

## 2024-09-12 RX ORDER — ASPIRIN 81 MG/1
324 TABLET, CHEWABLE ORAL ONCE
Status: COMPLETED | OUTPATIENT
Start: 2024-09-12 | End: 2024-09-12

## 2024-09-12 RX ORDER — IOPAMIDOL 755 MG/ML
100 INJECTION, SOLUTION INTRAVASCULAR
Status: COMPLETED | OUTPATIENT
Start: 2024-09-12 | End: 2024-09-12

## 2024-09-12 RX ORDER — HYDROCHLOROTHIAZIDE 12.5 MG/1
12.5 CAPSULE ORAL EVERY MORNING
Status: DISCONTINUED | OUTPATIENT
Start: 2024-09-13 | End: 2024-09-19 | Stop reason: HOSPADM

## 2024-09-12 RX ORDER — DEXTROSE MONOHYDRATE 100 MG/ML
INJECTION, SOLUTION INTRAVENOUS CONTINUOUS PRN
Status: DISCONTINUED | OUTPATIENT
Start: 2024-09-12 | End: 2024-09-19 | Stop reason: HOSPADM

## 2024-09-12 RX ORDER — ACETAMINOPHEN 650 MG/1
650 SUPPOSITORY RECTAL EVERY 6 HOURS PRN
Status: DISCONTINUED | OUTPATIENT
Start: 2024-09-12 | End: 2024-09-19 | Stop reason: HOSPADM

## 2024-09-12 RX ORDER — SODIUM CHLORIDE 9 MG/ML
INJECTION, SOLUTION INTRAVENOUS PRN
Status: DISCONTINUED | OUTPATIENT
Start: 2024-09-12 | End: 2024-09-19 | Stop reason: HOSPADM

## 2024-09-12 RX ORDER — INSULIN LISPRO 100 [IU]/ML
0-4 INJECTION, SOLUTION INTRAVENOUS; SUBCUTANEOUS
Status: DISCONTINUED | OUTPATIENT
Start: 2024-09-12 | End: 2024-09-19 | Stop reason: HOSPADM

## 2024-09-12 RX ORDER — HYDROCODONE BITARTRATE AND ACETAMINOPHEN 5; 325 MG/1; MG/1
1 TABLET ORAL EVERY 4 HOURS PRN
Status: DISCONTINUED | OUTPATIENT
Start: 2024-09-12 | End: 2024-09-14

## 2024-09-12 RX ORDER — SODIUM CHLORIDE 0.9 % (FLUSH) 0.9 %
5-40 SYRINGE (ML) INJECTION PRN
Status: DISCONTINUED | OUTPATIENT
Start: 2024-09-12 | End: 2024-09-19 | Stop reason: HOSPADM

## 2024-09-12 RX ORDER — ASPIRIN 81 MG/1
81 TABLET ORAL DAILY
Status: DISCONTINUED | OUTPATIENT
Start: 2024-09-13 | End: 2024-09-19 | Stop reason: HOSPADM

## 2024-09-12 RX ADMIN — NAPROXEN 500 MG: 250 TABLET ORAL at 18:48

## 2024-09-12 RX ADMIN — CARBOXYMETHYLCELLULOSE SODIUM 1 DROP: 10 GEL OPHTHALMIC at 21:24

## 2024-09-12 RX ADMIN — ASPIRIN 81 MG 324 MG: 81 TABLET ORAL at 18:48

## 2024-09-12 RX ADMIN — GADOTERIDOL 27 ML: 279.3 INJECTION, SOLUTION INTRAVENOUS at 18:11

## 2024-09-12 RX ADMIN — SODIUM CHLORIDE: 9 INJECTION, SOLUTION INTRAVENOUS at 19:40

## 2024-09-12 RX ADMIN — WATER 2000 MG: 1 INJECTION INTRAMUSCULAR; INTRAVENOUS; SUBCUTANEOUS at 18:38

## 2024-09-12 RX ADMIN — ACETAMINOPHEN 650 MG: 325 TABLET ORAL at 21:16

## 2024-09-12 RX ADMIN — MANNITOL 66.4 G: 20 INJECTION, SOLUTION INTRAVENOUS at 22:26

## 2024-09-12 RX ADMIN — Medication 2500 MG: at 19:41

## 2024-09-12 RX ADMIN — ENOXAPARIN SODIUM 30 MG: 100 INJECTION SUBCUTANEOUS at 21:23

## 2024-09-12 RX ADMIN — SODIUM CHLORIDE, PRESERVATIVE FREE 10 ML: 5 INJECTION INTRAVENOUS at 21:46

## 2024-09-12 RX ADMIN — IOPAMIDOL 100 ML: 755 INJECTION, SOLUTION INTRAVENOUS at 15:55

## 2024-09-12 ASSESSMENT — PAIN - FUNCTIONAL ASSESSMENT
PAIN_FUNCTIONAL_ASSESSMENT: NONE - DENIES PAIN
PAIN_FUNCTIONAL_ASSESSMENT: 0-10
PAIN_FUNCTIONAL_ASSESSMENT: ACTIVITIES ARE NOT PREVENTED

## 2024-09-12 ASSESSMENT — LIFESTYLE VARIABLES
HOW MANY STANDARD DRINKS CONTAINING ALCOHOL DO YOU HAVE ON A TYPICAL DAY: PATIENT DOES NOT DRINK
HOW OFTEN DO YOU HAVE A DRINK CONTAINING ALCOHOL: NEVER
HOW OFTEN DO YOU HAVE A DRINK CONTAINING ALCOHOL: NEVER

## 2024-09-12 ASSESSMENT — PAIN SCALES - GENERAL
PAINLEVEL_OUTOF10: 4
PAINLEVEL_OUTOF10: 8

## 2024-09-12 ASSESSMENT — PAIN DESCRIPTION - LOCATION: LOCATION: HEAD

## 2024-09-12 ASSESSMENT — PAIN DESCRIPTION - ONSET: ONSET: GRADUAL

## 2024-09-12 ASSESSMENT — PAIN DESCRIPTION - PAIN TYPE: TYPE: ACUTE PAIN

## 2024-09-12 ASSESSMENT — PAIN DESCRIPTION - DESCRIPTORS: DESCRIPTORS: ACHING

## 2024-09-12 ASSESSMENT — PAIN DESCRIPTION - FREQUENCY: FREQUENCY: INTERMITTENT

## 2024-09-12 ASSESSMENT — PAIN DESCRIPTION - ORIENTATION: ORIENTATION: ANTERIOR

## 2024-09-13 ENCOUNTER — APPOINTMENT (OUTPATIENT)
Dept: INTERVENTIONAL RADIOLOGY/VASCULAR | Age: 54
DRG: 603 | End: 2024-09-13
Attending: STUDENT IN AN ORGANIZED HEALTH CARE EDUCATION/TRAINING PROGRAM
Payer: COMMERCIAL

## 2024-09-13 LAB
ANION GAP SERPL CALC-SCNC: 10 MMOL/L (ref 9–18)
APPEARANCE FLD: CLEAR
BASOPHILS # BLD: 0.1 K/UL (ref 0–0.2)
BASOPHILS NFR BLD: 1 % (ref 0–2)
BUN SERPL-MCNC: 10 MG/DL (ref 6–23)
CALCIUM SERPL-MCNC: 9.1 MG/DL (ref 8.8–10.2)
CHLORIDE SERPL-SCNC: 104 MMOL/L (ref 98–107)
CK SERPL-CCNC: 98 U/L (ref 21–215)
CO2 SERPL-SCNC: 27 MMOL/L (ref 20–28)
COLOR FLD: COLORLESS
CREAT SERPL-MCNC: 0.78 MG/DL (ref 0.6–1.1)
D DIMER PPP FEU-MCNC: 0.53 UG/ML(FEU)
DIFFERENTIAL METHOD BLD: ABNORMAL
EOSINOPHIL # BLD: 0.2 K/UL (ref 0–0.8)
EOSINOPHIL NFR BLD: 2 % (ref 0.5–7.8)
ERYTHROCYTE [DISTWIDTH] IN BLOOD BY AUTOMATED COUNT: 16.1 % (ref 11.9–14.6)
ERYTHROCYTE [SEDIMENTATION RATE] IN BLOOD: 4 MM/HR (ref 0–30)
GLUCOSE BLD STRIP.AUTO-MCNC: 108 MG/DL (ref 65–100)
GLUCOSE BLD STRIP.AUTO-MCNC: 108 MG/DL (ref 65–100)
GLUCOSE BLD STRIP.AUTO-MCNC: 167 MG/DL (ref 65–100)
GLUCOSE BLD STRIP.AUTO-MCNC: 98 MG/DL (ref 65–100)
GLUCOSE CSF-MCNC: 68 MG/DL (ref 50–70)
GLUCOSE SERPL-MCNC: 131 MG/DL (ref 70–99)
HCT VFR BLD AUTO: 42.9 % (ref 35.8–46.3)
HGB BLD-MCNC: 13.2 G/DL (ref 11.7–15.4)
IMM GRANULOCYTES # BLD AUTO: 0.1 K/UL (ref 0–0.5)
IMM GRANULOCYTES NFR BLD AUTO: 1 % (ref 0–5)
LYMPHOCYTES # BLD: 2.8 K/UL (ref 0.5–4.6)
LYMPHOCYTES NFR BLD: 26 % (ref 13–44)
MAGNESIUM SERPL-MCNC: 2.2 MG/DL (ref 1.8–2.4)
MCH RBC QN AUTO: 26 PG (ref 26.1–32.9)
MCHC RBC AUTO-ENTMCNC: 30.8 G/DL (ref 31.4–35)
MCV RBC AUTO: 84.6 FL (ref 82–102)
MONOCYTES # BLD: 0.6 K/UL (ref 0.1–1.3)
MONOCYTES NFR BLD: 6 % (ref 4–12)
NEUTS SEG # BLD: 7.1 K/UL (ref 1.7–8.2)
NEUTS SEG NFR BLD: 66 % (ref 43–78)
NRBC # BLD: 0 K/UL (ref 0–0.2)
NUC CELL # FLD: 1 /CU MM
PLATELET # BLD AUTO: 264 K/UL (ref 150–450)
PMV BLD AUTO: 11.3 FL (ref 9.4–12.3)
POTASSIUM SERPL-SCNC: 4.2 MMOL/L (ref 3.5–5.1)
PROT CSF-MCNC: 33 MG/DL (ref 15–45)
RBC # BLD AUTO: 5.07 M/UL (ref 4.05–5.2)
RBC # FLD: 0 /CU MM
SERVICE CMNT-IMP: ABNORMAL
SERVICE CMNT-IMP: NORMAL
SODIUM SERPL-SCNC: 141 MMOL/L (ref 136–145)
SPECIMEN SOURCE FLD: NORMAL
TSH W FREE THYROID IF ABNORMAL: 2.4 UIU/ML (ref 0.27–4.2)
TUBE # CSF: NORMAL
TUBE # CSF: NORMAL
WBC # BLD AUTO: 10.9 K/UL (ref 4.3–11.1)

## 2024-09-13 PROCEDURE — 83735 ASSAY OF MAGNESIUM: CPT

## 2024-09-13 PROCEDURE — 009U3ZX DRAINAGE OF SPINAL CANAL, PERCUTANEOUS APPROACH, DIAGNOSTIC: ICD-10-PCS | Performed by: RADIOLOGY

## 2024-09-13 PROCEDURE — 86140 C-REACTIVE PROTEIN: CPT

## 2024-09-13 PROCEDURE — 88112 CYTOPATH CELL ENHANCE TECH: CPT

## 2024-09-13 PROCEDURE — 6370000000 HC RX 637 (ALT 250 FOR IP): Performed by: PSYCHIATRY & NEUROLOGY

## 2024-09-13 PROCEDURE — 82962 GLUCOSE BLOOD TEST: CPT

## 2024-09-13 PROCEDURE — 85379 FIBRIN DEGRADATION QUANT: CPT

## 2024-09-13 PROCEDURE — 86041 ACETYLCHOLN RCPTR BNDNG ANTB: CPT

## 2024-09-13 PROCEDURE — 80048 BASIC METABOLIC PNL TOTAL CA: CPT

## 2024-09-13 PROCEDURE — 87205 SMEAR GRAM STAIN: CPT

## 2024-09-13 PROCEDURE — 84443 ASSAY THYROID STIM HORMONE: CPT

## 2024-09-13 PROCEDURE — 2500000003 HC RX 250 WO HCPCS: Performed by: RADIOLOGY

## 2024-09-13 PROCEDURE — 2580000003 HC RX 258: Performed by: FAMILY MEDICINE

## 2024-09-13 PROCEDURE — 1100000003 HC PRIVATE W/ TELEMETRY

## 2024-09-13 PROCEDURE — 99222 1ST HOSP IP/OBS MODERATE 55: CPT | Performed by: PSYCHIATRY & NEUROLOGY

## 2024-09-13 PROCEDURE — 82945 GLUCOSE OTHER FLUID: CPT

## 2024-09-13 PROCEDURE — 6370000000 HC RX 637 (ALT 250 FOR IP): Performed by: FAMILY MEDICINE

## 2024-09-13 PROCEDURE — 36415 COLL VENOUS BLD VENIPUNCTURE: CPT

## 2024-09-13 PROCEDURE — 87070 CULTURE OTHR SPECIMN AEROBIC: CPT

## 2024-09-13 PROCEDURE — 6360000002 HC RX W HCPCS: Performed by: STUDENT IN AN ORGANIZED HEALTH CARE EDUCATION/TRAINING PROGRAM

## 2024-09-13 PROCEDURE — 2500000003 HC RX 250 WO HCPCS: Performed by: STUDENT IN AN ORGANIZED HEALTH CARE EDUCATION/TRAINING PROGRAM

## 2024-09-13 PROCEDURE — 62328 DX LMBR SPI PNXR W/FLUOR/CT: CPT | Performed by: RADIOLOGY

## 2024-09-13 PROCEDURE — 85025 COMPLETE CBC W/AUTO DIFF WBC: CPT

## 2024-09-13 PROCEDURE — 84157 ASSAY OF PROTEIN OTHER: CPT

## 2024-09-13 PROCEDURE — 6360000002 HC RX W HCPCS: Performed by: FAMILY MEDICINE

## 2024-09-13 PROCEDURE — 2580000003 HC RX 258: Performed by: STUDENT IN AN ORGANIZED HEALTH CARE EDUCATION/TRAINING PROGRAM

## 2024-09-13 PROCEDURE — 2709999900 IR LUMBAR PUNCTURE FOR DIAGNOSIS

## 2024-09-13 PROCEDURE — 82550 ASSAY OF CK (CPK): CPT

## 2024-09-13 PROCEDURE — 83520 IMMUNOASSAY QUANT NOS NONAB: CPT

## 2024-09-13 PROCEDURE — 85652 RBC SED RATE AUTOMATED: CPT

## 2024-09-13 PROCEDURE — 89050 BODY FLUID CELL COUNT: CPT

## 2024-09-13 RX ORDER — HYDROMORPHONE HYDROCHLORIDE 1 MG/ML
0.5 INJECTION, SOLUTION INTRAMUSCULAR; INTRAVENOUS; SUBCUTANEOUS ONCE
Status: DISCONTINUED | OUTPATIENT
Start: 2024-09-13 | End: 2024-09-19 | Stop reason: HOSPADM

## 2024-09-13 RX ORDER — ACETAZOLAMIDE 250 MG/1
250 TABLET ORAL DAILY
Status: DISCONTINUED | OUTPATIENT
Start: 2024-09-13 | End: 2024-09-15

## 2024-09-13 RX ORDER — MORPHINE SULFATE 2 MG/ML
2 INJECTION, SOLUTION INTRAMUSCULAR; INTRAVENOUS ONCE
Status: COMPLETED | OUTPATIENT
Start: 2024-09-13 | End: 2024-09-13

## 2024-09-13 RX ORDER — LIDOCAINE HYDROCHLORIDE 20 MG/ML
INJECTION, SOLUTION INFILTRATION; PERINEURAL PRN
Status: COMPLETED | OUTPATIENT
Start: 2024-09-13 | End: 2024-09-13

## 2024-09-13 RX ORDER — SODIUM CHLORIDE 0.9 % (FLUSH) 0.9 %
5-40 SYRINGE (ML) INJECTION EVERY 12 HOURS SCHEDULED
Status: DISCONTINUED | OUTPATIENT
Start: 2024-09-13 | End: 2024-09-19 | Stop reason: HOSPADM

## 2024-09-13 RX ORDER — MORPHINE SULFATE 2 MG/ML
2 INJECTION, SOLUTION INTRAMUSCULAR; INTRAVENOUS
Status: DISCONTINUED | OUTPATIENT
Start: 2024-09-13 | End: 2024-09-14

## 2024-09-13 RX ORDER — SODIUM CHLORIDE 9 MG/ML
INJECTION, SOLUTION INTRAVENOUS PRN
Status: DISCONTINUED | OUTPATIENT
Start: 2024-09-13 | End: 2024-09-19 | Stop reason: HOSPADM

## 2024-09-13 RX ORDER — SENNA AND DOCUSATE SODIUM 50; 8.6 MG/1; MG/1
2 TABLET, FILM COATED ORAL DAILY PRN
Status: DISCONTINUED | OUTPATIENT
Start: 2024-09-13 | End: 2024-09-19 | Stop reason: HOSPADM

## 2024-09-13 RX ORDER — SODIUM CHLORIDE 0.9 % (FLUSH) 0.9 %
5-40 SYRINGE (ML) INJECTION PRN
Status: DISCONTINUED | OUTPATIENT
Start: 2024-09-13 | End: 2024-09-19 | Stop reason: HOSPADM

## 2024-09-13 RX ADMIN — ENOXAPARIN SODIUM 30 MG: 100 INJECTION SUBCUTANEOUS at 08:23

## 2024-09-13 RX ADMIN — CARBOXYMETHYLCELLULOSE SODIUM 1 DROP: 10 GEL OPHTHALMIC at 08:24

## 2024-09-13 RX ADMIN — CARBOXYMETHYLCELLULOSE SODIUM 1 DROP: 10 GEL OPHTHALMIC at 13:58

## 2024-09-13 RX ADMIN — CARBOXYMETHYLCELLULOSE SODIUM 1 DROP: 10 GEL OPHTHALMIC at 20:04

## 2024-09-13 RX ADMIN — VANCOMYCIN HYDROCHLORIDE 1250 MG: 10 INJECTION, POWDER, LYOPHILIZED, FOR SOLUTION INTRAVENOUS at 09:00

## 2024-09-13 RX ADMIN — VANCOMYCIN HYDROCHLORIDE 1250 MG: 10 INJECTION, POWDER, LYOPHILIZED, FOR SOLUTION INTRAVENOUS at 19:50

## 2024-09-13 RX ADMIN — SODIUM CHLORIDE, PRESERVATIVE FREE 10 ML: 5 INJECTION INTRAVENOUS at 20:02

## 2024-09-13 RX ADMIN — ASPIRIN 81 MG: 81 TABLET, COATED ORAL at 08:23

## 2024-09-13 RX ADMIN — POTASSIUM CHLORIDE 10 MEQ: 750 TABLET, EXTENDED RELEASE ORAL at 08:23

## 2024-09-13 RX ADMIN — ONDANSETRON 4 MG: 2 INJECTION INTRAMUSCULAR; INTRAVENOUS at 17:50

## 2024-09-13 RX ADMIN — MORPHINE SULFATE 2 MG: 2 INJECTION, SOLUTION INTRAMUSCULAR; INTRAVENOUS at 15:58

## 2024-09-13 RX ADMIN — WATER 2000 MG: 1 INJECTION INTRAMUSCULAR; INTRAVENOUS; SUBCUTANEOUS at 17:46

## 2024-09-13 RX ADMIN — HYDROCODONE BITARTRATE AND ACETAMINOPHEN 1 TABLET: 5; 325 TABLET ORAL at 10:57

## 2024-09-13 RX ADMIN — ACETAZOLAMIDE 250 MG: 250 TABLET ORAL at 16:39

## 2024-09-13 RX ADMIN — SODIUM CHLORIDE, PRESERVATIVE FREE 10 ML: 5 INJECTION INTRAVENOUS at 20:03

## 2024-09-13 RX ADMIN — SODIUM CHLORIDE, PRESERVATIVE FREE 10 ML: 5 INJECTION INTRAVENOUS at 08:23

## 2024-09-13 RX ADMIN — METHYLPREDNISOLONE SODIUM SUCCINATE 500 MG: 500 INJECTION INTRAMUSCULAR; INTRAVENOUS at 13:57

## 2024-09-13 RX ADMIN — LIDOCAINE HYDROCHLORIDE 10 ML: 20 INJECTION, SOLUTION INFILTRATION; PERINEURAL at 15:11

## 2024-09-13 RX ADMIN — ACETAMINOPHEN 650 MG: 325 TABLET ORAL at 09:08

## 2024-09-13 RX ADMIN — WATER 2000 MG: 1 INJECTION INTRAMUSCULAR; INTRAVENOUS; SUBCUTANEOUS at 05:30

## 2024-09-13 RX ADMIN — HYDROCODONE BITARTRATE AND ACETAMINOPHEN 1 TABLET: 5; 325 TABLET ORAL at 19:58

## 2024-09-13 ASSESSMENT — PAIN DESCRIPTION - DESCRIPTORS
DESCRIPTORS: THROBBING
DESCRIPTORS: ACHING;STABBING
DESCRIPTORS: ACHING

## 2024-09-13 ASSESSMENT — PAIN DESCRIPTION - ORIENTATION
ORIENTATION: RIGHT;LEFT;MID;INNER
ORIENTATION: ANTERIOR
ORIENTATION: LEFT;RIGHT;INNER;MID

## 2024-09-13 ASSESSMENT — PAIN SCALES - GENERAL
PAINLEVEL_OUTOF10: 9
PAINLEVEL_OUTOF10: 7
PAINLEVEL_OUTOF10: 3
PAINLEVEL_OUTOF10: 0

## 2024-09-13 ASSESSMENT — PAIN DESCRIPTION - PAIN TYPE: TYPE: ACUTE PAIN

## 2024-09-13 ASSESSMENT — PAIN DESCRIPTION - LOCATION
LOCATION: HEAD
LOCATION: EYE
LOCATION: HEAD

## 2024-09-13 ASSESSMENT — PAIN - FUNCTIONAL ASSESSMENT: PAIN_FUNCTIONAL_ASSESSMENT: ACTIVITIES ARE NOT PREVENTED

## 2024-09-14 ENCOUNTER — APPOINTMENT (OUTPATIENT)
Dept: CT IMAGING | Age: 54
DRG: 603 | End: 2024-09-14
Payer: COMMERCIAL

## 2024-09-14 ENCOUNTER — APPOINTMENT (OUTPATIENT)
Dept: GENERAL RADIOLOGY | Age: 54
DRG: 603 | End: 2024-09-14
Payer: COMMERCIAL

## 2024-09-14 PROBLEM — R13.10 DYSPHAGIA: Status: ACTIVE | Noted: 2024-09-14

## 2024-09-14 LAB
ALBUMIN SERPL-MCNC: 3.7 G/DL (ref 3.5–5)
ALBUMIN/GLOB SERPL: 1.1 (ref 1–1.9)
ALP SERPL-CCNC: 99 U/L (ref 35–104)
ALT SERPL-CCNC: 31 U/L (ref 12–65)
ANION GAP SERPL CALC-SCNC: 14 MMOL/L (ref 9–18)
AST SERPL-CCNC: 15 U/L (ref 15–37)
BASOPHILS # BLD: 0 K/UL (ref 0–0.2)
BASOPHILS NFR BLD: 0 % (ref 0–2)
BILIRUB SERPL-MCNC: 0.3 MG/DL (ref 0–1.2)
BUN SERPL-MCNC: 14 MG/DL (ref 6–23)
CALCIUM SERPL-MCNC: 9.8 MG/DL (ref 8.8–10.2)
CHLORIDE SERPL-SCNC: 103 MMOL/L (ref 98–107)
CO2 SERPL-SCNC: 22 MMOL/L (ref 20–28)
CREAT SERPL-MCNC: 0.77 MG/DL (ref 0.6–1.1)
DIFFERENTIAL METHOD BLD: ABNORMAL
EOSINOPHIL # BLD: 0 K/UL (ref 0–0.8)
EOSINOPHIL NFR BLD: 0 % (ref 0.5–7.8)
ERYTHROCYTE [DISTWIDTH] IN BLOOD BY AUTOMATED COUNT: 15.7 % (ref 11.9–14.6)
EST. AVERAGE GLUCOSE BLD GHB EST-MCNC: 147 MG/DL
FOLATE SERPL-MCNC: 7.7 NG/ML (ref 3.1–17.5)
GLOBULIN SER CALC-MCNC: 3.4 G/DL (ref 2.3–3.5)
GLUCOSE BLD STRIP.AUTO-MCNC: 163 MG/DL (ref 65–100)
GLUCOSE BLD STRIP.AUTO-MCNC: 166 MG/DL (ref 65–100)
GLUCOSE BLD STRIP.AUTO-MCNC: 178 MG/DL (ref 65–100)
GLUCOSE BLD STRIP.AUTO-MCNC: 193 MG/DL (ref 65–100)
GLUCOSE SERPL-MCNC: 204 MG/DL (ref 70–99)
HBA1C MFR BLD: 6.7 % (ref 0–5.6)
HCT VFR BLD AUTO: 47 % (ref 35.8–46.3)
HGB BLD-MCNC: 14.8 G/DL (ref 11.7–15.4)
IMM GRANULOCYTES # BLD AUTO: 0.1 K/UL (ref 0–0.5)
IMM GRANULOCYTES NFR BLD AUTO: 1 % (ref 0–5)
LYMPHOCYTES # BLD: 1.4 K/UL (ref 0.5–4.6)
LYMPHOCYTES NFR BLD: 8 % (ref 13–44)
MCH RBC QN AUTO: 26.3 PG (ref 26.1–32.9)
MCHC RBC AUTO-ENTMCNC: 31.5 G/DL (ref 31.4–35)
MCV RBC AUTO: 83.5 FL (ref 82–102)
MONOCYTES # BLD: 0.1 K/UL (ref 0.1–1.3)
MONOCYTES NFR BLD: 1 % (ref 4–12)
NEUTS SEG # BLD: 16.9 K/UL (ref 1.7–8.2)
NEUTS SEG NFR BLD: 90 % (ref 43–78)
NRBC # BLD: 0 K/UL (ref 0–0.2)
PLATELET # BLD AUTO: 236 K/UL (ref 150–450)
PMV BLD AUTO: 11.8 FL (ref 9.4–12.3)
POTASSIUM SERPL-SCNC: 4.5 MMOL/L (ref 3.5–5.1)
PROT SERPL-MCNC: 7.1 G/DL (ref 6.3–8.2)
RBC # BLD AUTO: 5.63 M/UL (ref 4.05–5.2)
SERVICE CMNT-IMP: ABNORMAL
SODIUM SERPL-SCNC: 138 MMOL/L (ref 136–145)
TSH RECEP AB SER-ACNC: <1.1 IU/L (ref 0–1.75)
VANCOMYCIN SERPL-MCNC: 8 UG/ML
VIT B12 SERPL-MCNC: 611 PG/ML (ref 193–986)
WBC # BLD AUTO: 18.6 K/UL (ref 4.3–11.1)

## 2024-09-14 PROCEDURE — 31575 DIAGNOSTIC LARYNGOSCOPY: CPT | Performed by: OTOLARYNGOLOGY

## 2024-09-14 PROCEDURE — 82746 ASSAY OF FOLIC ACID SERUM: CPT

## 2024-09-14 PROCEDURE — 87641 MR-STAPH DNA AMP PROBE: CPT

## 2024-09-14 PROCEDURE — 94660 CPAP INITIATION&MGMT: CPT

## 2024-09-14 PROCEDURE — 6360000002 HC RX W HCPCS: Performed by: PSYCHIATRY & NEUROLOGY

## 2024-09-14 PROCEDURE — 82607 VITAMIN B-12: CPT

## 2024-09-14 PROCEDURE — 83036 HEMOGLOBIN GLYCOSYLATED A1C: CPT

## 2024-09-14 PROCEDURE — 99222 1ST HOSP IP/OBS MODERATE 55: CPT | Performed by: OTOLARYNGOLOGY

## 2024-09-14 PROCEDURE — 5A09457 ASSISTANCE WITH RESPIRATORY VENTILATION, 24-96 CONSECUTIVE HOURS, CONTINUOUS POSITIVE AIRWAY PRESSURE: ICD-10-PCS | Performed by: STUDENT IN AN ORGANIZED HEALTH CARE EDUCATION/TRAINING PROGRAM

## 2024-09-14 PROCEDURE — 80202 ASSAY OF VANCOMYCIN: CPT

## 2024-09-14 PROCEDURE — 71045 X-RAY EXAM CHEST 1 VIEW: CPT

## 2024-09-14 PROCEDURE — 6360000002 HC RX W HCPCS: Performed by: STUDENT IN AN ORGANIZED HEALTH CARE EDUCATION/TRAINING PROGRAM

## 2024-09-14 PROCEDURE — 2580000003 HC RX 258: Performed by: PSYCHIATRY & NEUROLOGY

## 2024-09-14 PROCEDURE — 6370000000 HC RX 637 (ALT 250 FOR IP): Performed by: PSYCHIATRY & NEUROLOGY

## 2024-09-14 PROCEDURE — 6370000000 HC RX 637 (ALT 250 FOR IP)

## 2024-09-14 PROCEDURE — 2500000003 HC RX 250 WO HCPCS: Performed by: STUDENT IN AN ORGANIZED HEALTH CARE EDUCATION/TRAINING PROGRAM

## 2024-09-14 PROCEDURE — 2580000003 HC RX 258: Performed by: FAMILY MEDICINE

## 2024-09-14 PROCEDURE — 99221 1ST HOSP IP/OBS SF/LOW 40: CPT | Performed by: INTERNAL MEDICINE

## 2024-09-14 PROCEDURE — 85025 COMPLETE CBC W/AUTO DIFF WBC: CPT

## 2024-09-14 PROCEDURE — 73120 X-RAY EXAM OF HAND: CPT

## 2024-09-14 PROCEDURE — 82962 GLUCOSE BLOOD TEST: CPT

## 2024-09-14 PROCEDURE — 6360000004 HC RX CONTRAST MEDICATION: Performed by: PSYCHIATRY & NEUROLOGY

## 2024-09-14 PROCEDURE — 51702 INSERT TEMP BLADDER CATH: CPT

## 2024-09-14 PROCEDURE — 6370000000 HC RX 637 (ALT 250 FOR IP): Performed by: FAMILY MEDICINE

## 2024-09-14 PROCEDURE — 1100000003 HC PRIVATE W/ TELEMETRY

## 2024-09-14 PROCEDURE — 80053 COMPREHEN METABOLIC PANEL: CPT

## 2024-09-14 PROCEDURE — 99232 SBSQ HOSP IP/OBS MODERATE 35: CPT | Performed by: PSYCHIATRY & NEUROLOGY

## 2024-09-14 PROCEDURE — 76937 US GUIDE VASCULAR ACCESS: CPT

## 2024-09-14 PROCEDURE — 6360000002 HC RX W HCPCS: Performed by: FAMILY MEDICINE

## 2024-09-14 PROCEDURE — 6370000000 HC RX 637 (ALT 250 FOR IP): Performed by: STUDENT IN AN ORGANIZED HEALTH CARE EDUCATION/TRAINING PROGRAM

## 2024-09-14 PROCEDURE — 86038 ANTINUCLEAR ANTIBODIES: CPT

## 2024-09-14 PROCEDURE — 70491 CT SOFT TISSUE NECK W/DYE: CPT

## 2024-09-14 PROCEDURE — 36415 COLL VENOUS BLD VENIPUNCTURE: CPT

## 2024-09-14 PROCEDURE — 2580000003 HC RX 258: Performed by: STUDENT IN AN ORGANIZED HEALTH CARE EDUCATION/TRAINING PROGRAM

## 2024-09-14 PROCEDURE — 51798 US URINE CAPACITY MEASURE: CPT

## 2024-09-14 RX ORDER — MORPHINE SULFATE 2 MG/ML
2 INJECTION, SOLUTION INTRAMUSCULAR; INTRAVENOUS
Status: DISCONTINUED | OUTPATIENT
Start: 2024-09-14 | End: 2024-09-19 | Stop reason: HOSPADM

## 2024-09-14 RX ORDER — INSULIN LISPRO 100 [IU]/ML
0-4 INJECTION, SOLUTION INTRAVENOUS; SUBCUTANEOUS NIGHTLY
Status: DISCONTINUED | OUTPATIENT
Start: 2024-09-14 | End: 2024-09-14 | Stop reason: SDUPTHER

## 2024-09-14 RX ORDER — HYDROCODONE BITARTRATE AND ACETAMINOPHEN 5; 325 MG/1; MG/1
1 TABLET ORAL EVERY 4 HOURS PRN
Status: DISCONTINUED | OUTPATIENT
Start: 2024-09-14 | End: 2024-09-19 | Stop reason: HOSPADM

## 2024-09-14 RX ORDER — IOPAMIDOL 755 MG/ML
100 INJECTION, SOLUTION INTRAVASCULAR
Status: COMPLETED | OUTPATIENT
Start: 2024-09-14 | End: 2024-09-14

## 2024-09-14 RX ORDER — DEXTROSE MONOHYDRATE 100 MG/ML
INJECTION, SOLUTION INTRAVENOUS CONTINUOUS PRN
Status: DISCONTINUED | OUTPATIENT
Start: 2024-09-14 | End: 2024-09-14 | Stop reason: SDUPTHER

## 2024-09-14 RX ORDER — IBUPROFEN 600 MG/1
1 TABLET ORAL PRN
Status: DISCONTINUED | OUTPATIENT
Start: 2024-09-14 | End: 2024-09-14 | Stop reason: SDUPTHER

## 2024-09-14 RX ORDER — HYDROCODONE BITARTRATE AND ACETAMINOPHEN 10; 325 MG/1; MG/1
1 TABLET ORAL EVERY 4 HOURS PRN
Status: DISCONTINUED | OUTPATIENT
Start: 2024-09-14 | End: 2024-09-19 | Stop reason: HOSPADM

## 2024-09-14 RX ORDER — SCOLOPAMINE TRANSDERMAL SYSTEM 1 MG/1
1 PATCH, EXTENDED RELEASE TRANSDERMAL
Status: DISCONTINUED | OUTPATIENT
Start: 2024-09-14 | End: 2024-09-19 | Stop reason: HOSPADM

## 2024-09-14 RX ORDER — HYDROXYCHLOROQUINE SULFATE 200 MG/1
200 TABLET, FILM COATED ORAL 2 TIMES DAILY
Status: DISCONTINUED | OUTPATIENT
Start: 2024-09-14 | End: 2024-09-14

## 2024-09-14 RX ORDER — INSULIN LISPRO 100 [IU]/ML
0-4 INJECTION, SOLUTION INTRAVENOUS; SUBCUTANEOUS
Status: DISCONTINUED | OUTPATIENT
Start: 2024-09-14 | End: 2024-09-14 | Stop reason: SDUPTHER

## 2024-09-14 RX ADMIN — ONDANSETRON 4 MG: 2 INJECTION INTRAMUSCULAR; INTRAVENOUS at 16:22

## 2024-09-14 RX ADMIN — IMMUNE GLOBULIN (HUMAN) 25000 MG: 10 INJECTION INTRAVENOUS; SUBCUTANEOUS at 13:10

## 2024-09-14 RX ADMIN — ONDANSETRON 4 MG: 2 INJECTION INTRAMUSCULAR; INTRAVENOUS at 11:01

## 2024-09-14 RX ADMIN — DICLOFENAC SODIUM 4 G: 10 GEL TOPICAL at 00:55

## 2024-09-14 RX ADMIN — CARBOXYMETHYLCELLULOSE SODIUM 1 DROP: 10 GEL OPHTHALMIC at 17:12

## 2024-09-14 RX ADMIN — SODIUM CHLORIDE: 9 INJECTION, SOLUTION INTRAVENOUS at 11:02

## 2024-09-14 RX ADMIN — METHYLPREDNISOLONE SODIUM SUCCINATE 500 MG: 500 INJECTION INTRAMUSCULAR; INTRAVENOUS at 11:05

## 2024-09-14 RX ADMIN — HYDROXYCHLOROQUINE SULFATE 200 MG: 200 TABLET ORAL at 10:07

## 2024-09-14 RX ADMIN — HYDROXYCHLOROQUINE SULFATE 200 MG: 200 TABLET ORAL at 20:52

## 2024-09-14 RX ADMIN — CEFTRIAXONE SODIUM 2000 MG: 2 INJECTION, POWDER, FOR SOLUTION INTRAMUSCULAR; INTRAVENOUS at 12:17

## 2024-09-14 RX ADMIN — IOPAMIDOL 100 ML: 755 INJECTION, SOLUTION INTRAVENOUS at 12:46

## 2024-09-14 RX ADMIN — SODIUM CHLORIDE, PRESERVATIVE FREE 10 ML: 5 INJECTION INTRAVENOUS at 19:00

## 2024-09-14 RX ADMIN — POLYETHYLENE GLYCOL 3350 17 G: 17 POWDER, FOR SOLUTION ORAL at 10:38

## 2024-09-14 RX ADMIN — VANCOMYCIN HYDROCHLORIDE 1250 MG: 10 INJECTION, POWDER, LYOPHILIZED, FOR SOLUTION INTRAVENOUS at 17:51

## 2024-09-14 RX ADMIN — CARBOXYMETHYLCELLULOSE SODIUM 1 DROP: 10 GEL OPHTHALMIC at 08:00

## 2024-09-14 RX ADMIN — MORPHINE SULFATE 2 MG: 2 INJECTION, SOLUTION INTRAMUSCULAR; INTRAVENOUS at 15:23

## 2024-09-14 RX ADMIN — MORPHINE SULFATE 2 MG: 2 INJECTION, SOLUTION INTRAMUSCULAR; INTRAVENOUS at 13:08

## 2024-09-14 RX ADMIN — ACETAZOLAMIDE 250 MG: 250 TABLET ORAL at 07:53

## 2024-09-14 RX ADMIN — ASPIRIN 81 MG: 81 TABLET, COATED ORAL at 07:53

## 2024-09-14 RX ADMIN — SODIUM CHLORIDE 1 MG: 9 INJECTION INTRAMUSCULAR; INTRAVENOUS; SUBCUTANEOUS at 13:48

## 2024-09-14 RX ADMIN — CARBOXYMETHYLCELLULOSE SODIUM 1 DROP: 10 GEL OPHTHALMIC at 20:52

## 2024-09-14 RX ADMIN — VANCOMYCIN HYDROCHLORIDE 1250 MG: 10 INJECTION, POWDER, LYOPHILIZED, FOR SOLUTION INTRAVENOUS at 11:09

## 2024-09-14 RX ADMIN — MORPHINE SULFATE 2 MG: 2 INJECTION, SOLUTION INTRAMUSCULAR; INTRAVENOUS at 17:53

## 2024-09-14 RX ADMIN — HYDROCODONE BITARTRATE AND ACETAMINOPHEN 1 TABLET: 10; 325 TABLET ORAL at 10:36

## 2024-09-14 RX ADMIN — ACETAMINOPHEN 650 MG: 325 TABLET ORAL at 00:56

## 2024-09-14 RX ADMIN — MORPHINE SULFATE 2 MG: 2 INJECTION, SOLUTION INTRAMUSCULAR; INTRAVENOUS at 10:59

## 2024-09-14 RX ADMIN — POTASSIUM CHLORIDE 10 MEQ: 750 TABLET, EXTENDED RELEASE ORAL at 07:53

## 2024-09-14 RX ADMIN — WATER 2000 MG: 1 INJECTION INTRAMUSCULAR; INTRAVENOUS; SUBCUTANEOUS at 05:48

## 2024-09-14 ASSESSMENT — PAIN - FUNCTIONAL ASSESSMENT
PAIN_FUNCTIONAL_ASSESSMENT: PREVENTS OR INTERFERES SOME ACTIVE ACTIVITIES AND ADLS
PAIN_FUNCTIONAL_ASSESSMENT: PREVENTS OR INTERFERES SOME ACTIVE ACTIVITIES AND ADLS

## 2024-09-14 ASSESSMENT — PAIN SCALES - GENERAL
PAINLEVEL_OUTOF10: 10
PAINLEVEL_OUTOF10: 5
PAINLEVEL_OUTOF10: 10
PAINLEVEL_OUTOF10: 0
PAINLEVEL_OUTOF10: 10
PAINLEVEL_OUTOF10: 0
PAINLEVEL_OUTOF10: 0
PAINLEVEL_OUTOF10: 5

## 2024-09-14 ASSESSMENT — PAIN DESCRIPTION - DESCRIPTORS
DESCRIPTORS: SHARP;STABBING;THROBBING
DESCRIPTORS: SHARP;STABBING

## 2024-09-14 ASSESSMENT — PAIN DESCRIPTION - LOCATION
LOCATION: HEAD

## 2024-09-14 ASSESSMENT — PAIN DESCRIPTION - ORIENTATION
ORIENTATION: ANTERIOR
ORIENTATION: ANTERIOR
ORIENTATION: MID;ANTERIOR

## 2024-09-15 LAB
ANION GAP SERPL CALC-SCNC: 11 MMOL/L (ref 9–18)
BASOPHILS # BLD: 0 K/UL (ref 0–0.2)
BASOPHILS NFR BLD: 0 % (ref 0–2)
BUN SERPL-MCNC: 16 MG/DL (ref 6–23)
CALCIUM SERPL-MCNC: 9 MG/DL (ref 8.8–10.2)
CHLORIDE SERPL-SCNC: 108 MMOL/L (ref 98–107)
CO2 SERPL-SCNC: 20 MMOL/L (ref 20–28)
CREAT SERPL-MCNC: 0.85 MG/DL (ref 0.6–1.1)
CRP SERPL-MCNC: 12 MG/L (ref 0–10)
DIFFERENTIAL METHOD BLD: ABNORMAL
EOSINOPHIL # BLD: 0 K/UL (ref 0–0.8)
EOSINOPHIL NFR BLD: 0 % (ref 0.5–7.8)
ERYTHROCYTE [DISTWIDTH] IN BLOOD BY AUTOMATED COUNT: 16.4 % (ref 11.9–14.6)
GLUCOSE BLD STRIP.AUTO-MCNC: 132 MG/DL (ref 65–100)
GLUCOSE BLD STRIP.AUTO-MCNC: 151 MG/DL (ref 65–100)
GLUCOSE BLD STRIP.AUTO-MCNC: 156 MG/DL (ref 65–100)
GLUCOSE BLD STRIP.AUTO-MCNC: 165 MG/DL (ref 65–100)
GLUCOSE SERPL-MCNC: 173 MG/DL (ref 70–99)
HCT VFR BLD AUTO: 42.2 % (ref 35.8–46.3)
HGB BLD-MCNC: 13.1 G/DL (ref 11.7–15.4)
IMM GRANULOCYTES # BLD AUTO: 0.1 K/UL (ref 0–0.5)
IMM GRANULOCYTES NFR BLD AUTO: 1 % (ref 0–5)
LYMPHOCYTES # BLD: 1.3 K/UL (ref 0.5–4.6)
LYMPHOCYTES NFR BLD: 5 % (ref 13–44)
MCH RBC QN AUTO: 26.3 PG (ref 26.1–32.9)
MCHC RBC AUTO-ENTMCNC: 31 G/DL (ref 31.4–35)
MCV RBC AUTO: 84.6 FL (ref 82–102)
MONOCYTES # BLD: 0.8 K/UL (ref 0.1–1.3)
MONOCYTES NFR BLD: 3 % (ref 4–12)
MRSA DNA SPEC QL NAA+PROBE: NOT DETECTED
NEUTS SEG # BLD: 24.2 K/UL (ref 1.7–8.2)
NEUTS SEG NFR BLD: 92 % (ref 43–78)
NRBC # BLD: 0 K/UL (ref 0–0.2)
PLATELET # BLD AUTO: 263 K/UL (ref 150–450)
PMV BLD AUTO: 11.8 FL (ref 9.4–12.3)
POTASSIUM SERPL-SCNC: 4.8 MMOL/L (ref 3.5–5.1)
RBC # BLD AUTO: 4.99 M/UL (ref 4.05–5.2)
S AUREUS CPE NOSE QL NAA+PROBE: NOT DETECTED
SERVICE CMNT-IMP: ABNORMAL
SODIUM SERPL-SCNC: 138 MMOL/L (ref 136–145)
WBC # BLD AUTO: 26.5 K/UL (ref 4.3–11.1)

## 2024-09-15 PROCEDURE — 6370000000 HC RX 637 (ALT 250 FOR IP): Performed by: PSYCHIATRY & NEUROLOGY

## 2024-09-15 PROCEDURE — 93005 ELECTROCARDIOGRAM TRACING: CPT | Performed by: INTERNAL MEDICINE

## 2024-09-15 PROCEDURE — 80048 BASIC METABOLIC PNL TOTAL CA: CPT

## 2024-09-15 PROCEDURE — 51702 INSERT TEMP BLADDER CATH: CPT

## 2024-09-15 PROCEDURE — 36415 COLL VENOUS BLD VENIPUNCTURE: CPT

## 2024-09-15 PROCEDURE — 6370000000 HC RX 637 (ALT 250 FOR IP): Performed by: FAMILY MEDICINE

## 2024-09-15 PROCEDURE — 85025 COMPLETE CBC W/AUTO DIFF WBC: CPT

## 2024-09-15 PROCEDURE — 99232 SBSQ HOSP IP/OBS MODERATE 35: CPT | Performed by: INTERNAL MEDICINE

## 2024-09-15 PROCEDURE — 6360000002 HC RX W HCPCS: Performed by: PSYCHIATRY & NEUROLOGY

## 2024-09-15 PROCEDURE — 6360000002 HC RX W HCPCS: Performed by: FAMILY MEDICINE

## 2024-09-15 PROCEDURE — 2580000003 HC RX 258: Performed by: FAMILY MEDICINE

## 2024-09-15 PROCEDURE — 2580000003 HC RX 258: Performed by: INTERNAL MEDICINE

## 2024-09-15 PROCEDURE — 1100000003 HC PRIVATE W/ TELEMETRY

## 2024-09-15 PROCEDURE — 99232 SBSQ HOSP IP/OBS MODERATE 35: CPT | Performed by: PSYCHIATRY & NEUROLOGY

## 2024-09-15 PROCEDURE — 6370000000 HC RX 637 (ALT 250 FOR IP): Performed by: STUDENT IN AN ORGANIZED HEALTH CARE EDUCATION/TRAINING PROGRAM

## 2024-09-15 PROCEDURE — 94660 CPAP INITIATION&MGMT: CPT

## 2024-09-15 PROCEDURE — 6360000002 HC RX W HCPCS: Performed by: INTERNAL MEDICINE

## 2024-09-15 PROCEDURE — 2580000003 HC RX 258: Performed by: STUDENT IN AN ORGANIZED HEALTH CARE EDUCATION/TRAINING PROGRAM

## 2024-09-15 PROCEDURE — 2500000003 HC RX 250 WO HCPCS: Performed by: STUDENT IN AN ORGANIZED HEALTH CARE EDUCATION/TRAINING PROGRAM

## 2024-09-15 PROCEDURE — 2580000003 HC RX 258: Performed by: PSYCHIATRY & NEUROLOGY

## 2024-09-15 PROCEDURE — 92610 EVALUATE SWALLOWING FUNCTION: CPT

## 2024-09-15 PROCEDURE — 82962 GLUCOSE BLOOD TEST: CPT

## 2024-09-15 PROCEDURE — 6360000002 HC RX W HCPCS: Performed by: STUDENT IN AN ORGANIZED HEALTH CARE EDUCATION/TRAINING PROGRAM

## 2024-09-15 RX ORDER — NYSTATIN 100000 [USP'U]/ML
5 SUSPENSION ORAL 4 TIMES DAILY
Status: DISCONTINUED | OUTPATIENT
Start: 2024-09-15 | End: 2024-09-19 | Stop reason: HOSPADM

## 2024-09-15 RX ORDER — ACETAZOLAMIDE 250 MG/1
500 TABLET ORAL DAILY
Status: DISCONTINUED | OUTPATIENT
Start: 2024-09-16 | End: 2024-09-19 | Stop reason: HOSPADM

## 2024-09-15 RX ADMIN — HYDROXYCHLOROQUINE SULFATE 200 MG: 200 TABLET ORAL at 21:03

## 2024-09-15 RX ADMIN — SODIUM CHLORIDE, PRESERVATIVE FREE 10 ML: 5 INJECTION INTRAVENOUS at 07:32

## 2024-09-15 RX ADMIN — CEFTRIAXONE SODIUM 2000 MG: 2 INJECTION, POWDER, FOR SOLUTION INTRAMUSCULAR; INTRAVENOUS at 11:14

## 2024-09-15 RX ADMIN — ENOXAPARIN SODIUM 30 MG: 100 INJECTION SUBCUTANEOUS at 21:03

## 2024-09-15 RX ADMIN — NYSTATIN 500000 UNITS: 100000 SUSPENSION ORAL at 21:03

## 2024-09-15 RX ADMIN — ASPIRIN 81 MG: 81 TABLET, COATED ORAL at 07:30

## 2024-09-15 RX ADMIN — ENOXAPARIN SODIUM 30 MG: 100 INJECTION SUBCUTANEOUS at 07:31

## 2024-09-15 RX ADMIN — VANCOMYCIN HYDROCHLORIDE 1250 MG: 10 INJECTION, POWDER, LYOPHILIZED, FOR SOLUTION INTRAVENOUS at 08:11

## 2024-09-15 RX ADMIN — POTASSIUM CHLORIDE 10 MEQ: 750 TABLET, EXTENDED RELEASE ORAL at 07:31

## 2024-09-15 RX ADMIN — ONDANSETRON 4 MG: 2 INJECTION INTRAMUSCULAR; INTRAVENOUS at 11:10

## 2024-09-15 RX ADMIN — SODIUM CHLORIDE 1 MG: 9 INJECTION INTRAMUSCULAR; INTRAVENOUS; SUBCUTANEOUS at 04:14

## 2024-09-15 RX ADMIN — SODIUM CHLORIDE, PRESERVATIVE FREE 5 ML: 5 INJECTION INTRAVENOUS at 21:13

## 2024-09-15 RX ADMIN — SODIUM CHLORIDE, PRESERVATIVE FREE 10 ML: 5 INJECTION INTRAVENOUS at 21:09

## 2024-09-15 RX ADMIN — HYDROCODONE BITARTRATE AND ACETAMINOPHEN 1 TABLET: 10; 325 TABLET ORAL at 07:30

## 2024-09-15 RX ADMIN — MORPHINE SULFATE 2 MG: 2 INJECTION, SOLUTION INTRAMUSCULAR; INTRAVENOUS at 12:48

## 2024-09-15 RX ADMIN — NYSTATIN 500000 UNITS: 100000 SUSPENSION ORAL at 15:19

## 2024-09-15 RX ADMIN — MORPHINE SULFATE 2 MG: 2 INJECTION, SOLUTION INTRAMUSCULAR; INTRAVENOUS at 11:11

## 2024-09-15 RX ADMIN — ACETAZOLAMIDE 250 MG: 250 TABLET ORAL at 07:31

## 2024-09-15 RX ADMIN — CARBOXYMETHYLCELLULOSE SODIUM 1 DROP: 10 GEL OPHTHALMIC at 21:04

## 2024-09-15 RX ADMIN — METHYLPREDNISOLONE SODIUM SUCCINATE 500 MG: 500 INJECTION INTRAMUSCULAR; INTRAVENOUS at 09:42

## 2024-09-15 RX ADMIN — VANCOMYCIN HYDROCHLORIDE 1250 MG: 10 INJECTION, POWDER, LYOPHILIZED, FOR SOLUTION INTRAVENOUS at 02:03

## 2024-09-15 RX ADMIN — CARBOXYMETHYLCELLULOSE SODIUM 1 DROP: 10 GEL OPHTHALMIC at 07:33

## 2024-09-15 RX ADMIN — SODIUM CHLORIDE, PRESERVATIVE FREE 10 ML: 5 INJECTION INTRAVENOUS at 07:28

## 2024-09-15 RX ADMIN — MORPHINE SULFATE 2 MG: 2 INJECTION, SOLUTION INTRAMUSCULAR; INTRAVENOUS at 09:00

## 2024-09-15 RX ADMIN — IMMUNE GLOBULIN (HUMAN) 25000 MG: 10 INJECTION INTRAVENOUS; SUBCUTANEOUS at 09:01

## 2024-09-15 RX ADMIN — HYDROXYCHLOROQUINE SULFATE 200 MG: 200 TABLET ORAL at 07:31

## 2024-09-15 RX ADMIN — CARBOXYMETHYLCELLULOSE SODIUM 1 DROP: 10 GEL OPHTHALMIC at 15:19

## 2024-09-15 ASSESSMENT — PAIN DESCRIPTION - DESCRIPTORS
DESCRIPTORS: ACHING
DESCRIPTORS: ACHING
DESCRIPTORS: TIGHTNESS
DESCRIPTORS: SHARP;THROBBING

## 2024-09-15 ASSESSMENT — PAIN SCALES - GENERAL
PAINLEVEL_OUTOF10: 0
PAINLEVEL_OUTOF10: 0
PAINLEVEL_OUTOF10: 8
PAINLEVEL_OUTOF10: 0
PAINLEVEL_OUTOF10: 0
PAINLEVEL_OUTOF10: 5
PAINLEVEL_OUTOF10: 10
PAINLEVEL_OUTOF10: 8
PAINLEVEL_OUTOF10: 0

## 2024-09-15 ASSESSMENT — PAIN - FUNCTIONAL ASSESSMENT
PAIN_FUNCTIONAL_ASSESSMENT: PREVENTS OR INTERFERES SOME ACTIVE ACTIVITIES AND ADLS

## 2024-09-15 ASSESSMENT — PAIN DESCRIPTION - FREQUENCY
FREQUENCY: CONTINUOUS
FREQUENCY: CONTINUOUS

## 2024-09-15 ASSESSMENT — PAIN DESCRIPTION - ONSET
ONSET: ON-GOING
ONSET: ON-GOING

## 2024-09-15 ASSESSMENT — PAIN DESCRIPTION - PAIN TYPE
TYPE: ACUTE PAIN
TYPE: ACUTE PAIN

## 2024-09-15 ASSESSMENT — PAIN DESCRIPTION - LOCATION
LOCATION: HEAD

## 2024-09-15 ASSESSMENT — PAIN DESCRIPTION - ORIENTATION
ORIENTATION: ANTERIOR;MID
ORIENTATION: ANTERIOR
ORIENTATION: MID;ANTERIOR
ORIENTATION: MID;ANTERIOR

## 2024-09-16 ENCOUNTER — APPOINTMENT (OUTPATIENT)
Dept: GENERAL RADIOLOGY | Age: 54
DRG: 603 | End: 2024-09-16
Payer: COMMERCIAL

## 2024-09-16 ENCOUNTER — ANESTHESIA EVENT (OUTPATIENT)
Dept: ENDOSCOPY | Age: 54
DRG: 603 | End: 2024-09-16
Payer: COMMERCIAL

## 2024-09-16 ENCOUNTER — ANESTHESIA (OUTPATIENT)
Dept: ENDOSCOPY | Age: 54
DRG: 603 | End: 2024-09-16
Payer: COMMERCIAL

## 2024-09-16 LAB
ACHR AB SER-SCNC: 0.03 NMOL/L (ref 0–0.24)
ANA SER QL: NEGATIVE
ANION GAP SERPL CALC-SCNC: 10 MMOL/L (ref 9–18)
BASOPHILS # BLD: 0 K/UL (ref 0–0.2)
BASOPHILS NFR BLD: 0 % (ref 0–2)
BUN SERPL-MCNC: 21 MG/DL (ref 6–23)
CALCIUM SERPL-MCNC: 9.1 MG/DL (ref 8.8–10.2)
CHLORIDE SERPL-SCNC: 109 MMOL/L (ref 98–107)
CO2 SERPL-SCNC: 24 MMOL/L (ref 20–28)
CREAT SERPL-MCNC: 0.91 MG/DL (ref 0.6–1.1)
CYTOLOGY-NON GYN: NORMAL
DIFFERENTIAL METHOD BLD: ABNORMAL
EKG ATRIAL RATE: 47 BPM
EKG DIAGNOSIS: NORMAL
EKG P AXIS: 35 DEGREES
EKG P-R INTERVAL: 190 MS
EKG Q-T INTERVAL: 528 MS
EKG QRS DURATION: 86 MS
EKG QTC CALCULATION (BAZETT): 467 MS
EKG R AXIS: 30 DEGREES
EKG T AXIS: 137 DEGREES
EKG VENTRICULAR RATE: 47 BPM
EOSINOPHIL # BLD: 0 K/UL (ref 0–0.8)
EOSINOPHIL NFR BLD: 0 % (ref 0.5–7.8)
ERYTHROCYTE [DISTWIDTH] IN BLOOD BY AUTOMATED COUNT: 17.6 % (ref 11.9–14.6)
GLUCOSE BLD STRIP.AUTO-MCNC: 111 MG/DL (ref 65–100)
GLUCOSE BLD STRIP.AUTO-MCNC: 118 MG/DL (ref 65–100)
GLUCOSE BLD STRIP.AUTO-MCNC: 155 MG/DL (ref 65–100)
GLUCOSE BLD STRIP.AUTO-MCNC: 93 MG/DL (ref 65–100)
GLUCOSE SERPL-MCNC: 129 MG/DL (ref 70–99)
HCT VFR BLD AUTO: 52.2 % (ref 35.8–46.3)
HGB BLD-MCNC: 16.1 G/DL (ref 11.7–15.4)
IMM GRANULOCYTES # BLD AUTO: 0.1 K/UL (ref 0–0.5)
IMM GRANULOCYTES NFR BLD AUTO: 1 % (ref 0–5)
LYMPHOCYTES # BLD: 2.5 K/UL (ref 0.5–4.6)
LYMPHOCYTES NFR BLD: 12 % (ref 13–44)
MCH RBC QN AUTO: 25.9 PG (ref 26.1–32.9)
MCHC RBC AUTO-ENTMCNC: 30.8 G/DL (ref 31.4–35)
MCV RBC AUTO: 84.1 FL (ref 82–102)
MONOCYTES # BLD: 1.1 K/UL (ref 0.1–1.3)
MONOCYTES NFR BLD: 5 % (ref 4–12)
NEUTS SEG # BLD: 17 K/UL (ref 1.7–8.2)
NEUTS SEG NFR BLD: 82 % (ref 43–78)
NRBC # BLD: 0 K/UL (ref 0–0.2)
PLATELET # BLD AUTO: 294 K/UL (ref 150–450)
PMV BLD AUTO: 11.6 FL (ref 9.4–12.3)
POTASSIUM SERPL-SCNC: 4.1 MMOL/L (ref 3.5–5.1)
RBC # BLD AUTO: 6.21 M/UL (ref 4.05–5.2)
SERVICE CMNT-IMP: ABNORMAL
SERVICE CMNT-IMP: NORMAL
SODIUM SERPL-SCNC: 142 MMOL/L (ref 136–145)
SPECIMEN SOURCE: NORMAL
WBC # BLD AUTO: 20.7 K/UL (ref 4.3–11.1)

## 2024-09-16 PROCEDURE — 6360000002 HC RX W HCPCS: Performed by: REGISTERED NURSE

## 2024-09-16 PROCEDURE — 3609012400 HC EGD TRANSORAL BIOPSY SINGLE/MULTIPLE: Performed by: INTERNAL MEDICINE

## 2024-09-16 PROCEDURE — 99233 SBSQ HOSP IP/OBS HIGH 50: CPT | Performed by: STUDENT IN AN ORGANIZED HEALTH CARE EDUCATION/TRAINING PROGRAM

## 2024-09-16 PROCEDURE — 7100000011 HC PHASE II RECOVERY - ADDTL 15 MIN: Performed by: INTERNAL MEDICINE

## 2024-09-16 PROCEDURE — 85025 COMPLETE CBC W/AUTO DIFF WBC: CPT

## 2024-09-16 PROCEDURE — 2580000003 HC RX 258: Performed by: STUDENT IN AN ORGANIZED HEALTH CARE EDUCATION/TRAINING PROGRAM

## 2024-09-16 PROCEDURE — 86042 ACETYLCHOLN RCPTR BLCKG ANTB: CPT

## 2024-09-16 PROCEDURE — 2500000003 HC RX 250 WO HCPCS: Performed by: REGISTERED NURSE

## 2024-09-16 PROCEDURE — 0DB68ZX EXCISION OF STOMACH, VIA NATURAL OR ARTIFICIAL OPENING ENDOSCOPIC, DIAGNOSTIC: ICD-10-PCS | Performed by: INTERNAL MEDICINE

## 2024-09-16 PROCEDURE — 6360000002 HC RX W HCPCS: Performed by: PSYCHIATRY & NEUROLOGY

## 2024-09-16 PROCEDURE — 2580000003 HC RX 258: Performed by: ANESTHESIOLOGY

## 2024-09-16 PROCEDURE — 3700000001 HC ADD 15 MINUTES (ANESTHESIA): Performed by: INTERNAL MEDICINE

## 2024-09-16 PROCEDURE — 82962 GLUCOSE BLOOD TEST: CPT

## 2024-09-16 PROCEDURE — 36415 COLL VENOUS BLD VENIPUNCTURE: CPT

## 2024-09-16 PROCEDURE — 86366 MUSCLE-SPECIFIC KINASE ANTB: CPT

## 2024-09-16 PROCEDURE — 6370000000 HC RX 637 (ALT 250 FOR IP): Performed by: FAMILY MEDICINE

## 2024-09-16 PROCEDURE — 2580000003 HC RX 258: Performed by: PSYCHIATRY & NEUROLOGY

## 2024-09-16 PROCEDURE — 2580000003 HC RX 258: Performed by: FAMILY MEDICINE

## 2024-09-16 PROCEDURE — 93010 ELECTROCARDIOGRAM REPORT: CPT | Performed by: INTERNAL MEDICINE

## 2024-09-16 PROCEDURE — 80048 BASIC METABOLIC PNL TOTAL CA: CPT

## 2024-09-16 PROCEDURE — 88305 TISSUE EXAM BY PATHOLOGIST: CPT

## 2024-09-16 PROCEDURE — 1100000003 HC PRIVATE W/ TELEMETRY

## 2024-09-16 PROCEDURE — 7100000010 HC PHASE II RECOVERY - FIRST 15 MIN: Performed by: INTERNAL MEDICINE

## 2024-09-16 PROCEDURE — 94660 CPAP INITIATION&MGMT: CPT

## 2024-09-16 PROCEDURE — 2709999900 HC NON-CHARGEABLE SUPPLY: Performed by: INTERNAL MEDICINE

## 2024-09-16 PROCEDURE — 43239 EGD BIOPSY SINGLE/MULTIPLE: CPT | Performed by: INTERNAL MEDICINE

## 2024-09-16 PROCEDURE — 88312 SPECIAL STAINS GROUP 1: CPT

## 2024-09-16 PROCEDURE — 86043 ACETYLCHOLN RCPTR MODLG ANTB: CPT

## 2024-09-16 PROCEDURE — 86041 ACETYLCHOLN RCPTR BNDNG ANTB: CPT

## 2024-09-16 PROCEDURE — 84446 ASSAY OF VITAMIN E: CPT

## 2024-09-16 PROCEDURE — 6370000000 HC RX 637 (ALT 250 FOR IP): Performed by: PSYCHIATRY & NEUROLOGY

## 2024-09-16 PROCEDURE — 6370000000 HC RX 637 (ALT 250 FOR IP): Performed by: STUDENT IN AN ORGANIZED HEALTH CARE EDUCATION/TRAINING PROGRAM

## 2024-09-16 PROCEDURE — 6360000002 HC RX W HCPCS: Performed by: FAMILY MEDICINE

## 2024-09-16 PROCEDURE — 51702 INSERT TEMP BLADDER CATH: CPT

## 2024-09-16 PROCEDURE — 6360000002 HC RX W HCPCS: Performed by: STUDENT IN AN ORGANIZED HEALTH CARE EDUCATION/TRAINING PROGRAM

## 2024-09-16 PROCEDURE — 2700000000 HC OXYGEN THERAPY PER DAY

## 2024-09-16 PROCEDURE — 86255 FLUORESCENT ANTIBODY SCREEN: CPT

## 2024-09-16 PROCEDURE — 3700000000 HC ANESTHESIA ATTENDED CARE: Performed by: INTERNAL MEDICINE

## 2024-09-16 RX ORDER — SODIUM CHLORIDE, SODIUM LACTATE, POTASSIUM CHLORIDE, CALCIUM CHLORIDE 600; 310; 30; 20 MG/100ML; MG/100ML; MG/100ML; MG/100ML
INJECTION, SOLUTION INTRAVENOUS CONTINUOUS PRN
Status: COMPLETED | OUTPATIENT
Start: 2024-09-16 | End: 2024-09-16

## 2024-09-16 RX ORDER — DIPHENHYDRAMINE HYDROCHLORIDE 50 MG/ML
12.5 INJECTION INTRAMUSCULAR; INTRAVENOUS
Status: CANCELLED | OUTPATIENT
Start: 2024-09-16 | End: 2024-09-17

## 2024-09-16 RX ORDER — PROPOFOL 10 MG/ML
INJECTION, EMULSION INTRAVENOUS
Status: DISCONTINUED | OUTPATIENT
Start: 2024-09-16 | End: 2024-09-16 | Stop reason: SDUPTHER

## 2024-09-16 RX ORDER — SODIUM CHLORIDE 9 MG/ML
INJECTION, SOLUTION INTRAVENOUS CONTINUOUS
Status: DISCONTINUED | OUTPATIENT
Start: 2024-09-16 | End: 2024-09-19 | Stop reason: HOSPADM

## 2024-09-16 RX ORDER — SODIUM CHLORIDE 0.9 % (FLUSH) 0.9 %
5-40 SYRINGE (ML) INJECTION EVERY 12 HOURS SCHEDULED
Status: CANCELLED | OUTPATIENT
Start: 2024-09-16

## 2024-09-16 RX ORDER — FENTANYL CITRATE 50 UG/ML
25 INJECTION, SOLUTION INTRAMUSCULAR; INTRAVENOUS EVERY 5 MIN PRN
Status: CANCELLED | OUTPATIENT
Start: 2024-09-16

## 2024-09-16 RX ORDER — ONDANSETRON 2 MG/ML
4 INJECTION INTRAMUSCULAR; INTRAVENOUS
Status: CANCELLED | OUTPATIENT
Start: 2024-09-16 | End: 2024-09-17

## 2024-09-16 RX ORDER — SODIUM CHLORIDE 0.9 % (FLUSH) 0.9 %
5-40 SYRINGE (ML) INJECTION PRN
Status: CANCELLED | OUTPATIENT
Start: 2024-09-16

## 2024-09-16 RX ORDER — METOCLOPRAMIDE HYDROCHLORIDE 5 MG/ML
10 INJECTION INTRAMUSCULAR; INTRAVENOUS
Status: CANCELLED | OUTPATIENT
Start: 2024-09-16 | End: 2024-09-17

## 2024-09-16 RX ORDER — SODIUM CHLORIDE 9 MG/ML
INJECTION, SOLUTION INTRAVENOUS PRN
Status: CANCELLED | OUTPATIENT
Start: 2024-09-16

## 2024-09-16 RX ORDER — LIDOCAINE HYDROCHLORIDE 20 MG/ML
INJECTION, SOLUTION EPIDURAL; INFILTRATION; INTRACAUDAL; PERINEURAL
Status: DISCONTINUED | OUTPATIENT
Start: 2024-09-16 | End: 2024-09-16 | Stop reason: SDUPTHER

## 2024-09-16 RX ORDER — SODIUM CHLORIDE, SODIUM LACTATE, POTASSIUM CHLORIDE, CALCIUM CHLORIDE 600; 310; 30; 20 MG/100ML; MG/100ML; MG/100ML; MG/100ML
INJECTION, SOLUTION INTRAVENOUS CONTINUOUS
Status: CANCELLED | OUTPATIENT
Start: 2024-09-16

## 2024-09-16 RX ORDER — HYDROMORPHONE HYDROCHLORIDE 2 MG/ML
0.5 INJECTION, SOLUTION INTRAMUSCULAR; INTRAVENOUS; SUBCUTANEOUS EVERY 10 MIN PRN
Status: CANCELLED | OUTPATIENT
Start: 2024-09-16

## 2024-09-16 RX ORDER — FENTANYL CITRATE 50 UG/ML
25 INJECTION, SOLUTION INTRAMUSCULAR; INTRAVENOUS
Status: CANCELLED | OUTPATIENT
Start: 2024-09-16 | End: 2024-09-17

## 2024-09-16 RX ORDER — GLYCOPYRROLATE 0.2 MG/ML
INJECTION INTRAMUSCULAR; INTRAVENOUS
Status: DISCONTINUED | OUTPATIENT
Start: 2024-09-16 | End: 2024-09-16 | Stop reason: SDUPTHER

## 2024-09-16 RX ORDER — OXYCODONE HYDROCHLORIDE 5 MG/1
5 TABLET ORAL
Status: CANCELLED | OUTPATIENT
Start: 2024-09-16 | End: 2024-09-17

## 2024-09-16 RX ORDER — LIDOCAINE HYDROCHLORIDE 10 MG/ML
1 INJECTION, SOLUTION INFILTRATION; PERINEURAL
Status: CANCELLED | OUTPATIENT
Start: 2024-09-16 | End: 2024-09-17

## 2024-09-16 RX ORDER — FENTANYL CITRATE 50 UG/ML
100 INJECTION, SOLUTION INTRAMUSCULAR; INTRAVENOUS
Status: CANCELLED | OUTPATIENT
Start: 2024-09-16 | End: 2024-09-17

## 2024-09-16 RX ORDER — NALOXONE HYDROCHLORIDE 0.4 MG/ML
INJECTION, SOLUTION INTRAMUSCULAR; INTRAVENOUS; SUBCUTANEOUS PRN
Status: CANCELLED | OUTPATIENT
Start: 2024-09-16

## 2024-09-16 RX ADMIN — HYDROCODONE BITARTRATE AND ACETAMINOPHEN 1 TABLET: 10; 325 TABLET ORAL at 08:05

## 2024-09-16 RX ADMIN — SODIUM CHLORIDE: 9 INJECTION, SOLUTION INTRAVENOUS at 09:32

## 2024-09-16 RX ADMIN — CARBOXYMETHYLCELLULOSE SODIUM 1 DROP: 10 GEL OPHTHALMIC at 15:45

## 2024-09-16 RX ADMIN — PROPOFOL 180 MCG/KG/MIN: 10 INJECTION, EMULSION INTRAVENOUS at 12:36

## 2024-09-16 RX ADMIN — METHYLPREDNISOLONE SODIUM SUCCINATE 500 MG: 500 INJECTION INTRAMUSCULAR; INTRAVENOUS at 15:49

## 2024-09-16 RX ADMIN — SODIUM CHLORIDE: 9 INJECTION, SOLUTION INTRAVENOUS at 15:48

## 2024-09-16 RX ADMIN — CARBOXYMETHYLCELLULOSE SODIUM 1 DROP: 10 GEL OPHTHALMIC at 11:08

## 2024-09-16 RX ADMIN — SODIUM CHLORIDE, PRESERVATIVE FREE 10 ML: 5 INJECTION INTRAVENOUS at 20:34

## 2024-09-16 RX ADMIN — CEFTRIAXONE SODIUM 2000 MG: 2 INJECTION, POWDER, FOR SOLUTION INTRAMUSCULAR; INTRAVENOUS at 16:51

## 2024-09-16 RX ADMIN — ASPIRIN 81 MG: 81 TABLET, COATED ORAL at 09:19

## 2024-09-16 RX ADMIN — CARBOXYMETHYLCELLULOSE SODIUM 1 DROP: 10 GEL OPHTHALMIC at 14:13

## 2024-09-16 RX ADMIN — PROPOFOL 50 MG: 10 INJECTION, EMULSION INTRAVENOUS at 12:35

## 2024-09-16 RX ADMIN — POTASSIUM CHLORIDE 10 MEQ: 750 TABLET, EXTENDED RELEASE ORAL at 09:20

## 2024-09-16 RX ADMIN — ONDANSETRON 4 MG: 2 INJECTION INTRAMUSCULAR; INTRAVENOUS at 08:05

## 2024-09-16 RX ADMIN — NYSTATIN 500000 UNITS: 100000 SUSPENSION ORAL at 20:34

## 2024-09-16 RX ADMIN — HYDROXYCHLOROQUINE SULFATE 200 MG: 200 TABLET ORAL at 09:20

## 2024-09-16 RX ADMIN — SODIUM CHLORIDE, PRESERVATIVE FREE 40 MG: 5 INJECTION INTRAVENOUS at 16:44

## 2024-09-16 RX ADMIN — SODIUM CHLORIDE, PRESERVATIVE FREE 10 ML: 5 INJECTION INTRAVENOUS at 09:19

## 2024-09-16 RX ADMIN — NYSTATIN 500000 UNITS: 100000 SUSPENSION ORAL at 14:13

## 2024-09-16 RX ADMIN — LIDOCAINE HYDROCHLORIDE 40 MG: 20 INJECTION, SOLUTION EPIDURAL; INFILTRATION; INTRACAUDAL; PERINEURAL at 12:35

## 2024-09-16 RX ADMIN — HYDROXYCHLOROQUINE SULFATE 200 MG: 200 TABLET ORAL at 20:33

## 2024-09-16 RX ADMIN — SODIUM CHLORIDE, PRESERVATIVE FREE 10 ML: 5 INJECTION INTRAVENOUS at 20:40

## 2024-09-16 RX ADMIN — CARBOXYMETHYLCELLULOSE SODIUM 1 DROP: 10 GEL OPHTHALMIC at 20:34

## 2024-09-16 RX ADMIN — IMMUNE GLOBULIN (HUMAN) 25000 MG: 10 INJECTION INTRAVENOUS; SUBCUTANEOUS at 11:04

## 2024-09-16 RX ADMIN — GLYCOPYRROLATE 0.2 MG: 0.2 INJECTION INTRAMUSCULAR; INTRAVENOUS at 12:33

## 2024-09-16 RX ADMIN — ENOXAPARIN SODIUM 30 MG: 100 INJECTION SUBCUTANEOUS at 20:33

## 2024-09-16 RX ADMIN — ACETAZOLAMIDE 500 MG: 250 TABLET ORAL at 09:20

## 2024-09-16 ASSESSMENT — PAIN DESCRIPTION - ORIENTATION: ORIENTATION: LEFT;RIGHT

## 2024-09-16 ASSESSMENT — PAIN SCALES - GENERAL
PAINLEVEL_OUTOF10: 0
PAINLEVEL_OUTOF10: 7

## 2024-09-16 ASSESSMENT — PAIN - FUNCTIONAL ASSESSMENT
PAIN_FUNCTIONAL_ASSESSMENT: NONE - DENIES PAIN
PAIN_FUNCTIONAL_ASSESSMENT: 0-10
PAIN_FUNCTIONAL_ASSESSMENT: NONE - DENIES PAIN
PAIN_FUNCTIONAL_ASSESSMENT: NONE - DENIES PAIN

## 2024-09-16 ASSESSMENT — PAIN DESCRIPTION - LOCATION: LOCATION: HEAD

## 2024-09-16 ASSESSMENT — PAIN DESCRIPTION - DESCRIPTORS: DESCRIPTORS: ACHING

## 2024-09-17 ENCOUNTER — TELEPHONE (OUTPATIENT)
Dept: RHEUMATOLOGY | Age: 54
End: 2024-09-17

## 2024-09-17 ENCOUNTER — APPOINTMENT (OUTPATIENT)
Dept: GENERAL RADIOLOGY | Age: 54
DRG: 603 | End: 2024-09-17
Payer: COMMERCIAL

## 2024-09-17 LAB
ANION GAP SERPL CALC-SCNC: 11 MMOL/L (ref 9–18)
BASOPHILS # BLD: 0 K/UL (ref 0–0.2)
BASOPHILS NFR BLD: 0 % (ref 0–2)
BUN SERPL-MCNC: 21 MG/DL (ref 6–23)
CALCIUM SERPL-MCNC: 8.5 MG/DL (ref 8.8–10.2)
CHLORIDE SERPL-SCNC: 108 MMOL/L (ref 98–107)
CO2 SERPL-SCNC: 20 MMOL/L (ref 20–28)
CREAT SERPL-MCNC: 0.75 MG/DL (ref 0.6–1.1)
DIFFERENTIAL METHOD BLD: ABNORMAL
EOSINOPHIL # BLD: 0 K/UL (ref 0–0.8)
EOSINOPHIL NFR BLD: 0 % (ref 0.5–7.8)
ERYTHROCYTE [DISTWIDTH] IN BLOOD BY AUTOMATED COUNT: 15.8 % (ref 11.9–14.6)
GLUCOSE BLD STRIP.AUTO-MCNC: 134 MG/DL (ref 65–100)
GLUCOSE BLD STRIP.AUTO-MCNC: 143 MG/DL (ref 65–100)
GLUCOSE BLD STRIP.AUTO-MCNC: 184 MG/DL (ref 65–100)
GLUCOSE BLD STRIP.AUTO-MCNC: 193 MG/DL (ref 65–100)
GLUCOSE SERPL-MCNC: 175 MG/DL (ref 70–99)
HCT VFR BLD AUTO: 42.5 % (ref 35.8–46.3)
HGB BLD-MCNC: 13.2 G/DL (ref 11.7–15.4)
IMM GRANULOCYTES # BLD AUTO: 0.1 K/UL (ref 0–0.5)
IMM GRANULOCYTES NFR BLD AUTO: 1 % (ref 0–5)
INTERLEUKIN 2 RECEPTOR ALPHA CHAIN: 187 U/ML (ref 223–710)
LYMPHOCYTES # BLD: 1.1 K/UL (ref 0.5–4.6)
LYMPHOCYTES NFR BLD: 10 % (ref 13–44)
MCH RBC QN AUTO: 25.8 PG (ref 26.1–32.9)
MCHC RBC AUTO-ENTMCNC: 31.1 G/DL (ref 31.4–35)
MCV RBC AUTO: 83.2 FL (ref 82–102)
MONOCYTES # BLD: 0.2 K/UL (ref 0.1–1.3)
MONOCYTES NFR BLD: 1 % (ref 4–12)
NEUTS SEG # BLD: 10.1 K/UL (ref 1.7–8.2)
NEUTS SEG NFR BLD: 88 % (ref 43–78)
NRBC # BLD: 0 K/UL (ref 0–0.2)
PLATELET # BLD AUTO: 234 K/UL (ref 150–450)
PMV BLD AUTO: 11.4 FL (ref 9.4–12.3)
POTASSIUM SERPL-SCNC: 3.8 MMOL/L (ref 3.5–5.1)
RBC # BLD AUTO: 5.11 M/UL (ref 4.05–5.2)
SERVICE CMNT-IMP: ABNORMAL
SODIUM SERPL-SCNC: 138 MMOL/L (ref 136–145)
WBC # BLD AUTO: 11.5 K/UL (ref 4.3–11.1)

## 2024-09-17 PROCEDURE — 94760 N-INVAS EAR/PLS OXIMETRY 1: CPT

## 2024-09-17 PROCEDURE — 6370000000 HC RX 637 (ALT 250 FOR IP): Performed by: PSYCHIATRY & NEUROLOGY

## 2024-09-17 PROCEDURE — 97162 PT EVAL MOD COMPLEX 30 MIN: CPT

## 2024-09-17 PROCEDURE — 6360000002 HC RX W HCPCS: Performed by: STUDENT IN AN ORGANIZED HEALTH CARE EDUCATION/TRAINING PROGRAM

## 2024-09-17 PROCEDURE — 6360000002 HC RX W HCPCS: Performed by: PSYCHIATRY & NEUROLOGY

## 2024-09-17 PROCEDURE — 92611 MOTION FLUOROSCOPY/SWALLOW: CPT

## 2024-09-17 PROCEDURE — 99232 SBSQ HOSP IP/OBS MODERATE 35: CPT | Performed by: STUDENT IN AN ORGANIZED HEALTH CARE EDUCATION/TRAINING PROGRAM

## 2024-09-17 PROCEDURE — 6370000000 HC RX 637 (ALT 250 FOR IP): Performed by: FAMILY MEDICINE

## 2024-09-17 PROCEDURE — 83520 IMMUNOASSAY QUANT NOS NONAB: CPT

## 2024-09-17 PROCEDURE — 94660 CPAP INITIATION&MGMT: CPT

## 2024-09-17 PROCEDURE — 2500000003 HC RX 250 WO HCPCS: Performed by: STUDENT IN AN ORGANIZED HEALTH CARE EDUCATION/TRAINING PROGRAM

## 2024-09-17 PROCEDURE — 99222 1ST HOSP IP/OBS MODERATE 55: CPT | Performed by: PHYSICAL MEDICINE & REHABILITATION

## 2024-09-17 PROCEDURE — 97112 NEUROMUSCULAR REEDUCATION: CPT

## 2024-09-17 PROCEDURE — 2580000003 HC RX 258: Performed by: STUDENT IN AN ORGANIZED HEALTH CARE EDUCATION/TRAINING PROGRAM

## 2024-09-17 PROCEDURE — 85025 COMPLETE CBC W/AUTO DIFF WBC: CPT

## 2024-09-17 PROCEDURE — 36415 COLL VENOUS BLD VENIPUNCTURE: CPT

## 2024-09-17 PROCEDURE — 2500000003 HC RX 250 WO HCPCS: Performed by: INTERNAL MEDICINE

## 2024-09-17 PROCEDURE — 74230 X-RAY XM SWLNG FUNCJ C+: CPT

## 2024-09-17 PROCEDURE — 97530 THERAPEUTIC ACTIVITIES: CPT

## 2024-09-17 PROCEDURE — 1100000003 HC PRIVATE W/ TELEMETRY

## 2024-09-17 PROCEDURE — 97535 SELF CARE MNGMENT TRAINING: CPT

## 2024-09-17 PROCEDURE — 80048 BASIC METABOLIC PNL TOTAL CA: CPT

## 2024-09-17 PROCEDURE — 97165 OT EVAL LOW COMPLEX 30 MIN: CPT

## 2024-09-17 PROCEDURE — 6370000000 HC RX 637 (ALT 250 FOR IP): Performed by: STUDENT IN AN ORGANIZED HEALTH CARE EDUCATION/TRAINING PROGRAM

## 2024-09-17 PROCEDURE — 2580000003 HC RX 258: Performed by: FAMILY MEDICINE

## 2024-09-17 PROCEDURE — 82962 GLUCOSE BLOOD TEST: CPT

## 2024-09-17 PROCEDURE — 51702 INSERT TEMP BLADDER CATH: CPT

## 2024-09-17 RX ADMIN — POTASSIUM CHLORIDE 10 MEQ: 750 TABLET, EXTENDED RELEASE ORAL at 11:26

## 2024-09-17 RX ADMIN — NYSTATIN 500000 UNITS: 100000 SUSPENSION ORAL at 20:41

## 2024-09-17 RX ADMIN — CARBOXYMETHYLCELLULOSE SODIUM 1 DROP: 10 GEL OPHTHALMIC at 12:44

## 2024-09-17 RX ADMIN — NYSTATIN 500000 UNITS: 100000 SUSPENSION ORAL at 18:26

## 2024-09-17 RX ADMIN — SODIUM CHLORIDE, PRESERVATIVE FREE 40 MG: 5 INJECTION INTRAVENOUS at 04:35

## 2024-09-17 RX ADMIN — HYDROXYCHLOROQUINE SULFATE 200 MG: 200 TABLET ORAL at 20:40

## 2024-09-17 RX ADMIN — HYDROXYCHLOROQUINE SULFATE 200 MG: 200 TABLET ORAL at 11:26

## 2024-09-17 RX ADMIN — ENOXAPARIN SODIUM 30 MG: 100 INJECTION SUBCUTANEOUS at 11:26

## 2024-09-17 RX ADMIN — BARIUM SULFATE 30 ML: 0.81 POWDER, FOR SUSPENSION ORAL at 10:12

## 2024-09-17 RX ADMIN — ASPIRIN 81 MG: 81 TABLET, COATED ORAL at 11:26

## 2024-09-17 RX ADMIN — ENOXAPARIN SODIUM 30 MG: 100 INJECTION SUBCUTANEOUS at 20:41

## 2024-09-17 RX ADMIN — CARBOXYMETHYLCELLULOSE SODIUM 1 DROP: 10 GEL OPHTHALMIC at 20:43

## 2024-09-17 RX ADMIN — METHYLPREDNISOLONE SODIUM SUCCINATE 500 MG: 500 INJECTION INTRAMUSCULAR; INTRAVENOUS at 11:37

## 2024-09-17 RX ADMIN — SODIUM CHLORIDE, PRESERVATIVE FREE 10 ML: 5 INJECTION INTRAVENOUS at 11:36

## 2024-09-17 RX ADMIN — SODIUM CHLORIDE: 9 INJECTION, SOLUTION INTRAVENOUS at 20:49

## 2024-09-17 RX ADMIN — BARIUM SULFATE 30 ML: 400 PASTE ORAL at 10:13

## 2024-09-17 RX ADMIN — MORPHINE SULFATE 2 MG: 2 INJECTION, SOLUTION INTRAMUSCULAR; INTRAVENOUS at 04:31

## 2024-09-17 RX ADMIN — HYDROCODONE BITARTRATE AND ACETAMINOPHEN 1 TABLET: 10; 325 TABLET ORAL at 01:43

## 2024-09-17 RX ADMIN — NYSTATIN 500000 UNITS: 100000 SUSPENSION ORAL at 11:27

## 2024-09-17 RX ADMIN — CEFTRIAXONE SODIUM 2000 MG: 2 INJECTION, POWDER, FOR SOLUTION INTRAMUSCULAR; INTRAVENOUS at 12:42

## 2024-09-17 RX ADMIN — SODIUM CHLORIDE, PRESERVATIVE FREE 40 MG: 5 INJECTION INTRAVENOUS at 18:26

## 2024-09-17 RX ADMIN — CARBOXYMETHYLCELLULOSE SODIUM 1 DROP: 10 GEL OPHTHALMIC at 18:42

## 2024-09-17 RX ADMIN — ACETAZOLAMIDE 500 MG: 250 TABLET ORAL at 11:26

## 2024-09-17 RX ADMIN — SODIUM CHLORIDE: 9 INJECTION, SOLUTION INTRAVENOUS at 04:41

## 2024-09-17 RX ADMIN — SODIUM CHLORIDE, PRESERVATIVE FREE 10 ML: 5 INJECTION INTRAVENOUS at 20:49

## 2024-09-17 RX ADMIN — SODIUM CHLORIDE, PRESERVATIVE FREE 10 ML: 5 INJECTION INTRAVENOUS at 20:41

## 2024-09-17 RX ADMIN — IMMUNE GLOBULIN (HUMAN) 25000 MG: 10 INJECTION INTRAVENOUS; SUBCUTANEOUS at 14:24

## 2024-09-17 ASSESSMENT — PAIN SCALES - GENERAL
PAINLEVEL_OUTOF10: 0
PAINLEVEL_OUTOF10: 7
PAINLEVEL_OUTOF10: 10
PAINLEVEL_OUTOF10: 0
PAINLEVEL_OUTOF10: 5
PAINLEVEL_OUTOF10: 7
PAINLEVEL_OUTOF10: 0

## 2024-09-17 ASSESSMENT — PAIN DESCRIPTION - ORIENTATION
ORIENTATION: RIGHT;LEFT
ORIENTATION: ANTERIOR
ORIENTATION: ANTERIOR

## 2024-09-17 ASSESSMENT — PAIN DESCRIPTION - ONSET
ONSET: AWAKENED FROM SLEEP
ONSET: AWAKENED FROM SLEEP

## 2024-09-17 ASSESSMENT — PAIN DESCRIPTION - LOCATION
LOCATION: HEAD
LOCATION: HEAD;EYE
LOCATION: HEAD

## 2024-09-17 ASSESSMENT — PAIN DESCRIPTION - FREQUENCY
FREQUENCY: CONTINUOUS
FREQUENCY: CONTINUOUS

## 2024-09-17 ASSESSMENT — PAIN DESCRIPTION - DESCRIPTORS
DESCRIPTORS: PRESSURE
DESCRIPTORS: THROBBING
DESCRIPTORS: ACHING;THROBBING
DESCRIPTORS: THROBBING
DESCRIPTORS: PRESSURE

## 2024-09-17 ASSESSMENT — PAIN DESCRIPTION - PAIN TYPE
TYPE: ACUTE PAIN
TYPE: ACUTE PAIN

## 2024-09-18 PROBLEM — I89.8 CALCIFIED LYMPH NODES: Status: ACTIVE | Noted: 2024-09-18

## 2024-09-18 LAB
ACHR AB SER-SCNC: <0.03 NMOL/L (ref 0–0.24)
ACHR BLOCK AB SER-ACNC: 15 % (ref 0–25)
ACHR MODULATING AB: NORMAL %
GLUCOSE BLD STRIP.AUTO-MCNC: 135 MG/DL (ref 65–100)
GLUCOSE BLD STRIP.AUTO-MCNC: 138 MG/DL (ref 65–100)
GLUCOSE BLD STRIP.AUTO-MCNC: 178 MG/DL (ref 65–100)
GLUCOSE BLD STRIP.AUTO-MCNC: 204 MG/DL (ref 65–100)
SERVICE CMNT-IMP: ABNORMAL
STRIA MUS AB TITR SER IF: NEGATIVE

## 2024-09-18 PROCEDURE — 6360000002 HC RX W HCPCS: Performed by: PSYCHIATRY & NEUROLOGY

## 2024-09-18 PROCEDURE — 97535 SELF CARE MNGMENT TRAINING: CPT

## 2024-09-18 PROCEDURE — 92526 ORAL FUNCTION THERAPY: CPT

## 2024-09-18 PROCEDURE — 99232 SBSQ HOSP IP/OBS MODERATE 35: CPT | Performed by: STUDENT IN AN ORGANIZED HEALTH CARE EDUCATION/TRAINING PROGRAM

## 2024-09-18 PROCEDURE — 99232 SBSQ HOSP IP/OBS MODERATE 35: CPT | Performed by: PHYSICAL MEDICINE & REHABILITATION

## 2024-09-18 PROCEDURE — 6370000000 HC RX 637 (ALT 250 FOR IP): Performed by: PSYCHIATRY & NEUROLOGY

## 2024-09-18 PROCEDURE — 2580000003 HC RX 258: Performed by: FAMILY MEDICINE

## 2024-09-18 PROCEDURE — 1100000003 HC PRIVATE W/ TELEMETRY

## 2024-09-18 PROCEDURE — 6370000000 HC RX 637 (ALT 250 FOR IP): Performed by: FAMILY MEDICINE

## 2024-09-18 PROCEDURE — 97530 THERAPEUTIC ACTIVITIES: CPT

## 2024-09-18 PROCEDURE — 97112 NEUROMUSCULAR REEDUCATION: CPT

## 2024-09-18 PROCEDURE — 6370000000 HC RX 637 (ALT 250 FOR IP): Performed by: STUDENT IN AN ORGANIZED HEALTH CARE EDUCATION/TRAINING PROGRAM

## 2024-09-18 PROCEDURE — 6360000002 HC RX W HCPCS: Performed by: STUDENT IN AN ORGANIZED HEALTH CARE EDUCATION/TRAINING PROGRAM

## 2024-09-18 PROCEDURE — 82962 GLUCOSE BLOOD TEST: CPT

## 2024-09-18 PROCEDURE — 2580000003 HC RX 258: Performed by: STUDENT IN AN ORGANIZED HEALTH CARE EDUCATION/TRAINING PROGRAM

## 2024-09-18 PROCEDURE — 51702 INSERT TEMP BLADDER CATH: CPT

## 2024-09-18 RX ADMIN — SODIUM CHLORIDE, PRESERVATIVE FREE 10 ML: 5 INJECTION INTRAVENOUS at 09:45

## 2024-09-18 RX ADMIN — ACETAMINOPHEN 650 MG: 325 TABLET ORAL at 10:24

## 2024-09-18 RX ADMIN — ENOXAPARIN SODIUM 30 MG: 100 INJECTION SUBCUTANEOUS at 20:54

## 2024-09-18 RX ADMIN — CEFTRIAXONE SODIUM 2000 MG: 2 INJECTION, POWDER, FOR SOLUTION INTRAMUSCULAR; INTRAVENOUS at 11:54

## 2024-09-18 RX ADMIN — PREDNISONE 80 MG: 50 TABLET ORAL at 09:43

## 2024-09-18 RX ADMIN — ACETAZOLAMIDE 500 MG: 250 TABLET ORAL at 09:44

## 2024-09-18 RX ADMIN — SODIUM CHLORIDE, PRESERVATIVE FREE 40 MG: 5 INJECTION INTRAVENOUS at 05:48

## 2024-09-18 RX ADMIN — POTASSIUM CHLORIDE 10 MEQ: 750 TABLET, EXTENDED RELEASE ORAL at 09:43

## 2024-09-18 RX ADMIN — CARBOXYMETHYLCELLULOSE SODIUM 1 DROP: 10 GEL OPHTHALMIC at 09:59

## 2024-09-18 RX ADMIN — ASPIRIN 81 MG: 81 TABLET, COATED ORAL at 09:44

## 2024-09-18 RX ADMIN — CARBOXYMETHYLCELLULOSE SODIUM 1 DROP: 10 GEL OPHTHALMIC at 17:57

## 2024-09-18 RX ADMIN — NYSTATIN 500000 UNITS: 100000 SUSPENSION ORAL at 09:44

## 2024-09-18 RX ADMIN — SODIUM CHLORIDE, PRESERVATIVE FREE 10 ML: 5 INJECTION INTRAVENOUS at 21:02

## 2024-09-18 RX ADMIN — SODIUM CHLORIDE, PRESERVATIVE FREE 40 MG: 5 INJECTION INTRAVENOUS at 17:56

## 2024-09-18 RX ADMIN — HYDROXYCHLOROQUINE SULFATE 200 MG: 200 TABLET ORAL at 09:44

## 2024-09-18 RX ADMIN — ENOXAPARIN SODIUM 30 MG: 100 INJECTION SUBCUTANEOUS at 09:44

## 2024-09-18 RX ADMIN — NYSTATIN 500000 UNITS: 100000 SUSPENSION ORAL at 20:54

## 2024-09-18 RX ADMIN — CARBOXYMETHYLCELLULOSE SODIUM 1 DROP: 10 GEL OPHTHALMIC at 11:54

## 2024-09-18 RX ADMIN — HYDROXYCHLOROQUINE SULFATE 200 MG: 200 TABLET ORAL at 20:54

## 2024-09-18 RX ADMIN — CARBOXYMETHYLCELLULOSE SODIUM 1 DROP: 10 GEL OPHTHALMIC at 20:54

## 2024-09-18 RX ADMIN — IMMUNE GLOBULIN (HUMAN) 25000 MG: 10 INJECTION INTRAVENOUS; SUBCUTANEOUS at 10:16

## 2024-09-18 RX ADMIN — SODIUM CHLORIDE, PRESERVATIVE FREE 10 ML: 5 INJECTION INTRAVENOUS at 20:55

## 2024-09-18 RX ADMIN — HYDROCODONE BITARTRATE AND ACETAMINOPHEN 1 TABLET: 10; 325 TABLET ORAL at 15:53

## 2024-09-18 ASSESSMENT — PAIN SCALES - GENERAL
PAINLEVEL_OUTOF10: 5
PAINLEVEL_OUTOF10: 3
PAINLEVEL_OUTOF10: 0
PAINLEVEL_OUTOF10: 7
PAINLEVEL_OUTOF10: 2

## 2024-09-18 ASSESSMENT — PAIN DESCRIPTION - LOCATION
LOCATION: HEAD;EYE
LOCATION: HEAD

## 2024-09-18 ASSESSMENT — PAIN DESCRIPTION - ORIENTATION
ORIENTATION: ANTERIOR
ORIENTATION: ANTERIOR

## 2024-09-18 ASSESSMENT — PAIN DESCRIPTION - DESCRIPTORS
DESCRIPTORS: ACHING;PRESSURE
DESCRIPTORS: PRESSURE

## 2024-09-19 ENCOUNTER — TELEPHONE (OUTPATIENT)
Dept: ORTHOPEDIC SURGERY | Age: 54
End: 2024-09-19

## 2024-09-19 ENCOUNTER — TELEPHONE (OUTPATIENT)
Dept: PULMONOLOGY | Age: 54
End: 2024-09-19

## 2024-09-19 VITALS
HEART RATE: 51 BPM | OXYGEN SATURATION: 97 % | BODY MASS INDEX: 43.71 KG/M2 | HEIGHT: 67 IN | DIASTOLIC BLOOD PRESSURE: 75 MMHG | TEMPERATURE: 98.1 F | RESPIRATION RATE: 16 BRPM | WEIGHT: 278.5 LBS | SYSTOLIC BLOOD PRESSURE: 118 MMHG

## 2024-09-19 PROBLEM — L03.211 FACIAL CELLULITIS: Status: RESOLVED | Noted: 2024-09-12 | Resolved: 2024-09-19

## 2024-09-19 PROBLEM — R53.81 DEBILITY: Status: ACTIVE | Noted: 2024-09-19

## 2024-09-19 LAB
BACTERIA SPEC CULT: NORMAL
GLUCOSE BLD STRIP.AUTO-MCNC: 121 MG/DL (ref 65–100)
GRAM STN SPEC: NORMAL
GRAM STN SPEC: NORMAL
SERVICE CMNT-IMP: ABNORMAL
SERVICE CMNT-IMP: NORMAL

## 2024-09-19 PROCEDURE — 6370000000 HC RX 637 (ALT 250 FOR IP): Performed by: PSYCHIATRY & NEUROLOGY

## 2024-09-19 PROCEDURE — 2580000003 HC RX 258: Performed by: STUDENT IN AN ORGANIZED HEALTH CARE EDUCATION/TRAINING PROGRAM

## 2024-09-19 PROCEDURE — 6360000002 HC RX W HCPCS: Performed by: STUDENT IN AN ORGANIZED HEALTH CARE EDUCATION/TRAINING PROGRAM

## 2024-09-19 PROCEDURE — 82962 GLUCOSE BLOOD TEST: CPT

## 2024-09-19 PROCEDURE — 6370000000 HC RX 637 (ALT 250 FOR IP): Performed by: STUDENT IN AN ORGANIZED HEALTH CARE EDUCATION/TRAINING PROGRAM

## 2024-09-19 PROCEDURE — 6370000000 HC RX 637 (ALT 250 FOR IP)

## 2024-09-19 PROCEDURE — 6360000002 HC RX W HCPCS: Performed by: FAMILY MEDICINE

## 2024-09-19 PROCEDURE — 6360000002 HC RX W HCPCS: Performed by: PSYCHIATRY & NEUROLOGY

## 2024-09-19 PROCEDURE — 51702 INSERT TEMP BLADDER CATH: CPT

## 2024-09-19 PROCEDURE — 6370000000 HC RX 637 (ALT 250 FOR IP): Performed by: FAMILY MEDICINE

## 2024-09-19 RX ORDER — PREDNISONE 20 MG/1
TABLET ORAL
Qty: 124 TABLET | Refills: 0 | Status: ON HOLD | OUTPATIENT
Start: 2024-09-19 | End: 2024-10-27

## 2024-09-19 RX ORDER — ACETAZOLAMIDE 250 MG/1
500 TABLET ORAL DAILY
Qty: 30 TABLET | Refills: 0 | Status: ON HOLD | OUTPATIENT
Start: 2024-09-19 | End: 2024-10-19

## 2024-09-19 RX ADMIN — SODIUM CHLORIDE: 9 INJECTION, SOLUTION INTRAVENOUS at 03:33

## 2024-09-19 RX ADMIN — ENOXAPARIN SODIUM 30 MG: 100 INJECTION SUBCUTANEOUS at 10:03

## 2024-09-19 RX ADMIN — ACETAZOLAMIDE 500 MG: 250 TABLET ORAL at 10:01

## 2024-09-19 RX ADMIN — CARBOXYMETHYLCELLULOSE SODIUM 1 DROP: 10 GEL OPHTHALMIC at 13:10

## 2024-09-19 RX ADMIN — POTASSIUM CHLORIDE 10 MEQ: 750 TABLET, EXTENDED RELEASE ORAL at 10:01

## 2024-09-19 RX ADMIN — ASPIRIN 81 MG: 81 TABLET, COATED ORAL at 10:00

## 2024-09-19 RX ADMIN — HYDROCODONE BITARTRATE AND ACETAMINOPHEN 1 TABLET: 10; 325 TABLET ORAL at 03:26

## 2024-09-19 RX ADMIN — ONDANSETRON 4 MG: 2 INJECTION INTRAMUSCULAR; INTRAVENOUS at 03:29

## 2024-09-19 RX ADMIN — ACETAMINOPHEN 650 MG: 325 TABLET ORAL at 02:36

## 2024-09-19 RX ADMIN — HYDROXYCHLOROQUINE SULFATE 200 MG: 200 TABLET ORAL at 10:00

## 2024-09-19 RX ADMIN — SODIUM CHLORIDE, PRESERVATIVE FREE 40 MG: 5 INJECTION INTRAVENOUS at 05:38

## 2024-09-19 RX ADMIN — CARBOXYMETHYLCELLULOSE SODIUM 1 DROP: 10 GEL OPHTHALMIC at 09:56

## 2024-09-19 RX ADMIN — PREDNISONE 80 MG: 50 TABLET ORAL at 10:01

## 2024-09-19 RX ADMIN — HYDROCHLOROTHIAZIDE 12.5 MG: 12.5 CAPSULE ORAL at 10:01

## 2024-09-19 RX ADMIN — ONDANSETRON 4 MG: 2 INJECTION INTRAMUSCULAR; INTRAVENOUS at 09:56

## 2024-09-19 ASSESSMENT — PAIN DESCRIPTION - ORIENTATION
ORIENTATION: ANTERIOR

## 2024-09-19 ASSESSMENT — PAIN DESCRIPTION - PAIN TYPE
TYPE: ACUTE PAIN

## 2024-09-19 ASSESSMENT — PAIN DESCRIPTION - DESCRIPTORS
DESCRIPTORS: ACHING;PRESSURE

## 2024-09-19 ASSESSMENT — PAIN SCALES - GENERAL
PAINLEVEL_OUTOF10: 8
PAINLEVEL_OUTOF10: 3
PAINLEVEL_OUTOF10: 8
PAINLEVEL_OUTOF10: 2
PAINLEVEL_OUTOF10: 8

## 2024-09-19 ASSESSMENT — PAIN DESCRIPTION - ONSET
ONSET: AWAKENED FROM SLEEP

## 2024-09-19 ASSESSMENT — PAIN - FUNCTIONAL ASSESSMENT
PAIN_FUNCTIONAL_ASSESSMENT: PREVENTS OR INTERFERES SOME ACTIVE ACTIVITIES AND ADLS

## 2024-09-19 ASSESSMENT — PAIN DESCRIPTION - LOCATION
LOCATION: HEAD;EYE

## 2024-09-19 ASSESSMENT — PAIN DESCRIPTION - FREQUENCY
FREQUENCY: CONTINUOUS

## 2024-09-19 NOTE — TELEPHONE ENCOUNTER
Ref by Dr. Pranay Alford for Possible biopsy of lower lobe calcified granuloma or left calcified hilar lymph node, being worked up for possible sarcoidosis biopsy would assist.     Just d/c from hospital today, pulmonary not consulted. Please advise regarding f/u: asap office appt so that bx can be scheduled?

## 2024-09-20 ENCOUNTER — HOSPITAL ENCOUNTER (INPATIENT)
Age: 54
LOS: 11 days | Discharge: HOME HEALTH | DRG: 880 | End: 2024-10-01
Attending: PHYSICAL MEDICINE & REHABILITATION | Admitting: PHYSICAL MEDICINE & REHABILITATION
Payer: COMMERCIAL

## 2024-09-20 ENCOUNTER — CARE COORDINATION (OUTPATIENT)
Dept: OTHER | Facility: CLINIC | Age: 54
End: 2024-09-20

## 2024-09-20 DIAGNOSIS — K29.90 GASTRITIS AND DUODENITIS: ICD-10-CM

## 2024-09-20 DIAGNOSIS — H02.403 PTOSIS OF BOTH EYELIDS: ICD-10-CM

## 2024-09-20 DIAGNOSIS — M25.852 FEMOROACETABULAR IMPINGEMENT OF LEFT HIP: ICD-10-CM

## 2024-09-20 DIAGNOSIS — Z87.39 HISTORY OF AVASCULAR NECROSIS OF CAPITAL FEMORAL EPIPHYSIS: Primary | ICD-10-CM

## 2024-09-20 DIAGNOSIS — G93.2 INCREASED INTRACRANIAL PRESSURE: ICD-10-CM

## 2024-09-20 DIAGNOSIS — H53.2 DOUBLE VISION: ICD-10-CM

## 2024-09-20 PROBLEM — F45.8: Status: ACTIVE | Noted: 2024-09-20

## 2024-09-20 PROBLEM — R53.81 PHYSICAL DEBILITY: Status: ACTIVE | Noted: 2024-09-20

## 2024-09-20 LAB
GLUCOSE BLD STRIP.AUTO-MCNC: 136 MG/DL (ref 65–100)
GLUCOSE BLD STRIP.AUTO-MCNC: 244 MG/DL (ref 65–100)
SERVICE CMNT-IMP: ABNORMAL
SERVICE CMNT-IMP: ABNORMAL

## 2024-09-20 PROCEDURE — 97530 THERAPEUTIC ACTIVITIES: CPT

## 2024-09-20 PROCEDURE — 1180000000 HC REHAB R&B

## 2024-09-20 PROCEDURE — 97162 PT EVAL MOD COMPLEX 30 MIN: CPT

## 2024-09-20 PROCEDURE — 6360000002 HC RX W HCPCS: Performed by: PHYSICAL MEDICINE & REHABILITATION

## 2024-09-20 PROCEDURE — 6370000000 HC RX 637 (ALT 250 FOR IP): Performed by: PHYSICAL MEDICINE & REHABILITATION

## 2024-09-20 PROCEDURE — 2500000003 HC RX 250 WO HCPCS: Performed by: PHYSICAL MEDICINE & REHABILITATION

## 2024-09-20 PROCEDURE — 82962 GLUCOSE BLOOD TEST: CPT

## 2024-09-20 PROCEDURE — 99223 1ST HOSP IP/OBS HIGH 75: CPT | Performed by: PHYSICAL MEDICINE & REHABILITATION

## 2024-09-20 RX ORDER — CARBOXYMETHYLCELLULOSE SODIUM 10 MG/ML
1 GEL OPHTHALMIC 4 TIMES DAILY
Status: DISPENSED | OUTPATIENT
Start: 2024-09-20

## 2024-09-20 RX ORDER — POTASSIUM CHLORIDE 750 MG/1
10 TABLET, EXTENDED RELEASE ORAL DAILY
Status: DISCONTINUED | OUTPATIENT
Start: 2024-09-21 | End: 2024-09-23

## 2024-09-20 RX ORDER — ALBUTEROL SULFATE 0.83 MG/ML
2.5 SOLUTION RESPIRATORY (INHALATION) EVERY 6 HOURS PRN
Status: ACTIVE | OUTPATIENT
Start: 2024-09-20

## 2024-09-20 RX ORDER — POLYETHYLENE GLYCOL 3350 17 G/17G
17 POWDER, FOR SOLUTION ORAL DAILY PRN
Status: DISPENSED | OUTPATIENT
Start: 2024-09-20

## 2024-09-20 RX ORDER — PANTOPRAZOLE SODIUM 40 MG/1
40 TABLET, DELAYED RELEASE ORAL
Status: DISCONTINUED | OUTPATIENT
Start: 2024-09-21 | End: 2024-09-20

## 2024-09-20 RX ORDER — PANTOPRAZOLE SODIUM 40 MG/1
40 TABLET, DELAYED RELEASE ORAL
Status: DISPENSED | OUTPATIENT
Start: 2024-09-20

## 2024-09-20 RX ORDER — ASPIRIN 81 MG/1
81 TABLET ORAL DAILY
Status: DISPENSED | OUTPATIENT
Start: 2024-09-21

## 2024-09-20 RX ORDER — SENNA AND DOCUSATE SODIUM 50; 8.6 MG/1; MG/1
2 TABLET, FILM COATED ORAL DAILY PRN
Status: DISCONTINUED | OUTPATIENT
Start: 2024-09-20 | End: 2024-09-27

## 2024-09-20 RX ORDER — NYSTATIN 100000 [USP'U]/ML
5 SUSPENSION ORAL 4 TIMES DAILY
Status: DISPENSED | OUTPATIENT
Start: 2024-09-20 | End: 2024-09-27

## 2024-09-20 RX ORDER — ACETAMINOPHEN 500 MG
500 TABLET ORAL EVERY 6 HOURS PRN
Status: DISCONTINUED | OUTPATIENT
Start: 2024-09-20 | End: 2024-09-23

## 2024-09-20 RX ORDER — IBUPROFEN 600 MG/1
1 TABLET ORAL PRN
Status: ACTIVE | OUTPATIENT
Start: 2024-09-20

## 2024-09-20 RX ORDER — ACETAZOLAMIDE 250 MG/1
500 TABLET ORAL DAILY
Status: DISCONTINUED | OUTPATIENT
Start: 2024-09-21 | End: 2024-09-24

## 2024-09-20 RX ORDER — ENOXAPARIN SODIUM 100 MG/ML
30 INJECTION SUBCUTANEOUS 2 TIMES DAILY
Status: DISPENSED | OUTPATIENT
Start: 2024-09-20

## 2024-09-20 RX ORDER — PREDNISONE 20 MG/1
60 TABLET ORAL DAILY
Status: DISCONTINUED | OUTPATIENT
Start: 2024-09-28 | End: 2024-09-25

## 2024-09-20 RX ORDER — INSULIN LISPRO 100 [IU]/ML
0-4 INJECTION, SOLUTION INTRAVENOUS; SUBCUTANEOUS
Status: DISPENSED | OUTPATIENT
Start: 2024-09-20

## 2024-09-20 RX ORDER — DEXTROSE MONOHYDRATE 100 MG/ML
INJECTION, SOLUTION INTRAVENOUS CONTINUOUS PRN
Status: ACTIVE | OUTPATIENT
Start: 2024-09-20

## 2024-09-20 RX ORDER — PREDNISONE 20 MG/1
80 TABLET ORAL DAILY
Status: COMPLETED | OUTPATIENT
Start: 2024-09-20 | End: 2024-09-22

## 2024-09-20 RX ORDER — PREDNISONE 20 MG/1
70 TABLET ORAL DAILY
Status: DISCONTINUED | OUTPATIENT
Start: 2024-09-23 | End: 2024-09-25

## 2024-09-20 RX ORDER — HYDROXYCHLOROQUINE SULFATE 200 MG/1
200 TABLET, FILM COATED ORAL 2 TIMES DAILY
Status: DISCONTINUED | OUTPATIENT
Start: 2024-09-20 | End: 2024-09-25

## 2024-09-20 RX ORDER — HYDROCODONE BITARTRATE AND ACETAMINOPHEN 10; 325 MG/1; MG/1
1 TABLET ORAL EVERY 4 HOURS PRN
Status: DISCONTINUED | OUTPATIENT
Start: 2024-09-20 | End: 2024-09-23

## 2024-09-20 RX ORDER — INSULIN LISPRO 100 [IU]/ML
0-4 INJECTION, SOLUTION INTRAVENOUS; SUBCUTANEOUS NIGHTLY
Status: ACTIVE | OUTPATIENT
Start: 2024-09-20

## 2024-09-20 RX ORDER — HYDROCODONE BITARTRATE AND ACETAMINOPHEN 5; 325 MG/1; MG/1
1 TABLET ORAL EVERY 4 HOURS PRN
Status: DISCONTINUED | OUTPATIENT
Start: 2024-09-20 | End: 2024-09-23

## 2024-09-20 RX ORDER — HYDROCHLOROTHIAZIDE 12.5 MG/1
12.5 CAPSULE ORAL EVERY MORNING
Status: DISPENSED | OUTPATIENT
Start: 2024-09-21

## 2024-09-20 RX ORDER — ONDANSETRON 4 MG/1
4 TABLET, ORALLY DISINTEGRATING ORAL EVERY 8 HOURS PRN
Status: DISPENSED | OUTPATIENT
Start: 2024-09-20

## 2024-09-20 RX ADMIN — ANTI-FUNGAL POWDER MICONAZOLE NITRATE TALC FREE: 1.42 POWDER TOPICAL at 21:18

## 2024-09-20 RX ADMIN — CARBOXYMETHYLCELLULOSE SODIUM 1 DROP: 10 GEL OPHTHALMIC at 20:38

## 2024-09-20 RX ADMIN — PREDNISONE 80 MG: 20 TABLET ORAL at 16:30

## 2024-09-20 RX ADMIN — PANTOPRAZOLE SODIUM 40 MG: 40 TABLET, DELAYED RELEASE ORAL at 16:30

## 2024-09-20 RX ADMIN — NYSTATIN 500000 UNITS: 100000 SUSPENSION ORAL at 16:31

## 2024-09-20 RX ADMIN — ENOXAPARIN SODIUM 30 MG: 100 INJECTION SUBCUTANEOUS at 20:38

## 2024-09-20 ASSESSMENT — PAIN SCALES - GENERAL: PAINLEVEL_OUTOF10: 0

## 2024-09-20 NOTE — TELEPHONE ENCOUNTER
Per MD response bx not an option. Will f/u with patient/family for office visit to review needs. Referral will be closed as established patient.

## 2024-09-20 NOTE — PROGRESS NOTES
PHYSICAL THERAPY EXAMINATION    PT Individual Minutes  Time In: 1445  Time Out: 1510  Minutes: 25                   Total Treatment Time:  25 Minutes         HPI:  Tricia Sellers is a 54 y.o. female patient who presented to [facility] on 9/12/2024 secondary to visual impairment and facial edema.  She had noted progressive blurry vision in the weeks leading up to admission, with particular worsening in the 24 hours before.  As this worsened, she also developed facial swelling, tenderness.  This progressed further to bilateral ptosis, significant headaches. She has been on antibiotics for suspected facial cellulitis. Brain MRI was notable for bilateral orbital proptosis with mildly increased CSF in the region of the optic nerve sheath.  She underwent lumbar puncture, with increased opening pressure but otherwise low suspicion for inflammatory etiology.  Neurology was consulted has been following, noting ophthalmoparesis/impaired extraocular motions and ongoing proptosis with severe ptosis.  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  She does have a history of rheumatoid arthritis and avascular necrosis of the bilateral hips.  Differential includes myasthenia gravis, Wallace Morrow syndrome, sarcoidosis, other autoimmune or rheumatologic condition.  It is possible she has a superimposed idiopathic intracranial hypertension in addition to a more systemic condition.  There is a family history of sarcoidosis and her daughter.  She is completing course of high-dose IV steroids today, and will complete a 5-day course of IVIG tomorrow.  She is unsure if she has noted any benefit yet from these treatments.  She does complain of ongoing significant \"pressure\" headaches around her ocular region.  She denies any acute weakness or ongoing sensory alterations in her limbs, although does report having some intermittent bilateral lower extremity cramping (within the last few months).  She has been found to have a mild to

## 2024-09-20 NOTE — H&P
[facility]  Inpatient Rehabilitation Center    Admission History and Physical Exam  INPATIENT REHABILITATION CENTER      IRC Admission Date: 9/20/2024  Primary Care Provider: Kellen Aguilera APRN - CNP  Specialty Group / Referring Service: Internal Medicine/ Hospitalist ([facility])    Chief Complaint: Acute Blindness  Admitting Diagnosis:   Functional blindness [F45.8]    Principal Problem:    Functional blindness  Active Problems:    Obstructive sleep apnea syndrome    Hypertension    Femoroacetabular impingement of left hip    History of avascular necrosis of capital femoral epiphysis    Morbid obesity ([facility])    Peripheral neuropathy    Type 2 diabetes mellitus without complication ([facility])    Weakness of right lower extremity    Ptosis of both eyelids    Physical debility    Elevated intracranial pressure  Resolved Problems:    * No resolved hospital problems. *      Acute Rehab Diagnoses:  Encounter for rehabilitation [Z51.89]   Abnormality of gait and mobility [R26.9]  Decreased independence for activities of daily living (ADL) [Z78.9]  Physical debility / deconditioning [R53.81]    Medical Diagnoses:  Past Medical History:   Diagnosis Date    Acute respiratory failure with hypoxia ([facility]) 06/24/2020    Arthritis     Avascular necrosis of bone of hip ([facility]) 01/30/2017    CHF (congestive heart failure) (Formerly [facility])     60045 Echo LVEF 55-65%    COVID-19 virus infection 06/24/2020    Diabetes ([facility])     Diarrhea of presumed infectious origin 06/26/2020    H/O echocardiogram 07/2021    LVEF 55-65%    Hypertension     Obesity     EMILIA on CPAP     Respiratory failure ([facility]) 06/24/2020    Stroke ([facility]) 03/2018    no residual    Type 2 diabetes mellitus without complication ([facility]) 6/2020       Date of Evaluation: September 20, 2024      HPI: Tricia Sellers is a 54 y.o. female patient who was recently hospitalized at [facility] from 9/12-9/19/2024 secondary to acute onset of left facial  somewhat limited EOM. Sclera normal with injection or erythema. +mild thrush? No palpable lymphadenopathy.  HEART: RRR. No murmurs, rubs or gallops  LUNGS: Even and unlabored breathing. Lungs clear to auscultation bilaterally.  ABD: Soft, protuberant but easily compressible without tenderness throughout. Normally active bowel sounds.  EXT: limbs are well perfused. There is some mild tenderness to palpation over left calf region (she reports chronic) without focal erythema or edema. left leg is nontender.  SKIN: No ecchymosis, rashes, or excoriations noted  PSYCH: Cooperative and calm, normal insight and judgment  Neuro:  Mental Status:           - Alert and orient to person, place and time.           - Able to follow commands.          - Language: fluent; naming, repetition intact          - Speech: no dysarthria    Sensory: Intact and symmetric sensation to light touch in extremities    Cranial Nerves:           - CN II:  she has significant diplopia and minimal visual acuity.          - CN III, IV and VI: pupils are somewhat dilated (after recent Ophtho exam) and minimally reactive to light. There is minimal volitional EOM noted.          - CN V: face sensation intact and symmetric bilaterally          - CN VII: Symmetric smile and eye closure          - CN VIII: Hearing grossly intact and equal bilaterally; no             nystagmus          - CN IX and X: Intact and symmetric palate elevation           - CN XI: Head turning and shoulder shrug intact and symmetric b/l          - CN XII: tongue midline in protrusion    MSK: decreased ROM about right hips (reports chronic), right > left  Motor/MMT:           - Inspection: Modest action tremors of bilateral upper extremities          - Bulk: normal          - Tone: no spasticity or rigidity           - Muscle Strength:                  RUE: EE 5/5  EF 5/5      WE 5/5  WF 5/5    5/5                  LUE: EE 5/5  EF 5/5      WE 5/5  WF 5/5    5/5                 patient's care to help facilitate all disposition needs and equipment requests provided by treating team members.  - Dysphagia- Patient with ongoing dysphagia and risk for secondary complications. Consult SLP to assess further and train in order to meet nutritional needs on the least restrictive diets.  - RT consult if needed, maintain O2 saturations greater than 92% during physical and endurance activities  - Diet-  ADULT DIET; Easy to Chew; ok for all bread, mixed consistencies  - DME: TBD  - Family Training: TBD  - Dispo: Home with home health services and close family support    Active medical conditions requiring ongoing and daily monitoring:  //Severe visual impairment, functional blindness of unclear etiology - ophthalmoparesis, ptosis  //Proptosis  //Optic nerve sheath and extraocular muscle edema  //Increased intracranial pressure s/p LP  -Considerations include sarcoidosis, MG, Wallace-Morrow syndrome, other rheum or neurologic condition. Some lab results remain pending.  -s/p IV steroids and IVIG with mild improvement (trey ptosis), now continues on high-dose prednisone and diamox.  -her outpatient Rheumatologist Dr. Valencia will assess patient on Monday 9/23  -Seen by Ophtho as outpatient 9/20 - reportedly with finding of cataracts but not an intrinsic eye condition to explain acute findings; Ophtho rec f/u from Neuro and Rheum.  -Will consult Neurology to continue to follow intermittently on acute rehab.  -Consideration was given to biopsy known calcified granuloma / lymph node (primarily to assess for sarcoidosis); per chart review, Pulmonologist Dr. Menchaca reviewed and felt biopsy would not be possible.  -Interventions for blind rehabilitation until further visual recovery.    //Severe headaches   -cont tylenol and/or Norco PRN. Consider scheduled tylenol + oxy for breatkthrough. short-term use of IV pain meds for severe breakthrough considered although she deferred at this time as headaches have been

## 2024-09-21 LAB
GLUCOSE BLD STRIP.AUTO-MCNC: 103 MG/DL (ref 65–100)
GLUCOSE BLD STRIP.AUTO-MCNC: 145 MG/DL (ref 65–100)
GLUCOSE BLD STRIP.AUTO-MCNC: 165 MG/DL (ref 65–100)
GLUCOSE BLD STRIP.AUTO-MCNC: 171 MG/DL (ref 65–100)
SERVICE CMNT-IMP: ABNORMAL
VITAMIN E ALPHA: 6.8 MG/L (ref 7–25.1)
VITAMIN E GAMMA: 1.5 MG/L (ref 0.5–5.5)

## 2024-09-21 PROCEDURE — 97167 OT EVAL HIGH COMPLEX 60 MIN: CPT

## 2024-09-21 PROCEDURE — 97110 THERAPEUTIC EXERCISES: CPT

## 2024-09-21 PROCEDURE — 97530 THERAPEUTIC ACTIVITIES: CPT

## 2024-09-21 PROCEDURE — 6360000002 HC RX W HCPCS: Performed by: PHYSICAL MEDICINE & REHABILITATION

## 2024-09-21 PROCEDURE — 6370000000 HC RX 637 (ALT 250 FOR IP): Performed by: PHYSICAL MEDICINE & REHABILITATION

## 2024-09-21 PROCEDURE — 1180000000 HC REHAB R&B

## 2024-09-21 PROCEDURE — 97116 GAIT TRAINING THERAPY: CPT

## 2024-09-21 PROCEDURE — 82962 GLUCOSE BLOOD TEST: CPT

## 2024-09-21 PROCEDURE — 97535 SELF CARE MNGMENT TRAINING: CPT

## 2024-09-21 RX ADMIN — HYDROCHLOROTHIAZIDE 12.5 MG: 12.5 CAPSULE ORAL at 09:00

## 2024-09-21 RX ADMIN — ASPIRIN 81 MG: 81 TABLET, COATED ORAL at 09:17

## 2024-09-21 RX ADMIN — PREDNISONE 80 MG: 20 TABLET ORAL at 09:00

## 2024-09-21 RX ADMIN — CARBOXYMETHYLCELLULOSE SODIUM 1 DROP: 10 GEL OPHTHALMIC at 17:07

## 2024-09-21 RX ADMIN — ANTI-FUNGAL POWDER MICONAZOLE NITRATE TALC FREE: 1.42 POWDER TOPICAL at 09:02

## 2024-09-21 RX ADMIN — PANTOPRAZOLE SODIUM 40 MG: 40 TABLET, DELAYED RELEASE ORAL at 05:36

## 2024-09-21 RX ADMIN — ENOXAPARIN SODIUM 30 MG: 100 INJECTION SUBCUTANEOUS at 09:00

## 2024-09-21 RX ADMIN — ACETAZOLAMIDE 500 MG: 250 TABLET ORAL at 09:00

## 2024-09-21 RX ADMIN — POTASSIUM CHLORIDE 10 MEQ: 750 TABLET, EXTENDED RELEASE ORAL at 09:18

## 2024-09-21 RX ADMIN — HYDROCODONE BITARTRATE AND ACETAMINOPHEN 1 TABLET: 10; 325 TABLET ORAL at 11:23

## 2024-09-21 RX ADMIN — ANTI-FUNGAL POWDER MICONAZOLE NITRATE TALC FREE: 1.42 POWDER TOPICAL at 20:18

## 2024-09-21 RX ADMIN — CARBOXYMETHYLCELLULOSE SODIUM 1 DROP: 10 GEL OPHTHALMIC at 20:18

## 2024-09-21 RX ADMIN — NYSTATIN 500000 UNITS: 100000 SUSPENSION ORAL at 09:00

## 2024-09-21 RX ADMIN — PANTOPRAZOLE SODIUM 40 MG: 40 TABLET, DELAYED RELEASE ORAL at 17:08

## 2024-09-21 RX ADMIN — ENOXAPARIN SODIUM 30 MG: 100 INJECTION SUBCUTANEOUS at 20:17

## 2024-09-21 RX ADMIN — NYSTATIN 500000 UNITS: 100000 SUSPENSION ORAL at 12:10

## 2024-09-21 RX ADMIN — CARBOXYMETHYLCELLULOSE SODIUM 1 DROP: 10 GEL OPHTHALMIC at 12:10

## 2024-09-21 RX ADMIN — HYDROCODONE BITARTRATE AND ACETAMINOPHEN 1 TABLET: 10; 325 TABLET ORAL at 05:39

## 2024-09-21 RX ADMIN — CARBOXYMETHYLCELLULOSE SODIUM 1 DROP: 10 GEL OPHTHALMIC at 09:02

## 2024-09-21 RX ADMIN — NYSTATIN 500000 UNITS: 100000 SUSPENSION ORAL at 17:07

## 2024-09-21 ASSESSMENT — PAIN SCALES - GENERAL
PAINLEVEL_OUTOF10: 0
PAINLEVEL_OUTOF10: 0
PAINLEVEL_OUTOF10: 7
PAINLEVEL_OUTOF10: 0
PAINLEVEL_OUTOF10: 0
PAINLEVEL_OUTOF10: 7

## 2024-09-21 ASSESSMENT — PAIN DESCRIPTION - DESCRIPTORS: DESCRIPTORS: ACHING;SHARP

## 2024-09-21 ASSESSMENT — PAIN DESCRIPTION - LOCATION
LOCATION: HIP
LOCATION: HEAD

## 2024-09-21 ASSESSMENT — PAIN DESCRIPTION - ORIENTATION: ORIENTATION: RIGHT

## 2024-09-21 NOTE — PROGRESS NOTES
Comments Mod-Min A simulated with RW     Toilet Hygiene   Current Functional Level   Score Partial/moderate assistance   Comments Mod A simulated     Summary of Session: Pt agreeable to shower.  After discussion about plan for treatment, patient eager to shower.   Discussion about placement of walker and chair to transfer recliner to  with mod A.  See above for ADL scores.   Visual adaption education given for shower/oral care and dressing throughout treatment plan with good carryover.  Pt took to physical therapy and passed off to therapist.    Plan: Continue OT POC with focus on ADL/IADL skills, functional transfers, functional mobility, coordination, strength, static and dynamic balance, and activity tolerance to maximize safety and independence with ADLs and functional transfers.      MACI Carter  9/21/2024

## 2024-09-21 NOTE — CARE COORDINATION
Pt chart reviewed for discharge planning. MSW met with pt at bedside, verified demographic information/ health insurance. Pt lives with family in one level home, states independent with ADLs, uses no DME, and drives . PCP was confirmed, last seen in the office last week. Pt reports no outside services in the home. MSW will follow pt plan of care and assist with supportive care referrals pending pt clinical progress.  Please consult case management if specific needs arise.        09/21/24 1997   Service Assessment   Patient Orientation Alert and Oriented;Person;Place;Situation;Self   Cognition Alert   History Provided By Patient   Primary Caregiver Self   Support Systems Spouse/Significant Other;Children;Family Members;Friends/Neighbors   Patient's Healthcare Decision Maker is: Named in Scanned ACP Document   PCP Verified by CM Yes   Last Visit to PCP Within last 3 months   Prior Functional Level Independent in ADLs/IADLs   Current Functional Level Independent in ADLs/IADLs   Can patient return to prior living arrangement Yes   Ability to make needs known: Good   Family able to assist with home care needs: Yes   Would you like for me to discuss the discharge plan with any other family members/significant others, and if so, who? Yes  (Family)   Financial Resources Other (Comment)   Community Resources None   Social/Functional History   Lives With Significant other;Family   Type of Home House   Home Layout One level   Receives Help From Family   Ambulation Assistance Independent   Active  Yes   Occupation Full time employment   Discharge Planning   Type of Residence House   Living Arrangements Spouse/Significant Other   Current Services Prior To Admission None   Potential Assistance Needed N/A   DME Ordered? No   Type of Home Care Services None   Patient expects to be discharged to: House   One/Two Story Residence One story   Services At/After Discharge   Transition of Care Consult (CM Consult) Discharge Planning

## 2024-09-21 NOTE — PROGRESS NOTES
PHYSICAL THERAPY DAILY NOTE    PT Individual Minutes  Time In: 1046  Time Out: 1151  Minutes: 65                 Total Treatment Time:  65 Minutes    Pt. Seen for: AM, Gait Training, Patient Education, Therapeutic Exercise, Transfer Training, and Other     Subjective: Patient reporting her R hip is hurting. Reports she has not had pain medication for her hip but did have pain medication for a HA earlier. Requesting pain medicaiton for R hip pain. Reports she can see bright white light. Reports she is able to keep her eyes open better today         Objective:  Restrictions/Precautions: Fall Risk, Bed Alarm, Other (comment) (posey alarm, limited vision)  Required Braces or Orthoses?: No                GROSS ASSESSMENT   Patient resting in wheelchair at beginning of treatment. Assisted patient back to room after gait and exercise in rehab gym. Set up in lunch in recliner chair and assisted patient with eating lunch. Patient able to feed self using clock method for food set up with verbal cueing from PT. Patient able to eat all of her lunch with minimal spillage of food.         COGNITION Daily Assessment    Overall Orientation Status: Within Normal Limits   Overall Cognitive Status: WNL        BED/MAT MOBILITY Daily Assessment     Bridging: Unable to assess (due to right hip pain)  Rolling to Left: Moderate assistance  Rolling to Right: Unable to assess (due to R hip pain)  Supine to Sit: Moderate assistance  Sit to Supine: Moderate assistance  Scooting: Moderate assistance  Bed Mobility Comments: Increased time and effort to complete with multiple cues for body mechanics. ALtered body mechanics due to R hip pain        TRANSFERS Daily Assessment    Sit to Stand: Minimal Assistance (with RW)  Stand to Sit: Minimal Assistance (with RW)  Stand Pivot Transfers: Moderate Assistance (with RW)  Comment: Increased time and effort to complete with cues for body mechanics. Cues for visual deficits. Increased NATANAEL with tendency  to slide R foot forward recluctant to  right foot. Limited stance phase RLE due to right hip pain          GAIT Daily Assessment    Surface: Level tile  Device: Rolling Walker (gait belt)  Other Apparatus: Wheelchair follow  Quality of Gait: slow start/stop step to antalgic gait pattern with shuffling type steps  Gait Deviations: Decreased step height;Slow Jenni;Increased NATANAEL;Decreased head and trunk rotation;Decreased arm swing;Decreased step length;Shuffles (Difficulty lifting right foot off floor due to R hip pain. SLides R foot forward in plantar flexed position)  Distance: 5 ft  Comments: Multiple cues for visual deficits. Distance limited due to R hip pain.  More Ambulation?: No              STEPS/STAIRS Daily Assessment    Stairs?: No              BALANCE Daily Assessment    Static Sitting: Good:  Pt. able to maintain balance w/o UE support;  exhibits some postural sway  Dynamic Sitting: Fair - accepts minimal challenge;  can maintain balance while turning head/trunk  Static Standing: Fair:  Pt. requires UE support;  may need occasional min A with RW  Dynamic Standing: Poor - unable to accept challenge or move without LOB        WHEELCHAIR MOBILITY Daily Assessment    Wheelchair Size: 22x16  Wheelchair Type: Standard  Wheelchair Cushion: Pressure Relieving (ROHO with seat board)  Pressure Relief Type:  (sit<>stand with RW)  Level of Assistance for pressure relief activities: Minimal assistance  Wheelchair Parts Management: No  Propulsion: No                     LOWER EXTREMITY EXERCISES   SUPINE EXERCISES Sets Reps Comments   Ankle Pumps 3 10 With blue t-band for LLE, min assist RLE   Heel Slides 3 10 Manual resistance for LLE, mod assist RLE with limited ROM due to pain   Hip Abduction 3 10 Manual resistance for LLE, mod assist with skate for RLE in limited ROM   Short Arc Quad 3 10 Set up assist LLE, min assist RLE   Increased time and effort to complete with multiple and frequent rest breaks. Cues

## 2024-09-21 NOTE — PLAN OF CARE
Problem: Safety - Adult  Goal: Free from fall injury  Outcome: Progressing     Problem: Skin/Tissue Integrity  Goal: Absence of new skin breakdown  Description: 1.  Monitor for areas of redness and/or skin breakdown  2.  Assess vascular access sites hourly  3.  Every 4-6 hours minimum:  Change oxygen saturation probe site  4.  Every 4-6 hours:  If on nasal continuous positive airway pressure, respiratory therapy assess nares and determine need for appliance change or resting period.  Outcome: Progressing     Problem: ABCDS Injury Assessment  Goal: Absence of physical injury  Outcome: Progressing      Price (Do Not Change): 0.00 Instructions: This plan will send the code FBSD to the PM system.  DO NOT or CHANGE the price. Detail Level: Simple 3 = A little assistance

## 2024-09-21 NOTE — H&P
PT requests pt receive her pain med 30 minutes prior to PT sessions. Message placed in MAR note for daily staff to see.

## 2024-09-21 NOTE — PLAN OF CARE
Problem: Safety - Adult  Goal: Free from fall injury  9/21/2024 1100 by Susie Greenwood RN  Outcome: Progressing  9/20/2024 2220 by Margie Gomez RN  Outcome: Progressing     Problem: Skin/Tissue Integrity  Goal: Absence of new skin breakdown  Description: 1.  Monitor for areas of redness and/or skin breakdown  2.  Assess vascular access sites hourly  3.  Every 4-6 hours minimum:  Change oxygen saturation probe site  4.  Every 4-6 hours:  If on nasal continuous positive airway pressure, respiratory therapy assess nares and determine need for appliance change or resting period.  9/21/2024 1100 by Susie Greenwood RN  Outcome: Progressing  9/20/2024 2220 by Margie Gomez RN  Outcome: Progressing     Problem: ABCDS Injury Assessment  Goal: Absence of physical injury  9/21/2024 1100 by Susie Greenwood RN  Outcome: Progressing  9/20/2024 2220 by Margie Gomez RN  Outcome: Progressing     Problem: Pain  Goal: Verbalizes/displays adequate comfort level or baseline comfort level  Outcome: Progressing

## 2024-09-21 NOTE — PROGRESS NOTES
[facility] OCCUPATIONAL THERAPY INITIAL EVALUATION  OT Individual Minutes  Time In: 0728  Time Out: 0904  Minutes: 96               HPI (per MD report): \"Tricia Sellers is a 54 y.o. female patient who was recently hospitalized at [facility] from 9/12-9/19/2024 secondary to acute onset of left facial swelling,  subacute visual impairment, and other medical complications. She had noted progressive blurry vision in the weeks leading up to admission, with particular worsening in the 24 hours before.  As this worsened, she also developed facial swelling and tenderness.  After admission, her condition progressed further to bilateral ptosis, significant headaches. She completed a course of ceftriaxone for possible facial cellulitis. Neurology was consulted and followed. She was noted to have ophthalmoparesis/impaired extraocular motions and ongoing proptosis with severe ptosis and functional blindness. Further workup with brain MRI was notable for bilateral orbital proptosis with mildly increased CSF in the region of the optic nerve sheath.  She underwent lumbar puncture, with increased opening pressure but otherwise low suspicion for inflammatory etiology. Neurosurgery did not recommend any surgical intervention. She does have a history of rheumatoid arthritis for which she takes Plaquenil, as well as avascular necrosis of the bilateral hips.  Current differential includes myasthenia gravis, Wallace Morrow syndrome, sarcoidosis, other autoimmune or rheumatologic condition.  It is possible she has a superimposed idiopathic intracranial hypertension in addition to a more systemic condition.  Rheumatology was consulted and provided recommendations. There is a family history of sarcoidosis in her daughter.  She completed a course of high-dose IV steroids on 9/17, and a 5-day course of IVIG on 9/18 with modest overall benefit. She is now being continued on a high-dose prednisone. ENT was consulted for dysphagia with  [facility]. Active . Notable PMHx: L CVA >5 years ago with minor residual RUE weakness, RLE weakness secondary to \"bone-on-bone\" in R hip    LIVING SITUATION:    Environment   Living Alone No   Support System Family Member(s)   Home Set Up    Pt reports personal home 1 lvl, pt reports anticipated d/c to dtr home; 2 lvl, 19 steps to 2nd floor, 1/2 bath only on 1st level   Home Entrance TBD   Bathroom Setup  Walk-In Shower, Grab Bars, Built-In Seat, and Shower Door   Current DME RW issued to pt with pt in room     UPPER EXTREMITY ASSESSMENT:   RUE LUE   General Evaluation Generally Decreased, Functional Generally Decreased, Functional   FMC Generally Decreased, Functional Generally Decreased, Functional   GMC Generally Decreased, Functional Generally Decreased, Functional   Light Touch Intact and pt c/o numbness in 1st-3rd tip of digit in R hand, s/s possible CTS, unclear, continued monitoring and assessment as appropriate Intact   ROM Generally Decreased, Functional Generally Decreased, Functional   Proprioception Impaired See below Impaired See below   Subluxation N/A N/A   Inattention/Neglect N/A N/A   Muscle Tone Normal Normal     STRENGTH: RUE LUE   Shoulder Flexion 3-/5 More than half, but not full range of motion against gravity 3+/5 Completes full range of motion against gravity with minimal resistance   Shoulder Abduction 3-/5 More than half, but not full range of motion against gravity 3+/5 Completes full range of motion against gravity with minimal resistance   Elbow Flexion 3+/5 Completes full range of motion against gravity with minimal resistance 3+/5 Completes full range of motion against gravity with minimal resistance   Elbow Extension  3+/5 Completes full range of motion against gravity with minimal resistance 3+/5 Completes full range of motion against gravity with minimal resistance    4-/5 Completes full range of motion against gravity with minimal-moderate resistance 4-/5 Completes full

## 2024-09-22 LAB
GLUCOSE BLD STRIP.AUTO-MCNC: 106 MG/DL (ref 65–100)
GLUCOSE BLD STRIP.AUTO-MCNC: 131 MG/DL (ref 65–100)
GLUCOSE BLD STRIP.AUTO-MCNC: 182 MG/DL (ref 65–100)
GLUCOSE BLD STRIP.AUTO-MCNC: 234 MG/DL (ref 65–100)
SERVICE CMNT-IMP: ABNORMAL

## 2024-09-22 PROCEDURE — 82962 GLUCOSE BLOOD TEST: CPT

## 2024-09-22 PROCEDURE — 1180000000 HC REHAB R&B

## 2024-09-22 PROCEDURE — 6370000000 HC RX 637 (ALT 250 FOR IP): Performed by: PHYSICAL MEDICINE & REHABILITATION

## 2024-09-22 PROCEDURE — 6360000002 HC RX W HCPCS: Performed by: PHYSICAL MEDICINE & REHABILITATION

## 2024-09-22 RX ADMIN — CARBOXYMETHYLCELLULOSE SODIUM 1 DROP: 10 GEL OPHTHALMIC at 20:03

## 2024-09-22 RX ADMIN — ANTI-FUNGAL POWDER MICONAZOLE NITRATE TALC FREE: 1.42 POWDER TOPICAL at 20:03

## 2024-09-22 RX ADMIN — CARBOXYMETHYLCELLULOSE SODIUM 1 DROP: 10 GEL OPHTHALMIC at 12:40

## 2024-09-22 RX ADMIN — CARBOXYMETHYLCELLULOSE SODIUM 1 DROP: 10 GEL OPHTHALMIC at 16:58

## 2024-09-22 RX ADMIN — NYSTATIN 500000 UNITS: 100000 SUSPENSION ORAL at 12:40

## 2024-09-22 RX ADMIN — PANTOPRAZOLE SODIUM 40 MG: 40 TABLET, DELAYED RELEASE ORAL at 16:55

## 2024-09-22 RX ADMIN — ACETAZOLAMIDE 500 MG: 250 TABLET ORAL at 08:41

## 2024-09-22 RX ADMIN — HYDROCODONE BITARTRATE AND ACETAMINOPHEN 1 TABLET: 10; 325 TABLET ORAL at 21:39

## 2024-09-22 RX ADMIN — PANTOPRAZOLE SODIUM 40 MG: 40 TABLET, DELAYED RELEASE ORAL at 04:30

## 2024-09-22 RX ADMIN — ENOXAPARIN SODIUM 30 MG: 100 INJECTION SUBCUTANEOUS at 08:40

## 2024-09-22 RX ADMIN — DOCUSATE SODIUM 50 MG AND SENNOSIDES 8.6 MG 2 TABLET: 8.6; 5 TABLET, FILM COATED ORAL at 08:41

## 2024-09-22 RX ADMIN — POLYETHYLENE GLYCOL 3350 17 G: 17 POWDER, FOR SOLUTION ORAL at 18:50

## 2024-09-22 RX ADMIN — CARBOXYMETHYLCELLULOSE SODIUM 1 DROP: 10 GEL OPHTHALMIC at 08:46

## 2024-09-22 RX ADMIN — NYSTATIN 500000 UNITS: 100000 SUSPENSION ORAL at 16:55

## 2024-09-22 RX ADMIN — ASPIRIN 81 MG: 81 TABLET, COATED ORAL at 08:41

## 2024-09-22 RX ADMIN — POTASSIUM CHLORIDE 10 MEQ: 750 TABLET, EXTENDED RELEASE ORAL at 08:42

## 2024-09-22 RX ADMIN — NYSTATIN 500000 UNITS: 100000 SUSPENSION ORAL at 08:41

## 2024-09-22 RX ADMIN — HYDROCHLOROTHIAZIDE 12.5 MG: 12.5 CAPSULE ORAL at 08:42

## 2024-09-22 RX ADMIN — ANTI-FUNGAL POWDER MICONAZOLE NITRATE TALC FREE: 1.42 POWDER TOPICAL at 08:46

## 2024-09-22 RX ADMIN — ENOXAPARIN SODIUM 30 MG: 100 INJECTION SUBCUTANEOUS at 20:02

## 2024-09-22 RX ADMIN — HYDROCODONE BITARTRATE AND ACETAMINOPHEN 1 TABLET: 10; 325 TABLET ORAL at 08:55

## 2024-09-22 RX ADMIN — INSULIN LISPRO 1 UNITS: 100 INJECTION, SOLUTION INTRAVENOUS; SUBCUTANEOUS at 16:54

## 2024-09-22 RX ADMIN — PREDNISONE 80 MG: 20 TABLET ORAL at 08:41

## 2024-09-22 ASSESSMENT — PAIN SCALES - GENERAL
PAINLEVEL_OUTOF10: 2
PAINLEVEL_OUTOF10: 0
PAINLEVEL_OUTOF10: 3
PAINLEVEL_OUTOF10: 7
PAINLEVEL_OUTOF10: 8
PAINLEVEL_OUTOF10: 4

## 2024-09-22 ASSESSMENT — PAIN DESCRIPTION - LOCATION
LOCATION: HIP
LOCATION: HEAD

## 2024-09-22 ASSESSMENT — PAIN DESCRIPTION - DESCRIPTORS: DESCRIPTORS: ACHING

## 2024-09-22 ASSESSMENT — PAIN DESCRIPTION - ORIENTATION
ORIENTATION: LEFT;RIGHT
ORIENTATION: RIGHT

## 2024-09-22 NOTE — PROGRESS NOTES
Pt offered Miralax po multiple times this shift, continues to answer, \"I'll take it later.\" No Miralax prn accepted by pt this shift.

## 2024-09-22 NOTE — PLAN OF CARE
Problem: Safety - Adult  Goal: Free from fall injury  9/21/2024 2156 by Margie Gomez RN  Outcome: Progressing  9/21/2024 1100 by Susie Greenwood RN  Outcome: Progressing     Problem: Skin/Tissue Integrity  Goal: Absence of new skin breakdown  Description: 1.  Monitor for areas of redness and/or skin breakdown  2.  Assess vascular access sites hourly  3.  Every 4-6 hours minimum:  Change oxygen saturation probe site  4.  Every 4-6 hours:  If on nasal continuous positive airway pressure, respiratory therapy assess nares and determine need for appliance change or resting period.  9/21/2024 2156 by Margie Gomez RN  Outcome: Progressing  9/21/2024 1100 by Susie Greenwood RN  Outcome: Progressing     Problem: ABCDS Injury Assessment  Goal: Absence of physical injury  9/21/2024 2156 by Margie Gomez RN  Outcome: Progressing  9/21/2024 1100 by Susie Greenwood RN  Outcome: Progressing     Problem: Pain  Goal: Verbalizes/displays adequate comfort level or baseline comfort level  9/21/2024 2156 by Margie Gomez RN  Outcome: Progressing  9/21/2024 1100 by Susie Greenwood RN  Outcome: Progressing

## 2024-09-22 NOTE — PLAN OF CARE
Problem: Safety - Adult  Goal: Free from fall injury  9/22/2024 1012 by Susie Greenwood RN  Outcome: Progressing  9/21/2024 2156 by Margie Gomez RN  Outcome: Progressing     Problem: Skin/Tissue Integrity  Goal: Absence of new skin breakdown  Description: 1.  Monitor for areas of redness and/or skin breakdown  2.  Assess vascular access sites hourly  3.  Every 4-6 hours minimum:  Change oxygen saturation probe site  4.  Every 4-6 hours:  If on nasal continuous positive airway pressure, respiratory therapy assess nares and determine need for appliance change or resting period.  9/22/2024 1012 by Susie Greenwood RN  Outcome: Progressing  9/21/2024 2156 by Margie Gomez RN  Outcome: Progressing     Problem: ABCDS Injury Assessment  Goal: Absence of physical injury  9/22/2024 1012 by Susie Greenwood RN  Outcome: Progressing  9/21/2024 2156 by Margie Gomez RN  Outcome: Progressing     Problem: Pain  Goal: Verbalizes/displays adequate comfort level or baseline comfort level  9/22/2024 1012 by Susie Greenwood RN  Outcome: Progressing  9/21/2024 2156 by Margie Gomez RN  Outcome: Progressing

## 2024-09-23 ENCOUNTER — HOSPITAL ENCOUNTER (INPATIENT)
Age: 54
End: 2024-09-23
Attending: PHYSICAL MEDICINE & REHABILITATION | Admitting: PHYSICAL MEDICINE & REHABILITATION

## 2024-09-23 PROBLEM — Z51.89 ENCOUNTER FOR REHABILITATION: Status: ACTIVE | Noted: 2024-09-20

## 2024-09-23 PROBLEM — Z78.9 DECREASED INDEPENDENCE WITH ACTIVITIES OF DAILY LIVING: Status: ACTIVE | Noted: 2024-09-12

## 2024-09-23 PROBLEM — Z74.09 IMPAIRED FUNCTIONAL MOBILITY, BALANCE, GAIT, AND ENDURANCE: Status: ACTIVE | Noted: 2024-09-12

## 2024-09-23 LAB
ANION GAP SERPL CALC-SCNC: 10 MMOL/L (ref 9–18)
BASOPHILS # BLD: 0 K/UL (ref 0–0.2)
BASOPHILS NFR BLD: 0 % (ref 0–2)
BUN SERPL-MCNC: 20 MG/DL (ref 6–23)
CALCIUM SERPL-MCNC: 9 MG/DL (ref 8.8–10.2)
CHLORIDE SERPL-SCNC: 105 MMOL/L (ref 98–107)
CO2 SERPL-SCNC: 23 MMOL/L (ref 20–28)
CREAT SERPL-MCNC: 0.84 MG/DL (ref 0.6–1.1)
DIFFERENTIAL METHOD BLD: ABNORMAL
EOSINOPHIL # BLD: 0 K/UL (ref 0–0.8)
EOSINOPHIL NFR BLD: 0 % (ref 0.5–7.8)
ERYTHROCYTE [DISTWIDTH] IN BLOOD BY AUTOMATED COUNT: 17.1 % (ref 11.9–14.6)
GLUCOSE BLD STRIP.AUTO-MCNC: 147 MG/DL (ref 65–100)
GLUCOSE BLD STRIP.AUTO-MCNC: 168 MG/DL (ref 65–100)
GLUCOSE BLD STRIP.AUTO-MCNC: 227 MG/DL (ref 65–100)
GLUCOSE BLD STRIP.AUTO-MCNC: 269 MG/DL (ref 65–100)
GLUCOSE SERPL-MCNC: 175 MG/DL (ref 70–99)
HCT VFR BLD AUTO: 43.9 % (ref 35.8–46.3)
HGB BLD-MCNC: 13.9 G/DL (ref 11.7–15.4)
IMM GRANULOCYTES # BLD AUTO: 0.2 K/UL (ref 0–0.5)
IMM GRANULOCYTES NFR BLD AUTO: 1 % (ref 0–5)
LYMPHOCYTES # BLD: 3.3 K/UL (ref 0.5–4.6)
LYMPHOCYTES NFR BLD: 19 % (ref 13–44)
MCH RBC QN AUTO: 26.5 PG (ref 26.1–32.9)
MCHC RBC AUTO-ENTMCNC: 31.7 G/DL (ref 31.4–35)
MCV RBC AUTO: 83.6 FL (ref 82–102)
MONOCYTES # BLD: 1.2 K/UL (ref 0.1–1.3)
MONOCYTES NFR BLD: 7 % (ref 4–12)
NEUTS SEG # BLD: 12.7 K/UL (ref 1.7–8.2)
NEUTS SEG NFR BLD: 73 % (ref 43–78)
NRBC # BLD: 0 K/UL (ref 0–0.2)
PLATELET # BLD AUTO: 215 K/UL (ref 150–450)
PMV BLD AUTO: 13.4 FL (ref 9.4–12.3)
POTASSIUM SERPL-SCNC: ABNORMAL MMOL/L (ref 3.5–5.1)
RBC # BLD AUTO: 5.25 M/UL (ref 4.05–5.2)
SERVICE CMNT-IMP: ABNORMAL
SODIUM SERPL-SCNC: 138 MMOL/L (ref 136–145)
WBC # BLD AUTO: 17.4 K/UL (ref 4.3–11.1)

## 2024-09-23 PROCEDURE — 82962 GLUCOSE BLOOD TEST: CPT

## 2024-09-23 PROCEDURE — 99232 SBSQ HOSP IP/OBS MODERATE 35: CPT | Performed by: PHYSICAL MEDICINE & REHABILITATION

## 2024-09-23 PROCEDURE — 97110 THERAPEUTIC EXERCISES: CPT

## 2024-09-23 PROCEDURE — 6370000000 HC RX 637 (ALT 250 FOR IP): Performed by: PHYSICAL MEDICINE & REHABILITATION

## 2024-09-23 PROCEDURE — 92610 EVALUATE SWALLOWING FUNCTION: CPT

## 2024-09-23 PROCEDURE — 80048 BASIC METABOLIC PNL TOTAL CA: CPT

## 2024-09-23 PROCEDURE — 97530 THERAPEUTIC ACTIVITIES: CPT

## 2024-09-23 PROCEDURE — 1180000000 HC REHAB R&B

## 2024-09-23 PROCEDURE — 97535 SELF CARE MNGMENT TRAINING: CPT

## 2024-09-23 PROCEDURE — 6370000000 HC RX 637 (ALT 250 FOR IP): Performed by: PHYSICIAN ASSISTANT

## 2024-09-23 PROCEDURE — 85025 COMPLETE CBC W/AUTO DIFF WBC: CPT

## 2024-09-23 PROCEDURE — 36415 COLL VENOUS BLD VENIPUNCTURE: CPT

## 2024-09-23 PROCEDURE — 6360000002 HC RX W HCPCS: Performed by: PHYSICAL MEDICINE & REHABILITATION

## 2024-09-23 RX ORDER — OXYCODONE HYDROCHLORIDE 5 MG/1
10 TABLET ORAL EVERY 4 HOURS PRN
Status: DISCONTINUED | OUTPATIENT
Start: 2024-09-23 | End: 2024-09-26

## 2024-09-23 RX ORDER — OXYCODONE HYDROCHLORIDE 5 MG/1
5 TABLET ORAL EVERY 4 HOURS PRN
Status: DISCONTINUED | OUTPATIENT
Start: 2024-09-23 | End: 2024-09-26

## 2024-09-23 RX ORDER — ACETAMINOPHEN 500 MG
1000 TABLET ORAL EVERY 8 HOURS
Status: DISPENSED | OUTPATIENT
Start: 2024-09-23

## 2024-09-23 RX ORDER — SCOLOPAMINE TRANSDERMAL SYSTEM 1 MG/1
1 PATCH, EXTENDED RELEASE TRANSDERMAL
Status: DISPENSED | OUTPATIENT
Start: 2024-09-23

## 2024-09-23 RX ADMIN — PANTOPRAZOLE SODIUM 40 MG: 40 TABLET, DELAYED RELEASE ORAL at 05:30

## 2024-09-23 RX ADMIN — ACETAZOLAMIDE 500 MG: 250 TABLET ORAL at 08:18

## 2024-09-23 RX ADMIN — CARBOXYMETHYLCELLULOSE SODIUM 1 DROP: 10 GEL OPHTHALMIC at 20:25

## 2024-09-23 RX ADMIN — NYSTATIN 500000 UNITS: 100000 SUSPENSION ORAL at 20:24

## 2024-09-23 RX ADMIN — ENOXAPARIN SODIUM 30 MG: 100 INJECTION SUBCUTANEOUS at 08:19

## 2024-09-23 RX ADMIN — INSULIN LISPRO 2 UNITS: 100 INJECTION, SOLUTION INTRAVENOUS; SUBCUTANEOUS at 16:39

## 2024-09-23 RX ADMIN — POTASSIUM CHLORIDE 10 MEQ: 750 TABLET, EXTENDED RELEASE ORAL at 08:19

## 2024-09-23 RX ADMIN — NYSTATIN 500000 UNITS: 100000 SUSPENSION ORAL at 08:19

## 2024-09-23 RX ADMIN — NYSTATIN 500000 UNITS: 100000 SUSPENSION ORAL at 16:40

## 2024-09-23 RX ADMIN — ONDANSETRON 4 MG: 4 TABLET, ORALLY DISINTEGRATING ORAL at 08:50

## 2024-09-23 RX ADMIN — ENOXAPARIN SODIUM 30 MG: 100 INJECTION SUBCUTANEOUS at 20:23

## 2024-09-23 RX ADMIN — PREDNISONE 70 MG: 20 TABLET ORAL at 08:18

## 2024-09-23 RX ADMIN — PANTOPRAZOLE SODIUM 40 MG: 40 TABLET, DELAYED RELEASE ORAL at 16:39

## 2024-09-23 RX ADMIN — ASPIRIN 81 MG: 81 TABLET, COATED ORAL at 08:18

## 2024-09-23 RX ADMIN — HYDROCODONE BITARTRATE AND ACETAMINOPHEN 1 TABLET: 10; 325 TABLET ORAL at 08:39

## 2024-09-23 RX ADMIN — HYDROCHLOROTHIAZIDE 12.5 MG: 12.5 CAPSULE ORAL at 08:18

## 2024-09-23 RX ADMIN — ACETAMINOPHEN 1000 MG: 500 TABLET ORAL at 19:22

## 2024-09-23 RX ADMIN — NYSTATIN 500000 UNITS: 100000 SUSPENSION ORAL at 12:36

## 2024-09-23 RX ADMIN — ACETAMINOPHEN 1000 MG: 500 TABLET ORAL at 12:36

## 2024-09-23 ASSESSMENT — PAIN SCALES - GENERAL
PAINLEVEL_OUTOF10: 0
PAINLEVEL_OUTOF10: 7
PAINLEVEL_OUTOF10: 0
PAINLEVEL_OUTOF10: 4

## 2024-09-23 ASSESSMENT — PAIN DESCRIPTION - LOCATION: LOCATION: GENERALIZED

## 2024-09-23 ASSESSMENT — PAIN DESCRIPTION - DESCRIPTORS: DESCRIPTORS: ACHING

## 2024-09-23 NOTE — CARE COORDINATION
CM reviewed / screen patient medical chart for continued stay.  Patient needs are continue to be followed by Dr. Milton Lyles. Patient was admitted on 9/20/24.  Patient has been approved for estimated length of stay for  14 days. Patient has currently used 3 days of estimated length of stay for days.  [facility] is [facility] [employer] [facility] [employer]. Pt was admitted for functional blindness. Patient has no discharge date / plan at this time scheduled. CM will continue to follow patient plan of care and assist with supportive care needs, concerns or questions that may arise.         Name of Ins: [employer]  Policy #: [id number]    Secondary Ins:   Policy #:   Auth #  Admission Date: 9/20/24  Approved Dates: 14  Discharge Date: TBD  LOS: 3  Contact Name:   Contact #  Fax #:  Updated Clinicals: None at this time.

## 2024-09-23 NOTE — PROGRESS NOTES
PHYSICAL THERAPY DAILY NOTE    PT Individual Minutes  Time In: 0836  Time Out: 0935  Minutes: 59                 Total Treatment Time:  59 Minutes    Pt. Seen for: AM, Therapeutic Exercise, and Transfer Training     Subjective: Pt. Reports nausea after taking medication this AM.           Objective:  Restrictions/Precautions: Fall Risk, Bed Alarm, Other (comment) (posey alarm, limited vision)  Required Braces or Orthoses?: No             GROSS ASSESSMENT   Pt. Up in w/c upon arrival.  Pt. Experience episode of nausea w/ minimal emesis after taking meds.  Improved w/ zofran.         COGNITION Daily Assessment        Overall Cognitive Status: WNL        BED/MAT MOBILITY Daily Assessment    NT this session.       TRANSFERS Daily Assessment    Sit to Stand: Minimal Assistance  Stand to Sit: Minimal Assistance  Bed to Chair: Minimal assistance (w/ RW)          GAIT Daily Assessment     Deferred secondary to R hip pain this AM       STEPS/STAIRS Daily Assessment     NT         BALANCE Daily Assessment    Static Sitting: Good:  Pt. able to maintain balance w/o UE support;  exhibits some postural sway  Dynamic Sitting: Good - accepts moderate challenge;  can maintain balance while picking object off the floor  Static Standing: Fair:  Pt. requires UE support;  may need occasional min A  Dynamic Standing: Poor - unable to accept challenge or move without LOB        WHEELCHAIR MOBILITY Daily Assessment     NT              LOWER EXTREMITY EXERCISES   Pt. Performed Motomed @ resistance level 2 x10 minutes to increase strength & ROM  R LE.     Pain level: 6/10  Pain Location:  R hip  Pain Interventions: position change;  RN provided pain medication    Vital Signs:  /85   Pulse 68   Temp 98.1 °F (36.7 °C) (Oral)   Resp 18   Ht 1.702 m (5' 7\")   Wt 126.1 kg (278 lb)   SpO2 98%   BMI 43.54 kg/m²      Education:    Education Given To: Patient  Education Provided: Safety;Transfer Training;Precautions;Mobility  Training  Education Provided Comments: Pt. educated on the purpose of the motomed  Education Method: Demonstration;Verbal  Barriers to Learning: None  Education Outcome: Verbalized understanding;Demonstrated understanding;Continued education needed     Interdisciplinary Communication: Collaborated w/ OT regarding pt's progress.     Pt. Left in recliner call bell in reach needs in reach oriented to surroundings         Assessment: Pt. Requiring less assistance for sit to stand, but transfers & gait cont. To be limited by increased R hip pain.  Pt. Cont. To mobilize well below her baseline & benefits from cont. PT to address.         Plan of Care: Continue with POC and progress as tolerated.      Caridad Mendez, PT  9/23/2024

## 2024-09-23 NOTE — PROGRESS NOTES
PHYSICAL THERAPY DAILY NOTE    PT Individual Minutes  Time In: 1120  Time Out: 1205  Minutes: 45                 Total Treatment Time:  42 Minutes    Pt. Seen for: AM, Therapeutic Exercise, and Transfer Training     Subjective: Pt. Reports her vision is worse today.  States she can only see black figures but no light.         Objective:  Restrictions/Precautions: Fall Risk, Bed Alarm, Other (comment) (posey alarm, limited vision)  Required Braces or Orthoses?: No             GROSS ASSESSMENT   Pt. Up in w/c @ therapy table w/ OT upon arrival.        COGNITION Daily Assessment        Overall Cognitive Status: WNL        BED/MAT MOBILITY Daily Assessment     Rolling to Left: Supervision  Supine to Sit: Moderate assistance (for trunk righting & R LE)  Sit to Supine: Minimal assistance (for R LE)        TRANSFERS Daily Assessment   Max Vcs for visual orientation    Mat to w/c via stand pivot w/ RW performed w/ supervision @ end of tx session Sit to Stand: Minimal Assistance  Stand to Sit: Minimal Assistance  Bed to Chair: Minimal assistance (w/ RW)          GAIT Daily Assessment    Deferred secondary to increased hip pain this AM       STEPS/STAIRS Daily Assessment     NT         BALANCE Daily Assessment    Static Sitting: Good:  Pt. able to maintain balance w/o UE support;  exhibits some postural sway  Dynamic Sitting: Good - accepts moderate challenge;  can maintain balance while picking object off the floor  Static Standing: Fair:  Pt. requires UE support;  may need occasional min A  Dynamic Standing: Fair - accepts minimal challenge;  can maintain balance while turning head/trunk       WHEELCHAIR MOBILITY Daily Assessment     NT              LOWER EXTREMITY EXERCISES   Pt. Performed supine LE exercises to increase strength & ROM.      SUPINE EXERCISES Sets Reps Comments   Ankle Pumps 1 12    Isometric hip adduction 1 12    Glut Sets 1 12    Heel Slides 1 12 AAROM R LE   Hip Abduction 1 12 Using a  skate & board

## 2024-09-23 NOTE — PLAN OF CARE
Problem: Safety - Adult  Goal: Free from fall injury  9/23/2024 0744 by Pramod Sparrow, RN  Outcome: Progressing  9/22/2024 2221 by Margie Gomez, RN  Outcome: Progressing

## 2024-09-23 NOTE — PROGRESS NOTES
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 Vision remains \"like a blob\" cannot always distinguish light stimuli. Ptosis variable, a little worse today compared to Friday. She states she was bothered more by HA yesterday but still has HA this AM - remains left-sided. We discussed changing her pain medication regimen since she reports Norco helps decrease HA only ~10%. She states scopolamine patch helps with both dizziness and secretion management. She reports decreased appetite despite high-dose steroids; daughter brought in Boost supplement but we will have dietitian evaluate. Has had intermittent swallowing issues; SLP to re-evaluate her at lunch. We reviewed and discussed AM labs; BS mostly <200, leukocytosis persists. All questions and concerns were addressed at this time.    Objective:     Current Facility-Administered Medications   Medication Dose Route Frequency    [START ON 9/24/2024] potassium bicarb-citric acid (EFFER-K) effervescent tablet 10 mEq  10 mEq Oral Daily    scopolamine (TRANSDERM-SCOP) transdermal patch 1 patch  1 patch TransDERmal Q72H    acetaminophen (TYLENOL) tablet 1,000 mg  1,000 mg Oral q8h    oxyCODONE (ROXICODONE) immediate release tablet 10 mg  10 mg Oral Q4H PRN    oxyCODONE (ROXICODONE) immediate release tablet 5 mg  5 mg Oral Q4H PRN    predniSONE (DELTASONE) tablet 70 mg  70 mg Oral Daily    Followed by    [START ON 9/28/2024] predniSONE (DELTASONE) tablet 60 mg  60 mg Oral Daily    nystatin (MYCOSTATIN) 925969 UNIT/ML suspension 500,000 Units  5 mL Oral 4x Daily    acetaZOLAMIDE (DIAMOX) tablet 500 mg  500 mg Oral Daily    carboxymethylcellulose (THERATEARS) 1 % ophthalmic gel 1 drop  1 drop Both  staff, therapy team and case management.    Part of this note may have been written by using a voice dictation software.  The note has been proof read but may still contain some grammatical/other typographical errors.    Milton Lyles MD  Physical Medicine and Rehabilitation Attending Physician  September 23, 2024

## 2024-09-23 NOTE — PLAN OF CARE
Problem: Safety - Adult  Goal: Free from fall injury  9/22/2024 2221 by Margie Gomez RN  Outcome: Progressing  9/22/2024 1012 by Susie Greenwood RN  Outcome: Progressing     Problem: Skin/Tissue Integrity  Goal: Absence of new skin breakdown  Description: 1.  Monitor for areas of redness and/or skin breakdown  2.  Assess vascular access sites hourly  3.  Every 4-6 hours minimum:  Change oxygen saturation probe site  4.  Every 4-6 hours:  If on nasal continuous positive airway pressure, respiratory therapy assess nares and determine need for appliance change or resting period.  9/22/2024 2221 by Margie Gomez RN  Outcome: Progressing  9/22/2024 1012 by Susie Greenwood RN  Outcome: Progressing     Problem: ABCDS Injury Assessment  Goal: Absence of physical injury  9/22/2024 2221 by Margie Gomez RN  Outcome: Progressing  9/22/2024 1012 by Susie Greenwood RN  Outcome: Progressing     Problem: Pain  Goal: Verbalizes/displays adequate comfort level or baseline comfort level  9/22/2024 2221 by Margie Gomez RN  Outcome: Progressing  9/22/2024 1012 by Susie Greenwood RN  Outcome: Progressing

## 2024-09-23 NOTE — CONSULTS
Comprehensive Nutrition Assessment    Type and Reason for Visit: Initial, Consult  Poor Intake/Appetite 5 or More Days (Hospitalists)    Nutrition Recommendations/Plan:   Meals and Snacks:  Diet: Continue current diet per SLP evaluation  Oral Nutriton Supplement Therapy:   Medical food supplement therapy:  None Not applicable      Malnutrition Assessment:  Malnutrition Status: At risk for malnutrition (Comment) (varied appetite/intakes during admit)  no noé wasting    Nutrition Assessment:  Nutrition History:   Pt states that prior to admit her appetite was very good, and she was eating very well when she likes the food she's eating. No dietary restrictions at baseline. Eats a couple times/day at baseline. Denies having issues chewing/swallowing. Denies n/v/c/d.      Do You Have Any Cultural, Rastafarian, or Ethnic Food Preferences?: No   Weight History:   EMR weight history review: 294# 9/18/23 (Cardiology), 296# 11/8/23 (Cardiology), 290# 4/30/24 (FOV), 296# 5/15/24 (FOV), 298# 7/11/24 (Orthopedics), 290# 8/5/24 (Rheumatology).   Pt reports that her weight dropped from 291# down to 278#, but thinks most of it was fluid related.   Nutrition Background:       PMH significant for CVA, bilateral avascular necrosis of hips, rheumatoid factor positive, HTN, DM 2, Vitamin D Deficiency, EMILIA, CHF, arthritis.   She was recently (9/12-9/19) hospitalized at [facility] from 9/12-9/19/2024 secondary to acute onset of left facial swelling,  subacute visual impairment, and other medical complications. Developed bilateral ptosis, significant headaches. Brain MRI was notable for bilateral orbital proptosis with mildly increased CSF in the region of the optic nerve sheath.  She underwent lumbar puncture, with increased opening pressure but otherwise low suspicion for inflammatory etiology.   9/20: transferred to [facility]  Nutrition Interval:  9/20: easy to chew diet, no mixed consistencies per SLP  9/23: cleared for a regular

## 2024-09-23 NOTE — PROGRESS NOTES
[facility] OCCUPATIONAL THERAPY DAILY NOTE  OT Individual Minutes  Time In: 1035  Time Out: 1120  Minutes: 45               Subjective: Pt agreeable to treatment. \"That's a plate\"  Pain: No pain expressed.  Interdisciplinary Communication: Collaborated with Physician, PA, PT, RN, SLP, and OT  regarding pt status, pt performance, and handoff communication.   Precautions: Falls and severe visual deficits    FUNCTIONAL MOBILITY:       Bed Mobility NT    Sit to Stand CGA    Transfer  CGA and Kristy  Transfer Type: SPT  Equipment: RW    Ambulation NT  Equipment: NA       - Activity Tolerance - Coordination - Range of Motion - Visual-Perceptual    Pt completed therapeutic activities to challenge  BUE AROM/STR, FM/GM coordination, FM coordination/dexterity, bi-manual coordination, activity tolerance, problem solving, and use of compensatory, tactile, methods for scanning environment and completion of simulated ADL  in preparation for safe return to max (I) with I/ADLs. Pt tolerated Pt tolerated activities well with no c/o pain. . Rest breaks utilized as needed throughout.  Pt completed:  Self-feeding; Pt participated in simulated self-feeding style activity seated at table. Wrist weights utilized to simulate boundary of food tray and fork with enlarged handle, small cup and plate with plate guard set within border. Black construction paper mat utilized to assess use of contrasting colors. Pt reports all white or all black blobs only. Pt unable to distinguish white plate on black background present session compared to ability to distinguish white cereal bowl from blue placemat previous session. Pt utilizing clock method for tactile scanning of borders. Order of events for meals is as such  Scan for and establish borders; \"start at 6 o'clock\". Table then food tray, then plate, then items on tray.  For table and tray scan border from 6 o'clock to ~11 and 1 to prevent spillage of items on table/tray.   Return to 6 o'clock move hands  forward slowly, locate rim of plate, use fingers to establish border of plate without knocking off plate guard. Assess food location.   Return to 6 o'clock, slowly slide hands out locating utensils and cups.   Interdisciplinary discussion utilized and will discuss with pt to establish desired try set up for consistency. Once established mock tray to be set up in room for staff to go off of as appropriate.  Pt completed functional self-feeding simulation seated. Pt used above method to assess tray/plate, find adaptive fork, retrieve therapy from plate and place into small container at ~11 o'clock outside of plate.  Poor functional proprioception and coordination at present time, pt is progressing however. Continued assessment and implementation of adaptive and compensatory methods next session as appropriate and as interdisciplinary discussion and review with pt continues.     Education   Adaptive ADL Techniques, AE/DME Training, Benefits of OT, Energy Conservation, Pacing, Functional Transfer Training, Aj Dressing Strategies, Precautions, Rolling Walker Management, and Safety Awareness     Assessment: Patient tolerated session well overall. Review of prior education around blinking best to pt abilities when eyes watering 2nd to dry eyes. After session increased interdisciplinary discussion with Unit RN director, therapy supervisor, PT, OT, Ots around need for set-up assist, methods currently used and cues required for self-feeding at present time. Pt demonstrated good participation in OT treatment. Pt continues to benefit from skilled OT services to address remaining deficits and progress toward baseline level of independence and safety. Patient ended session seated in gym with PT.   Plan: Continue OT POC.     HO HERNANDEZ OT   9/23/2024

## 2024-09-23 NOTE — PROGRESS NOTES
Pt became dizzy after using BR. Pt placed in seating position on BSC in BR another nurse called to assist pt. Pt states she felt like the room was spinning. Pt able to ambulate back to the bed with walker  x2 assist. Significant guidance needed for walker placement. Pt also is having a harder time with her body proprioception. Pt in bed with c/o headache also now. Norco and call light given with instructions to call if symptoms worsened or unchanged.

## 2024-09-23 NOTE — PROGRESS NOTES
Contacted the Rheumatologist through [facility] [employer] phone number. They informed me they do not see patients while they are in the hospital.

## 2024-09-23 NOTE — PROGRESS NOTES
[facility] OCCUPATIONAL THERAPY DAILY NOTE  OT Individual Minutes  Time In: 0750  Time Out: 0836  Minutes: 46               Subjective: Pt agreeable to treatment. \"It's going to get better.\"  Pain: RN notified . Pt endorsed \"heaviness\" on L side of face that has been going on during inpatient stay; R hip pain. Vitals monitored throughout, no change in pt status-notified RN. / 64 (78), SPO2 >90%, HR 71; reported that this feeling improves throughout the day.  Interdisciplinary Communication: Collaborated with PT and RN regarding pt status, pt performance, and handoff communication.   Precautions: Falls, Lana Alarm, and functional blindness     FUNCTIONAL MOBILITY:       Bed Mobility Kristy and ModA    Sit to Stand Kristy and ModA    Transfer  Kritsy and ModA  Transfer Type: SPT  Equipment: RW Pt required [place] A, direct cues for orientation of GB/FWW and placed of items/DME with vision deficit.   Ambulation Kristy  Equipment: RW      ACTIVITIES OF DAILY LIVING:       Shower/Bathe Partial/moderate assistance MOD A-pt performed shower seated with tub t/f bench/HHSH/GB; pt performed shower with required MOD A for BLE's washing/drying and washing buttocks/drying, pt required [place] A and direct verbal cues d/t vision deficit   Upper Body  Dressing Partial/moderate assistance MIN A while seated in w/c to clasp bra in back, v.c.'s for article of clothing and placement on lap and orientation for clothing item while seated, able to don overhead shirt and doff PJ top SBA   Lower Body Dressing Substantial/maximal assistance Pt able to participate in steps, MAX A required for LB dressing to thread BLE's with underwear/pants and pull up over hips attempting to alternate L<>R hand on FWW for balance   Donning/[place] Footwear Substantial/maximal assistance MAX A to doff/don socks seated   Toileting Hygiene Partial/moderate assistance MOD A-pt able to complete doffing pants over hips, A for pulling pants up over hips with FWW with cues and

## 2024-09-23 NOTE — PROGRESS NOTES
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]  PRIMARY DIAGNOSIS: Functional blindness  Date of Eval: 2024  ICD-10: Treatment Diagnosis: R13.12 Dysphagia, Oropharyngeal Phase    RECOMMENDATIONS   Diet Recommendation:   regular  thin liquids   Medications:  1 at a time with liquid rinse  *nursing reports potassium switched to liquid*     Compensatory Swallowing Strategies:  upright positioning, small bites and sips, slow rate of intake, and supervision with meals  Set up assistance   Supervision with meals    Therapeutic Interventions:  Diet tolerance monitoring  Training in safe swallowing strategies, swallowing precautions and compensatory techniques     ASSESSMENT    patient presents with essentially functional oropharyngeal swallow. she reports pill dysphagia with larger pills, but patient and nursing report adjustments have been made. recommend advance to regular diet. set up assist and supervision with meals for safety and to ensure appropriate bite size given visual deficits and history of dysphagia during acute admission.      GENERAL    History of Present Injury/Illness: per chart, HPI:  \"Tricia Sellers is a 54 y.o. female patient who presented to [facility] on 2024 secondary to visual impairment and facial edema.  She had noted progressive blurry vision in the weeks leading up to admission, with particular worsening in the 24 hours before.  As this worsened, she also developed facial swelling, tenderness.  This progressed further to bilateral ptosis, significant headaches. She has been on antibiotics for suspected facial cellulitis. Brain MRI was notable for bilateral orbital proptosis with mildly increased CSF in the region of the optic nerve sheath.  She underwent lumbar puncture, with increased opening pressure but otherwise low  demonstrates functional hyolaryngeal excursion, tongue base retraction, posterior pharyngeal wall constriction, epiglottic inversion, and upper esophageal opening. Solids tolerated with increasing oral prep time as textures became more challenging. Patient reports fatigue as textures persist. Discussed options for minced and moist vs soft solids. Patient requesting soft solids at this time with anticipation of diet upgrade as strength improves with IVIG.      Recommend soft and bite sized solids, thin liquids, medications whole in puree. RN called after study and informed of recommendations. Of note, due to visual impairments at this time, an order for feeding assistance has been added.       Of note, due to episode of difficulty managing secretions and sticking sensation, She underwent EGD on 9/16 with mild gastritis and duodenitis without active erosion.       Subjective: \"I think I'm done then they tell me food is on the plate\"    Pain: no complaints during session.        Cognitive linguistic screening:  Follows multi step directions:WFL  Recall: WFL, remote and recent   Speech: 100% intelligible  Pt denies any changes.    OBJECTIVE    Oral Motor Assessment  Labial: no impairment  Lingual: no impairment  Dentition: intact  Oral Hygiene: adequate-pt has been on tx for oral thrush  Vocal quality: WFL      Oropharyngeal Phase:   Patient observed with lunch tray (easy to chew meat, mashed potatoes, carrots, and cake). Using clock method, plate guard and min verbal cues, pt self fed. Pt recalling methods taught in OT appropriately.   Adequate oral prep/transfer. No overt s/sx aspiration. No complaints of sticking sensation. At times, pt taking extremely large bites likely baseline and complicated by visual difficulty impacting ability to monitor size. Reviewed compensatory strategies/precautions given history during acute admission of food sticking.  Patient observed to take 2 meds-1 at a time whole with liquid rinse

## 2024-09-23 NOTE — PLAN OF CARE
[facility]  Rehab Physician Plan of Care Review  Individualized Overall Plan of Care     Patient Name:  Tricia Sellers          Expected LOS: 14 days  Rehabilitation IGC: Other disabling impairment - Functional blindness [F45.8]    Primary Rehabilitation (etiologic) diagnosis: Functional blindness with ophthalmologic impairments and increased intracranial pressure, suspected secondary to as-yet undiagnosed autoimmune or neurologic condition.     Medical/Functional Prognosis: Excellent     Anticipated functional outcomes/goals and interventions: Performs mobility and self care activities at the highest level of function for planned discharge setting. Advance to the least restrictive diet without signs or symptoms of aspiration and demonstrate communication skills at the highest level of function for planned discharge setting.     Patient's/family's anticipated outcomes/personal goals: to improve strength, endurance, and independence      Physical therapy functional outcome/goal:  Bed mobility  Bridging: Unable to assess (due to right hip pain)  Rolling to Left: Moderate assistance  Rolling to Right: Unable to assess (due to R hip pain)  Supine to Sit: Moderate assistance  Sit to Supine: Moderate assistance  Scooting: Moderate assistance  Bed Mobility Comments: Increased time and effort to complete with multiple cues for body mechanics. ALtered body mechanics due to R hip pain   Transfers  Sit to Stand: Minimal Assistance  Stand to Sit: Minimal Assistance  Bed to Chair: Minimal assistance (w/ RW)  Stand Pivot Transfers: Moderate Assistance (with RW)  Car Transfer: Moderate Assistance  Comment: Increased time and effort to complete with cues for body mechanics. Cues for visual deficits. Increased NATANAEL with tendency to slide R foot forward recluctant to  right foot. Limited stance phase RLE due to right hip pain   Ambulation  Surface: Level tile  Device: Rolling Walker (gait belt)  Other Apparatus:  urinary function/dysfunction. Does have the potential for underlying bladder dysfunction given immobility. Therefore, requires continued routine monitoring with appropriate bladder management as indicated to avoid urinary tract complications.      Required Therapy (Check all that apply)  PT/OT at least 15 hours/per week x 14 days  SLP at least 2-3 hours per week x 1+ week  15+ hours per week total between all disciplines     Any updates/changes to the Required Therapies section in Rehab POC Review document, as compared to the Pre-Admission Screening document, are due to the assessment completed by the interdisciplinary team members. These changes/updates are reviewed and approved by the Physical Medicine & Rehabilitation physician.        Anticipated Discharge Plan  Community Setting:Home Care and Family assistance   PT/OT/RN/?SLP     Anticipated length of stay: 14 days after admission     Physician:  Milton Lyles MD  Date: 9/23/2024  Time:  3:05 PM

## 2024-09-24 ENCOUNTER — APPOINTMENT (OUTPATIENT)
Dept: MRI IMAGING | Age: 54
DRG: 880 | End: 2024-09-24
Attending: PHYSICAL MEDICINE & REHABILITATION
Payer: COMMERCIAL

## 2024-09-24 LAB
ACHR AB SER-SCNC: <0.03 NMOL/L (ref 0–0.24)
ACHR BLOCK AB SER-ACNC: 15 % (ref 0–25)
ACHR MODULATING AB: 0 % (ref 0–45)
GLUCOSE BLD STRIP.AUTO-MCNC: 125 MG/DL (ref 65–100)
GLUCOSE BLD STRIP.AUTO-MCNC: 172 MG/DL (ref 65–100)
GLUCOSE BLD STRIP.AUTO-MCNC: 183 MG/DL (ref 65–100)
GLUCOSE BLD STRIP.AUTO-MCNC: 234 MG/DL (ref 65–100)
GM 1 IGG: <20 % (ref 0–30)
INTERPRETATION: NORMAL
MUSK AB SER IA-ACNC: <1 U/ML
MYASTHENIA GRAVIS PANEL INFO: NORMAL
SERVICE CMNT-IMP: ABNORMAL
STRIA MUS AB TITR SER IF: NEGATIVE

## 2024-09-24 PROCEDURE — 92526 ORAL FUNCTION THERAPY: CPT

## 2024-09-24 PROCEDURE — 1180000000 HC REHAB R&B

## 2024-09-24 PROCEDURE — 6370000000 HC RX 637 (ALT 250 FOR IP): Performed by: PSYCHIATRY & NEUROLOGY

## 2024-09-24 PROCEDURE — 99233 SBSQ HOSP IP/OBS HIGH 50: CPT | Performed by: PSYCHIATRY & NEUROLOGY

## 2024-09-24 PROCEDURE — 97535 SELF CARE MNGMENT TRAINING: CPT

## 2024-09-24 PROCEDURE — 6370000000 HC RX 637 (ALT 250 FOR IP): Performed by: PHYSICIAN ASSISTANT

## 2024-09-24 PROCEDURE — 86363 MOG-IGG1 ANTB FLO CYTMTRY EA: CPT

## 2024-09-24 PROCEDURE — 97530 THERAPEUTIC ACTIVITIES: CPT

## 2024-09-24 PROCEDURE — 6370000000 HC RX 637 (ALT 250 FOR IP): Performed by: PHYSICAL MEDICINE & REHABILITATION

## 2024-09-24 PROCEDURE — 82962 GLUCOSE BLOOD TEST: CPT

## 2024-09-24 PROCEDURE — 6360000002 HC RX W HCPCS: Performed by: PHYSICAL MEDICINE & REHABILITATION

## 2024-09-24 PROCEDURE — 99233 SBSQ HOSP IP/OBS HIGH 50: CPT | Performed by: PHYSICAL MEDICINE & REHABILITATION

## 2024-09-24 PROCEDURE — 36415 COLL VENOUS BLD VENIPUNCTURE: CPT

## 2024-09-24 PROCEDURE — 86053 AQAPRN-4 ANTB FLO CYTMTRY EA: CPT

## 2024-09-24 PROCEDURE — 70551 MRI BRAIN STEM W/O DYE: CPT

## 2024-09-24 RX ORDER — ACETAZOLAMIDE 250 MG/1
500 TABLET ORAL 2 TIMES DAILY
Status: DISPENSED | OUTPATIENT
Start: 2024-09-24

## 2024-09-24 RX ORDER — MECLIZINE HYDROCHLORIDE 25 MG/1
25 TABLET ORAL 3 TIMES DAILY PRN
Status: DISCONTINUED | OUTPATIENT
Start: 2024-09-24 | End: 2024-09-25

## 2024-09-24 RX ORDER — LANOLIN ALCOHOL/MO/W.PET/CERES
3 CREAM (GRAM) TOPICAL EVERY EVENING
Status: DISPENSED | OUTPATIENT
Start: 2024-09-24

## 2024-09-24 RX ORDER — INSULIN GLARGINE 100 [IU]/ML
8 INJECTION, SOLUTION SUBCUTANEOUS NIGHTLY
Status: DISCONTINUED | OUTPATIENT
Start: 2024-09-24 | End: 2024-09-26

## 2024-09-24 RX ADMIN — PANTOPRAZOLE SODIUM 40 MG: 40 TABLET, DELAYED RELEASE ORAL at 06:14

## 2024-09-24 RX ADMIN — NYSTATIN 500000 UNITS: 100000 SUSPENSION ORAL at 12:09

## 2024-09-24 RX ADMIN — Medication 3 MG: at 20:49

## 2024-09-24 RX ADMIN — HYDROCHLOROTHIAZIDE 12.5 MG: 12.5 CAPSULE ORAL at 07:57

## 2024-09-24 RX ADMIN — OXYCODONE 5 MG: 5 TABLET ORAL at 06:22

## 2024-09-24 RX ADMIN — NYSTATIN 500000 UNITS: 100000 SUSPENSION ORAL at 07:57

## 2024-09-24 RX ADMIN — INSULIN GLARGINE 8 UNITS: 100 INJECTION, SOLUTION SUBCUTANEOUS at 20:50

## 2024-09-24 RX ADMIN — ACETAZOLAMIDE 500 MG: 250 TABLET ORAL at 07:58

## 2024-09-24 RX ADMIN — INSULIN LISPRO 1 UNITS: 100 INJECTION, SOLUTION INTRAVENOUS; SUBCUTANEOUS at 17:15

## 2024-09-24 RX ADMIN — ACETAMINOPHEN 1000 MG: 500 TABLET ORAL at 19:15

## 2024-09-24 RX ADMIN — ENOXAPARIN SODIUM 30 MG: 100 INJECTION SUBCUTANEOUS at 07:59

## 2024-09-24 RX ADMIN — CARBOXYMETHYLCELLULOSE SODIUM 1 DROP: 10 GEL OPHTHALMIC at 20:54

## 2024-09-24 RX ADMIN — ASPIRIN 81 MG: 81 TABLET, COATED ORAL at 07:57

## 2024-09-24 RX ADMIN — ACETAZOLAMIDE 500 MG: 250 TABLET ORAL at 20:49

## 2024-09-24 RX ADMIN — ENOXAPARIN SODIUM 30 MG: 100 INJECTION SUBCUTANEOUS at 20:49

## 2024-09-24 RX ADMIN — PREDNISONE 70 MG: 20 TABLET ORAL at 07:57

## 2024-09-24 RX ADMIN — NYSTATIN 500000 UNITS: 100000 SUSPENSION ORAL at 17:15

## 2024-09-24 RX ADMIN — ACETAMINOPHEN 1000 MG: 500 TABLET ORAL at 12:08

## 2024-09-24 RX ADMIN — NYSTATIN 500000 UNITS: 100000 SUSPENSION ORAL at 20:49

## 2024-09-24 RX ADMIN — PANTOPRAZOLE SODIUM 40 MG: 40 TABLET, DELAYED RELEASE ORAL at 17:15

## 2024-09-24 RX ADMIN — POTASSIUM BICARBONATE 10 MEQ: 391 TABLET, EFFERVESCENT ORAL at 07:58

## 2024-09-24 ASSESSMENT — PAIN SCALES - GENERAL
PAINLEVEL_OUTOF10: 0
PAINLEVEL_OUTOF10: 5
PAINLEVEL_OUTOF10: 0
PAINLEVEL_OUTOF10: 0

## 2024-09-24 ASSESSMENT — PAIN DESCRIPTION - DESCRIPTORS: DESCRIPTORS: ACHING

## 2024-09-24 NOTE — PROGRESS NOTES
[facility] OCCUPATIONAL THERAPY DAILY NOTE  OT Individual Minutes  Time In: 0918  Time Out: 1021  Minutes: 63               Subjective: Pt agreeable to treatment. \"The dizziness is better than earlier.\"  Pain: No pain expressed. Dizziness endorsed with mobility and therapist pushing w/c.   Interdisciplinary Communication: Collaborated with PT and RN regarding pt status and pt performance.   Precautions: Falls and Lana Alarm, functional blindness; pt with increased dizziness with w/c mobility from therapist and t/f's, required slow movements and rest breaks during task.    FUNCTIONAL MOBILITY:       Bed Mobility NT    Sit to Stand Kristy    Transfer  Kristy and ModA  Transfer Type: SPT  Equipment: RW  SPT from w/c<>toilet with FWW/GB   Ambulation NT  Equipment: NA       - Activity Tolerance - Coordination - Range of Motion - Self-Care    Pt participated while seated in w/c stereognosis task with pt's personal hygiene and daily items. Pt able to correctly identify objects 14/14 with increased time for compensation for vision deficits; tactile and olfactory senses for task. Pt required cues for orientation of clock method for tray table, cues to slow UE use with proprioceptive deficits. Pushing items away from table or towards edge with cues on stopping once she feels items.      Pt then participated in g/h tasks seated in w/c with cues/intermittent Unga A for brushing teeth with cues on positioning and calibration of grabbing items/proprioception deficits for grabbing items. SBA while seated - CGA for brushing teeth and cues required d/t vision deficits, pt able to identify items appropriately. Pt able to don Deoderant and powder seated with cues on safe technique.       Pt then required to use restroom with CGA in standing for pants management, seated close SBA for russ cares use of GB. Pt required MIN-MOD A SPT from W/C<>toilet with FWW/GB and cues for orientation and use of FWW d/t vision deficits. Hand hygiene with hand

## 2024-09-24 NOTE — PROGRESS NOTES
[facility] OCCUPATIONAL THERAPY DAILY NOTE  OT Individual Minutes  Time In: 1130  Time Out: 1200  Minutes: 30               Subjective: Pt agreeable to treatment. \"I'm doing wonderful.\"  Pain: No pain expressed.  Interdisciplinary Communication: Collaborated with RN and SLP regarding pt status, pt performance, and handoff communication.   Precautions: Falls and severe visual deficits    FUNCTIONAL MOBILITY:       Bed Mobility NT    Sit to Stand NT    Transfer  NT  Transfer Type: NA  Equipment: NA    Ambulation NT  Equipment: NA       - Cognition - Coordination - Self-Care   Pt completed self-care seated at table to challenge BUE function, functional mobility and (I) with ADLs in preparation for safe return to max (I) with ADLs. Pt tolerated self-care with no c/o pain. Rest breaks utilized as needed throughout..  Pt completed:  Self-feeding; Pt participated in eating lunch. Pt utilized clock method  for tactile scanning of borders of tray table, tray, and plate. Pt then used clock method to locate various items (fries, burger buns, toppings, cup, etc.) on the tray/plate. Pt required 5+ verbal/tactile cues with intermittent hand over hand assist for locating items on the plate/tray and assembly of burger. Increased discussion and review of clock method and pacing of bilateral UE movements to minimize knocking over items on tray table/tray. Increased time for training of cup retrieval utilizing tips of fingers/back of hand, stopping movement, opening hand, moving forward, and then closing and grasping.      Education   Adaptive ADL Techniques, Benefits of OT, Energy Conservation, Pacing, and Safety Awareness     Assessment: Patient tolerated session well. Patient stated that all she sees is \"black\" and that she is very tired today. Reviewed clock method prior to eating. Patient demonstrated understanding. Pt demonstrated good participation in OT treatment. Pt continues to benefit from skilled OT services to address remaining

## 2024-09-24 NOTE — PLAN OF CARE
Problem: Safety - Adult  Goal: Free from fall injury  9/23/2024 2133 by Kellen Arriaga, RN  Outcome: Progressing  9/23/2024 0744 by Pramod Sparrow, RN  Outcome: Progressing     Problem: Skin/Tissue Integrity  Goal: Absence of new skin breakdown  Description: 1.  Monitor for areas of redness and/or skin breakdown  2.  Assess vascular access sites hourly  3.  Every 4-6 hours minimum:  Change oxygen saturation probe site  4.  Every 4-6 hours:  If on nasal continuous positive airway pressure, respiratory therapy assess nares and determine need for appliance change or resting period.  Outcome: Progressing     Problem: ABCDS Injury Assessment  Goal: Absence of physical injury  Outcome: Progressing     Problem: Pain  Goal: Verbalizes/displays adequate comfort level or baseline comfort level  Outcome: Progressing

## 2024-09-24 NOTE — PROGRESS NOTES
[place] [address]  Tel: [phone]     Physical Medicine and Rehabilitation Progress Note      Tricia Sellers  Admit Date: 9/20/2024  Admit Diagnosis: Functional blindness [F45.8]    Subjective     Patient seen this morning during break in therapy. She reports any movement makes her dizzy so she avoids hydrating to decrease need to void. She is amenable to trial meclizine. SLP recommended regular diet with thin liquids but endorsed supervision with compensatory swallow strategies. Vision deficit unchanged, facial tightness / pain and arthritic pain slightly worsened today. She has not been sleeping well although notes she cannot always distinguish between day and night. All questions and concerns were addressed at this time.    Care discussed in detail with Neurology Dr. Burger and her outpatient Rheumatologist Dr. Valencia today.    Objective:     Current Facility-Administered Medications   Medication Dose Route Frequency    meclizine (ANTIVERT) tablet 25 mg  25 mg Oral TID PRN    acetaZOLAMIDE (DIAMOX) tablet 500 mg  500 mg Oral BID    melatonin tablet 3 mg  3 mg Oral QPM    insulin glargine (LANTUS) injection vial 8 Units  8 Units SubCUTAneous Nightly    potassium bicarb-citric acid (EFFER-K) effervescent tablet 10 mEq  10 mEq Oral Daily    scopolamine (TRANSDERM-SCOP) transdermal patch 1 patch  1 patch TransDERmal Q72H    acetaminophen (TYLENOL) tablet 1,000 mg  1,000 mg Oral q8h    oxyCODONE (ROXICODONE) immediate release tablet 10 mg  10 mg Oral Q4H PRN    oxyCODONE (ROXICODONE) immediate release tablet 5 mg  5 mg Oral Q4H PRN    predniSONE (DELTASONE) tablet 70 mg  70 mg Oral Daily    Followed by    [START ON 9/28/2024] predniSONE (DELTASONE) tablet 60 mg  60 mg Oral Daily    nystatin (MYCOSTATIN) 571409 UNIT/ML suspension 500,000 Units  5 mL Oral 4x Daily    carboxymethylcellulose (THERATEARS) 1 % ophthalmic gel 1 drop  1 drop Both Eyes 4x  regular  -cont scopolamine for secretion management    //Oral thrush, appears mild, continue nystatin topical, reassess ~9/28     //Possible L facial cellulitis s/p ceftriaxone. Has some mild ongoing edema and tenderness.     //DM type 2  -SS insulin coverage for now.  -9/23 BS mostly <200, patient reports decreased appetite despite high-dose steroids  -9/24 evening BS yesterday >200, patient notes she ate part of hamburger brought in by her daughter. Pt reports she will hold trulicity until returning home. Will add low dose glargine for now.  Lab Results   Component Value Date/Time    POCGLU 183 (H) 09/24/2024 11:34 AM    POCGLU 125 (H) 09/24/2024 06:17 AM    POCGLU 227 (H) 09/23/2024 07:21 PM     //Mild gastritis / duodenitis confirmed with EGD  -GI rec PPI BID x 8 weeks (~11/16/24)  -hold home NSAID trey while on high dose steroid     //Physical debility     Care discussed in detail with Neurology Dr. Burger and her outpatient Rheumatologist Dr. Valencia 9/24    CODE STATUS: Full Code     DVT prophylaxis: Mobilization with PT and OT. SCDs. Chemical prophylaxis as indicated     Labs: CBC, BMP to be done 9/23 and will be repeated as clinically appropriate.        Acute Rehabilitation Medical Justification:  Will require continued close monitoring of the following systems:  -CV: Monitor blood pressure and heart rate. Adjust medications as needed.  History of HTN, remote stroke.  -Endocrine: Monitor glycemia and adjust pharmacologic regimen as needed.  -Respiratory: Respiratory therapy consult if needed. Incentive spirometer every 2 hours while awake, as needed. To be followed by respiratory therapist as needed.  -Sleep: Monitor sleep cycles.   -Skin: Monitor incisions/wounds for adequate healing. Wound care coordinator consulted as needed. Frequent turning while in bed and repositioning in chair. Monitor for skin breakdown or erythema.  -Psychiatric: Monitor for signs and symptoms of depression. Monitor appetite.  tract complications.      Principal Problem:    Functional blindness  Active Problems:    Obstructive sleep apnea syndrome    Hypertension    Femoroacetabular impingement of left hip    History of avascular necrosis of capital femoral epiphysis    Morbid obesity (HCC)    Peripheral neuropathy    Type 2 diabetes mellitus without complication (HCC)    Weakness of right lower extremity    Dysphagia    Ptosis of both eyelids    Physical debility    Elevated intracranial pressure    Impaired functional mobility, balance, gait, and endurance    Decreased independence with activities of daily living    Encounter for rehabilitation  Resolved Problems:    * No resolved hospital problems. *         Current Risks:   **Risk for Delirium given prolonged hospital stay.   **Risks for falls given recent immobility, weakness and fatigue  **Skin breakdown and pressure injury in the setting of significantly impaired mobility and/or impaired sensation          Fall precautions in place. Chair and bed alarms are set daily and nightly to discourage patient from getting up unattended. Discussed strategy to reduce the risk of falls which include understanding patient’s known fall risk factors and management of the risk factors identified, such as modification of home environment, avoiding or decreasing psychoactive medications. Sitting for 3-5 minutes prior to standing for blood pressure acclimation and avoiding orthostasis. Encouraged continuing exercise therapies which were/will be taught and implemented at [facility], particularly balance, strength, and gait training.  Avoid if possible the following medications which can contribute to delirium: benzodiazepines, antihistamines, and anticholinergics  Avoid sleep disruption by rescheduling early morning blood draws, avoiding middle of the night vital signs or disturbances, minimizing noise.   Keep lights on and blinds open during the day; lights and TV off at night; minimize tethers including

## 2024-09-24 NOTE — PLAN OF CARE
Problem: Safety - Adult  Goal: Free from fall injury  9/24/2024 0843 by Pramod Sparrow, RN  Outcome: Progressing  9/23/2024 2133 by Kellen Arriaga RN  Outcome: Progressing

## 2024-09-24 NOTE — PROGRESS NOTES
Neurology Progress Note       Interval History: It has been over a week since I have last seen the patient.  Her ptosis has significantly improved and she can open her eyes.  She continues to have ophthalmoparesis and reports almost complete loss of vision saying that everything looks dark.  She has not had significant improvements.  She continues on Diamox.  She is status post IVIG and high-dose methylprednisolone.    Examination: Pertinent positives and negatives include:    Bilateral ptosis (now mild) and ophthalmoparesis.  Complete loss of vision in both eyes.  Pupils are equal, round, and reactive to light.  Strength is 5 out of 5 with giveaway weakness in the bilateral lower extremities.      Assessment and Plan: 54-year-old woman with vision loss, ptosis, and ophthalmoparesis.  She was treated with IV methylprednisolone 500 mg daily and IVIG for 5 days due to suspicion of an inflammatory/autoimmune condition.  So far, autoimmune workup has been negative including a myasthenia gravis panel, thyrotrophin antibodies, TSH, interleukin-2 receptor antibodies, and LIZET with reflex.  She has continued on prednisone with plan for tapering. In addition, we are treating the patient with Diamox 500 mg twice daily given the possibility of pseudotumor cerebri/idiopathic intracranial hypertension in the setting of an elevated opening pressure during lumbar puncture.  Her pressure was minimally elevated at 28 and her ophthalmologically examination, per ophthalmology, did not show papilledema.  It seems unlikely to me that idiopathic intracranial hypertension is an adequate explanation for the patient's symptoms.    Previous MRI showed \"bilateral orbital proptosis with mildly increased CSF in the region of the optic nerve sheaths.\"  We will repeat MRI.    Recommendations    I recommend a neuro-ophthalmology consultation.  I have contacted Dr. Godfrey Francois MD who will come to the hospital and see the patient.    Repeat MRI of  the orbits    Cumulative time spent today was 50 minutes which included chart review, examining the patient, obtaining history from patient/family/other providers, reviewing imaging, and counseling the patient and/or family on medical condition.

## 2024-09-24 NOTE — PROGRESS NOTES
PHYSICAL THERAPY DAILY NOTE    PT Individual Minutes  Time In: 0830  Time Out: 0920  Minutes: 50                 Total Treatment Time:  50 Minutes    Pt. Seen for: AM and Transfer Training     Subjective: Pt. Reports increased dizziness, starting this morning.  States dizziness is worse when she is moving, rated as 8/10 & improved w/ rest.  Pt. Reports vision is worse today & sees only blackness.  Pt. Also reports sensation of increased edema in L side of her face as well as increased ptosis.         Objective:  Restrictions/Precautions: Fall Risk, Bed Alarm, Other (comment) (posey alarm, limited vision)  Required Braces or Orthoses?: No             GROSS ASSESSMENT   Pt. Up in recliner upon arrival.  Attempted to assess for nystagmus, but difficult w/ ptosis (even w/ assistance w/ eyelid).  There does not appear to be a nystagmus @ rest, but unable to visualize iris w/ slow horizontal head movement so unable to assess nystagmus w/ movement.        COGNITION Daily Assessment        Overall Cognitive Status: WNL        BED/MAT MOBILITY Daily Assessment    NT       TRANSFERS Daily Assessment   Performed repeated sit to/from stand from recliner  Sit to Stand: Minimal Assistance;Moderate Assistance  Stand to Sit: Minimal Assistance  Bed to Chair: Minimal assistance;2 Person Assistance          GAIT Daily Assessment     NT       STEPS/STAIRS Daily Assessment     NT         BALANCE Daily Assessment    Static Sitting: Good:  Pt. able to maintain balance w/o UE support;  exhibits some postural sway  Dynamic Sitting: Good - accepts moderate challenge;  can maintain balance while picking object off the floor  Static Standing: Fair:  Pt. requires UE support;  may need occasional min A  Dynamic Standing: Poor - unable to accept challenge or move without LOB        WHEELCHAIR MOBILITY Daily Assessment     NT         Pain level: not rated  Pain Location:  HA  Pain Interventions: frequent rest breaks    Vital Signs:  BP standing  117/89,     Education:    Education Given To: Patient  Education Provided: Safety;Transfer Training;Precautions;Mobility Training  Education Provided Comments: Pt. educated on compensatory strageties for dizziness  Education Method: Verbal  Barriers to Learning: None  Education Outcome: Verbalized understanding;Demonstrated understanding;Continued education needed     Interdisciplinary Communication: discussed pt's dizziness w/ PA & OT    Pt. Left in wheelchair OT at bedside         Assessment: Pt. W/ increased dizziness today which is interfering with function.  Pt. Unable to tolerate full vestibular assessment @ this time.  However, may benefit from the additional of a vestibulo-suppressing medication to allow pt. Tot better tolerate therapy.  Discussed w/ PA.         Plan of Care: Continue with POC and progress as tolerated.      Caridad Mendez, PT  9/24/2024

## 2024-09-24 NOTE — PROGRESS NOTES
PHYSICAL THERAPY DAILY NOTE    PT Individual Minutes  [id number]  [id number]                 Total Treatment Time:  73 Minutes    Pt. Seen for: Therapeutic Exercise and Transfer Training     Subjective: Pt. Reports dizziness has improved from this morning.  States she still hs no vision - \"I keep asking people if the light is on, because all I see is black\".  Pt. Also reports she is not able to sleep at night & is quite tired this afternoon.         Objective:  Restrictions/Precautions: Fall Risk, Bed Alarm, Other (comment) (posey alarm, limited vision)  Required Braces or Orthoses?: No           GROSS ASSESSMENT   Pt. Up in recliner upon arrival.        COGNITION Daily Assessment        Overall Cognitive Status: Exceptions        BED/MAT MOBILITY Daily Assessment     Rolling to Left: Supervision  Rolling to Right: Unable to assess  Supine to Sit: Moderate assistance  Sit to Supine: Minimal assistance (for R LE)        TRANSFERS Daily Assessment   Toilet transfer w/ grab bar CGA w/ max Vcs for visual orientation Sit to Stand: Minimal Assistance  Stand to Sit: Minimal Assistance  Bed to Chair: Minimal assistance (w/ RW)          GAIT Daily Assessment     NT       STEPS/STAIRS Daily Assessment     NT         BALANCE Daily Assessment   Standing balance during lower body clothing management CGA Static Sitting: Good:  Pt. able to maintain balance w/o UE support;  exhibits some postural sway  Dynamic Sitting: Good - accepts moderate challenge;  can maintain balance while picking object off the floor  Static Standing: Fair:  Pt. requires UE support;  may need occasional min A  Dynamic Standing: Fair - accepts minimal challenge;  can maintain balance while turning head/trunk       WHEELCHAIR MOBILITY Daily Assessment     NT              LOWER EXTREMITY EXERCISES   Pt. Performed ankle pumps x10 & SAQ x10.     Pain level: not rated  Pain Location:  R hip  Pain Interventions: frequent rest breaks & psoitioning    Vital Signs:

## 2024-09-24 NOTE — PLAN OF CARE
Problem: Safety - Adult  Goal: Free from fall injury  9/24/2024 1957 by Kellen Arriaga, RN  Outcome: Progressing  9/24/2024 0843 by Pramod Sparrow, RN  Outcome: Progressing     Problem: Skin/Tissue Integrity  Goal: Absence of new skin breakdown  Description: 1.  Monitor for areas of redness and/or skin breakdown  2.  Assess vascular access sites hourly  3.  Every 4-6 hours minimum:  Change oxygen saturation probe site  4.  Every 4-6 hours:  If on nasal continuous positive airway pressure, respiratory therapy assess nares and determine need for appliance change or resting period.  Outcome: Progressing     Problem: ABCDS Injury Assessment  Goal: Absence of physical injury  Outcome: Progressing     Problem: Pain  Goal: Verbalizes/displays adequate comfort level or baseline comfort level  Outcome: Progressing

## 2024-09-24 NOTE — PROGRESS NOTES
[facility]  SPEECH LANGUAGE PATHOLOGY: DYSPHAGIA Daily Note #1    NAME: Tricia Sellers  : 1970  MRN: [id number]    ADMISSION DATE: 2024  ADMITTING DIAGNOSIS: Functional blindness [F45.8]  PRIMARY DIAGNOSIS: Functional blindness  Date of Eval: 2024  ICD-10: Treatment Diagnosis: R13.12 Dysphagia, Oropharyngeal Phase    RECOMMENDATIONS   Diet Recommendation:   regular  thin liquids   Medications:  1 at a time with liquid rinse  *nursing reports potassium switched to liquid*     Compensatory Swallowing Strategies:  upright positioning, small bites and sips, slow rate of intake, and supervision with meals  Set up assistance   Supervision with meals    Therapeutic Interventions:  Diet tolerance monitoring  Training in safe swallowing strategies, swallowing precautions and compensatory techniques     ASSESSMENT    patient with functional oropharyngeal swallow. recommend continue regular diet and thin liquids. meds 1 at a time. supervision with meals due to visual deficits.      GENERAL    Dysphagia History  Current Diet: Easy to Chew with bread/mixed consistencies and Thin Liquid  Complaints: patient reports difficulty with potassium pill this morning. Reports she requested it be switched to liquid.     Prior instrumental swallowing assessments: yes Modified Barium swallow study 24  With results as follows:  Patient presents with single episode of deep penetration when consuming first sip of thin liquids via straw, though no aspiration observed throughout study. Swallow of liquid consistencies were triggered in the pyriforms, though patient demonstrates functional hyolaryngeal excursion, tongue base retraction, posterior pharyngeal wall constriction, epiglottic inversion, and upper esophageal opening. Solids tolerated with increasing oral prep time as textures became more challenging. Patient reports fatigue as textures persist. Discussed options for minced and moist vs soft solids. Patient requesting soft  restricted.   []  2 Moderate-Severe Dysphagia: Maximum assistance or maximum use   of strategies with partial po intake   []  1 Severe dysphagia: NPO. Unable to tolerate any po safely   Patient Score: 6 Within Functional Limits/ Modified independent    PLAN    Duration/Frequency: Continue to follow patient 3 x/week for duration of inpatient rehabilitation stay to address goals listed or until goals met.     Medical Necessity/Other Comments:   Patient is expected to demonstrate progress in diet tolerance and swallow safety to improve swallow safety and decrease aspiration risk.      GOALS:   Long Term Goals: (Time Frame: 1 week)  Patient will tolerate safest, least restrictive diet without respiratory decline in order to meet nutritional needs. GOAL MET 9/24/2024  Patient will demonstrate understanding of safe swallowing guidelines by completing across meal GOAL MET 9/24/2024    PATIENT EDUCATION: Education provided to patient on the following topics: diet recommendations and aspiration precautions; discussed cognition, including impact of fatigue/lack of sleep on cognition.  patient verbalized understanding and demonstrated understanding  Interdisciplinary Collaboration:  OT regarding feeding/supervision and nursing regarding supervision      Safety:   Call light left within reach.  Wheelchair, alarm on, RN notified      Therapy Time  SLP Individual Minutes  Time In: 1200  Time Out: 1222  Minutes: 22          TRUNG Crow, CCC-SLP    9/24/2024 12:36 PM

## 2024-09-24 NOTE — PROGRESS NOTES
[facility]  SPEECH LANGUAGE PATHOLOGY: DYSPHAGIA Daily Note #1 and Discharge    NAME: Tricia Sellers  : 1970  MRN: [id number]    ADMISSION DATE: 2024  ADMITTING DIAGNOSIS: Functional blindness [F45.8]  PRIMARY DIAGNOSIS: Functional blindness  Date of Eval: 2024  ICD-10: Treatment Diagnosis: R13.12 Dysphagia, Oropharyngeal Phase    RECOMMENDATIONS   Diet Recommendation:   regular  thin liquids   Medications:  1 at a time with liquid rinse  *nursing reports potassium switched to liquid*     Compensatory Swallowing Strategies:  upright positioning, small bites and sips, slow rate of intake, and supervision with meals  Set up assistance   Supervision with meals    Therapeutic Interventions:  Possible cognitive evaluation If appropriate pending clinical course.      ASSESSMENT    patient with functional oropharyngeal swallow. recommend continue regular diet and thin liquids. meds 1 at a time. supervision with meals due to visual deficits. patient is having some difficulty following multi-step/complex directions today likely complicated by fatigue and lack of sleep, as well as the overall load of information being given. Patient does report she has noticed this; however, pt denies any cognitive changes. Will continue to collaborate with PT/OT and potentially follow up for ongoing assessment and management as appropriate. reports family is managing iADLs. discussed strategies with PT/OT to assist with management.    Addendum 24: discussed during team conference. No further ST needs.  GENERAL    Dysphagia History  Current Diet: Easy to Chew with bread/mixed consistencies and Thin Liquid  Complaints: patient reports difficulty with potassium pill this morning. Reports she requested it be switched to liquid.     Prior instrumental swallowing assessments: yes Modified Barium swallow study 24  With results as follows:  Patient presents with single episode of deep penetration when consuming first sip of thin  liquids via straw, though no aspiration observed throughout study. Swallow of liquid consistencies were triggered in the pyriforms, though patient demonstrates functional hyolaryngeal excursion, tongue base retraction, posterior pharyngeal wall constriction, epiglottic inversion, and upper esophageal opening. Solids tolerated with increasing oral prep time as textures became more challenging. Patient reports fatigue as textures persist. Discussed options for minced and moist vs soft solids. Patient requesting soft solids at this time with anticipation of diet upgrade as strength improves with IVIG.   Recommend soft and bite sized solids, thin liquids, medications whole in puree. RN called after study and informed of recommendations. Of note, due to visual impairments at this time, an order for feeding assistance has been added.   Of note, due to episode of difficulty managing secretions and sticking sensation, She underwent EGD on 9/16 with mild gastritis and duodenitis without active erosion.     Subjective: \"my cognition is good. My daughter is doing everything. My bills appointments. I'm not doing anything\"    Pain: no complaints during session.does report increased pressure.        Cognitive linguistic screening:  Follows multi step directions: increased difficulty noted today, exacerbated by fatigue.   Recall: WFL, remote and recent recall  Speech: 100% intelligible, low volume    OBJECTIVE    Oral Motor Assessment  Labial: no impairment  Lingual: no impairment  Dentition: intact  Oral Hygiene: adequate-pt has been on tx for oral thrush  Vocal quality: WFL      Oropharyngeal Phase:   Patient consumed thin liquids via straw (single and serial swallows), burger with fries, pineapple. Appropriate oral prep and clearance with all textures. Appeared to demonstrate timely swallow and single swallows upon palpation likely indicating adequate pharyngeal clearance. No overt signs or symptoms of airway compromise

## 2024-09-24 NOTE — PROGRESS NOTES
[facility] OCCUPATIONAL THERAPY DAILY NOTE  OT Individual Minutes  Time In: 0701  Time Out: 0752  Minutes: 51               Subjective: Pt agreeable to treatment. \"I see black.\" In regards to her vision this date.   Pain: RN notified .  Interdisciplinary Communication: Collaborated with PT and RN regarding pt status and pt performance.   Precautions: Falls, [place] Alarm, and functional blindness     FUNCTIONAL MOBILITY:       Bed Mobility Kristy and ModA    Sit to Stand Kristy    Transfer  Kristy  Transfer Type: SPT  Equipment: RW Few STS's with FWW with MIN A for ADL's with increased time/slow pacing d/t vision deficits and mild c/o dizziness with upright   Ambulation Kristy  Equipment: RW  Few steps to bedside chair with cues on slow pacing with FWW and vision deficits; cues on environment d/t vision deficits      ACTIVITIES OF DAILY LIVING:       Eating Supervision or touching assistance SPV self feeding with clock method, cues on calibration of grabbing items/proprioceptive deficits, cues for compensatory techniques with LUE for appropriate amount of food. Pt able to manage milk container, therapist cut up burrito for pt. Cues on appropriate amount of food on fork d/t large amounts.   Shower/Bathe Partial/moderate assistance MIN A sponge bath while seated EOB with increased time, verbal cues d/t vision deficits; pt required A for B feet. Pt standing with touching A for russ cares.   Upper Body  Dressing Partial/moderate assistance MIN A required for clasping bra behind-discussed alternative strategies for next days; pt able to don/doff overhead shirt seated with SBA and some v.c.'s d/t vision deficits   Lower Body Dressing Partial/moderate assistance MIN A required while seated/standing; pt able in standing CGA with FWW/cues d/t vision deficits to pull pants down over hips, while seated able to use B feet to pull over and unthread BLE's; pt required [place] A for threading BLE's/pt able to participate in steps and pull up over B

## 2024-09-25 ENCOUNTER — CLINICAL DOCUMENTATION (OUTPATIENT)
Dept: OPHTHALMOLOGY | Age: 54
End: 2024-09-25

## 2024-09-25 LAB
ANION GAP SERPL CALC-SCNC: 11 MMOL/L (ref 9–18)
BASOPHILS # BLD: 0 K/UL (ref 0–0.2)
BASOPHILS NFR BLD: 0 % (ref 0–2)
BUN SERPL-MCNC: 21 MG/DL (ref 6–23)
CALCIUM SERPL-MCNC: 9.5 MG/DL (ref 8.8–10.2)
CHLORIDE SERPL-SCNC: 100 MMOL/L (ref 98–107)
CO2 SERPL-SCNC: 25 MMOL/L (ref 20–28)
CREAT SERPL-MCNC: 0.76 MG/DL (ref 0.6–1.1)
DIFFERENTIAL METHOD BLD: ABNORMAL
EOSINOPHIL # BLD: 0 K/UL (ref 0–0.8)
EOSINOPHIL NFR BLD: 0 % (ref 0.5–7.8)
ERYTHROCYTE [DISTWIDTH] IN BLOOD BY AUTOMATED COUNT: 16.7 % (ref 11.9–14.6)
GLUCOSE BLD STRIP.AUTO-MCNC: 140 MG/DL (ref 65–100)
GLUCOSE BLD STRIP.AUTO-MCNC: 170 MG/DL (ref 65–100)
GLUCOSE BLD STRIP.AUTO-MCNC: 193 MG/DL (ref 65–100)
GLUCOSE BLD STRIP.AUTO-MCNC: 231 MG/DL (ref 65–100)
GLUCOSE SERPL-MCNC: 133 MG/DL (ref 70–99)
HCT VFR BLD AUTO: 43.2 % (ref 35.8–46.3)
HGB BLD-MCNC: 14 G/DL (ref 11.7–15.4)
IMM GRANULOCYTES # BLD AUTO: 0.4 K/UL (ref 0–0.5)
IMM GRANULOCYTES NFR BLD AUTO: 2 % (ref 0–5)
LYMPHOCYTES # BLD: 5 K/UL (ref 0.5–4.6)
LYMPHOCYTES NFR BLD: 21 % (ref 13–44)
MCH RBC QN AUTO: 26.4 PG (ref 26.1–32.9)
MCHC RBC AUTO-ENTMCNC: 32.4 G/DL (ref 31.4–35)
MCV RBC AUTO: 81.5 FL (ref 82–102)
MONOCYTES # BLD: 1.5 K/UL (ref 0.1–1.3)
MONOCYTES NFR BLD: 6 % (ref 4–12)
NEUTS SEG # BLD: 16.5 K/UL (ref 1.7–8.2)
NEUTS SEG NFR BLD: 71 % (ref 43–78)
NRBC # BLD: 0 K/UL (ref 0–0.2)
PLATELET # BLD AUTO: 300 K/UL (ref 150–450)
PMV BLD AUTO: 12.6 FL (ref 9.4–12.3)
POTASSIUM SERPL-SCNC: 4.4 MMOL/L (ref 3.5–5.1)
RBC # BLD AUTO: 5.3 M/UL (ref 4.05–5.2)
SERVICE CMNT-IMP: ABNORMAL
SODIUM SERPL-SCNC: 136 MMOL/L (ref 136–145)
WBC # BLD AUTO: 23.4 K/UL (ref 4.3–11.1)

## 2024-09-25 PROCEDURE — 82962 GLUCOSE BLOOD TEST: CPT

## 2024-09-25 PROCEDURE — 97110 THERAPEUTIC EXERCISES: CPT

## 2024-09-25 PROCEDURE — 97530 THERAPEUTIC ACTIVITIES: CPT

## 2024-09-25 PROCEDURE — 36415 COLL VENOUS BLD VENIPUNCTURE: CPT

## 2024-09-25 PROCEDURE — 85025 COMPLETE CBC W/AUTO DIFF WBC: CPT

## 2024-09-25 PROCEDURE — 6370000000 HC RX 637 (ALT 250 FOR IP): Performed by: PHYSICIAN ASSISTANT

## 2024-09-25 PROCEDURE — 80048 BASIC METABOLIC PNL TOTAL CA: CPT

## 2024-09-25 PROCEDURE — 6370000000 HC RX 637 (ALT 250 FOR IP): Performed by: PHYSICAL MEDICINE & REHABILITATION

## 2024-09-25 PROCEDURE — 97535 SELF CARE MNGMENT TRAINING: CPT

## 2024-09-25 PROCEDURE — 1180000000 HC REHAB R&B

## 2024-09-25 PROCEDURE — 99233 SBSQ HOSP IP/OBS HIGH 50: CPT | Performed by: PHYSICAL MEDICINE & REHABILITATION

## 2024-09-25 PROCEDURE — 6360000002 HC RX W HCPCS: Performed by: PHYSICAL MEDICINE & REHABILITATION

## 2024-09-25 PROCEDURE — 6370000000 HC RX 637 (ALT 250 FOR IP): Performed by: PSYCHIATRY & NEUROLOGY

## 2024-09-25 PROCEDURE — 99232 SBSQ HOSP IP/OBS MODERATE 35: CPT | Performed by: NURSE PRACTITIONER

## 2024-09-25 RX ORDER — BACLOFEN 10 MG/1
5 TABLET ORAL 3 TIMES DAILY
Status: DISCONTINUED | OUTPATIENT
Start: 2024-09-25 | End: 2024-09-26

## 2024-09-25 RX ORDER — MECLIZINE HYDROCHLORIDE 25 MG/1
25 TABLET ORAL
Status: DISPENSED | OUTPATIENT
Start: 2024-09-25

## 2024-09-25 RX ADMIN — PANTOPRAZOLE SODIUM 40 MG: 40 TABLET, DELAYED RELEASE ORAL at 05:04

## 2024-09-25 RX ADMIN — ACETAMINOPHEN 1000 MG: 500 TABLET ORAL at 18:01

## 2024-09-25 RX ADMIN — BACLOFEN 5 MG: 10 TABLET ORAL at 20:40

## 2024-09-25 RX ADMIN — CARBOXYMETHYLCELLULOSE SODIUM 1 DROP: 10 GEL OPHTHALMIC at 07:40

## 2024-09-25 RX ADMIN — ACETAZOLAMIDE 500 MG: 250 TABLET ORAL at 07:37

## 2024-09-25 RX ADMIN — NYSTATIN 500000 UNITS: 100000 SUSPENSION ORAL at 13:11

## 2024-09-25 RX ADMIN — ACETAMINOPHEN 1000 MG: 500 TABLET ORAL at 03:49

## 2024-09-25 RX ADMIN — ENOXAPARIN SODIUM 30 MG: 100 INJECTION SUBCUTANEOUS at 20:40

## 2024-09-25 RX ADMIN — PREDNISONE 70 MG: 20 TABLET ORAL at 07:37

## 2024-09-25 RX ADMIN — CARBOXYMETHYLCELLULOSE SODIUM 1 DROP: 10 GEL OPHTHALMIC at 17:20

## 2024-09-25 RX ADMIN — CARBOXYMETHYLCELLULOSE SODIUM 1 DROP: 10 GEL OPHTHALMIC at 13:14

## 2024-09-25 RX ADMIN — PANTOPRAZOLE SODIUM 40 MG: 40 TABLET, DELAYED RELEASE ORAL at 17:15

## 2024-09-25 RX ADMIN — NYSTATIN 500000 UNITS: 100000 SUSPENSION ORAL at 17:15

## 2024-09-25 RX ADMIN — NYSTATIN 500000 UNITS: 100000 SUSPENSION ORAL at 20:40

## 2024-09-25 RX ADMIN — ENOXAPARIN SODIUM 30 MG: 100 INJECTION SUBCUTANEOUS at 09:05

## 2024-09-25 RX ADMIN — OXYCODONE 5 MG: 5 TABLET ORAL at 09:05

## 2024-09-25 RX ADMIN — CARBOXYMETHYLCELLULOSE SODIUM 1 DROP: 10 GEL OPHTHALMIC at 20:41

## 2024-09-25 RX ADMIN — POTASSIUM BICARBONATE 10 MEQ: 391 TABLET, EFFERVESCENT ORAL at 07:40

## 2024-09-25 RX ADMIN — INSULIN GLARGINE 8 UNITS: 100 INJECTION, SOLUTION SUBCUTANEOUS at 21:15

## 2024-09-25 RX ADMIN — MECLIZINE HYDROCHLORIDE 25 MG: 25 TABLET ORAL at 09:05

## 2024-09-25 RX ADMIN — Medication 3 MG: at 17:15

## 2024-09-25 RX ADMIN — MECLIZINE HYDROCHLORIDE 25 MG: 25 TABLET ORAL at 17:46

## 2024-09-25 RX ADMIN — ACETAZOLAMIDE 500 MG: 250 TABLET ORAL at 20:41

## 2024-09-25 RX ADMIN — ASPIRIN 81 MG: 81 TABLET, COATED ORAL at 07:37

## 2024-09-25 RX ADMIN — MECLIZINE HYDROCHLORIDE 25 MG: 25 TABLET ORAL at 14:11

## 2024-09-25 RX ADMIN — NYSTATIN 500000 UNITS: 100000 SUSPENSION ORAL at 07:40

## 2024-09-25 RX ADMIN — ACETAMINOPHEN 1000 MG: 500 TABLET ORAL at 12:01

## 2024-09-25 RX ADMIN — HYDROCHLOROTHIAZIDE 12.5 MG: 12.5 CAPSULE ORAL at 07:40

## 2024-09-25 ASSESSMENT — PAIN DESCRIPTION - LOCATION: LOCATION: HEAD

## 2024-09-25 ASSESSMENT — PAIN SCALES - GENERAL
PAINLEVEL_OUTOF10: 0
PAINLEVEL_OUTOF10: 5
PAINLEVEL_OUTOF10: 0

## 2024-09-25 ASSESSMENT — PAIN DESCRIPTION - DESCRIPTORS: DESCRIPTORS: DULL

## 2024-09-25 NOTE — PROGRESS NOTES
[facility] OCCUPATIONAL THERAPY DAILY NOTE  OT Individual Minutes  Time In: 1245  Time Out: 1310  Minutes: 25               Subjective: Pt agreeable to treatment. \"It's going to get better\"  Pain: No pain expressed.  Interdisciplinary Communication: Collaborated with Physician, PT, RN, and Neuro ophthalmologist, OT  regarding pt status, pt performance, and handoff communication.   Precautions: Falls    FUNCTIONAL MOBILITY:       Bed Mobility NT    Sit to Stand NT    Transfer  NT  Transfer Type: NA  Equipment: NA    Ambulation NT  Equipment: NA       - Family Training - Self-Care   Pt completed self-care seated at raised table in room to challenge BUE function, compensatory method recall, functional mobility and (I) with ADLs in preparation for safe return to max (I) with ADLs. Pt tolerated self-care well with no c/o pain. Rest breaks utilized as needed throughout. Pt dtr present throughout session and involved in education/training review during meal time.  Pt completed:  Self-feeding; Pt completed self-feeding seated upright in recliner at tray table. Tray set up to pt preference [On tray: drink at 2 o'clock, utensils at 3 o'clock, plate/dish at 6 o'clock]. Pt verbally cued for initial set up and meal present in front of pt. Initial assistance provided by dtr for seasoning of meal. 3-5 verbal cues for directions, pacing and safety (bite size/completion of chewing prior to speaking). 3-5 drop-off of food from spoon once near oral cavity. Review of cues for using B/L hands throughout task and using L hand to monitor level of food on spoon prior to transition to oral cavity. Pt dtr present verbalized understanding of education/compensatory methods. Continued review with pt and family to be completed as appropriate.        Education   Adaptive ADL Techniques, AE/DME Training, Benefits of OT, Energy Conservation, Pacing, Family Training, and Safety Awareness     Assessment: Patient tolerated session well. Outside neuro  ophthalmologist present with interdisciplinary team and family on entry with lunch tray. After Neuro-opth completed transition to OT session. Handoff for completion of meal to PT and Therapy supervisor. Pt demonstrated good participation in OT treatment. Pt continues to benefit from skilled OT services to address remaining deficits and progress toward baseline level of independence and safety. Patient ended session seated in recliner with call bell and needs within reach, with family member at bedside, and interdisciplinary team awaiting return of neuro-opth .   Plan: Continue OT POC.     HO HERNANDEZ OT   9/25/2024

## 2024-09-25 NOTE — PROGRESS NOTES
Neurology Daily Progress Note     Assessment:     54-year-old woman with vision loss, ptosis, and ophthalmoparesis.  She was treated with IV methylprednisolone 500 mg daily and IVIG for 5 days due to suspicion of an inflammatory/autoimmune condition.  So far, autoimmune workup has been negative including a myasthenia gravis panel, thyrotrophin antibodies, TSH, interleukin-2 receptor antibodies, and LIZET with reflex.  She has continued on prednisone with plan for tapering. In addition, we are treating the patient with Diamox 500 mg twice daily given the possibility of pseudotumor cerebri/idiopathic intracranial hypertension in the setting of an elevated opening pressure during lumbar puncture.  Her pressure was minimally elevated at 28 and her ophthalmologically examination, per ophthalmology, did not show papilledema.  It seems unlikely to me that idiopathic intracranial hypertension is an adequate explanation for the patient's symptoms.     Previous MRI showed \"bilateral orbital proptosis with mildly increased CSF in the region of the optic nerve sheaths.\" Her repeat MRI was normal.     Plan:     She will be seen by neuro-ophthalmology today     Subjective:        Interval history:    Patient doing well. Reports vision is unchanged. Notes some right hip pain.     History:    Tricia Sellers is a 54 y.o. female who is being seen for vision loss, ptosis, and ophthalmoparesis .    Review of systems negative with exception of pertinent positives and negatives noted above.       Objective:     Physical Exam:  General - Well developed, well nourished, in no apparent distress. Pleasant and conversant.   HEENT - Normocephalic, atraumatic. Conjunctiva are clear.   Neck - Supple without masses  Extremities - Peripheral pulses intact. No edema and no rashes.     Neurological examination - Comprehension, attention, memory and reasoning are intact. Language and speech are normal.   On cranial nerve examination, (II, III, IV,

## 2024-09-25 NOTE — PROGRESS NOTES
[facility] OCCUPATIONAL THERAPY DAILY NOTE  OT Individual Minutes  Time in: 0845  Time out: 0904  Minutes: 19  Time In: 0934  Time Out: 1034  Minutes: 60               Subjective: Pt agreeable to treatment. \"The room is spinning, I need to sit\".   Pain: No pain expressed and RN notified . Pt reported every movement OOB causes dizziness. RN during earlier session provided medications and therapist waited further treatment d/t med setting in for pt's shower.   Interdisciplinary Communication: Collaborated with PT and RN regarding pt status, pt performance, and handoff communication.   Precautions: Falls, [place] Alarm, and functional blindness.     FUNCTIONAL MOBILITY:       Bed Mobility Supervision HOB slightly elevated, use of bed rail   Sit to Stand CGA and Kristy    Transfer  CGA and Kristy  Transfer Type: SPT  Equipment: RW Pt initially attempted in initial STS from EOB pt required MIN A and required to sit right away d/t dizziness, therapist then stepped out d/t RN to administer medications.     Pt then performed SPT from EOB>w/c>shower chair<>w/c, and performed STS from w/c touching A with FWW and cues on technique and orientation to layout d/t vision deficit.   Ambulation NT  Equipment: NA      ACTIVITIES OF DAILY LIVING:       Oral Hygiene Supervision or touching assistance Pt seated in w/c, tray table to L-direct cues d/t time constraints, some management of cups/making sure no spills onto shirt, s/u A with toothpaste on tooth brush prior to brushing.   Shower/Bathe Partial/moderate assistance Overall MOD A for shower with tub t/f bench/GB/HHSH and long handled sponge; pt performed most of shower while seated with SBA, noted to drop HHSH with A to provide back to pt. Pt stood a few times with touching A, verbal cues for hand placement; towards end of shower and following few STS's pt fatigued and required therapist to rinse/wash/dry BLE's, rinse buttocks region and dry.   Upper Body  Dressing Supervision or touching

## 2024-09-25 NOTE — PROGRESS NOTES
indicated in addition to 24-hour rehabilitation nursing to manage and progress plan of care; this can be accomplished within the acute rehabilitation setting while addressing functional needs.  - Patient requires continued PT for ambulatory/mobility dysfunction due to functional blindness, debility. Currently requires ongoing intensive Physical Therapy for 2-3 times a week, 1 to 2 hours daily, to address basic mobility including bed mobility, transfers, and out of bed mobility/ambulation with appropriate assistive device as indicated; stretching/ROM exercises; strengthening; static and dynamic balance; endurance; and modalities as needed including for pain management.  - Patient requires continued OT for activities of daily living dysfunction due to recent functional blindness, debility. Currently requires ongoing intensive Occupational Therapy for a minimum of 2-3 times a week, 1 to 2 hours daily (feeding, grooming, bathing, UE and LE dressing, toileting), instrumental ADLs, potentially cognitive function, safety, energy conservation techniques, community reintegration, modalities as indicated, and adaptive equipment as needed.  - Intensive therapies indicated for safe transit to the home environment.  - Patient and family/caregivers also require education in post-hospital precautions and home exercise routine, adaptive techniques and deficit compensation strategies, strengthening and conditioning, equipment prescription and instructions in use.  to be assigned to patient's care to help facilitate all disposition needs and equipment requests provided by treating team members.  - Dysphagia - Patient with ongoing dysphagia and risk for secondary complications. Consult SLP to assess further and train in order to meet nutritional needs on the least restrictive diets.  - RT consult if needed, maintain O2 saturations greater than 92% during physical and endurance activities  - Diet: ADULT DIET; Regular; no  management.  Last BM (including prior to admit): 09/24/24  -Bladder management: Monitor urinary output and urinary function/dysfunction. Does have the potential for underlying bladder dysfunction given immobility. Therefore, requires continued routine monitoring with appropriate bladder management as indicated to avoid urinary tract complications.      Principal Problem:    Functional blindness  Active Problems:    Obstructive sleep apnea syndrome    Hypertension    Femoroacetabular impingement of left hip    History of avascular necrosis of capital femoral epiphysis    Morbid obesity ([facility])    Peripheral neuropathy    Type 2 diabetes mellitus without complication ([facility])    Weakness of right lower extremity    Dysphagia    Ptosis of both eyelids    Physical debility    Elevated intracranial pressure    Impaired functional mobility, balance, gait, and endurance    Decreased independence with activities of daily living    Encounter for rehabilitation  Resolved Problems:    * No resolved hospital problems. *         Current Risks:   **Risk for Delirium given prolonged hospital stay.   **Risks for falls given recent immobility, weakness and fatigue  **Skin breakdown and pressure injury in the setting of significantly impaired mobility and/or impaired sensation          Fall precautions in place. Chair and bed alarms are set daily and nightly to discourage patient from getting up unattended. Discussed strategy to reduce the risk of falls which include understanding patient’s known fall risk factors and management of the risk factors identified, such as modification of home environment, avoiding or decreasing psychoactive medications. Sitting for 3-5 minutes prior to standing for blood pressure acclimation and avoiding orthostasis. Encouraged continuing exercise therapies which were/will be taught and implemented at [facility], particularly balance, strength, and gait training.  Avoid if possible the following medications which  can contribute to delirium: benzodiazepines, antihistamines, and anticholinergics  Avoid sleep disruption by rescheduling early morning blood draws, avoiding middle of the night vital signs or disturbances, minimizing noise.   Keep lights on and blinds open during the day; lights and TV off at night; minimize tethers including urinary catheters; use glasses, hearing aids, and dentures as appropriate.  Avoid long periods of sleep during the day. Brief naps can be helpful but long periods of sleep can disrupt the sleep wake cycle for this patient.  Reorientation and visual cues for orientation should be tried  Maintain hydration and bowel function     Rehabilitation Prognosis: Good     Estimated Length of Stay on IRF: Discuss in team conference tomorrow.     Disposition: Patient is expected to return home with close supervision and continued rehabilitation with home health therapy.      Outpatient Provider Follow-up Appointments:    No follow-up provider specified.       The patient is able to tolerate and benefit from multidisciplinary acute inpatient rehabilitation.    65 minutes were spent in direct patient care, discussing current medications, medical conditions, dispo planning, discussion of care with multiple providers, and continuation of therapy in the community. More than half of total time was spent in counseling and coordination of care with patient, nursing staff, therapy team and case management.    At patient's request, detailed review of her current medical status was given to her friend Dr. Tiffanie Brunner via phone call.    Part of this note may have been written by using a voice dictation software.  The note has been proof read but may still contain some grammatical/other typographical errors.      Milton Lyles MD  Physical Medicine and Rehabilitation Attending Physician  September 25, 2024

## 2024-09-25 NOTE — PROGRESS NOTES
I had a pleasant conversation with the patient and explained the nature of functional neurological disorders.  She was very receptive.  We discussed the many stressors in her life and she has a plan going forward and will be staying with her daughter.    I do recommend cognitive behavioral therapy.

## 2024-09-25 NOTE — PLAN OF CARE
Problem: Safety - Adult  Goal: Free from fall injury  9/25/2024 1050 by Susie Greenwood RN  Outcome: Progressing  9/25/2024 1050 by Susie Greenwood RN  Outcome: Progressing     Problem: Skin/Tissue Integrity  Goal: Absence of new skin breakdown  Description: 1.  Monitor for areas of redness and/or skin breakdown  2.  Assess vascular access sites hourly  3.  Every 4-6 hours minimum:  Change oxygen saturation probe site  4.  Every 4-6 hours:  If on nasal continuous positive airway pressure, respiratory therapy assess nares and determine need for appliance change or resting period.  9/25/2024 1050 by Susie Greenwood RN  Outcome: Progressing  9/25/2024 1050 by Susie Greenwood RN  Outcome: Progressing     Problem: ABCDS Injury Assessment  Goal: Absence of physical injury  9/25/2024 1050 by Susie Greenwood RN  Outcome: Progressing  9/25/2024 1050 by Susie Greenwood RN  Outcome: Progressing     Problem: Pain  Goal: Verbalizes/displays adequate comfort level or baseline comfort level  9/25/2024 1050 by Susie Greenwood RN  Outcome: Progressing  9/25/2024 1050 by Susie Greenwood RN  Outcome: Progressing

## 2024-09-25 NOTE — PROGRESS NOTES
PHYSICAL THERAPY DAILY NOTE    PT Individual Minutes  Time In: 1030  Time Out: 1120  Minutes: 50                 Total Treatment Time:  50 Minutes    Pt. Seen for: AM, Therapeutic Exercise, and Transfer Training     Subjective: Pt. Reports she was able to sleep a little last night with the addition of melatonin.         Objective:  Restrictions/Precautions: Fall Risk, Bed Alarm, Other (comment) (posey alarm, limited vision)  Required Braces or Orthoses?: No             GROSS ASSESSMENT   Pt. Up in w/c w/ OT upon arrival.        COGNITION Daily Assessment        Overall Cognitive Status: Exceptions        BED/MAT MOBILITY Daily Assessment     Supine to Sit: Minimal assistance;Moderate assistance (for trunk righting)  Sit to Supine: Minimal assistance (for R LE)        TRANSFERS Daily Assessment   Pt. Requires max visual cueing & increased time to complete transfers. Sit to Stand: Supervision  Stand to Sit: Contact guard assistance (to guide descent)  Bed to Chair: Contact guard assistance (w/ RW)          GAIT Daily Assessment     NT       STEPS/STAIRS Daily Assessment    NT         BALANCE Daily Assessment    Static Sitting: Good:  Pt. able to maintain balance w/o UE support;  exhibits some postural sway  Dynamic Sitting: Good - accepts moderate challenge;  can maintain balance while picking object off the floor  Static Standing: Fair:  Pt. requires UE support;  may need occasional min A  Dynamic Standing: Fair - accepts minimal challenge;  can maintain balance while turning head/trunk       WHEELCHAIR MOBILITY Daily Assessment     NT            LOWER EXTREMITY EXERCISES   Pt. Performed supine LE exercises to increase strength & ROM.   Pt. Performed the following B LEs: ankle pumps x10, SAQ x10, isometric hip adduction x10.     Pain level: not rated  Pain Location:  HA  Pain Interventions: pt. Declined pain medication;  placed cold washcloth over pt's head    Vital Signs:  BP (!) 103/58   Pulse 90   Temp 97.6 °F  (36.4 °C) (Oral)   Resp 18   Ht 1.702 m (5' 7\")   Wt 126.1 kg (278 lb)   SpO2 96%   BMI 43.54 kg/m²      Education:    Education Given To: Patient  Education Provided: Safety;Transfer Training;Precautions;Mobility Training  Education Method: Verbal  Barriers to Learning: None  Education Outcome: Verbalized understanding;Demonstrated understanding;Continued education needed     Interdisciplinary Communication: handoff communication w/ OT    Pt. Left in recliner call bell in reach needs in reach nursing at bedside         Assessment: Pt. Able to perform transfers w/ less assistance this AM.  R hip pain cont. To be biggest barrier to increasing mobility.  Benefits from cont. PT to address.         Plan of Care: Continue with POC and progress as tolerated.      Caridad Mendez, PT  9/25/2024

## 2024-09-26 LAB
GLUCOSE BLD STRIP.AUTO-MCNC: 116 MG/DL (ref 65–100)
GLUCOSE BLD STRIP.AUTO-MCNC: 173 MG/DL (ref 65–100)
GLUCOSE BLD STRIP.AUTO-MCNC: 177 MG/DL (ref 65–100)
GLUCOSE BLD STRIP.AUTO-MCNC: 257 MG/DL (ref 65–100)
SERVICE CMNT-IMP: ABNORMAL

## 2024-09-26 PROCEDURE — 97530 THERAPEUTIC ACTIVITIES: CPT

## 2024-09-26 PROCEDURE — 99233 SBSQ HOSP IP/OBS HIGH 50: CPT | Performed by: PHYSICAL MEDICINE & REHABILITATION

## 2024-09-26 PROCEDURE — 97116 GAIT TRAINING THERAPY: CPT

## 2024-09-26 PROCEDURE — 82962 GLUCOSE BLOOD TEST: CPT

## 2024-09-26 PROCEDURE — 6370000000 HC RX 637 (ALT 250 FOR IP): Performed by: PHYSICIAN ASSISTANT

## 2024-09-26 PROCEDURE — 6370000000 HC RX 637 (ALT 250 FOR IP): Performed by: PSYCHIATRY & NEUROLOGY

## 2024-09-26 PROCEDURE — 97535 SELF CARE MNGMENT TRAINING: CPT

## 2024-09-26 PROCEDURE — 6360000002 HC RX W HCPCS: Performed by: PHYSICAL MEDICINE & REHABILITATION

## 2024-09-26 PROCEDURE — 1180000000 HC REHAB R&B

## 2024-09-26 PROCEDURE — 6370000000 HC RX 637 (ALT 250 FOR IP): Performed by: PHYSICAL MEDICINE & REHABILITATION

## 2024-09-26 PROCEDURE — 97110 THERAPEUTIC EXERCISES: CPT

## 2024-09-26 RX ORDER — INSULIN GLARGINE 100 [IU]/ML
11 INJECTION, SOLUTION SUBCUTANEOUS NIGHTLY
Status: DISPENSED | OUTPATIENT
Start: 2024-09-26

## 2024-09-26 RX ORDER — PREDNISONE 20 MG/1
20 TABLET ORAL DAILY
Status: ACTIVE | OUTPATIENT
Start: 2024-10-03 | End: 2024-10-10

## 2024-09-26 RX ORDER — PREDNISONE 5 MG/1
10 TABLET ORAL DAILY
Status: ACTIVE | OUTPATIENT
Start: 2024-10-10 | End: 2024-10-17

## 2024-09-26 RX ORDER — TRAMADOL HYDROCHLORIDE 50 MG/1
100 TABLET ORAL
Status: DISPENSED | OUTPATIENT
Start: 2024-09-26

## 2024-09-26 RX ORDER — OXYCODONE HYDROCHLORIDE 5 MG/1
5 TABLET ORAL EVERY 4 HOURS PRN
Status: ACTIVE | OUTPATIENT
Start: 2024-09-26

## 2024-09-26 RX ADMIN — OXYCODONE 5 MG: 5 TABLET ORAL at 07:22

## 2024-09-26 RX ADMIN — ENOXAPARIN SODIUM 30 MG: 100 INJECTION SUBCUTANEOUS at 09:04

## 2024-09-26 RX ADMIN — MECLIZINE HYDROCHLORIDE 25 MG: 25 TABLET ORAL at 14:37

## 2024-09-26 RX ADMIN — ACETAZOLAMIDE 500 MG: 250 TABLET ORAL at 09:03

## 2024-09-26 RX ADMIN — MECLIZINE HYDROCHLORIDE 25 MG: 25 TABLET ORAL at 06:03

## 2024-09-26 RX ADMIN — Medication 3 MG: at 17:32

## 2024-09-26 RX ADMIN — CARBOXYMETHYLCELLULOSE SODIUM 1 DROP: 10 GEL OPHTHALMIC at 17:32

## 2024-09-26 RX ADMIN — MECLIZINE HYDROCHLORIDE 25 MG: 25 TABLET ORAL at 17:32

## 2024-09-26 RX ADMIN — ENOXAPARIN SODIUM 30 MG: 100 INJECTION SUBCUTANEOUS at 21:31

## 2024-09-26 RX ADMIN — PANTOPRAZOLE SODIUM 40 MG: 40 TABLET, DELAYED RELEASE ORAL at 14:38

## 2024-09-26 RX ADMIN — NYSTATIN 500000 UNITS: 100000 SUSPENSION ORAL at 17:33

## 2024-09-26 RX ADMIN — ACETAMINOPHEN 1000 MG: 500 TABLET ORAL at 11:21

## 2024-09-26 RX ADMIN — POTASSIUM BICARBONATE 10 MEQ: 391 TABLET, EFFERVESCENT ORAL at 09:03

## 2024-09-26 RX ADMIN — TRAMADOL HYDROCHLORIDE 100 MG: 50 TABLET ORAL at 14:37

## 2024-09-26 RX ADMIN — CARBOXYMETHYLCELLULOSE SODIUM 1 DROP: 10 GEL OPHTHALMIC at 14:41

## 2024-09-26 RX ADMIN — CARBOXYMETHYLCELLULOSE SODIUM 1 DROP: 10 GEL OPHTHALMIC at 09:05

## 2024-09-26 RX ADMIN — INSULIN LISPRO 2 UNITS: 100 INJECTION, SOLUTION INTRAVENOUS; SUBCUTANEOUS at 17:33

## 2024-09-26 RX ADMIN — POLYETHYLENE GLYCOL 3350 17 G: 17 POWDER, FOR SOLUTION ORAL at 13:16

## 2024-09-26 RX ADMIN — ACETAMINOPHEN 1000 MG: 500 TABLET ORAL at 17:32

## 2024-09-26 RX ADMIN — ANTI-FUNGAL POWDER MICONAZOLE NITRATE TALC FREE: 1.42 POWDER TOPICAL at 09:04

## 2024-09-26 RX ADMIN — PREDNISONE 30 MG: 20 TABLET ORAL at 09:02

## 2024-09-26 RX ADMIN — ASPIRIN 81 MG: 81 TABLET, COATED ORAL at 09:02

## 2024-09-26 RX ADMIN — INSULIN GLARGINE 11 UNITS: 100 INJECTION, SOLUTION SUBCUTANEOUS at 21:31

## 2024-09-26 RX ADMIN — PANTOPRAZOLE SODIUM 40 MG: 40 TABLET, DELAYED RELEASE ORAL at 06:03

## 2024-09-26 RX ADMIN — CARBOXYMETHYLCELLULOSE SODIUM 1 DROP: 10 GEL OPHTHALMIC at 21:34

## 2024-09-26 RX ADMIN — ACETAZOLAMIDE 500 MG: 250 TABLET ORAL at 21:40

## 2024-09-26 RX ADMIN — HYDROCHLOROTHIAZIDE 12.5 MG: 12.5 CAPSULE ORAL at 09:03

## 2024-09-26 RX ADMIN — MAGNESIUM HYDROXIDE 30 ML: 400 SUSPENSION ORAL at 13:16

## 2024-09-26 RX ADMIN — NYSTATIN 500000 UNITS: 100000 SUSPENSION ORAL at 09:04

## 2024-09-26 RX ADMIN — BACLOFEN 5 MG: 10 TABLET ORAL at 09:03

## 2024-09-26 RX ADMIN — NYSTATIN 500000 UNITS: 100000 SUSPENSION ORAL at 14:38

## 2024-09-26 ASSESSMENT — PAIN SCALES - GENERAL
PAINLEVEL_OUTOF10: 5
PAINLEVEL_OUTOF10: 0

## 2024-09-26 ASSESSMENT — PAIN DESCRIPTION - ORIENTATION: ORIENTATION: RIGHT

## 2024-09-26 ASSESSMENT — PAIN DESCRIPTION - LOCATION: LOCATION: HIP

## 2024-09-26 ASSESSMENT — PAIN DESCRIPTION - DESCRIPTORS: DESCRIPTORS: ACHING

## 2024-09-26 NOTE — PATIENT CARE CONFERENCE
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 Working as a  on 9th floor  Patient's Prior Living Situation: Lives with family member(s)  Baseline Supervision/Assistance: None  Current issues/needs regarding patient and family discharge status: family available to assist at discharge; but patient is adamant about staying on the second floor     ---------------------------------------------------------------------------------------------------    Functional Assessment:  Most Recent Mobility Scores Per Physical Therapy:   Bed/Mat Mobility Rolling to Left: Supervision  Rolling to Right: Unable to assess  Supine to Sit: Minimal assistance;Moderate assistance (for trunk righting)  Sit to Supine: Minimal assistance (for R LE)   Transfers Sit to Stand: Supervision  Stand to Sit: Contact guard assistance (to guide descent)  Bed to Chair: Contact guard assistance (w/ RW)    Gait            Steps/Stairs          Wheelchair Mobility            Most Recent ADL Scores Per Occupational Therapy:   ADL Score Comments   Eating Supervision or touching assistance SPV self feeding with clock method, cues  quality as necessary to ensure adequate rest to be able to participate in daily therapy sessions; Skin: Monitor incisions/wounds for adequate healing.  Wound care coordinator consulted as needed.  Frequent turning while in bed and repositioning in chair.  Monitor for skin breakdown or erythema; Psychiatric: Monitor for signs and symptoms of depression.  Monitor appetite.  Monitor for depression anxiety as the patient is adjusting to disability.  Monitor and adjust medications as needed; Nutrition: Continue current diet and adjust as needed and as tolerated.  Consult dietitian for nutritional assessment and recommendations; ID: Patient will be monitored closely for any signs or symptoms of infection, such as elevated white blood cell count, increased temperature, signs of UTI; Pain: The patient will be monitored closely for signs and symptoms of pain.  Pain regimen will be adjusted as needed; Cognitive function/mental status: Has the potential for residual deficits in orientation, processing, attention, thought organization, problem solving, reasoning, word retrieval, and memory given recent injury. Therefore, patient may require continued routine follow up primarily by Speech Language Pathology in addition to Occupational Therapy to address potential underlying deficits and working on higher level-cognitive function with compensatory strategies as indicated; Deep venous thromboembolism: patient is at increased risk for thromboembolic event given recent injury, new mobility limitations and current hospitalization. Therefore, maintain on mechanical DVT prophylaxis and encourage progressive out of mobility while continuing routine monitoring for potential thromboembolic events; Bowel management: increased potential for recurrent constipation given mobility limitations, current hospitalization, and medication use. Will need to continue routine monitoring of bowel function with appropriate adjustment in bowel regimen as

## 2024-09-26 NOTE — PROGRESS NOTES
[facility] OCCUPATIONAL THERAPY DAILY NOTE  OT Individual Minutes  Time In: 0750  Time Out: 0834  Minutes: 44               Subjective: Pt agreeable to treatment. \"This is going to get better.\"  Pain: No pain expressed.  Interdisciplinary Communication: Collaborated with PT and RN regarding pt status and pt performance.   Precautions: Falls and functional blindness    FUNCTIONAL MOBILITY:       Bed Mobility Supervision    Sit to Stand CGA    Transfer  CGA  Transfer Type:     Equipment: RW Pt performed STS's EOB with FWW touching A for ADL's   Ambulation CGA and Kristy  Equipment: RW Pt took 5 steps to bedside chair with FWW MIN A-touching A with cues for vision deficits required.      ACTIVITIES OF DAILY LIVING:       Shower/Bathe Supervision or touching assistance Sponge bath seated EOB, pt able to complete Seated EOB SBA with increased time/cues d/t vision deficits; standing russ cares with FWW touching A   Upper Body  Dressing Supervision or touching assistance SPV seated EOB to don bra/overhead shirt, doff overhead shirt with good trunk stability   Lower Body Dressing Supervision or touching assistance SBA seated to unthread pants/underwear withB feet use; pt able to thread BLE's with underwear/pants with some cues d/t vision deficits, able to stand and pull up over hips with use ofFWW   Donning/[place] Footwear Supervision or touching assistance SBA seated with cues d/t vision deficits, pt able to doff socks with dressing stick with A for S/U A approprately, able to don leaning forward for RLE with increased time, able to perform figure four LLE EOB good balance   Education Adaptive ADL Techniques, AE/DME Training, Benefits of OT, Energy Conservation, Pacing, Functional Transfer Training, Rolling Walker Management, and Safety Awareness vision compensatory strategies.      Assessment: Pt noted to be able to see a \"glare\" with eyes, see's \"blobs\" moving, able to see some shapes inconsistently, noted more blinking with B

## 2024-09-26 NOTE — PROGRESS NOTES
PHYSICAL THERAPY DAILY NOTE    PT Individual Minutes  Time In: 0915  Time Out: 1001  Minutes: 46                 Total Treatment Time:  46 Minutes    Pt. Seen for: AM, Therapeutic Exercise, and Transfer Training     Subjective: Pt. Reports she slept better last night.         Objective:  Restrictions/Precautions: Fall Risk, Bed Alarm, Other (comment) (posey alarm, limited vision)  Required Braces or Orthoses?: No               GROSS ASSESSMENT   Pt. Up in recliner upon arrival.  Keeps eyes closed during exercises        COGNITION Daily Assessment        Overall Cognitive Status: WFL       BED/MAT MOBILITY Daily Assessment     Supine to Sit: Supervision (requiring increased time)  Sit to Supine: Minimal assistance (for R LE)        TRANSFERS Daily Assessment    Sit to Stand: Supervision  Stand to Sit: Supervision  Bed to Chair: Contact guard assistance (w/ RW)          GAIT Daily Assessment     NT       STEPS/STAIRS Daily Assessment     NT         BALANCE Daily Assessment    Static Sitting: Good:  Pt. able to maintain balance w/o UE support;  exhibits some postural sway  Dynamic Sitting: Good - accepts moderate challenge;  can maintain balance while picking object off the floor  Static Standing: Fair:  Pt. requires UE support;  may need occasional min A  Dynamic Standing: Fair - accepts minimal challenge;  can maintain balance while turning head/trunk       WHEELCHAIR MOBILITY Daily Assessment    NT              LOWER EXTREMITY EXERCISES   Pt. Performed supine LE exercises to increase strength & ROM    SUPINE EXERCISES Sets Reps Comments   Ankle Pumps 1 12    Isometric hip adduction 1 12    Glut Sets 1 12    Heel Slides 1 12    Hip Abduction 1 12 L LE s/ skate & board   Short Arc Quad 1 12    Straight Leg Raise 1 12 AAROM L LE         Pain level: 0/10 @ rest, 9/10 w/ exercises  Pain Location:  R hip  Pain Interventions: repositioning    Vital Signs:  /86   Pulse 77   Temp 96.8 °F (36 °C) (Oral)   Resp 18

## 2024-09-26 NOTE — PROGRESS NOTES
PHYSICAL THERAPY DAILY NOTE    PT Individual Minutes  [id number]  [id number]                 Total Treatment Time:  48 Minutes    Pt. Seen for: PM, Gait Training, and Transfer Training     Subjective: Pt. Reports she has 19 steps to access the second floor of her daughter's home.  States that the railing is on the L at the beginning of the stairs & changes to the R after a shirt landing.  Pt. Reports there is not a bedroom downstairs, but there is a pull out couch.         Objective:  Restrictions/Precautions: Fall Risk, Bed Alarm, Other (comment) (posey alarm, limited vision)  Required Braces or Orthoses?: No                GROSS ASSESSMENT   Pt. Up in recliner w/ daughter @ bedside upon arrival.        COGNITION Daily Assessment        Overall Cognitive Status: Exceptions        BED/MAT MOBILITY Daily Assessment     NT this session       TRANSFERS Daily Assessment   Toilet transfer w/c to toilet CGA for visual cueing Sit to Stand: Supervision  Stand to Sit: Supervision  Bed to Chair: Contact guard assistance (CGA for visual cues, using RW)          GAIT Daily Assessment    Surface: Level tile  Device: Rolling Walker  Other Apparatus: Wheelchair follow  Assistance: Supervision  Quality of Gait: VCs to for technique to offload R LE in stance;  required cues to keep RW in a straight path as pt. tended to veer to the right  Gait Deviations: Decreased step height;Slow Jenni;Increased NATANAEL;Decreased head and trunk rotation;Decreased arm swing;Decreased step length;Shuffles  Distance: 60'              STEPS/STAIRS Daily Assessment   Worked on pre-stair training in parallel bars using 6\" step.  Pt. Performed w/ S-CGA as follows:  Single LE step-ups L LE x5  Single LE step-ups R LE x5  Step up & over x4                BALANCE Daily Assessment    Static Sitting: Good:  Pt. able to maintain balance w/o UE support;  exhibits some postural sway  Dynamic Sitting: Good - accepts moderate challenge;  can maintain balance while

## 2024-09-26 NOTE — CARE COORDINATION
Interdisciplinary Tuesday / Thursday, Team Conference Meeting Notes    Interdisciplinary team conference meeting completed to discuss plan of care.       Attending Staff: (Director) Virginia Moses (Therapy Supervisor), Dr. Milton Lyles, (CM) Delmi, (PT) Caridad Boykin Dana, Erin, (OT) Rachel Ag Joy, (SLP) Josefa      Family Members Attending: Daughter (Drel) via phone      Estimated D/C Date: 10/1/24      Recommended Follow-Up Therapy: Staff has recommended (HH) PT/OT/RN      SNF Facility: NA      Home Health: [facility]    Dialysis: NA  Name of Dialysis Facility: NA      [place] [facility]      Family Training: Therapy will schedule family training with family.        Communication with family/caregivers: CM reviewed / screen patient medical chart for continued stay.   Patient needs are continue to be followed by Dr. Milton Lyles. Patient was admitted on 9/20/24. Patient has been approved for estimated length of stay for 14 days.  Patient has currently used 8 out of estimated length of stay for days. Patient has discharge date / plan at this time scheduled for 10/1/24. Team Conference  staff met with patient / daughter and discussed patient progress and barriers for discharge home. Staff discussed patient needs for home at discharge. Dr. Lyles has recommended a discharge date 10/1/24 / therapy staff has recommended (HH) / no DME's and family training.  Patient / spouse / family has requested a referral be made [facility] for (RN, PT/OT). Family training has been requested.  Therapy staff will schedule family training.  CM will continue to follow patient plan of care and assist with supportive care. CM will continue to follow / monitor for any needs, concerns or questions that may arise.          Name of Ins: [facility]/ [employer]   Policy #: [id number]    Secondary Ins:  Policy #:  Auth #  Admission Date: 9/20/24  Approved Dates: 14  LOS: 8  Discharge Date: 10/1/24  Contact Name:  Contact #  Fax #:  Updated Clinicals:

## 2024-09-26 NOTE — PROGRESS NOTES
[place] [address]  Tel: [phone]     Physical Medicine and Rehabilitation Progress Note      Tricia Sellers  Admit Date: 9/20/2024  Admit Diagnosis: Functional blindness [F45.8]    Subjective     Patient seen this morning during break in therapy and again during team conference. She states talking to Dr. Burger was very helpful yesterday, and she is now more aware of how stress is negatively affecting her health. She reports dizziness is controlled with scheduled meclizine, and melatonin is helping her sleep better. She states hip pain (R>>L) is limiting her participation in therapy and asks about re-starting Plaquenil. She is agreeable to trial of scheduled tramadol before therapies. All questions and concerns were addressed at this time.    Team conference held and attended today.    Ongoing care discussions with Neurology Dr. Don Burger and Rheumatologist Dr. Brenda Valencia today.    Objective:     Current Facility-Administered Medications   Medication Dose Route Frequency    oxyCODONE (ROXICODONE) immediate release tablet 5 mg  5 mg Oral Q4H PRN    traMADol (ULTRAM) tablet 100 mg  100 mg Oral 2 times per day    [START ON 9/27/2024] predniSONE (DELTASONE) tablet 30 mg  30 mg Oral Daily    [START ON 10/3/2024] predniSONE (DELTASONE) tablet 20 mg  20 mg Oral Daily    [START ON 10/10/2024] predniSONE (DELTASONE) tablet 10 mg  10 mg Oral Daily    insulin glargine (LANTUS) injection vial 11 Units  11 Units SubCUTAneous Nightly    meclizine (ANTIVERT) tablet 25 mg  25 mg Oral 3 times per day    acetaZOLAMIDE (DIAMOX) tablet 500 mg  500 mg Oral BID    melatonin tablet 3 mg  3 mg Oral QPM    potassium bicarb-citric acid (EFFER-K) effervescent tablet 10 mEq  10 mEq Oral Daily    scopolamine (TRANSDERM-SCOP) transdermal patch 1 patch  1 patch TransDERmal Q72H    acetaminophen (TYLENOL) tablet 1,000 mg  1,000 mg Oral q8h    nystatin (MYCOSTATIN)  implemented at [facility], particularly balance, strength, and gait training.  Avoid if possible the following medications which can contribute to delirium: benzodiazepines, antihistamines, and anticholinergics  Avoid sleep disruption by rescheduling early morning blood draws, avoiding middle of the night vital signs or disturbances, minimizing noise.   Keep lights on and blinds open during the day; lights and TV off at night; minimize tethers including urinary catheters; use glasses, hearing aids, and dentures as appropriate.  Avoid long periods of sleep during the day. Brief naps can be helpful but long periods of sleep can disrupt the sleep wake cycle for this patient.  Reorientation and visual cues for orientation should be tried  Maintain hydration and bowel function     Disposition: To daughter's home on 10/1 with 24 hour support initially; Primary barrier is that she will need to navigate >15 stairs to upstairs living area/ hip pain a limiting issue. HH PT/OT/RN on DC.       Outpatient Provider Follow-up Appointments:    No follow-up provider specified.       The patient is able to tolerate and benefit from multidisciplinary acute inpatient rehabilitation.    Team Conference held and attended. Greater than 50 minutes were spent in direct patient care, discussing current medications, medical conditions, dispo planning, continuation of therapy in the community, discussion with multiple other medical providers. More than 50% of total time was spent in counseling and coordination of care with patient, nursing staff, therapy team and case management.     The patient's medical conditions, functional status, and progress toward discharge goals was discussed with the treatment team.     The patient appears to be making progress toward discharge goals.    Barriers to discharge: functional blindness, needs to navigate stairs for safe discharge     DME: TBD     other follow- up care needed: Family training        Part of this  note may have been written by using a voice dictation software.  The note has been proof read but may still contain some grammatical/other typographical errors.      Milton Lyles MD  Physical Medicine and Rehabilitation Attending Physician  September 26, 2024

## 2024-09-26 NOTE — PROGRESS NOTES
[facility] OCCUPATIONAL THERAPY DAILY NOTE  OT Individual Minutes  Time In: 1030  Time Out: 1114  Minutes: 44               Subjective: Pt agreeable to treatment. \"It has been such a relief.\"  Pain: No pain expressed.  Interdisciplinary Communication: Collaborated with PT, RN, and care team during Team Conference regarding pt status and pt performance.   Precautions: Falls, [place] Alarm, and severe visual deficit     FUNCTIONAL MOBILITY:       Bed Mobility NT    Sit to Stand CGA    Transfer  CGA and Kristy  Transfer Type: SPT  Equipment: RW    Ambulation NT  Equipment: NA       - Activity Tolerance - Cognition - Coordination   Pt completed therapeutic activities to challenge  BUE AROM/STR, FM/GM coordination, FM coordination/dexterity, bi-manual coordination, activity tolerance, and problem solving and use of compensatory methods to scan environment in preparation for safe return to max (I) with I/ADLs. Pt tolerated Pt tolerated activities well with no c/o pain. . Rest breaks utilized as needed throughout..   Pt completed stereognosis activity seated at tray table. Pt used BUE to locate objects in rice bin and then identified the object. Pt identified 9/10 objects; buttons, balls, bolts, and beads. Pt required increased time with 1-3 verbal cues for locating items in the rice bin.       - Self-Care   Pt completed self-care seated at tray table to challenge BUE function, functional mobility and (I) with ADLs in preparation for safe return to max (I) with ADLs. Pt tolerated self-care well with no c/o pain. Rest breaks utilized as needed throughout..  Pt completed:  Self-feeding: Pt completed simulated self-feeding activity seated at tray table. Pt used tray that was set with silverware, plate, and a cup. Pt first found the boundaries of the tray table, tray, and plate. Pt utilized the clock method to locate items on the tray. Pt showed understanding of method. Pt required 3-5 verbal cues to locate the items on the tray.

## 2024-09-27 LAB
GLUCOSE BLD STRIP.AUTO-MCNC: 135 MG/DL (ref 65–100)
GLUCOSE BLD STRIP.AUTO-MCNC: 215 MG/DL (ref 65–100)
GLUCOSE BLD STRIP.AUTO-MCNC: 232 MG/DL (ref 65–100)
GLUCOSE BLD STRIP.AUTO-MCNC: 97 MG/DL (ref 65–100)
SERVICE CMNT-IMP: ABNORMAL
SERVICE CMNT-IMP: NORMAL

## 2024-09-27 PROCEDURE — 6360000002 HC RX W HCPCS: Performed by: PHYSICAL MEDICINE & REHABILITATION

## 2024-09-27 PROCEDURE — 6370000000 HC RX 637 (ALT 250 FOR IP): Performed by: PSYCHIATRY & NEUROLOGY

## 2024-09-27 PROCEDURE — 6370000000 HC RX 637 (ALT 250 FOR IP): Performed by: PHYSICIAN ASSISTANT

## 2024-09-27 PROCEDURE — 6370000000 HC RX 637 (ALT 250 FOR IP): Performed by: PHYSICAL MEDICINE & REHABILITATION

## 2024-09-27 PROCEDURE — 1180000000 HC REHAB R&B

## 2024-09-27 PROCEDURE — 97535 SELF CARE MNGMENT TRAINING: CPT

## 2024-09-27 PROCEDURE — 82962 GLUCOSE BLOOD TEST: CPT

## 2024-09-27 PROCEDURE — 99232 SBSQ HOSP IP/OBS MODERATE 35: CPT | Performed by: PHYSICAL MEDICINE & REHABILITATION

## 2024-09-27 RX ORDER — BISACODYL 10 MG
10 SUPPOSITORY, RECTAL RECTAL DAILY PRN
Status: ACTIVE | OUTPATIENT
Start: 2024-09-27

## 2024-09-27 RX ORDER — SENNA AND DOCUSATE SODIUM 50; 8.6 MG/1; MG/1
2 TABLET, FILM COATED ORAL 2 TIMES DAILY
Status: DISPENSED | OUTPATIENT
Start: 2024-09-27

## 2024-09-27 RX ADMIN — PANTOPRAZOLE SODIUM 40 MG: 40 TABLET, DELAYED RELEASE ORAL at 06:16

## 2024-09-27 RX ADMIN — Medication 3 MG: at 18:24

## 2024-09-27 RX ADMIN — ENOXAPARIN SODIUM 30 MG: 100 INJECTION SUBCUTANEOUS at 08:36

## 2024-09-27 RX ADMIN — TRAMADOL HYDROCHLORIDE 100 MG: 50 TABLET ORAL at 06:16

## 2024-09-27 RX ADMIN — HYDROCHLOROTHIAZIDE 12.5 MG: 12.5 CAPSULE ORAL at 08:36

## 2024-09-27 RX ADMIN — PREDNISONE 30 MG: 20 TABLET ORAL at 08:35

## 2024-09-27 RX ADMIN — POTASSIUM BICARBONATE 10 MEQ: 391 TABLET, EFFERVESCENT ORAL at 08:35

## 2024-09-27 RX ADMIN — INSULIN LISPRO 1 UNITS: 100 INJECTION, SOLUTION INTRAVENOUS; SUBCUTANEOUS at 18:28

## 2024-09-27 RX ADMIN — PANTOPRAZOLE SODIUM 40 MG: 40 TABLET, DELAYED RELEASE ORAL at 18:24

## 2024-09-27 RX ADMIN — SENNOSIDES AND DOCUSATE SODIUM 2 TABLET: 50; 8.6 TABLET ORAL at 11:00

## 2024-09-27 RX ADMIN — MECLIZINE HYDROCHLORIDE 25 MG: 25 TABLET ORAL at 14:30

## 2024-09-27 RX ADMIN — ACETAMINOPHEN 1000 MG: 500 TABLET ORAL at 18:24

## 2024-09-27 RX ADMIN — ASPIRIN 81 MG: 81 TABLET, COATED ORAL at 08:35

## 2024-09-27 RX ADMIN — CARBOXYMETHYLCELLULOSE SODIUM 1 DROP: 10 GEL OPHTHALMIC at 08:41

## 2024-09-27 RX ADMIN — ENOXAPARIN SODIUM 30 MG: 100 INJECTION SUBCUTANEOUS at 20:44

## 2024-09-27 RX ADMIN — CARBOXYMETHYLCELLULOSE SODIUM 1 DROP: 10 GEL OPHTHALMIC at 20:46

## 2024-09-27 RX ADMIN — MECLIZINE HYDROCHLORIDE 25 MG: 25 TABLET ORAL at 20:45

## 2024-09-27 RX ADMIN — ACETAZOLAMIDE 500 MG: 250 TABLET ORAL at 08:36

## 2024-09-27 RX ADMIN — ANTI-FUNGAL POWDER MICONAZOLE NITRATE TALC FREE: 1.42 POWDER TOPICAL at 08:46

## 2024-09-27 RX ADMIN — MECLIZINE HYDROCHLORIDE 25 MG: 25 TABLET ORAL at 06:16

## 2024-09-27 RX ADMIN — INSULIN GLARGINE 11 UNITS: 100 INJECTION, SOLUTION SUBCUTANEOUS at 20:45

## 2024-09-27 RX ADMIN — CARBOXYMETHYLCELLULOSE SODIUM 1 DROP: 10 GEL OPHTHALMIC at 13:00

## 2024-09-27 RX ADMIN — NYSTATIN 500000 UNITS: 100000 SUSPENSION ORAL at 13:00

## 2024-09-27 RX ADMIN — CARBOXYMETHYLCELLULOSE SODIUM 1 DROP: 10 GEL OPHTHALMIC at 18:26

## 2024-09-27 RX ADMIN — TRAMADOL HYDROCHLORIDE 100 MG: 50 TABLET ORAL at 12:00

## 2024-09-27 RX ADMIN — ACETAZOLAMIDE 500 MG: 250 TABLET ORAL at 20:45

## 2024-09-27 RX ADMIN — ACETAMINOPHEN 1000 MG: 500 TABLET ORAL at 11:00

## 2024-09-27 RX ADMIN — NYSTATIN 500000 UNITS: 100000 SUSPENSION ORAL at 08:37

## 2024-09-27 ASSESSMENT — PAIN DESCRIPTION - DESCRIPTORS: DESCRIPTORS: ACHING

## 2024-09-27 ASSESSMENT — PAIN SCALES - GENERAL: PAINLEVEL_OUTOF10: 0

## 2024-09-27 ASSESSMENT — PAIN DESCRIPTION - LOCATION: LOCATION: HIP;BACK

## 2024-09-27 NOTE — PROGRESS NOTES
completed follow up visit.  Patient engaged in life review and spiritual reflection.   provided pastoral presence, prayer and empathetic listening.  Peace be with you,  Signed by  KACI LillyDiv.   [phone]

## 2024-09-27 NOTE — PROGRESS NOTES
[facility] OCCUPATIONAL THERAPY PROGRESS NOTE  OT Individual Minutes  Time In: 0703  Time Out: 0753  Minutes: 50               GOALS:  GOALS:  Short Term Goals:  Time Frame for Short Term Goals: 7 days   STG 1: Patient will dress UB with Supervision using AE/DME PRN.Goal met 9/27/24.  STG 2: Patient will dress LB with CGA using AE/DME PRN.Goal met 9/27/24.  STG 3: Patient will don footwear with Partial/Moderate Assistance using AE/DME PRN.Goal met 9/27/24.  STG 4: Patient will bathe with CGA using AE/DME PRN.Goal met 9/27/24.  STG 5: Patient will toilet with CGA using AE/DME PRN.Goal met 9/27/24.     Long Term Goals:  Time Frame for Long Term Goals: 14 days   LTG 1: Patient will dress UB with Setup Assistance using AE/DME PRN.   LTG 2: Patient will dress LB with Supervision using AE/DME PRN.   LTG 3: Patient will don footwear with Setup Assistance using AE/DME PRN.   LTG 4: Patient will bathe with Supervision using AE/DME PRN.   LTG 5: Patient will toilet with Supervision using AE/DME PRN.   LTG 6: Patient/caregiver will verbalize understanding of OT recommendations regarding ADLs, functional transfers, home safety, AE/DME, energy conservation, safety awareness, activity tolerance, and follow-up therapy to increase safety with functional tasks upon discharge.       Subjective: Patient agreeable to therapy. \"I can see some blobs\".   Pain: No pain expressed.  Precautions: Falls and functional blindness    FUNCTIONAL MOBILITY:        Bed Mobility Supervision HOB elevated and use of bed rail   Sit to Stand CGA    Transfer  CGA  Transfer Type: SPT  Equipment: RW Pt performed SPT from EOB>w/c>toilet<>w/c<>shower chair> recliner chair with a few STS's with v.c.'s d/t vision deficits.    Ambulation NT  Equipment: NA      ACTIVITIES OF DAILY LIVING:    Initial Score Current Score   Eating Partial/moderate assistance  Min A - SBA, Min A for intermittent/initial hand-over-hand assist for edu/training of adaptive/compensatory eathing

## 2024-09-28 LAB
GLUCOSE BLD STRIP.AUTO-MCNC: 132 MG/DL (ref 65–100)
GLUCOSE BLD STRIP.AUTO-MCNC: 145 MG/DL (ref 65–100)
GLUCOSE BLD STRIP.AUTO-MCNC: 234 MG/DL (ref 65–100)
GLUCOSE BLD STRIP.AUTO-MCNC: 88 MG/DL (ref 65–100)
GLUCOSE BLD STRIP.AUTO-MCNC: 91 MG/DL (ref 65–100)
SERVICE CMNT-IMP: ABNORMAL
SERVICE CMNT-IMP: NORMAL
SERVICE CMNT-IMP: NORMAL

## 2024-09-28 PROCEDURE — 1180000000 HC REHAB R&B

## 2024-09-28 PROCEDURE — 6370000000 HC RX 637 (ALT 250 FOR IP): Performed by: PSYCHIATRY & NEUROLOGY

## 2024-09-28 PROCEDURE — 6360000002 HC RX W HCPCS: Performed by: PHYSICAL MEDICINE & REHABILITATION

## 2024-09-28 PROCEDURE — 6370000000 HC RX 637 (ALT 250 FOR IP): Performed by: PHYSICAL MEDICINE & REHABILITATION

## 2024-09-28 PROCEDURE — 82962 GLUCOSE BLOOD TEST: CPT

## 2024-09-28 PROCEDURE — 6370000000 HC RX 637 (ALT 250 FOR IP): Performed by: PHYSICIAN ASSISTANT

## 2024-09-28 RX ADMIN — ACETAMINOPHEN 1000 MG: 500 TABLET ORAL at 20:47

## 2024-09-28 RX ADMIN — ENOXAPARIN SODIUM 30 MG: 100 INJECTION SUBCUTANEOUS at 08:00

## 2024-09-28 RX ADMIN — ACETAZOLAMIDE 500 MG: 250 TABLET ORAL at 20:46

## 2024-09-28 RX ADMIN — POTASSIUM BICARBONATE 10 MEQ: 391 TABLET, EFFERVESCENT ORAL at 08:00

## 2024-09-28 RX ADMIN — ACETAZOLAMIDE 500 MG: 250 TABLET ORAL at 08:00

## 2024-09-28 RX ADMIN — PANTOPRAZOLE SODIUM 40 MG: 40 TABLET, DELAYED RELEASE ORAL at 06:00

## 2024-09-28 RX ADMIN — SENNOSIDES AND DOCUSATE SODIUM 2 TABLET: 50; 8.6 TABLET ORAL at 13:45

## 2024-09-28 RX ADMIN — PREDNISONE 30 MG: 20 TABLET ORAL at 08:00

## 2024-09-28 RX ADMIN — Medication 3 MG: at 17:45

## 2024-09-28 RX ADMIN — CARBOXYMETHYLCELLULOSE SODIUM 1 DROP: 10 GEL OPHTHALMIC at 20:47

## 2024-09-28 RX ADMIN — ENOXAPARIN SODIUM 30 MG: 100 INJECTION SUBCUTANEOUS at 20:46

## 2024-09-28 RX ADMIN — MECLIZINE HYDROCHLORIDE 25 MG: 25 TABLET ORAL at 06:00

## 2024-09-28 RX ADMIN — HYDROCHLOROTHIAZIDE 12.5 MG: 12.5 CAPSULE ORAL at 08:00

## 2024-09-28 RX ADMIN — CARBOXYMETHYLCELLULOSE SODIUM 1 DROP: 10 GEL OPHTHALMIC at 13:42

## 2024-09-28 RX ADMIN — ANTI-FUNGAL POWDER MICONAZOLE NITRATE TALC FREE: 1.42 POWDER TOPICAL at 13:44

## 2024-09-28 RX ADMIN — INSULIN LISPRO 1 UNITS: 100 INJECTION, SOLUTION INTRAVENOUS; SUBCUTANEOUS at 17:43

## 2024-09-28 RX ADMIN — MECLIZINE HYDROCHLORIDE 25 MG: 25 TABLET ORAL at 17:45

## 2024-09-28 RX ADMIN — INSULIN GLARGINE 11 UNITS: 100 INJECTION, SOLUTION SUBCUTANEOUS at 20:46

## 2024-09-28 RX ADMIN — ACETAMINOPHEN 1000 MG: 500 TABLET ORAL at 13:48

## 2024-09-28 RX ADMIN — CARBOXYMETHYLCELLULOSE SODIUM 1 DROP: 10 GEL OPHTHALMIC at 17:52

## 2024-09-28 RX ADMIN — CARBOXYMETHYLCELLULOSE SODIUM 1 DROP: 10 GEL OPHTHALMIC at 09:00

## 2024-09-28 RX ADMIN — MECLIZINE HYDROCHLORIDE 25 MG: 25 TABLET ORAL at 14:54

## 2024-09-28 RX ADMIN — ASPIRIN 81 MG: 81 TABLET, COATED ORAL at 08:00

## 2024-09-28 RX ADMIN — PANTOPRAZOLE SODIUM 40 MG: 40 TABLET, DELAYED RELEASE ORAL at 14:54

## 2024-09-28 ASSESSMENT — PAIN DESCRIPTION - LOCATION: LOCATION: LEG

## 2024-09-28 ASSESSMENT — PAIN - FUNCTIONAL ASSESSMENT: PAIN_FUNCTIONAL_ASSESSMENT: ACTIVITIES ARE NOT PREVENTED

## 2024-09-28 ASSESSMENT — PAIN SCALES - GENERAL: PAINLEVEL_OUTOF10: 2

## 2024-09-28 ASSESSMENT — PAIN DESCRIPTION - DESCRIPTORS: DESCRIPTORS: ACHING

## 2024-09-28 ASSESSMENT — PAIN DESCRIPTION - ORIENTATION: ORIENTATION: LEFT;RIGHT

## 2024-09-28 NOTE — PLAN OF CARE
Problem: Safety - Adult  Goal: Free from fall injury  9/27/2024 2137 by Margie Gomez, RN  Outcome: Progressing  9/27/2024 0956 by Daryl Vargas, RN  Outcome: Progressing     Problem: Skin/Tissue Integrity  Goal: Absence of new skin breakdown  Description: 1.  Monitor for areas of redness and/or skin breakdown  2.  Assess vascular access sites hourly  3.  Every 4-6 hours minimum:  Change oxygen saturation probe site  4.  Every 4-6 hours:  If on nasal continuous positive airway pressure, respiratory therapy assess nares and determine need for appliance change or resting period.  9/27/2024 2137 by Margie Gomez, RN  Outcome: Progressing  9/27/2024 0956 by Daryl Vargas, RN  Outcome: Progressing     Problem: ABCDS Injury Assessment  Goal: Absence of physical injury  9/27/2024 2137 by Margie Gomez, RN  Outcome: Progressing  9/27/2024 0956 by Daryl Vargas, RN  Outcome: Progressing     Problem: Pain  Goal: Verbalizes/displays adequate comfort level or baseline comfort level  9/27/2024 2137 by Margie Gomez, RN  Outcome: Progressing  9/27/2024 0956 by Daryl Vargas, RN  Outcome: Progressing

## 2024-09-29 VITALS
SYSTOLIC BLOOD PRESSURE: 115 MMHG | DIASTOLIC BLOOD PRESSURE: 82 MMHG | TEMPERATURE: 98.8 F | WEIGHT: 278 LBS | HEART RATE: 85 BPM | RESPIRATION RATE: 18 BRPM | BODY MASS INDEX: 43.63 KG/M2 | HEIGHT: 67 IN | OXYGEN SATURATION: 97 %

## 2024-09-29 LAB
GLUCOSE BLD STRIP.AUTO-MCNC: 101 MG/DL (ref 65–100)
GLUCOSE BLD STRIP.AUTO-MCNC: 161 MG/DL (ref 65–100)
GLUCOSE BLD STRIP.AUTO-MCNC: 205 MG/DL (ref 65–100)
GLUCOSE BLD STRIP.AUTO-MCNC: 99 MG/DL (ref 65–100)
SERVICE CMNT-IMP: ABNORMAL
SERVICE CMNT-IMP: NORMAL

## 2024-09-29 PROCEDURE — 1180000000 HC REHAB R&B

## 2024-09-29 PROCEDURE — 82962 GLUCOSE BLOOD TEST: CPT

## 2024-09-29 PROCEDURE — 6370000000 HC RX 637 (ALT 250 FOR IP): Performed by: PHYSICIAN ASSISTANT

## 2024-09-29 PROCEDURE — 6370000000 HC RX 637 (ALT 250 FOR IP): Performed by: PHYSICAL MEDICINE & REHABILITATION

## 2024-09-29 PROCEDURE — 6370000000 HC RX 637 (ALT 250 FOR IP): Performed by: PSYCHIATRY & NEUROLOGY

## 2024-09-29 PROCEDURE — 6360000002 HC RX W HCPCS: Performed by: PHYSICAL MEDICINE & REHABILITATION

## 2024-09-29 RX ADMIN — ACETAZOLAMIDE 500 MG: 250 TABLET ORAL at 21:15

## 2024-09-29 RX ADMIN — INSULIN GLARGINE 11 UNITS: 100 INJECTION, SOLUTION SUBCUTANEOUS at 21:14

## 2024-09-29 RX ADMIN — CARBOXYMETHYLCELLULOSE SODIUM 1 DROP: 10 GEL OPHTHALMIC at 21:17

## 2024-09-29 RX ADMIN — ACETAMINOPHEN 1000 MG: 500 TABLET ORAL at 21:15

## 2024-09-29 RX ADMIN — ENOXAPARIN SODIUM 30 MG: 100 INJECTION SUBCUTANEOUS at 21:14

## 2024-09-29 NOTE — PLAN OF CARE
Problem: Safety - Adult  Goal: Free from fall injury  9/28/2024 2133 by Margie Gomez RN  Outcome: Progressing  9/28/2024 1521 by Stevie Anderson RN  Outcome: Progressing     Problem: Skin/Tissue Integrity  Goal: Absence of new skin breakdown  Description: 1.  Monitor for areas of redness and/or skin breakdown  2.  Assess vascular access sites hourly  3.  Every 4-6 hours minimum:  Change oxygen saturation probe site  4.  Every 4-6 hours:  If on nasal continuous positive airway pressure, respiratory therapy assess nares and determine need for appliance change or resting period.  9/28/2024 2133 by Margie Gomez RN  Outcome: Progressing  9/28/2024 1521 by Stevie Anderson RN  Outcome: Progressing     Problem: ABCDS Injury Assessment  Goal: Absence of physical injury  9/28/2024 2133 by Margie Gomez RN  Outcome: Progressing  9/28/2024 1521 by Stevie Anderson RN  Outcome: Progressing     Problem: Pain  Goal: Verbalizes/displays adequate comfort level or baseline comfort level  9/28/2024 2133 by Margie Gomez RN  Outcome: Progressing  9/28/2024 1521 by Stevie Anderson RN  Outcome: Progressing

## 2024-09-30 LAB
GLUCOSE BLD STRIP.AUTO-MCNC: 110 MG/DL (ref 65–100)
GLUCOSE BLD STRIP.AUTO-MCNC: 110 MG/DL (ref 65–100)
GLUCOSE BLD STRIP.AUTO-MCNC: 185 MG/DL (ref 65–100)
SERVICE CMNT-IMP: ABNORMAL

## 2024-09-30 PROCEDURE — 82962 GLUCOSE BLOOD TEST: CPT

## 2024-09-30 PROCEDURE — 6370000000 HC RX 637 (ALT 250 FOR IP): Performed by: PHYSICAL MEDICINE & REHABILITATION

## 2024-09-30 PROCEDURE — 6370000000 HC RX 637 (ALT 250 FOR IP): Performed by: PHYSICIAN ASSISTANT

## 2024-09-30 PROCEDURE — 97535 SELF CARE MNGMENT TRAINING: CPT

## 2024-09-30 PROCEDURE — 97530 THERAPEUTIC ACTIVITIES: CPT

## 2024-09-30 PROCEDURE — 1180000000 HC REHAB R&B

## 2024-09-30 PROCEDURE — 6360000002 HC RX W HCPCS: Performed by: PHYSICAL MEDICINE & REHABILITATION

## 2024-09-30 PROCEDURE — 6370000000 HC RX 637 (ALT 250 FOR IP): Performed by: PSYCHIATRY & NEUROLOGY

## 2024-09-30 PROCEDURE — 99232 SBSQ HOSP IP/OBS MODERATE 35: CPT | Performed by: PHYSICAL MEDICINE & REHABILITATION

## 2024-09-30 PROCEDURE — 97116 GAIT TRAINING THERAPY: CPT

## 2024-09-30 RX ADMIN — PREDNISONE 30 MG: 20 TABLET ORAL at 08:47

## 2024-09-30 RX ADMIN — HYDROCHLOROTHIAZIDE 12.5 MG: 12.5 CAPSULE ORAL at 08:48

## 2024-09-30 RX ADMIN — ANTI-FUNGAL POWDER MICONAZOLE NITRATE TALC FREE: 1.42 POWDER TOPICAL at 08:50

## 2024-09-30 RX ADMIN — CARBOXYMETHYLCELLULOSE SODIUM 1 DROP: 10 GEL OPHTHALMIC at 08:52

## 2024-09-30 RX ADMIN — ENOXAPARIN SODIUM 30 MG: 100 INJECTION SUBCUTANEOUS at 08:51

## 2024-09-30 RX ADMIN — CARBOXYMETHYLCELLULOSE SODIUM 1 DROP: 10 GEL OPHTHALMIC at 12:25

## 2024-09-30 RX ADMIN — ACETAZOLAMIDE 500 MG: 250 TABLET ORAL at 19:37

## 2024-09-30 RX ADMIN — ACETAZOLAMIDE 500 MG: 250 TABLET ORAL at 08:43

## 2024-09-30 RX ADMIN — ENOXAPARIN SODIUM 30 MG: 100 INJECTION SUBCUTANEOUS at 20:20

## 2024-09-30 RX ADMIN — ACETAMINOPHEN 1000 MG: 500 TABLET ORAL at 12:20

## 2024-09-30 RX ADMIN — SENNOSIDES AND DOCUSATE SODIUM 2 TABLET: 50; 8.6 TABLET ORAL at 19:37

## 2024-09-30 RX ADMIN — ACETAMINOPHEN 1000 MG: 500 TABLET ORAL at 19:36

## 2024-09-30 RX ADMIN — POTASSIUM BICARBONATE 10 MEQ: 391 TABLET, EFFERVESCENT ORAL at 08:49

## 2024-09-30 RX ADMIN — ASPIRIN 81 MG: 81 TABLET, COATED ORAL at 08:46

## 2024-09-30 RX ADMIN — CARBOXYMETHYLCELLULOSE SODIUM 1 DROP: 10 GEL OPHTHALMIC at 17:48

## 2024-09-30 RX ADMIN — INSULIN GLARGINE 11 UNITS: 100 INJECTION, SOLUTION SUBCUTANEOUS at 20:20

## 2024-09-30 ASSESSMENT — PAIN SCALES - GENERAL
PAINLEVEL_OUTOF10: 0
PAINLEVEL_OUTOF10: 0

## 2024-09-30 NOTE — PROGRESS NOTES
PHYSICAL THERAPY DISCHARGE SUMMARY    PT Individual Minutes  Time In: 0915  Time Out: 0953  Minutes: 38                   Total Treatment Time:  38 Minutes           Precautions at discharge:      Restrictions/Precautions: Fall Risk, Bed Alarm, Other (comment) (posey alarm, limited vision)  Required Braces or Orthoses?: No                    Impairments:    Decreased LE strength  [x]     Decreased strength trunk/core  [x]     Decreased AROM   [x]     Decreased PROM  []    Decreased endurance  []     Decreased balance sitting  []     Decreased balance standing  []     Pain   [x]     Slow ambulation velocity  [x]    Decreased coordination  []    Decreased safety awareness  []       Functional Limitations:   Decreased independence with bed mobility  []     Decreased independence with functional transfers  []     Decreased independence with ambulation  [x]     Decreased independence with stair negotiation  [x]               Objective:     Vital Signs: /60   Pulse 72   Temp 98.2 °F (36.8 °C) (Oral)   Resp 18   Ht 1.702 m (5' 7\")   Wt 125 kg (275 lb 9.6 oz)   SpO2 99%   BMI 43.17 kg/m²      Pain level: pt. Denies pain     Patient education:    Education Given To: Patient;Family  Education Provided: Transfer Training;Mobility Training  Education Provided Comments: Completed family training.  Pt's daughter observed pt. perform car transfer, curb step, & 12 steps w/ single railing  Education Method: Demonstration;Verbal  Barriers to Learning: Vision  Education Outcome: Verbalized understanding;Demonstrated understanding;Continued education needed    Interdisciplinary Communication: discussed pt's progress w/ OT & PA     Cognition:      Overall Cognitive Status: Exceptions    Lower Extremity Function:     LLE RLE   ROM WFL AROM limited by severe hip DJD   Fine Motor Coordination Intact Intact   Tone Normal Normal   Sensation Light Touch Intact Light Touch Intact   Strength Generally decreased, but functional  Walker  Assistance 2: Supervision  Gait Deviations: Slow Jenni, Decreased step length, Decreased step height, Decreased arm swing  Distance: 20'      Walk 50' with 2 Turns 88   4      Walk 150' 88   4      Walking 10' on Unlevel Surface 88   4      Picking Up Object From Floor 88 5       STAIRS Initial Quality Indicator Score Discharge Quality Indicator Score Stairs Assessment   1 Curb Step 88   4   Stairs?: Yes  Stairs  # Steps : 12  Rails: Left ascending  Curbs: 6\"  Device: Rolling walker  Assistance: Supervision   4 Steps 88   4      12 Steps 88    4           Pt. Left in recliner call bell in reach needs in reach family at bedside           PHYSICAL THERAPY PLAN OF CARE     Goals:  Pt will demonstrate roll left & right & transition supine<>sit with Modified Independent in 10 days  MET  2.   Pt. will transfer from bed to/from chair with Supervision/Standby Assist using the least restrictive device in 10 days MET  3.   Pt. will ambulate 150 feet with RW or LRAD and Supervision/Standby Assist in 10 days MET  4.   Pt. Will ambulate up/down 4 steps with per home environment and CGA in 10 days MET  5.   Pt. Will perform HEP with Supervision/Standby Assist in 10 days MET     Pt would benefit from continued skilled physical therapy in order to improve independent functional mobility within the home with use of least restrictive device. Interventions may include range of motion (AROM, PROM B LE/trunk), motor function (B LE/trunk strengthening/coordination), activity tolerance (vitals, oxygen saturation levels), bed mobility training, balance activities, gait training (progressive ambulation program), and functional transfer training.      HEP handout: deferred       Pt to be discharged home with daughter.     Therapy Recommendations upon discharge:   HHPT   Equipment needs at discharge:  RW     Interdisciplinary Eval, Care Plan, and Patient Education for further information regarding physical therapy discharge summary

## 2024-09-30 NOTE — PROGRESS NOTES
note may have been written by using a voice dictation software. The note has been proof read but may still contain some grammatical/other typographical errors.      Milton Lyles MD  Physical Medicine and Rehabilitation Attending Physician  September 27, 2024

## 2024-09-30 NOTE — PROGRESS NOTES
[facility] OCCUPATIONAL THERAPY DAILY NOTE  OT Individual Minutes  Time In: 1035  Time Out: 1117  Minutes: 42               Subjective: Pt agreeable to treatment. \"I'm running out of here.\"  Pain: No pain expressed.  Interdisciplinary Communication: Collaborated with  CNA  regarding pt status and pt performance.   Precautions: Falls, Poor Safety Awareness, [place] Alarm, and visual deficit    FUNCTIONAL MOBILITY:       Bed Mobility NT    Sit to Stand CGA    Transfer  CGA  Transfer Type: SPT  Equipment: RW    Ambulation SBA and CGA  Equipment: RW       - Activity Tolerance - Balance - Cognition - Coordination - Energy Conservation/Pacing  - Family Training - Visual-Perceptual    Pt completed therapeutic activities to challenge  BUE AROM/STR, FM/GM coordination, FM coordination/dexterity, bi-manual coordination, static balance, standing tolerance, activity tolerance, problem solving, and STM recall in preparation for safe return to max (I) with I/ADLs. Pt tolerated activities well with no c/o pain. Rest breaks utilized as needed throughout.  Pt completed wooden peg and board activity standing at table with RW. Pt used BUE to retrieve pegs from the peg board. Pt then sorted the peg by color using BUE to place pegs into marked containers on R/L side of pt. Pt completed activity in ~1 minute, in standing, with no cues required. Pt graded up to geometric shape activity.   Pt completed small geometric shape [#2] activity standing at table with RW. Pt retrieved yellow geometric shape from board closest to pt using RUE and placed into insert on board furthest away (arms length) using RUE. Pt completed all shapes with increased time and 1-3 verbal cues for problem solving. Pt stood for ~4-8 mins 3 times.       - Self-Care   Pt completed self-care navigating room and unit to challenge balance, functional mobility and (I) with ADLs in preparation for safe return to max (I) with ADLs. Pt tolerated self-care with no c/o pain. Rest breaks

## 2024-09-30 NOTE — PROGRESS NOTES
[facility] OCCUPATIONAL THERAPY DISCHARGE NOTE  OT Individual Minutes  Time In: 0836  Time Out: 0915  Minutes: 39               GOALS:  GOALS:  GOALS:  Short Term Goals:  Time Frame for Short Term Goals: 7 days   STG 1: Patient will dress UB with Supervision using AE/DME PRN.Goal met 9/27/24.  STG 2: Patient will dress LB with CGA using AE/DME PRN.Goal met 9/27/24.  STG 3: Patient will don footwear with Partial/Moderate Assistance using AE/DME PRN.Goal met 9/27/24.  STG 4: Patient will bathe with CGA using AE/DME PRN.Goal met 9/27/24.  STG 5: Patient will toilet with CGA using AE/DME PRN.Goal met 9/27/24.     Long Term Goals:  Time Frame for Long Term Goals: 14 days   LTG 1: Patient will dress UB with Setup Assistance using AE/DME PRN. Goal met 9/30/24.  LTG 2: Patient will dress LB with Supervision using AE/DME PRN. Goal met 9/30/24.  LTG 3: Patient will don footwear with Setup Assistance using AE/DME PRN. Progressing/met 9/30/24.   LTG 4: Patient will bathe with Supervision using AE/DME PRN. Goal met 9/30/24.  LTG 5: Patient will toilet with Supervision using AE/DME PRN. Goal met 9/30/24.  LTG 6: Patient/caregiver will verbalize understanding of OT recommendations regarding ADLs, functional transfers, home safety, AE/DME, energy conservation, safety awareness, activity tolerance, and follow-up therapy to increase safety with functional tasks upon discharge. Goal met 9/30/24.    Subjective: Patient agreeable to therapy. \"I can see two of you.\"  Pain: No pain expressed.  Precautions: Fallsfunctional blindness.    FUNCTIONAL MOBILITY:        Bed Mobility NT    Sit to Stand SBA    Transfer  SBA  Transfer Type:  toilet, recliner chair  Equipment: RW    Ambulation SBA  Equipment: RW      ACTIVITIES OF DAILY LIVING:    Initial Score Current Score   Eating Partial/moderate assistance  Min A - SBA, Min A for intermittent/initial hand-over-hand assist for edu/training of adaptive/compensatory eathing methods. Decreased  assistance  SPV seated with sock aid with improved vision and use of dressing stick/reacher; education on use and demonstration provided with SPV for completion   Toileting Hygiene Partial/moderate assistance  Mod A simulated Supervision or touching assistance  Based on clinical judgement SPV with FWW, pt already completed prior-based on simulation task   Toilet Transfer Partial/moderate assistance  Mod-Min A simulated with RW Supervision or touching assistance  SPV with FWW/GB for toilet t/f this date   Initial Score: Patient's lowest score within first 72 hrs of inpatient rehab stay.   Current Score: Patient's average performance level over the last 48 hours.      Discharge Location: Home with Family Support  Recommended Therapy/Supervision at Discharge: HHOT and 24 Hour Supervision  Recommended Equipment: RW and pt has tub bench at home/BSC for toileting; AE provided  Safety/Functional Level: Pt completes ADLs with Setup/Clean-up Assist and Supervision using Rolling Walker, Dressing Stick, Long Handled Sponge, Reacher, Sock Aid, BSC, Grab Bars, and Tub Transfer Bench. Pt requires SBA for mobility related ADLs. Pt will require  assist as vision continues to progress  with IADLs such as home management, cooking, and cleaning.   Family Training: Completed this date; no further questions/all concerns and questions addressed .  Residual Deficits: Decreased balance, vision/perception, functional mobility, and IADL performance.    Summary of Progress: Patient demonstrates good progress with strength, activity tolerance, coordination, balance, cognition, vision/perception, functional mobility, safety awareness, AE/DME use, ADL performance , and IADL performance, see above for details. Patient continues to require skilled OT services to address deficits and maximize pt safety and independence.     Pt this date able to visually track with B eyes with visual stimuli and track; endorsed diplopia in B eyes in central vision,

## 2024-10-01 ENCOUNTER — TELEPHONE (OUTPATIENT)
Dept: ORTHOPEDIC SURGERY | Age: 54
End: 2024-10-01

## 2024-10-01 VITALS
TEMPERATURE: 97.5 F | RESPIRATION RATE: 17 BRPM | HEART RATE: 64 BPM | OXYGEN SATURATION: 98 % | WEIGHT: 275.6 LBS | BODY MASS INDEX: 43.26 KG/M2 | SYSTOLIC BLOOD PRESSURE: 99 MMHG | HEIGHT: 67 IN | DIASTOLIC BLOOD PRESSURE: 56 MMHG

## 2024-10-01 LAB
GLUCOSE BLD STRIP.AUTO-MCNC: 95 MG/DL (ref 65–100)
SERVICE CMNT-IMP: NORMAL

## 2024-10-01 PROCEDURE — 6370000000 HC RX 637 (ALT 250 FOR IP): Performed by: PSYCHIATRY & NEUROLOGY

## 2024-10-01 PROCEDURE — 6370000000 HC RX 637 (ALT 250 FOR IP): Performed by: PHYSICAL MEDICINE & REHABILITATION

## 2024-10-01 PROCEDURE — 6360000002 HC RX W HCPCS: Performed by: PHYSICAL MEDICINE & REHABILITATION

## 2024-10-01 PROCEDURE — 99239 HOSP IP/OBS DSCHRG MGMT >30: CPT | Performed by: PHYSICAL MEDICINE & REHABILITATION

## 2024-10-01 PROCEDURE — 6370000000 HC RX 637 (ALT 250 FOR IP): Performed by: PHYSICIAN ASSISTANT

## 2024-10-01 PROCEDURE — 82962 GLUCOSE BLOOD TEST: CPT

## 2024-10-01 RX ORDER — ACETAZOLAMIDE 250 MG/1
500 TABLET ORAL DAILY
Qty: 60 TABLET | Refills: 0 | Status: SHIPPED | OUTPATIENT
Start: 2024-10-01 | End: 2024-10-31

## 2024-10-01 RX ORDER — PANTOPRAZOLE SODIUM 40 MG/1
40 TABLET, DELAYED RELEASE ORAL
Qty: 60 TABLET | Refills: 1 | Status: SHIPPED | OUTPATIENT
Start: 2024-10-01

## 2024-10-01 RX ORDER — MECLIZINE HYDROCHLORIDE 25 MG/1
25 TABLET ORAL 3 TIMES DAILY PRN
Qty: 20 TABLET | Refills: 0 | Status: SHIPPED | OUTPATIENT
Start: 2024-10-01 | End: 2024-10-11

## 2024-10-01 RX ORDER — POLYETHYLENE GLYCOL 3350 17 G/17G
17 POWDER, FOR SOLUTION ORAL DAILY PRN
COMMUNITY
Start: 2024-10-01 | End: 2024-10-15

## 2024-10-01 RX ORDER — MECLIZINE HYDROCHLORIDE 25 MG/1
25 TABLET ORAL 3 TIMES DAILY PRN
Status: DISCONTINUED | OUTPATIENT
Start: 2024-10-01 | End: 2024-10-01 | Stop reason: HOSPADM

## 2024-10-01 RX ORDER — TRAMADOL HYDROCHLORIDE 50 MG/1
50-100 TABLET ORAL EVERY 6 HOURS PRN
Qty: 30 TABLET | Refills: 0 | Status: SHIPPED | OUTPATIENT
Start: 2024-10-01 | End: 2024-10-06

## 2024-10-01 RX ORDER — PREDNISONE 10 MG/1
TABLET ORAL
Qty: 24 TABLET | Refills: 0 | Status: SHIPPED | OUTPATIENT
Start: 2024-10-02 | End: 2024-10-15

## 2024-10-01 RX ADMIN — MECLIZINE HYDROCHLORIDE 25 MG: 25 TABLET ORAL at 06:05

## 2024-10-01 RX ADMIN — ASPIRIN 81 MG: 81 TABLET, COATED ORAL at 08:19

## 2024-10-01 RX ADMIN — PREDNISONE 30 MG: 20 TABLET ORAL at 08:22

## 2024-10-01 RX ADMIN — PANTOPRAZOLE SODIUM 40 MG: 40 TABLET, DELAYED RELEASE ORAL at 06:05

## 2024-10-01 RX ADMIN — CARBOXYMETHYLCELLULOSE SODIUM 1 DROP: 10 GEL OPHTHALMIC at 08:26

## 2024-10-01 RX ADMIN — ENOXAPARIN SODIUM 30 MG: 100 INJECTION SUBCUTANEOUS at 08:23

## 2024-10-01 RX ADMIN — ACETAZOLAMIDE 500 MG: 250 TABLET ORAL at 08:17

## 2024-10-01 RX ADMIN — ANTI-FUNGAL POWDER MICONAZOLE NITRATE TALC FREE: 1.42 POWDER TOPICAL at 08:25

## 2024-10-01 RX ADMIN — PREDNISONE 30 MG: 20 TABLET ORAL at 08:19

## 2024-10-01 ASSESSMENT — PAIN SCALES - GENERAL: PAINLEVEL_OUTOF10: 0

## 2024-10-01 NOTE — TELEPHONE ENCOUNTER
[facility] called to make a hosp f/u apt for this patient, they said with Dr Calderon but I see in the computer this is a Dr ARIANA Kaplan pat. Also, who does this patient need to see?

## 2024-10-01 NOTE — PLAN OF CARE
Problem: Safety - Adult  Goal: Free from fall injury  10/1/2024 0740 by Stevie Anderson RN  Outcome: Progressing  9/30/2024 2126 by Kellen Arriaga RN  Outcome: Progressing     Problem: Skin/Tissue Integrity  Goal: Absence of new skin breakdown  Description: 1.  Monitor for areas of redness and/or skin breakdown  2.  Assess vascular access sites hourly  3.  Every 4-6 hours minimum:  Change oxygen saturation probe site  4.  Every 4-6 hours:  If on nasal continuous positive airway pressure, respiratory therapy assess nares and determine need for appliance change or resting period.  10/1/2024 0740 by Stevie Anderson RN  Outcome: Progressing  9/30/2024 2126 by Kellen Arriaga RN  Outcome: Progressing     Problem: ABCDS Injury Assessment  Goal: Absence of physical injury  10/1/2024 0740 by Stevie Anderson RN  Outcome: Progressing  9/30/2024 2126 by Kellen Arriaga RN  Outcome: Progressing     Problem: Pain  Goal: Verbalizes/displays adequate comfort level or baseline comfort level  10/1/2024 0740 by Stevie Anderson RN  Outcome: Progressing  9/30/2024 2126 by Kellen Arriaga RN  Outcome: Progressing     Problem: Chronic Conditions and Co-morbidities  Goal: Patient's chronic conditions and co-morbidity symptoms are monitored and maintained or improved  10/1/2024 0740 by Stevie Anderson RN  Outcome: Progressing  Flowsheets (Taken 10/1/2024 0720)  Care Plan - Patient's Chronic Conditions and Co-Morbidity Symptoms are Monitored and Maintained or Improved: Monitor and assess patient's chronic conditions and comorbid symptoms for stability, deterioration, or improvement  9/30/2024 2126 by Kellen Arriaga RN  Outcome: Progressing  Flowsheets  Taken 9/30/2024 1930 by Kellen Arriaga RN  Care Plan - Patient's Chronic Conditions and Co-Morbidity Symptoms are Monitored and Maintained or Improved:   Monitor and assess patient's chronic conditions and comorbid symptoms for stability, deterioration, or improvement   Collaborate with multidisciplinary

## 2024-10-01 NOTE — DISCHARGE INSTRUCTIONS
Please discuss with your orthopedic surgeon if or when you may be appropriate for a hip replacement or other intervention.  You may require further healing from your current illness prior to surgical intervention.    Your rheumatologist has recommended that your hydroxychloroquine (Plaquenil) be held until you can follow-up in the clinic.  Please make an appointment to see your rheumatologist for soon as possible after discharge.    Do not take your naproxen (or similar medications such as ibuprofen, motrin, advil, aleve, or other NSAIDs) for the time being, given recent finding of mild inactive gastritis (stomach irritation) on recent medical workup.  You may be able to restart these medications at some point in future but please seek clearance from your primary care provider or rheumatologist first.    It is recommended that you continue pantoprazole twice daily for at least 6 additional weeks to promote stomach healing. Discuss with your primary care provider whether you should continue this medication after that time.    You have been prescribed a short supply of a pain medication called tramadol.  This is intended only to be used as needed for uncontrolled moderate to severe pain.  Discuss with your rheumatologist, primary care provider and/or orthopedist what alternative treatment options may be available. If a refill of tramadol is needed, you will need to request it from one of these providers.    You will be continued on a medication called Diamox (acetazolamide) to help manage the mildly increased pressure around the brain. You may be able to stop this in the near future. Discuss with your primary care provider when you follow-up if you should continue this medication or if you need a referral to a neurologist for further determination of ongoing management needed.    Please follow-up as needed with ophthalmology if your blurry vision has not significantly improved in the next 3 to 4 weeks.    You have

## 2024-10-01 NOTE — DISCHARGE SUMMARY
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]    Principal Problem:    Functional blindness  Active Problems:    Obstructive sleep apnea syndrome    Hypertension    Femoroacetabular impingement of left hip    History of avascular necrosis of capital femoral epiphysis    Morbid obesity    Peripheral neuropathy    Type 2 diabetes mellitus without complication ([facility])    Weakness of right lower extremity    Dysphagia    Ptosis of both eyelids    Physical debility    Elevated intracranial pressure    Impaired functional mobility, balance, gait, and endurance    Decreased independence with activities of daily living    Encounter for rehabilitation  Resolved Problems:    * No resolved hospital problems. *      Past Medical History:   Diagnosis Date    Acute respiratory failure with hypoxia 06/24/2020    Arthritis     Avascular necrosis of bone of hip ([facility]) 01/30/2017    CHF (congestive heart failure) ([facility])     11175 Echo LVEF 55-65%    COVID-19 virus infection 06/24/2020    Diabetes ([facility])     Diarrhea of presumed infectious origin 06/26/2020    H/O echocardiogram 07/2021    LVEF 55-65%    Hypertension     Obesity     EMILIA on CPAP     Respiratory failure ([facility]) 06/24/2020    Stroke ([facility]) 03/2018    no residual    Type 2 diabetes mellitus without complication ([facility]) 6/2020         PCP FOLLOW-UP:   Review ongoing indication for Diamox (for treatment of mildly elevated intracranial pressure); as increased intracranial pressure is NOT felt to be the acute cause of her visual difficulties.  Address additional pain management options pending possible surgical procedure/hip replacement.  NSAIDs may not be ideal for the near term given recent findings of mild gastritis and  Value Ref Range    POC Glucose 110 (H) 65 - 100 mg/dL    Performed by: Sonja    POCT Glucose    Collection Time: 09/30/24  7:35 PM   Result Value Ref Range    POC Glucose 185 (H) 65 - 100 mg/dL    Performed by: Teresa    POCT Glucose    Collection Time: 10/01/24  6:05 AM   Result Value Ref Range    POC Glucose 95 65 - 100 mg/dL    Performed by: Teresa             Medication List        START taking these medications      meclizine 25 MG tablet  Commonly known as: ANTIVERT  Take 1 tablet by mouth 3 times daily as needed for Dizziness     pantoprazole 40 MG tablet  Commonly known as: PROTONIX  Take 1 tablet by mouth 2 times daily (before meals) For gastritis     polyethylene glycol 17 g packet  Commonly known as: GLYCOLAX  Take 1 packet by mouth daily as needed for Constipation     traMADol 50 MG tablet  Commonly known as: ULTRAM  Take 1-2 tablets by mouth every 6 hours as needed for Pain (moderate-to-severe pain) for up to 5 days. Max Daily Amount: 400 mg            CHANGE how you take these medications      predniSONE 10 MG tablet  Commonly known as: DELTASONE  Take 3 tablets by mouth daily for 1 day, THEN 2 tablets daily for 7 days, THEN 1 tablet daily for 7 days. Take until gone then stop.  Start taking on: October 2, 2024  What changed:   medication strength  See the new instructions.            CONTINUE taking these medications      acetaZOLAMIDE 250 MG tablet  Commonly known as: DIAMOX  Take 2 tablets by mouth daily     albuterol sulfate  (90 Base) MCG/ACT inhaler  Commonly known as: Ventolin HFA  Inhale 2 puffs into the lungs 4 times daily as needed for Wheezing     aspirin 81 MG EC tablet  Take 1 tablet by mouth daily     CPAP Machine Misc     hydroCHLOROthiazide 12.5 MG capsule  Take 1 capsule by mouth every morning     potassium chloride 10 MEQ extended release tablet  Commonly known as: KLOR-CON M  Take 1 tablet by mouth daily     Trulicity 0.75 MG/0.5ML Sopn SC

## 2024-10-01 NOTE — CARE COORDINATION
CM reviewed / screen patient medical chart for discharge. Patient has discharge order from Dr. Milton Lyles: Patient was admitted on 9/20/24 and discharging home on 10/1/24. Patient estimated length of stay was 11 days. Patient was approved for estimated length of stay days with anticipating of being discharged on 10/1/24. Patient Disposition @ discharge: Home with Home Health. Patient has a referral made to [facility]  (PT/OT/RN). Patient needs no DME and family training has been completed. Information placed on AVS. Discharge summary faxed to [facility].  Family will transport patient home. Patient has met all treatment goals / milestones. CM will continue to follow / monitor for any needs, concerns or questions that may arise.       Name of Ins: [facility] / [employer]   Policy #: [id number]    Secondary Ins:   Policy #:   Auth #  Admission Date: 9/20/24  Approved Dates:  11  Discharge Date: 10/1/24  LOS: 11  Contact Name:  Contact #  Fax #:  Updated Clinicals:  None at this time.

## 2024-10-01 NOTE — PROGRESS NOTES
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 She had significant improvement in vision over the weekend. She is now able to track better, improving visual acuity although remains with somewhat blurry vision. Headaches much improved. She does not feel she needs the scopolamine patch anymore. Dizziness is much improved as well. Hip pain has improved with scheduled tramadol. She is eager for DC to home ASAP. Family can provide all needed assist. All questions and concerns were addressed at this time.    Objective:     Current Facility-Administered Medications   Medication Dose Route Frequency    meclizine (ANTIVERT) tablet 25 mg  25 mg Oral TID PRN    sennosides-docusate sodium (SENOKOT-S) 8.6-50 MG tablet 2 tablet  2 tablet Oral BID    bisacodyl (DULCOLAX) suppository 10 mg  10 mg Rectal Daily PRN    glycerin (ADULT) suppository 2 g  1 suppository Rectal Daily PRN    oxyCODONE (ROXICODONE) immediate release tablet 5 mg  5 mg Oral Q4H PRN    traMADol (ULTRAM) tablet 100 mg  100 mg Oral 2 times per day    predniSONE (DELTASONE) tablet 30 mg  30 mg Oral Daily    [START ON 10/3/2024] predniSONE (DELTASONE) tablet 20 mg  20 mg Oral Daily    [START ON 10/10/2024] predniSONE (DELTASONE) tablet 10 mg  10 mg Oral Daily    insulin glargine (LANTUS) injection vial 11 Units  11 Units SubCUTAneous Nightly    acetaZOLAMIDE (DIAMOX) tablet 500 mg  500 mg Oral BID    melatonin tablet 3 mg  3 mg Oral QPM    potassium bicarb-citric acid (EFFER-K) effervescent tablet 10 mEq  10 mEq Oral Daily    acetaminophen (TYLENOL) tablet 1,000 mg  1,000 mg Oral q8h    carboxymethylcellulose (THERATEARS) 1 % ophthalmic gel 1 drop  1 drop Both Eyes 4x Daily    enoxaparin Sodium  (LOVENOX) injection 30 mg  30 mg SubCUTAneous BID    aspirin EC tablet 81 mg  81 mg Oral Daily    hydroCHLOROthiazide capsule 12.5 mg  12.5 mg Oral QAM    miconazole (MICOTIN) 2 % powder   Topical BID    insulin lispro (HUMALOG,ADMELOG) injection vial 0-4 Units  0-4 Units SubCUTAneous Nightly    insulin lispro (HUMALOG,ADMELOG) injection vial 0-4 Units  0-4 Units SubCUTAneous TID WC    albuterol (PROVENTIL) (2.5 MG/3ML) 0.083% nebulizer solution 2.5 mg  2.5 mg Nebulization Q6H PRN    dextrose 10 % infusion   IntraVENous Continuous PRN    dextrose bolus 10% 125 mL  125 mL IntraVENous PRN    Or    dextrose bolus 10% 250 mL  250 mL IntraVENous PRN    diclofenac sodium (VOLTAREN) 1 % gel 4 g  4 g Topical Q6H PRN    Glucagon Emergency KIT 1 mg  1 mg SubCUTAneous PRN    glucose chewable tablet 16 g  4 tablet Oral PRN    ondansetron (ZOFRAN-ODT) disintegrating tablet 4 mg  4 mg Oral Q8H PRN    polyethylene glycol (GLYCOLAX) packet 17 g  17 g Oral Daily PRN    magnesium hydroxide (MILK OF MAGNESIA) 400 MG/5ML suspension 30 mL  30 mL Oral Daily PRN    pantoprazole (PROTONIX) tablet 40 mg  40 mg Oral BID AC     Allergies   Allergen Reactions    Metformin Rash and Angioedema    Penicillins Anaphylaxis and Rash     Throat swelling and thick tongue.       Visit Vitals  BP 95/67   Pulse 53   Temp 97.9 °F (Oral)   Resp 18   Ht 1.702 m (5' 7\")   Wt 125 kg (275 lb 9.6 oz)   SpO2 96%   BMI 43.17 kg/m²          Review of Systems:  General: fatigue much improved.  Neuro: dizziness is decreasing.  HEENT: vision improving, +diplopia  CV: Denies chest pain or palpitations  Pulmonary: Denies cough or shortness of breath  : Denies dysuria or hematuria   GI: +Constipation - improving.  MSK: severe chronic bilateral hip pain.  Psych: Denies visual or auditory hallucinations      Physical Exam:   GENERAL: Alert, NAD. Sitting upright in chair.  HEENT: NC/AT. Eyes spontaneously open, appears able to track to some extent and identifies me in

## 2024-10-01 NOTE — PLAN OF CARE
Problem: Safety - Adult  Goal: Free from fall injury  9/30/2024 2126 by Kellen Arriaga RN  Outcome: Progressing  9/30/2024 0958 by Stevie Anderson RN  Outcome: Progressing     Problem: Skin/Tissue Integrity  Goal: Absence of new skin breakdown  Description: 1.  Monitor for areas of redness and/or skin breakdown  2.  Assess vascular access sites hourly  3.  Every 4-6 hours minimum:  Change oxygen saturation probe site  4.  Every 4-6 hours:  If on nasal continuous positive airway pressure, respiratory therapy assess nares and determine need for appliance change or resting period.  9/30/2024 2126 by Kellen Arriaga RN  Outcome: Progressing  9/30/2024 0958 by Stevie Anderson RN  Outcome: Progressing     Problem: ABCDS Injury Assessment  Goal: Absence of physical injury  9/30/2024 2126 by Kellen Arriaga RN  Outcome: Progressing  9/30/2024 0958 by Stevie Anderson RN  Outcome: Progressing     Problem: Pain  Goal: Verbalizes/displays adequate comfort level or baseline comfort level  9/30/2024 2126 by Kellen Arriaga RN  Outcome: Progressing  9/30/2024 0958 by Stevie Anderson RN  Outcome: Progressing     Problem: Chronic Conditions and Co-morbidities  Goal: Patient's chronic conditions and co-morbidity symptoms are monitored and maintained or improved  Outcome: Progressing  Flowsheets  Taken 9/30/2024 1930 by Kellen Arriaga RN  Care Plan - Patient's Chronic Conditions and Co-Morbidity Symptoms are Monitored and Maintained or Improved:   Monitor and assess patient's chronic conditions and comorbid symptoms for stability, deterioration, or improvement   Collaborate with multidisciplinary team to address chronic and comorbid conditions and prevent exacerbation or deterioration  Taken 9/30/2024 0730 by Stevie Anderson RN  Care Plan - Patient's Chronic Conditions and Co-Morbidity Symptoms are Monitored and Maintained or Improved: Monitor and assess patient's chronic conditions and comorbid symptoms for stability, deterioration, or

## 2024-10-02 ENCOUNTER — CARE COORDINATION (OUTPATIENT)
Dept: OTHER | Facility: CLINIC | Age: 54
End: 2024-10-02

## 2024-10-02 PROBLEM — Z51.89 ENCOUNTER FOR REHABILITATION: Status: RESOLVED | Noted: 2024-09-20 | Resolved: 2024-10-02

## 2024-10-02 PROBLEM — R25.2 MUSCLE CRAMPS: Status: RESOLVED | Noted: 2021-07-23 | Resolved: 2024-10-02

## 2024-10-02 PROBLEM — M06.9 RHEUMATOID ARTHRITIS (HCC): Status: ACTIVE | Noted: 2024-10-02

## 2024-10-02 NOTE — CARE COORDINATION
Care Transitions Note    Follow Up Call     Attempted to reach patient for transitions of care follow up.  Unable to reach patient.      Outreach Attempts:   HIPAA compliant voicemail left for patient.     Care Summary Note: Pt has home health care for PT/OT and RN .     Follow Up Appointment:   Future Appointments         Provider Specialty Dept Phone    10/21/2024 2:00 PM (Arrive by 1:45 PM) Brenda Valencia MD Rheumatology [phone]    11/7/2024 4:10 PM (Arrive by 3:55 PM) Jacqueline Auguste MD Pulmonology [phone]    12/2/2024 12:30 PM (Arrive by 12:15 PM) Brenda Valencia MD Rheumatology [phone]    12/5/2024 8:30 AM Lois Menchaca MD Pulmonology [phone]    1/17/2025 1:40 PM Isabel Rasmussen, APRN - CNP Sleep Medicine [phone]            Plan for follow-up call in 2-5 days based on severity of symptoms and risk factors. Plan for next call:  review home care status, ambulation, vision status, med review     Erika Lutz RN

## 2024-10-03 ENCOUNTER — TELEPHONE (OUTPATIENT)
Dept: ORTHOPEDIC SURGERY | Age: 54
End: 2024-10-03

## 2024-10-03 ENCOUNTER — CARE COORDINATION (OUTPATIENT)
Dept: OTHER | Facility: CLINIC | Age: 54
End: 2024-10-03

## 2024-10-03 DIAGNOSIS — M16.11 PRIMARY OSTEOARTHRITIS OF RIGHT HIP: ICD-10-CM

## 2024-10-03 DIAGNOSIS — M25.551 RIGHT HIP PAIN: Primary | ICD-10-CM

## 2024-10-03 NOTE — TELEPHONE ENCOUNTER
Pt  was in the  hospital  and  needs an 6/8 week  followup  at [facility]  r/s please  I showed nothing until mid November

## 2024-10-03 NOTE — CARE COORDINATION
Care Transitions Note    Follow Up Call     Patient Current Location:  Home: [address] [place] [address] [place] [address]    Care Transition Nurse contacted the patient by telephone. Verified name and  as identifiers.    Additional needs identified to be addressed with provider   No needs identified                 Method of communication with provider: none.    Care Summary Note: Pt is doing well . She has home care for PT and OT Pt said she called this morning and they are trying to get her scheduled for at WENDI due to no improvement in her hip pain She has great support with her daughter . Pt has ultram for pain if needed. Pt said she is waiting for a call back from the Orthopedic office. She has all medications and has no questions. Follow up with rheumatology on 10/15. She has decreased vision, no accidents or injuries since being home . Pulmonology appt is 24. Pt has appt with ortho on 10/31 . No concerns today she has all DME at home     Plan of care updates since last contact:  Review of patient management of conditions/medications:         Advance Care Planning:   Does patient have an Advance Directive: reviewed and current.    Medication Review:  Medication reconciliation was performed with patient,1111F entered: N/A.    Remote Patient Monitoring:  Offered patient enrollment in the Remote Patient Monitoring (RPM) program for in-home monitoring: Patient is not eligible for RPM program because: insurance coverage.    Assessments:  Care Transitions Subsequent and Final Call    Subsequent and Final Calls  Have your medications changed?: No  Do you have any questions related to your medications?: No  Do you currently have any active services?: Yes  Are you currently active with any services?: Home Health  Do you have any needs or concerns that I can assist you with?: No  Identified Barriers: None  Care Transitions Interventions  Other Interventions:              Follow Up Appointment:   Reviewed upcoming

## 2024-10-03 NOTE — TELEPHONE ENCOUNTER
Patient reports she saw no relief from the injection and would jorge to proceed with a WENDI. I put in a referral for this today

## 2024-10-03 NOTE — PROGRESS NOTES
Patient states injction did not help her pain and she would jorge to proceed with WENDI. I put in a referral for this today.

## 2024-10-04 LAB — MUSK AB SER IA-ACNC: <1 U/ML

## 2024-10-05 LAB
Lab: NORMAL
Lab: NORMAL
REFERENCE LAB: NORMAL

## 2024-10-09 ENCOUNTER — CARE COORDINATION (OUTPATIENT)
Dept: OTHER | Facility: CLINIC | Age: 54
End: 2024-10-09

## 2024-10-09 NOTE — CARE COORDINATION
Care Transitions Note    Follow Up Call     Patient Current Location:  Home: [address] [place] [address]    Care Transition Nurse contacted the patient by telephone. Verified name and  as identifiers.    Additional needs identified to be addressed with provider   No needs identified                 Method of communication with provider: none.    Care Summary Note: Pt said home care will be delayed she has cell service and hoping to get her power back on today, she lives with family, she is safe, has food and water she said and hot meals are being delivered to them she said. She has no issues with pain she said she does have the Ortho appt on 10/28. She is planning for the WENDI.  Hopefully the home care for therapy can resume soon. Pt has all her medications she said no issues . Will follow         Advance Care Planning:   Does patient have an Advance Directive: reviewed and current.    Medication Review:  No changes since last call.     Remote Patient Monitoring:  Offered patient enrollment in the Remote Patient Monitoring (RPM) program for in-home monitoring: Patient is not eligible for RPM program because: insurance coverage.    Assessments:  Care Transitions Subsequent and Final Call    Subsequent and Final Calls  Do you have any ongoing symptoms?: No  Have your medications changed?: No  Do you have any questions related to your medications?: No  Do you currently have any active services?: Yes  Are you currently active with any services?: Home Health  Do you have any needs or concerns that I can assist you with?: No  Care Transitions Interventions  Other Interventions:              Follow Up Appointment:   Reviewed upcoming appointment(s).  Future Appointments         Provider Specialty Dept Phone    10/15/2024 10:30 AM Kellen Aguilera APRN - CNP Family Medicine [phone]    10/28/2024 10:00 AM Angel Mauro MD Orthopedic Surgery [phone]    2024 4:10 PM (Arrive by 3:55 PM) [place]

## 2024-10-15 ENCOUNTER — OFFICE VISIT (OUTPATIENT)
Dept: FAMILY MEDICINE CLINIC | Facility: CLINIC | Age: 54
End: 2024-10-15
Payer: COMMERCIAL

## 2024-10-15 VITALS
HEART RATE: 87 BPM | WEIGHT: 293 LBS | DIASTOLIC BLOOD PRESSURE: 86 MMHG | BODY MASS INDEX: 45.99 KG/M2 | OXYGEN SATURATION: 97 % | HEIGHT: 67 IN | TEMPERATURE: 98 F | SYSTOLIC BLOOD PRESSURE: 120 MMHG

## 2024-10-15 DIAGNOSIS — Z09 HOSPITAL DISCHARGE FOLLOW-UP: Primary | ICD-10-CM

## 2024-10-15 DIAGNOSIS — F45.8: ICD-10-CM

## 2024-10-15 PROBLEM — H02.403 PTOSIS OF BOTH EYELIDS: Status: RESOLVED | Noted: 2024-09-20 | Resolved: 2024-10-15

## 2024-10-15 PROBLEM — Z74.09 IMPAIRED FUNCTIONAL MOBILITY, BALANCE, GAIT, AND ENDURANCE: Status: RESOLVED | Noted: 2024-09-12 | Resolved: 2024-10-15

## 2024-10-15 PROBLEM — H53.2 DOUBLE VISION: Status: RESOLVED | Noted: 2024-09-12 | Resolved: 2024-10-15

## 2024-10-15 PROBLEM — Z78.9 DECREASED INDEPENDENCE WITH ACTIVITIES OF DAILY LIVING: Status: RESOLVED | Noted: 2024-09-12 | Resolved: 2024-10-15

## 2024-10-15 PROBLEM — I69.920 APHASIA FOLLOWING UNSPECIFIED CEREBROVASCULAR DISEASE: Status: RESOLVED | Noted: 2018-03-05 | Resolved: 2024-10-15

## 2024-10-15 PROBLEM — R15.9 INCONTINENCE OF FECES: Status: RESOLVED | Noted: 2021-07-07 | Resolved: 2024-10-15

## 2024-10-15 PROBLEM — F32.0 CURRENT MILD EPISODE OF MAJOR DEPRESSIVE DISORDER WITHOUT PRIOR EPISODE (HCC): Status: RESOLVED | Noted: 2018-05-17 | Resolved: 2024-10-15

## 2024-10-15 PROBLEM — R53.81 PHYSICAL DEBILITY: Status: RESOLVED | Noted: 2024-09-20 | Resolved: 2024-10-15

## 2024-10-15 PROBLEM — R41.841 COGNITIVE COMMUNICATION DEFICIT: Status: RESOLVED | Noted: 2018-03-27 | Resolved: 2024-10-15

## 2024-10-15 PROBLEM — G93.2 ELEVATED INTRACRANIAL PRESSURE: Status: RESOLVED | Noted: 2024-09-20 | Resolved: 2024-10-15

## 2024-10-15 PROBLEM — F43.21 GRIEF REACTION: Status: RESOLVED | Noted: 2018-04-18 | Resolved: 2024-10-15

## 2024-10-15 PROBLEM — F43.20 GRIEF REACTION: Status: RESOLVED | Noted: 2018-04-18 | Resolved: 2024-10-15

## 2024-10-15 PROBLEM — I63.9 STROKE OF UNUSUAL ETIOLOGY (HCC): Status: RESOLVED | Noted: 2018-03-05 | Resolved: 2024-10-15

## 2024-10-15 PROBLEM — R53.81 DEBILITY: Status: RESOLVED | Noted: 2024-09-19 | Resolved: 2024-10-15

## 2024-10-15 PROCEDURE — 1111F DSCHRG MED/CURRENT MED MERGE: CPT | Performed by: NURSE PRACTITIONER

## 2024-10-15 PROCEDURE — 3079F DIAST BP 80-89 MM HG: CPT | Performed by: NURSE PRACTITIONER

## 2024-10-15 PROCEDURE — 99214 OFFICE O/P EST MOD 30 MIN: CPT | Performed by: NURSE PRACTITIONER

## 2024-10-15 PROCEDURE — 3074F SYST BP LT 130 MM HG: CPT | Performed by: NURSE PRACTITIONER

## 2024-10-15 ASSESSMENT — ENCOUNTER SYMPTOMS
TROUBLE SWALLOWING: 0
GASTROINTESTINAL NEGATIVE: 1
RESPIRATORY NEGATIVE: 1

## 2024-10-15 NOTE — PROGRESS NOTES
Tricia Sellers is a 54 y.o. female who presents today for the following:  No chief complaint on file.      Allergies   Allergen Reactions    Metformin Rash and Angioedema    Penicillins Anaphylaxis and Rash     Throat swelling and thick tongue.       Current Outpatient Medications   Medication Sig Dispense Refill    predniSONE (DELTASONE) 10 MG tablet Take 3 tablets by mouth daily for 1 day, THEN 2 tablets daily for 7 days, THEN 1 tablet daily for 7 days. Take until gone then stop. 24 tablet 0    acetaZOLAMIDE (DIAMOX) 250 MG tablet Take 2 tablets by mouth daily 60 tablet 0    polyethylene glycol (GLYCOLAX) 17 g packet Take 1 packet by mouth daily as needed for Constipation      pantoprazole (PROTONIX) 40 MG tablet Take 1 tablet by mouth 2 times daily (before meals) For gastritis 60 tablet 1    dulaglutide (TRULICITY) 0.75 MG/0.5ML SOPN SC injection ADMINISTER 0.75 MG UNDER THE SKIN 1 TIME A WEEK 2 mL 8    albuterol sulfate HFA (VENTOLIN HFA) 108 (90 Base) MCG/ACT inhaler Inhale 2 puffs into the lungs 4 times daily as needed for Wheezing 18 g 1    hydroCHLOROthiazide 12.5 MG capsule Take 1 capsule by mouth every morning 90 capsule 2    potassium chloride (KLOR-CON M) 10 MEQ extended release tablet Take 1 tablet by mouth daily 90 tablet 2    aspirin 81 MG EC tablet Take 1 tablet by mouth daily 90 tablet 2    vitamin D (ERGOCALCIFEROL) 1.25 MG (61629 UT) CAPS capsule Take 1 capsule by mouth once a week 12 capsule 0    Misc. Devices (CPAP MACHINE) MISC Use as Instructed       No current facility-administered medications for this visit.       Past Medical History:   Diagnosis Date    Acute respiratory failure with hypoxia 06/24/2020    Arthritis     Avascular necrosis of bone of hip ([facility]) 01/30/2017    CHF (congestive heart failure) ([facility])     [address]12 Echo LVEF 55-65%    COVID-19 virus infection 06/24/2020    Diabetes ([facility])     Diarrhea of presumed infectious origin 06/26/2020    H/O echocardiogram 07/2021    LVEF 
and neck supple.      Right lower leg: No edema.      Left lower leg: No edema.   Skin:     General: Skin is warm and dry.      Coloration: Skin is not jaundiced or pale.   Neurological:      General: No focal deficit present.      Mental Status: She is alert and oriented to person, place, and time.      Comments: No facial weakness noted.   Psychiatric:         Mood and Affect: Mood normal.         Behavior: Behavior normal.         Thought Content: Thought content normal.         Judgment: Judgment normal.       Medication List          START taking these medications       meclizine 25 MG tablet  Commonly known as: ANTIVERT  Take 1 tablet by mouth 3 times daily as needed for Dizziness      pantoprazole 40 MG tablet  Commonly known as: PROTONIX  Take 1 tablet by mouth 2 times daily (before meals) For gastritis      polyethylene glycol 17 g packet  Commonly known as: GLYCOLAX  Take 1 packet by mouth daily as needed for Constipation      traMADol 50 MG tablet  Commonly known as: ULTRAM  Take 1-2 tablets by mouth every 6 hours as needed for Pain (moderate-to-severe pain) for up to 5 days. Max Daily Amount: 400 mg                CHANGE how you take these medications       predniSONE 10 MG tablet  Commonly known as: DELTASONE  Take 3 tablets by mouth daily for 1 day, THEN 2 tablets daily for 7 days, THEN 1 tablet daily for 7 days. Take until gone then stop.  Start taking on: October 2, 2024  What changed:   medication strength  See the new instructions.                CONTINUE taking these medications       acetaZOLAMIDE 250 MG tablet  Commonly known as: DIAMOX  Take 2 tablets by mouth daily      albuterol sulfate  (90 Base) MCG/ACT inhaler  Commonly known as: Ventolin HFA  Inhale 2 puffs into the lungs 4 times daily as needed for Wheezing      aspirin 81 MG EC tablet  Take 1 tablet by mouth daily      CPAP Machine Misc      hydroCHLOROthiazide 12.5 MG capsule  Take 1 capsule by mouth every morning      potassium

## 2024-10-16 ENCOUNTER — CARE COORDINATION (OUTPATIENT)
Dept: OTHER | Facility: CLINIC | Age: 54
End: 2024-10-16

## 2024-10-16 NOTE — CARE COORDINATION
Care Transitions Note    Follow Up Call     Attempted to reach patient for transitions of care follow up.  Unable to reach patient.      Outreach Attempts:   HIPAA compliant voicemail left for patient.     Care Summary Note: Pt has RTW date of 10/20 per her pcp notes from her visit.     Follow Up Appointment:   Future Appointments         Provider Specialty Dept Phone    10/28/2024 10:00 AM Angel Mauro MD Orthopedic Surgery [phone]    11/7/2024 4:10 PM (Arrive by 3:55 PM) Jacqueline Auguste MD Pulmonology [phone]    12/2/2024 12:30 PM (Arrive by 12:15 PM) Brenda Valencia MD Rheumatology [phone]    12/5/2024 8:30 AM Lois Menchaca MD Pulmonology [phone]    1/15/2025 11:30 AM Kellen Aguilera APRN - CNP Family Medicine [phone]    1/17/2025 1:40 PM Isabel Rasmussen APRN - CNP Sleep Medicine [phone]            Plan for follow-up call in 6-10 days based on severity of symptoms and risk factors. Plan for next call:  RTW, vision with her new glasses     Erika Lutz RN

## 2024-10-23 ENCOUNTER — CARE COORDINATION (OUTPATIENT)
Dept: OTHER | Facility: CLINIC | Age: 54
End: 2024-10-23

## 2024-10-23 NOTE — CARE COORDINATION
Ambulatory Care Coordination Note     10/23/2024 1:42 PM     Patient Current Location:  Home: [address] [place] [address] [place] [address]     ACM contacted the patient by telephone. Verified name and  with patient as identifiers.         ACM: Erika Lutz RN     Challenges to be reviewed by the provider   Additional needs identified to be addressed with provider No  none               Method of communication with provider: none.    Has the patient been seen in the ED since your last call? no    Care Summary Note: Pt said she is doing pretty good, is getting around the house ok, no falls, she said her vision is better, the worst time is in the mornings but improves throughout the day she said. She see the orthopedic surgeon on 10/28/24 and she is planning for  a WENDI   Pt said she has pain at times in the hip and is anxious to get it fixed. She also sees the ophthalmologist again on 24 for follow up She is checking her blood sugars 3x a day and her A1C is 6.7. She ranges from 99 - 120's she said. She takes Trulicity once a week. She has great family support and has no acute needs as of now. I will follow up for upcoming surgery     Offered patient enrollment in the Remote Patient Monitoring (RPM) program for in-home monitoring: Patient is not eligible for RPM program because: insurance coverage.     Assessments Completed:   Ambulatory Care Coordination Assessment    Care Coordination Protocol  Week 1 - Initial Assessment     Do you have all of your prescriptions and are they filled?: Yes  Barriers to medication adherence: None  Are you able to afford your medications?: Yes  How often do you have trouble taking your medications the way you have been told to take them?: I always take them as prescribed.           Current Housing: Private Residence  Who do you live with?: Alone  Are you an active caregiver in your home?: No     Do you have any DME?: Yes  Patient DME: Walker, Other           Are you experiencing  VIS provided to patient and parent. verbal consent granted, no contraindications noted. Flu vaccine administered per MAR. Patient tolerated well.

## 2024-11-04 ENCOUNTER — CARE COORDINATION (OUTPATIENT)
Dept: OTHER | Facility: CLINIC | Age: 54
End: 2024-11-04

## 2024-11-04 ENCOUNTER — OFFICE VISIT (OUTPATIENT)
Dept: ORTHOPEDIC SURGERY | Age: 54
End: 2024-11-04
Payer: COMMERCIAL

## 2024-11-04 VITALS — BODY MASS INDEX: 45.99 KG/M2 | HEIGHT: 67 IN | WEIGHT: 293 LBS

## 2024-11-04 DIAGNOSIS — M54.50 CHRONIC BILATERAL LOW BACK PAIN, UNSPECIFIED WHETHER SCIATICA PRESENT: ICD-10-CM

## 2024-11-04 DIAGNOSIS — M16.11 OSTEOARTHRITIS OF RIGHT HIP, UNSPECIFIED OSTEOARTHRITIS TYPE: ICD-10-CM

## 2024-11-04 DIAGNOSIS — G89.29 CHRONIC BILATERAL LOW BACK PAIN, UNSPECIFIED WHETHER SCIATICA PRESENT: ICD-10-CM

## 2024-11-04 DIAGNOSIS — E66.01 MORBID OBESITY: ICD-10-CM

## 2024-11-04 DIAGNOSIS — M25.551 RIGHT HIP PAIN: Primary | ICD-10-CM

## 2024-11-04 PROCEDURE — 99205 OFFICE O/P NEW HI 60 MIN: CPT | Performed by: ORTHOPAEDIC SURGERY

## 2024-11-04 NOTE — CARE COORDINATION
Ambulatory Care Coordination Note     2024 2:20 PM     Patient Current Location:  [place]     ACM contacted the patient by telephone. Verified name and  with patient as identifiers.     Patient graduated from the High Risk Care Management program on 2024.  Patient verbalizes confidence in the ability to self-manage at this time. has the ability to self manage at this time..  Care management goals have been completed. No further Ambulatory Care Manager follow up scheduled.      ACM: Erika Lutz RN     Challenges to be reviewed by the provider   Additional needs identified to be addressed with provider No  none               Method of communication with provider: none.    Has the patient been seen in the ED since your last call? no    Care Summary Note: Pt saw Ortho surgeon today her right hip surgery will not be until 2025 unless someone cancels pt said the office told her that is his first available surgery she is on the cancellation list. The surgery will be Depuy Actis WENDI . Pt is using a walker and w/c at times as well due to the hip hurting her, she does have pain medication to take prn if needed She said she knows she needs to lose weight but it hurts to walk a lot. Pt has good support at home and has no further needs , Her last A1C was 6.7 diabetes is will controlled . ACM will graduate patient     Offered patient enrollment in the Remote Patient Monitoring (RPM) program for in-home monitoring: Patient is not eligible for RPM program because: insurance coverage.     Assessments Completed:   Ambulatory Care Coordination Assessment    Care Coordination Protocol  Referral from Primary Care Provider: No  Week 1 - Initial Assessment     Do you have all of your prescriptions and are they filled?: Yes  Barriers to medication adherence: None  Are you able to afford your medications?: Yes  How often do you have trouble taking your medications the way you have been told to take them?: I

## 2024-11-04 NOTE — PROGRESS NOTES
Name: Tricia Sellers  YOB: 1970  Gender: female  MRN: [id number]    CC: Right hip pain    HPI: Tricia Sellers is a 54 y.o. female who presents with a greater than 6-month history of progressive right hip and groin pain.  She does report a history of right hip arthroscopy under the care of Dr. Jere Martinez at [facility] in 2016.  She states this did help her to some degree but it did not last.  She has experienced progressive groin and anterolateral thigh pain particularly worse over the past 3 months.  She has trouble lifting her legs and putting her shoes and socks on.  She has weightbearing discomfort.  She has been advised to use a cane in the past but does not do that.    She has had a recent hospitalization for sudden onset of vision loss with an extensive neurologic and other workup and a period of time in a rehab type environment at [facility].  Fortunately her vision has returned and she is back to her usual otherwise level of health.    Other issues include morbid obesity with a BMI around 46.  She has been told she has AVN in the left hip but does not have much in the way of symptomatology there.  She does have chronic lower back pain as well but does not see anybody on a regular basis for that.    History was obtained from patient and she does work as a  for [employer]    ROS/Meds/PSH/PMH/FH/SH: I personally reviewed the patients standard intake form.  Below are the pertinents    Tobacco:  reports that she has never smoked. She has never used smokeless tobacco.  Past Medical History:   Diagnosis Date    Acute respiratory failure with hypoxia 06/24/2020    Arthritis     Avascular necrosis of bone of hip 01/30/2017    CHF (congestive heart failure) ([facility])     [address]38 Echo LVEF 55-65%    COVID-19 virus infection 06/24/2020    Current mild episode of major depressive disorder without prior episode ([facility]) 05/17/2018    Diabetes ([facility])     Diarrhea of presumed infectious

## 2024-12-02 ENCOUNTER — TELEPHONE (OUTPATIENT)
Dept: FAMILY MEDICINE CLINIC | Facility: CLINIC | Age: 54
End: 2024-12-02

## 2024-12-02 ENCOUNTER — OFFICE VISIT (OUTPATIENT)
Dept: RHEUMATOLOGY | Age: 54
End: 2024-12-02
Payer: COMMERCIAL

## 2024-12-02 VITALS
RESPIRATION RATE: 12 BRPM | SYSTOLIC BLOOD PRESSURE: 124 MMHG | HEART RATE: 65 BPM | DIASTOLIC BLOOD PRESSURE: 80 MMHG | WEIGHT: 293 LBS | BODY MASS INDEX: 45.99 KG/M2 | HEIGHT: 67 IN | OXYGEN SATURATION: 94 %

## 2024-12-02 DIAGNOSIS — E55.9 VITAMIN D DEFICIENCY: ICD-10-CM

## 2024-12-02 DIAGNOSIS — M79.89 LEG SWELLING: ICD-10-CM

## 2024-12-02 DIAGNOSIS — M05.9 SEROPOSITIVE RHEUMATOID ARTHRITIS (HCC): ICD-10-CM

## 2024-12-02 DIAGNOSIS — R06.01 ORTHOPNEA: ICD-10-CM

## 2024-12-02 DIAGNOSIS — Z79.631 LONG TERM METHOTREXATE USER: Primary | ICD-10-CM

## 2024-12-02 DIAGNOSIS — R06.02 SHORTNESS OF BREATH: ICD-10-CM

## 2024-12-02 LAB
25(OH)D3 SERPL-MCNC: 9.6 NG/ML (ref 30–100)
ALBUMIN SERPL-MCNC: 3.6 G/DL (ref 3.5–5)
ALBUMIN/GLOB SERPL: 1.2 (ref 1–1.9)
ALP SERPL-CCNC: 108 U/L (ref 35–104)
ALT SERPL-CCNC: 23 U/L (ref 8–45)
ANION GAP SERPL CALC-SCNC: 11 MMOL/L (ref 7–16)
AST SERPL-CCNC: 17 U/L (ref 15–37)
BASOPHILS # BLD: 0.1 K/UL (ref 0–0.2)
BASOPHILS NFR BLD: 1 % (ref 0–2)
BILIRUB SERPL-MCNC: 0.2 MG/DL (ref 0–1.2)
BUN SERPL-MCNC: 10 MG/DL (ref 6–23)
CALCIUM SERPL-MCNC: 9.9 MG/DL (ref 8.8–10.2)
CHLORIDE SERPL-SCNC: 104 MMOL/L (ref 98–107)
CO2 SERPL-SCNC: 28 MMOL/L (ref 20–29)
CREAT SERPL-MCNC: 0.65 MG/DL (ref 0.6–1.1)
CRP SERPL-MCNC: 0.8 MG/DL (ref 0–0.4)
DIFFERENTIAL METHOD BLD: ABNORMAL
EOSINOPHIL # BLD: 0.2 K/UL (ref 0–0.8)
EOSINOPHIL NFR BLD: 2 % (ref 0.5–7.8)
ERYTHROCYTE [DISTWIDTH] IN BLOOD BY AUTOMATED COUNT: 15.2 % (ref 11.9–14.6)
ERYTHROCYTE [SEDIMENTATION RATE] IN BLOOD: 2 MM/HR (ref 0–30)
GLOBULIN SER CALC-MCNC: 3.1 G/DL (ref 2.3–3.5)
GLUCOSE SERPL-MCNC: 126 MG/DL (ref 70–99)
HCT VFR BLD AUTO: 44.2 % (ref 35.8–46.3)
HGB BLD-MCNC: 13.5 G/DL (ref 11.7–15.4)
IMM GRANULOCYTES # BLD AUTO: 0 K/UL (ref 0–0.5)
IMM GRANULOCYTES NFR BLD AUTO: 0 % (ref 0–5)
LYMPHOCYTES # BLD: 2.8 K/UL (ref 0.5–4.6)
LYMPHOCYTES NFR BLD: 35 % (ref 13–44)
MCH RBC QN AUTO: 26.2 PG (ref 26.1–32.9)
MCHC RBC AUTO-ENTMCNC: 30.5 G/DL (ref 31.4–35)
MCV RBC AUTO: 85.8 FL (ref 82–102)
MONOCYTES # BLD: 0.6 K/UL (ref 0.1–1.3)
MONOCYTES NFR BLD: 7 % (ref 4–12)
NEUTS SEG # BLD: 4.4 K/UL (ref 1.7–8.2)
NEUTS SEG NFR BLD: 55 % (ref 43–78)
NRBC # BLD: 0 K/UL (ref 0–0.2)
PLATELET # BLD AUTO: 324 K/UL (ref 150–450)
PMV BLD AUTO: 12.6 FL (ref 9.4–12.3)
POTASSIUM SERPL-SCNC: 3.9 MMOL/L (ref 3.5–5.1)
PROT SERPL-MCNC: 6.8 G/DL (ref 6.3–8.2)
RBC # BLD AUTO: 5.15 M/UL (ref 4.05–5.2)
SODIUM SERPL-SCNC: 144 MMOL/L (ref 136–145)
WBC # BLD AUTO: 8 K/UL (ref 4.3–11.1)

## 2024-12-02 PROCEDURE — 3074F SYST BP LT 130 MM HG: CPT | Performed by: INTERNAL MEDICINE

## 2024-12-02 PROCEDURE — 99214 OFFICE O/P EST MOD 30 MIN: CPT | Performed by: INTERNAL MEDICINE

## 2024-12-02 PROCEDURE — 3079F DIAST BP 80-89 MM HG: CPT | Performed by: INTERNAL MEDICINE

## 2024-12-02 RX ORDER — METHOTREXATE 2.5 MG/1
12.5 TABLET ORAL WEEKLY
Qty: 20 TABLET | Refills: 0 | Status: SHIPPED | OUTPATIENT
Start: 2024-12-02 | End: 2025-01-01

## 2024-12-02 RX ORDER — FOLIC ACID 1 MG/1
1 TABLET ORAL DAILY
Qty: 90 TABLET | Refills: 1 | Status: SHIPPED | OUTPATIENT
Start: 2024-12-02

## 2024-12-02 ASSESSMENT — ROUTINE ASSESSMENT OF PATIENT INDEX DATA (RAPID3)
ON A SCALE OF ONE TO TEN, HOW MUCH PAIN HAVE YOU HAD BECAUSE OF YOUR CONDITION OVER THE PAST WEEK?: 10
ON A SCALE OF ONE TO TEN, HOW DIFFICULT WAS IT FOR YOU TO COMPLETE THE LISTED DAILY PHYSICAL TASKS OVER THE LAST WEEK: 1.4
ON A SCALE OF ONE TO TEN, HOW MUCH OF A PROBLEM HAS UNUSUAL FATIGUE OR TIREDNESS BEEN FOR YOU OVER THE PAST WEEK?: 7
ON A SCALE OF ONE TO TEN, CONSIDERING ALL THE WAYS IN WHICH ILLNESS AND HEALTH CONDITIONS MAY AFFECT YOU AT THIS TIME, PLEASE INDICATE BELOW HOW YOU ARE DOING:: 10
WHEN YOU AWAKENED IN THE MORNING OVER THE LAST WEEK, PLEASE INDICATE THE AMOUNT OF TIME IT TAKES UNTIL YOU ARE AS LIMBER AS YOU WILL BE FOR THE DAY: 1 HOUR

## 2024-12-02 NOTE — TELEPHONE ENCOUNTER
Patient is requesting a copy of her flu shot, be sent to her work.proof she had it completed./  Fax is [phone]

## 2024-12-02 NOTE — PROGRESS NOTES
12/2/2024    SUBJECTIVE:  Tricia Sellers is a 54 y.o. female that is here for follow-up of:     Seropositive rheumatoid arthritis    PMH:  - AVN bilateral hip Dx 2015, 2016, s/p arthroscopic surgery bilaterally  - CHF, HTN  - DM2  - CVA  - EMILIA  - surgical hysterectomy due to fibroid in 2007     Occupation:  [employer]  ---  Last OV 8/5/2024- started on HCQ. 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 HCQ was discontinued. 90% improvement in vision. Denies side effects to plaquenil. Recommended stress reduction.     Generalized leg swelling  - using HCTZ 12.5 mg. Seeing primary later this week. Using compression socks. Associated shortness of breath, 3-4 pillow orthopnea -cannot sleep flat at night. SOB with walking - trouble walking around at work - returned mid Oct 2024.     progressive symptoms of bilateral hand pain, swelling- worse since last visit    AM stiffness 60 minutes    Bilateral hip pain-progressed - scheduled for R THR April 2025.     Facial swelling around cheeks and eyes in mornings - she wonders if this is related to CPAP.     No rash chest pain fever raynaud's mucositis    Vaccines: completed flu 2024, covid, pneumonia    REVIEW OF SYSTEMS:  A total of 10 systems (musculoskeletal system as stated in the HPI and the following 9 systems) were reviewed with patient today and were negative except as stated in the HPI and except for the following (depicted with an \"X\"):        \"X\" Constitutional  \"X\" HEENT /Mouth  \"X\" Cardiovascular and  Respiratory (2 systems)  \"X\" Gastrointestinal    Fever/chills   Hair loss  x Shortness of breath   Upset stomach    Falls   Dry mouth   Coughing   Diarrhea / constipation    Wt loss   Mouth sores   Wheezing   Heartburn    Wt gain   Ringing ears   Chest pain   Dark or bloody stools    Night sweats   Diff. swallowing   None of above   Nausea or vomiting   x None of above  X None of above     x None of

## 2024-12-05 ENCOUNTER — OFFICE VISIT (OUTPATIENT)
Dept: PULMONOLOGY | Age: 54
End: 2024-12-05
Payer: COMMERCIAL

## 2024-12-05 ENCOUNTER — OFFICE VISIT (OUTPATIENT)
Dept: FAMILY MEDICINE CLINIC | Facility: CLINIC | Age: 54
End: 2024-12-05

## 2024-12-05 ENCOUNTER — HOSPITAL ENCOUNTER (OUTPATIENT)
Dept: GENERAL RADIOLOGY | Age: 54
Discharge: HOME | End: 2024-12-08
Payer: COMMERCIAL

## 2024-12-05 VITALS
RESPIRATION RATE: 20 BRPM | SYSTOLIC BLOOD PRESSURE: 123 MMHG | HEART RATE: 72 BPM | WEIGHT: 293 LBS | DIASTOLIC BLOOD PRESSURE: 84 MMHG | OXYGEN SATURATION: 96 % | BODY MASS INDEX: 45.99 KG/M2 | TEMPERATURE: 98 F | HEIGHT: 67 IN

## 2024-12-05 VITALS
BODY MASS INDEX: 45.99 KG/M2 | HEIGHT: 67 IN | DIASTOLIC BLOOD PRESSURE: 60 MMHG | WEIGHT: 293 LBS | HEART RATE: 75 BPM | SYSTOLIC BLOOD PRESSURE: 98 MMHG | OXYGEN SATURATION: 95 %

## 2024-12-05 DIAGNOSIS — R06.00 DYSPNEA, UNSPECIFIED TYPE: Primary | ICD-10-CM

## 2024-12-05 DIAGNOSIS — E66.01 MORBID OBESITY: ICD-10-CM

## 2024-12-05 DIAGNOSIS — R06.00 DYSPNEA, UNSPECIFIED TYPE: ICD-10-CM

## 2024-12-05 DIAGNOSIS — Z23 FLU VACCINE NEED: ICD-10-CM

## 2024-12-05 DIAGNOSIS — G47.33 OSA (OBSTRUCTIVE SLEEP APNEA): ICD-10-CM

## 2024-12-05 DIAGNOSIS — R60.9 EDEMA, UNSPECIFIED TYPE: Primary | ICD-10-CM

## 2024-12-05 DIAGNOSIS — R06.01 ORTHOPNEA: ICD-10-CM

## 2024-12-05 DIAGNOSIS — R60.9 EDEMA, UNSPECIFIED TYPE: ICD-10-CM

## 2024-12-05 DIAGNOSIS — R06.02 SHORTNESS OF BREATH: ICD-10-CM

## 2024-12-05 DIAGNOSIS — M06.9 RHEUMATOID ARTHRITIS, INVOLVING UNSPECIFIED SITE, UNSPECIFIED WHETHER RHEUMATOID FACTOR PRESENT (HCC): ICD-10-CM

## 2024-12-05 LAB
ALBUMIN SERPL-MCNC: 3.7 G/DL (ref 3.5–5)
ALBUMIN/GLOB SERPL: 1.2 (ref 1–1.9)
ALP SERPL-CCNC: 98 U/L (ref 35–104)
ALT SERPL-CCNC: 30 U/L (ref 8–45)
ANION GAP SERPL CALC-SCNC: 10 MMOL/L (ref 7–16)
AST SERPL-CCNC: 24 U/L (ref 15–37)
BILIRUB SERPL-MCNC: 0.4 MG/DL (ref 0–1.2)
BUN SERPL-MCNC: 8 MG/DL (ref 6–23)
CALCIUM SERPL-MCNC: 9.2 MG/DL (ref 8.8–10.2)
CHLORIDE SERPL-SCNC: 104 MMOL/L (ref 98–107)
CO2 SERPL-SCNC: 29 MMOL/L (ref 20–29)
CREAT SERPL-MCNC: 0.64 MG/DL (ref 0.6–1.1)
GLOBULIN SER CALC-MCNC: 3.1 G/DL (ref 2.3–3.5)
GLUCOSE SERPL-MCNC: 102 MG/DL (ref 70–99)
NT PRO BNP: <36 PG/ML (ref 0–125)
POTASSIUM SERPL-SCNC: 3.6 MMOL/L (ref 3.5–5.1)
PROT SERPL-MCNC: 6.8 G/DL (ref 6.3–8.2)
SODIUM SERPL-SCNC: 143 MMOL/L (ref 136–145)

## 2024-12-05 PROCEDURE — 3074F SYST BP LT 130 MM HG: CPT | Performed by: INTERNAL MEDICINE

## 2024-12-05 PROCEDURE — 3079F DIAST BP 80-89 MM HG: CPT | Performed by: INTERNAL MEDICINE

## 2024-12-05 PROCEDURE — 99214 OFFICE O/P EST MOD 30 MIN: CPT | Performed by: INTERNAL MEDICINE

## 2024-12-05 PROCEDURE — 71046 X-RAY EXAM CHEST 2 VIEWS: CPT

## 2024-12-05 SDOH — ECONOMIC STABILITY: INCOME INSECURITY: HOW HARD IS IT FOR YOU TO PAY FOR THE VERY BASICS LIKE FOOD, HOUSING, MEDICAL CARE, AND HEATING?: PATIENT DECLINED

## 2024-12-05 SDOH — ECONOMIC STABILITY: FOOD INSECURITY: WITHIN THE PAST 12 MONTHS, YOU WORRIED THAT YOUR FOOD WOULD RUN OUT BEFORE YOU GOT MONEY TO BUY MORE.: PATIENT DECLINED

## 2024-12-05 SDOH — ECONOMIC STABILITY: FOOD INSECURITY: WITHIN THE PAST 12 MONTHS, THE FOOD YOU BOUGHT JUST DIDN'T LAST AND YOU DIDN'T HAVE MONEY TO GET MORE.: PATIENT DECLINED

## 2024-12-05 ASSESSMENT — ENCOUNTER SYMPTOMS
SHORTNESS OF BREATH: 1
ALLERGIC/IMMUNOLOGIC NEGATIVE: 1
GASTROINTESTINAL NEGATIVE: 1
EYES NEGATIVE: 1

## 2024-12-05 ASSESSMENT — PATIENT HEALTH QUESTIONNAIRE - PHQ9
9. THOUGHTS THAT YOU WOULD BE BETTER OFF DEAD, OR OF HURTING YOURSELF: NOT AT ALL
5. POOR APPETITE OR OVEREATING: NOT AT ALL
SUM OF ALL RESPONSES TO PHQ QUESTIONS 1-9: 0
SUM OF ALL RESPONSES TO PHQ QUESTIONS 1-9: 0
1. LITTLE INTEREST OR PLEASURE IN DOING THINGS: NOT AT ALL
SUM OF ALL RESPONSES TO PHQ QUESTIONS 1-9: 0
8. MOVING OR SPEAKING SO SLOWLY THAT OTHER PEOPLE COULD HAVE NOTICED. OR THE OPPOSITE, BEING SO FIGETY OR RESTLESS THAT YOU HAVE BEEN MOVING AROUND A LOT MORE THAN USUAL: NOT AT ALL
SUM OF ALL RESPONSES TO PHQ QUESTIONS 1-9: 0
6. FEELING BAD ABOUT YOURSELF - OR THAT YOU ARE A FAILURE OR HAVE LET YOURSELF OR YOUR FAMILY DOWN: NOT AT ALL
SUM OF ALL RESPONSES TO PHQ QUESTIONS 1-9: 0
7. TROUBLE CONCENTRATING ON THINGS, SUCH AS READING THE NEWSPAPER OR WATCHING TELEVISION: NOT AT ALL
SUM OF ALL RESPONSES TO PHQ QUESTIONS 1-9: 0
4. FEELING TIRED OR HAVING LITTLE ENERGY: NOT AT ALL
2. FEELING DOWN, DEPRESSED OR HOPELESS: NOT AT ALL
SUM OF ALL RESPONSES TO PHQ QUESTIONS 1-9: 0
3. TROUBLE FALLING OR STAYING ASLEEP: NOT AT ALL
SUM OF ALL RESPONSES TO PHQ9 QUESTIONS 1 & 2: 0
10. IF YOU CHECKED OFF ANY PROBLEMS, HOW DIFFICULT HAVE THESE PROBLEMS MADE IT FOR YOU TO DO YOUR WORK, TAKE CARE OF THINGS AT HOME, OR GET ALONG WITH OTHER PEOPLE: NOT DIFFICULT AT ALL
SUM OF ALL RESPONSES TO PHQ QUESTIONS 1-9: 0

## 2024-12-05 NOTE — PROGRESS NOTES
[facility]  [address]  [place] [address]   (ph) [phone] (fax) [phone]  JAX Carpenter      Chief Complaint   Patient presents with    Edema     Legs and feet swelling gotten worst Thanksgiving day. Need letter for insurance for Trulicity for diabetes and not weight loss.       54-year-old female comes into the office today reporting that she thinks her legs are more swollen than they usually are.  She reports that she is waiting a right hip replacement and has not been able to exercise very much.  She reports that she is under rheumatology for rheumatoid arthritis and she is on the Rheumatrex.  She has been wearing her compression hose but feels that they are a little more snug. Denies a hx of lymphedema. Only a two lb weight gain since October.         Allergies   Allergen Reactions    Metformin Rash and Angioedema    Penicillins Anaphylaxis and Rash     Throat swelling and thick tongue.       Past Medical History:   Diagnosis Date    Acute respiratory failure with hypoxia 06/24/2020    Arthritis     Avascular necrosis of bone of hip 01/30/2017    CHF (congestive heart failure) (formerly [facility])     [id number] Echo LVEF 55-65%    COVID-19 virus infection 06/24/2020    Current mild episode of major depressive disorder without prior episode (formerly [facility]) 05/17/2018    Diabetes (formerly [facility])     Diarrhea of presumed infectious origin 06/26/2020    Elevated intracranial pressure 09/20/2024    H/O echocardiogram 07/2021    LVEF 55-65%    Hypertension     Low back pain 01/2024    Obesity     EMILIA on CPAP     Respiratory failure 06/24/2020    Rheumatoid arthritis (formerly [facility]) 4/30/24    Stroke (formerly [facility]) 03/2018    no residual    Stroke of unusual etiology (formerly [facility]) 03/05/2018    Last Assessment & Plan:   Formatting of this note might be different from the original.  Symptoms started Saturday evening (3/3/2018).  Patient initially reported feeling dizzy and having some vague right lower

## 2024-12-05 NOTE — PROGRESS NOTES
dulaglutide (TRULICITY) 0.75 MG/0.5ML SOPN SC injection ADMINISTER 0.75 MG UNDER THE SKIN 1 TIME A WEEK    folic acid (FOLVITE) 1 mg, Oral, DAILY    hydroCHLOROthiazide 12.5 mg, Oral, EVERY MORNING    methotrexate (RHEUMATREX) 12.5 mg, Oral, WEEKLY    Misc. Devices (CPAP MACHINE) MISC Use as Instructed    pantoprazole (PROTONIX) 40 mg, Oral, 2 TIMES DAILY BEFORE MEALS, For gastritis    potassium chloride (KLOR-CON M) 10 MEQ extended release tablet 10 mEq, Oral, DAILY    vitamin D (ERGOCALCIFEROL) 50,000 Units, Oral, WEEKLY

## 2024-12-06 NOTE — RESULT ENCOUNTER NOTE
Results to patient please.  Electrolytes are within normal limits.  Glucose is 102.  Creatinine and BUN are within normal limits.  Liver enzymes are within normal limits.  BNP was within normal limits.  Continue with compression hose and continue with HCTZ daily.  I hate to increase the dose of HCTZ because her blood pressure is already low.  Please  encourage her to weigh daily.  She only had a 2 pound weight gain from October till now.  Last cardiology note states that she has Lasix to take as needed.  If she takes this, she is to monitor her blood pressure.   Thanks

## 2025-01-13 ENCOUNTER — TELEPHONE (OUTPATIENT)
Dept: RHEUMATOLOGY | Age: 55
End: 2025-01-13

## 2025-01-13 RX ORDER — FOLIC ACID 1 MG/1
2 TABLET ORAL DAILY
Qty: 180 TABLET | Refills: 0 | Status: SHIPPED | OUTPATIENT
Start: 2025-01-13 | End: 2025-04-13

## 2025-01-15 ENCOUNTER — OFFICE VISIT (OUTPATIENT)
Dept: FAMILY MEDICINE CLINIC | Facility: CLINIC | Age: 55
End: 2025-01-15

## 2025-01-15 VITALS
BODY MASS INDEX: 45.99 KG/M2 | SYSTOLIC BLOOD PRESSURE: 128 MMHG | OXYGEN SATURATION: 96 % | HEART RATE: 66 BPM | HEIGHT: 67 IN | WEIGHT: 293 LBS | TEMPERATURE: 98.3 F | DIASTOLIC BLOOD PRESSURE: 84 MMHG

## 2025-01-15 DIAGNOSIS — Z12.31 BREAST CANCER SCREENING BY MAMMOGRAM: ICD-10-CM

## 2025-01-15 DIAGNOSIS — E66.01 MORBID OBESITY: ICD-10-CM

## 2025-01-15 DIAGNOSIS — G47.33 OBSTRUCTIVE SLEEP APNEA SYNDROME: ICD-10-CM

## 2025-01-15 DIAGNOSIS — E11.40 TYPE 2 DIABETES MELLITUS WITH DIABETIC NEUROPATHY, WITHOUT LONG-TERM CURRENT USE OF INSULIN (HCC): Primary | ICD-10-CM

## 2025-01-15 DIAGNOSIS — Z79.899 ON POTASSIUM WASTING DIURETIC THERAPY: ICD-10-CM

## 2025-01-15 DIAGNOSIS — E11.40 TYPE 2 DIABETES MELLITUS WITH DIABETIC NEUROPATHY, WITHOUT LONG-TERM CURRENT USE OF INSULIN (HCC): ICD-10-CM

## 2025-01-15 DIAGNOSIS — I10 PRIMARY HYPERTENSION: ICD-10-CM

## 2025-01-15 DIAGNOSIS — Z86.73 HISTORY OF STROKE: ICD-10-CM

## 2025-01-15 DIAGNOSIS — M06.9 RHEUMATOID ARTHRITIS, INVOLVING UNSPECIFIED SITE, UNSPECIFIED WHETHER RHEUMATOID FACTOR PRESENT (HCC): ICD-10-CM

## 2025-01-15 DIAGNOSIS — K29.90 GASTRITIS AND DUODENITIS: ICD-10-CM

## 2025-01-15 DIAGNOSIS — E78.1 HYPERTRIGLYCERIDEMIA: ICD-10-CM

## 2025-01-15 DIAGNOSIS — M79.89 LEG SWELLING: ICD-10-CM

## 2025-01-15 DIAGNOSIS — M25.852 FEMOROACETABULAR IMPINGEMENT OF LEFT HIP: ICD-10-CM

## 2025-01-15 PROBLEM — R13.10 DYSPHAGIA: Status: RESOLVED | Noted: 2024-09-14 | Resolved: 2025-01-15

## 2025-01-15 PROBLEM — R29.898 WEAKNESS OF RIGHT LOWER EXTREMITY: Status: RESOLVED | Noted: 2018-03-21 | Resolved: 2025-01-15

## 2025-01-15 PROBLEM — F45.8: Status: RESOLVED | Noted: 2024-09-20 | Resolved: 2025-01-15

## 2025-01-15 PROBLEM — T14.8XXA MUSCLE STRAIN: Status: RESOLVED | Noted: 2022-06-28 | Resolved: 2025-01-15

## 2025-01-15 PROBLEM — Z87.39 HISTORY OF AVASCULAR NECROSIS OF CAPITAL FEMORAL EPIPHYSIS: Status: RESOLVED | Noted: 2021-06-07 | Resolved: 2025-01-15

## 2025-01-15 LAB
CREAT UR-MCNC: 134 MG/DL (ref 28–217)
HBA1C MFR BLD: 6.3 %
MICROALBUMIN UR-MCNC: <1.2 MG/DL (ref 0–20)
MICROALBUMIN/CREAT UR-RTO: NORMAL MG/G (ref 0–30)

## 2025-01-15 PROCEDURE — 3074F SYST BP LT 130 MM HG: CPT | Performed by: NURSE PRACTITIONER

## 2025-01-15 PROCEDURE — 3079F DIAST BP 80-89 MM HG: CPT | Performed by: NURSE PRACTITIONER

## 2025-01-15 PROCEDURE — 83036 HEMOGLOBIN GLYCOSYLATED A1C: CPT | Performed by: NURSE PRACTITIONER

## 2025-01-15 PROCEDURE — 99214 OFFICE O/P EST MOD 30 MIN: CPT | Performed by: NURSE PRACTITIONER

## 2025-01-15 RX ORDER — HYDROCHLOROTHIAZIDE 12.5 MG/1
12.5 CAPSULE ORAL EVERY MORNING
Qty: 90 CAPSULE | Refills: 2 | Status: SHIPPED | OUTPATIENT
Start: 2025-01-15

## 2025-01-15 RX ORDER — ROSUVASTATIN CALCIUM 20 MG/1
20 TABLET, COATED ORAL
Qty: 90 TABLET | Refills: 1 | Status: SHIPPED | OUTPATIENT
Start: 2025-01-15

## 2025-01-15 RX ORDER — ASPIRIN 81 MG/1
81 TABLET ORAL DAILY
Qty: 90 TABLET | Refills: 2 | Status: SHIPPED | OUTPATIENT
Start: 2025-01-15

## 2025-01-15 RX ORDER — PANTOPRAZOLE SODIUM 40 MG/1
40 TABLET, DELAYED RELEASE ORAL DAILY PRN
Qty: 90 TABLET | Refills: 1 | Status: SHIPPED | OUTPATIENT
Start: 2025-01-15

## 2025-01-15 RX ORDER — POTASSIUM CHLORIDE 750 MG/1
10 TABLET, EXTENDED RELEASE ORAL DAILY
Qty: 90 TABLET | Refills: 2 | Status: SHIPPED | OUTPATIENT
Start: 2025-01-15

## 2025-01-15 RX ORDER — METHOTREXATE 2.5 MG/1
12.5 TABLET ORAL WEEKLY
COMMUNITY

## 2025-01-15 ASSESSMENT — PATIENT HEALTH QUESTIONNAIRE - PHQ9
10. IF YOU CHECKED OFF ANY PROBLEMS, HOW DIFFICULT HAVE THESE PROBLEMS MADE IT FOR YOU TO DO YOUR WORK, TAKE CARE OF THINGS AT HOME, OR GET ALONG WITH OTHER PEOPLE: NOT DIFFICULT AT ALL
SUM OF ALL RESPONSES TO PHQ QUESTIONS 1-9: 0
2. FEELING DOWN, DEPRESSED OR HOPELESS: NOT AT ALL
3. TROUBLE FALLING OR STAYING ASLEEP: NOT AT ALL
6. FEELING BAD ABOUT YOURSELF - OR THAT YOU ARE A FAILURE OR HAVE LET YOURSELF OR YOUR FAMILY DOWN: NOT AT ALL
9. THOUGHTS THAT YOU WOULD BE BETTER OFF DEAD, OR OF HURTING YOURSELF: NOT AT ALL
SUM OF ALL RESPONSES TO PHQ9 QUESTIONS 1 & 2: 0
SUM OF ALL RESPONSES TO PHQ QUESTIONS 1-9: 0
1. LITTLE INTEREST OR PLEASURE IN DOING THINGS: NOT AT ALL
SUM OF ALL RESPONSES TO PHQ QUESTIONS 1-9: 0
5. POOR APPETITE OR OVEREATING: NOT AT ALL
4. FEELING TIRED OR HAVING LITTLE ENERGY: NOT AT ALL
8. MOVING OR SPEAKING SO SLOWLY THAT OTHER PEOPLE COULD HAVE NOTICED. OR THE OPPOSITE, BEING SO FIGETY OR RESTLESS THAT YOU HAVE BEEN MOVING AROUND A LOT MORE THAN USUAL: NOT AT ALL
SUM OF ALL RESPONSES TO PHQ QUESTIONS 1-9: 0
7. TROUBLE CONCENTRATING ON THINGS, SUCH AS READING THE NEWSPAPER OR WATCHING TELEVISION: NOT AT ALL

## 2025-01-15 ASSESSMENT — ENCOUNTER SYMPTOMS
GASTROINTESTINAL NEGATIVE: 1
RESPIRATORY NEGATIVE: 1

## 2025-01-15 NOTE — TELEPHONE ENCOUNTER
Daughter reported pt is nauseous with mtx. Suggested raising to 2 mg FA. If no benefit recommend contacting office for [place] MTX

## 2025-01-15 NOTE — ASSESSMENT & PLAN NOTE
Has been off her statin for some unclear reason.  Restarted and will recheck lipid panel at follow up appt.  Continues lifestyle modification weight loss, exercise, and diet.

## 2025-01-15 NOTE — PROGRESS NOTES
Tricia Sellers is a 55 y.o. female who presents today for the following:  Chief Complaint   Patient presents with    Follow-up     Handicap placard renewed       Allergies   Allergen Reactions    Metformin Rash and Angioedema    Penicillins Anaphylaxis and Rash     Throat swelling and thick tongue.       Current Outpatient Medications   Medication Sig Dispense Refill    methotrexate (RHEUMATREX) 2.5 MG chemo tablet Take 5 tablets by mouth once a week      Tirzepatide 2.5 MG/0.5ML SOAJ Inject 2.5 mg into the skin every 7 days 2 mL 1    rosuvastatin (CRESTOR) 20 MG tablet Take 1 tablet by mouth nightly 90 tablet 1    Handicap Placard MISC by Does not apply route 1 each 0    potassium chloride (KLOR-CON M) 10 MEQ extended release tablet Take 1 tablet by mouth daily 90 tablet 2    pantoprazole (PROTONIX) 40 MG tablet Take 1 tablet by mouth daily as needed (heart burn) For gastritis 90 tablet 1    hydroCHLOROthiazide 12.5 MG capsule Take 1 capsule by mouth every morning 90 capsule 2    aspirin 81 MG EC tablet Take 1 tablet by mouth daily 90 tablet 2    folic acid (FOLVITE) 1 MG tablet Take 2 tablets by mouth daily 180 tablet 0    albuterol sulfate HFA (VENTOLIN HFA) 108 (90 Base) MCG/ACT inhaler Inhale 2 puffs into the lungs 4 times daily as needed for Wheezing 18 g 1    vitamin D (ERGOCALCIFEROL) 1.25 MG (14069 UT) CAPS capsule Take 1 capsule by mouth once a week 12 capsule 0    Misc. Devices (CPAP MACHINE) MISC Use as Instructed       No current facility-administered medications for this visit.       Past Medical History:   Diagnosis Date    Acute respiratory failure with hypoxia 06/24/2020    Arthritis     Avascular necrosis of bone of hip 01/30/2017    CHF (congestive heart failure) ([facility])     [id number] Echo LVEF 55-65%    COVID-19 virus infection 06/24/2020    Current mild episode of major depressive disorder without prior episode ([facility]) 05/17/2018    Diabetes ([facility])     Diarrhea of presumed infectious origin

## 2025-01-15 NOTE — ASSESSMENT & PLAN NOTE
Chronic, not at goal (unstable), starting to move more feeling somewhat better; has hip surgery soon, medication adherence emphasized, and lifestyle modifications recommended

## 2025-01-15 NOTE — ASSESSMENT & PLAN NOTE
Continue ASA 81 mg daily and needs to restart her statin; for some unclear reason stopped taking.  High intensity for secondary prevention and will repeat lipid panel at next appt.  Tight glycemic control and bp control.

## 2025-01-16 NOTE — PROGRESS NOTES
[place] [id number]  Patient phone number: There are no phone numbers on file.      Primary Insurance: Payor: / No coverage found.  Subscriber ID: No Subscriber Number on File      AMB Supply Order  Order Details     DME Location:    Order Date: 1/17/2025             (  X   )New Set-Up     apap machine   (     ) CPAP Unit  (   x  ) Auto CPAP Unit  (     ) BiLevel Unit  (     ) Auto BiLevel Unit  (     ) ASV   (     ) Bilevel ST    (     ) Oxygen Concentrator         Length of need: 12 months    Pressure: 10-16  cmH20  EPR:      Starting Ramp Pressure:   cm H20  Ramp Time: min      Patient had a diagnostic Apnea Hypopnea Index (AHI) of :      *SUPPLIES* Replace all as needed, or per coverage guidelines     Masks Type:    (     ) -Full Face Mask (1 per 3 mon)  (     ) -Full Mask (1 per month) Interface/Cushion      (  x   ) -Nasal Mask (1 per 3 mon)  (   x  ) - Nasal Mask (1 per month) Interface/Cushion  (  x   ) -Pillow (2 per mon)  (x     ) -Vqcehtspo (1 per 6 mon)      _________________________________________________________________          Other Supplies:    (  X   )-Toaorrkd (1 per 6 mon)  ( X    )-Xrwnyp Tubing (1 per 3 mon)  (  X   )- Disposable Filter (2 per mon)  (   X  )-Dkxvyh Humidifier (1 per year)     (  x   )-Boxmamvql (sometimes used with Full Face Mask) (1 per 6 mos)  (     )-Tubing-without heat (1 per 3 mos)  ( X   )-Non-Disposable Filter (1 per 6 mos)  (   x  )-Water Chamber (1 per 6 mos)  (     )-Humidifier non-heated (1 per 5 yrs)      Signed Date: 1/17/2025  Electronically Signed By: MILAN Elkins CNP     No orders of the defined types were placed in this encounter.        Collaborating Physician: Dr. Sarabjit DE ANDA spent at least 30 minutes with this established patient, and >50% of the time was spent counseling and/or coordinating care regarding rene.    Isabel Rasmussen,

## 2025-01-17 ENCOUNTER — TELEMEDICINE (OUTPATIENT)
Dept: SLEEP MEDICINE | Age: 55
End: 2025-01-17

## 2025-01-17 DIAGNOSIS — G47.33 OSA (OBSTRUCTIVE SLEEP APNEA): Primary | ICD-10-CM

## 2025-01-17 DIAGNOSIS — G47.10 HYPERSOMNIA: ICD-10-CM

## 2025-01-17 DIAGNOSIS — G47.34 NOCTURNAL HYPOXEMIA: ICD-10-CM

## 2025-01-17 PROCEDURE — 99214 OFFICE O/P EST MOD 30 MIN: CPT | Performed by: NURSE PRACTITIONER

## 2025-01-17 RX ORDER — DULAGLUTIDE 0.75 MG/.5ML
0.75 INJECTION, SOLUTION SUBCUTANEOUS WEEKLY
COMMUNITY
Start: 2024-12-06

## 2025-01-17 ASSESSMENT — SLEEP AND FATIGUE QUESTIONNAIRES
HOW LIKELY ARE YOU TO NOD OFF OR FALL ASLEEP WHILE SITTING QUIETLY AFTER LUNCH WITHOUT ALCOHOL: HIGH CHANCE OF DOZING
HOW LIKELY ARE YOU TO NOD OFF OR FALL ASLEEP WHILE SITTING AND READING: HIGH CHANCE OF DOZING
HOW LIKELY ARE YOU TO NOD OFF OR FALL ASLEEP WHILE WATCHING TV: HIGH CHANCE OF DOZING
ESS TOTAL SCORE: 22
HOW LIKELY ARE YOU TO NOD OFF OR FALL ASLEEP WHILE LYING DOWN TO REST IN THE AFTERNOON WHEN CIRCUMSTANCES PERMIT: HIGH CHANCE OF DOZING
HOW LIKELY ARE YOU TO NOD OFF OR FALL ASLEEP WHEN YOU ARE A PASSENGER IN A CAR FOR AN HOUR WITHOUT A BREAK: HIGH CHANCE OF DOZING
HOW LIKELY ARE YOU TO NOD OFF OR FALL ASLEEP IN A CAR, WHILE STOPPED FOR A FEW MINUTES IN TRAFFIC: MODERATE CHANCE OF DOZING
HOW LIKELY ARE YOU TO NOD OFF OR FALL ASLEEP WHILE SITTING AND TALKING TO SOMEONE: HIGH CHANCE OF DOZING
HOW LIKELY ARE YOU TO NOD OFF OR FALL ASLEEP WHILE SITTING INACTIVE IN A PUBLIC PLACE: MODERATE CHANCE OF DOZING

## 2025-01-17 NOTE — PATIENT INSTRUCTIONS
The company who will be taking care of your CPAP supplies is:    Address: [address]  [address], [place] [address]  Phone: [phone]   Fax: [phone]     Overnight Oximetry is a device with a watch and a finger probe that you wear when you sleep. It measures your heart rate and the amount of oxygen in your blood. This test records the data, return the device back to your medical equipment company, and the data is sent to our office for review. You will be called with the results.

## 2025-02-13 NOTE — ED PROVIDER NOTES
Emergency Department Provider Note       PCP: Kellen Aguilera, APRN - CNP   Age: 54 y.o.   Sex: female     DISPOSITION Decision To Discharge 06/03/2024 07:15:44 PM       ICD-10-CM    1. Left hip pain  M25.552 External Referral To Orthopedic Surgery          Medical Decision Making     54-year-old female presents to the emergency department for evaluation of left hip pain.  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  States she felt discomfort extending down her leg.  Reports instability as well as decreased range of motion left hip secondary to pain.  Reports prior hip surgery secondary to ligamentous issues.  Reports some swelling down the left lower extremity.  Did not take any medicines for discomfort prior to arrival.  Will give gram of Tylenol.  X-ray shows no acute fracture.  Obtain CT scan for further evaluation.  This did not reveal any fracture.  Patient does have history of ligamentous injury requiring prior surgery.  She is able to ambulate with assistance.  States she has crutches as well as a walker at home.  She opts to take Tylenol and Motrin for discomfort.  Referral to her orthopedic surgeon was placed.  Close outpatient follow-up recommended along with return precautions.  She voiced understanding and agreement     1 or more acute illnesses that pose a threat to life or bodily function.   Over the counter drug management performed.  Shared medical decision making was utilized in creating the patients health plan today.    I independently ordered and reviewed each unique test.  I reviewed external records: provider visit note from PCP.     I interpreted the X-rays no obvious fracture.  I interpreted the CT Scan no obvious fracture.              History     54-year-old female presents to the emergency department for evaluation of left hip pain.  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 
What Is The Reason For Today's Visit?: Upper Body Skin Exam
Unknown if ever smoked

## 2025-02-21 ENCOUNTER — TELEPHONE (OUTPATIENT)
Dept: RHEUMATOLOGY | Age: 55
End: 2025-02-21

## 2025-03-05 DIAGNOSIS — M16.11 PRIMARY OSTEOARTHRITIS OF RIGHT HIP: Primary | ICD-10-CM

## 2025-03-31 ENCOUNTER — PREP FOR PROCEDURE (OUTPATIENT)
Dept: ORTHOPEDIC SURGERY | Age: 55
End: 2025-03-31

## 2025-03-31 DIAGNOSIS — M16.11 ARTHRITIS OF RIGHT HIP: Primary | ICD-10-CM

## 2025-03-31 RX ORDER — ACETAMINOPHEN 325 MG/1
1000 TABLET ORAL ONCE
Status: CANCELLED | OUTPATIENT
Start: 2025-03-31 | End: 2025-03-31

## 2025-04-01 ENCOUNTER — HOSPITAL ENCOUNTER (OUTPATIENT)
Dept: REHABILITATION | Age: 55
Discharge: HOME | End: 2025-04-04
Payer: COMMERCIAL

## 2025-04-01 ENCOUNTER — HOSPITAL ENCOUNTER (OUTPATIENT)
Dept: SURGERY | Age: 55
Discharge: HOME | End: 2025-04-04
Payer: COMMERCIAL

## 2025-04-01 VITALS
DIASTOLIC BLOOD PRESSURE: 89 MMHG | BODY MASS INDEX: 45.99 KG/M2 | HEIGHT: 67 IN | TEMPERATURE: 97.7 F | WEIGHT: 293 LBS | SYSTOLIC BLOOD PRESSURE: 124 MMHG | RESPIRATION RATE: 18 BRPM | OXYGEN SATURATION: 97 % | HEART RATE: 79 BPM

## 2025-04-01 DIAGNOSIS — Z79.631 LONG TERM METHOTREXATE USER: Primary | ICD-10-CM

## 2025-04-01 DIAGNOSIS — Z79.631 LONG TERM METHOTREXATE USER: ICD-10-CM

## 2025-04-01 DIAGNOSIS — M16.11 ARTHRITIS OF RIGHT HIP: ICD-10-CM

## 2025-04-01 LAB
ALBUMIN SERPL-MCNC: 4 G/DL (ref 3.5–5)
ALBUMIN SERPL-MCNC: 4.1 G/DL (ref 3.5–5)
ALBUMIN/GLOB SERPL: 1 (ref 1–1.9)
ALBUMIN/GLOB SERPL: 1.1 (ref 1–1.9)
ALP SERPL-CCNC: 110 U/L (ref 35–104)
ALP SERPL-CCNC: 111 U/L (ref 35–104)
ALT SERPL-CCNC: 24 U/L (ref 8–45)
ALT SERPL-CCNC: 26 U/L (ref 8–45)
ANION GAP SERPL CALC-SCNC: 12 MMOL/L (ref 7–16)
ANION GAP SERPL CALC-SCNC: 13 MMOL/L (ref 7–16)
AST SERPL-CCNC: 18 U/L (ref 15–37)
AST SERPL-CCNC: 23 U/L (ref 15–37)
BASOPHILS # BLD: 0.07 K/UL (ref 0–0.2)
BASOPHILS # BLD: 0.08 K/UL (ref 0–0.2)
BASOPHILS NFR BLD: 0.7 % (ref 0–2)
BASOPHILS NFR BLD: 0.7 % (ref 0–2)
BILIRUB SERPL-MCNC: 0.5 MG/DL (ref 0–1.2)
BILIRUB SERPL-MCNC: 0.6 MG/DL (ref 0–1.2)
BUN SERPL-MCNC: 13 MG/DL (ref 6–23)
BUN SERPL-MCNC: 13 MG/DL (ref 6–23)
CALCIUM SERPL-MCNC: 9.6 MG/DL (ref 8.8–10.2)
CALCIUM SERPL-MCNC: 9.7 MG/DL (ref 8.8–10.2)
CHLORIDE SERPL-SCNC: 100 MMOL/L (ref 98–107)
CHLORIDE SERPL-SCNC: 101 MMOL/L (ref 98–107)
CO2 SERPL-SCNC: 27 MMOL/L (ref 20–29)
CO2 SERPL-SCNC: 27 MMOL/L (ref 20–29)
CREAT SERPL-MCNC: 0.83 MG/DL (ref 0.6–1.1)
CREAT SERPL-MCNC: 0.83 MG/DL (ref 0.6–1.1)
CRP SERPL-MCNC: 1.4 MG/DL (ref 0–0.4)
DIFFERENTIAL METHOD BLD: ABNORMAL
DIFFERENTIAL METHOD BLD: ABNORMAL
EKG ATRIAL RATE: 76 BPM
EKG DIAGNOSIS: NORMAL
EKG P AXIS: 32 DEGREES
EKG P-R INTERVAL: 190 MS
EKG Q-T INTERVAL: 404 MS
EKG QRS DURATION: 84 MS
EKG QTC CALCULATION (BAZETT): 454 MS
EKG R AXIS: 39 DEGREES
EKG T AXIS: 11 DEGREES
EKG VENTRICULAR RATE: 76 BPM
EOSINOPHIL # BLD: 0.19 K/UL (ref 0–0.8)
EOSINOPHIL # BLD: 0.19 K/UL (ref 0–0.8)
EOSINOPHIL NFR BLD: 1.8 % (ref 0.5–7.8)
EOSINOPHIL NFR BLD: 1.9 % (ref 0.5–7.8)
ERYTHROCYTE [DISTWIDTH] IN BLOOD BY AUTOMATED COUNT: 15.9 % (ref 11.9–14.6)
ERYTHROCYTE [DISTWIDTH] IN BLOOD BY AUTOMATED COUNT: 16.3 % (ref 11.9–14.6)
ERYTHROCYTE [SEDIMENTATION RATE] IN BLOOD: 3 MM/HR (ref 0–30)
EST. AVERAGE GLUCOSE BLD GHB EST-MCNC: 143 MG/DL
GLOBULIN SER CALC-MCNC: 3.7 G/DL (ref 2.3–3.5)
GLOBULIN SER CALC-MCNC: 4.2 G/DL (ref 2.3–3.5)
GLUCOSE SERPL-MCNC: 106 MG/DL (ref 70–99)
GLUCOSE SERPL-MCNC: 97 MG/DL (ref 70–99)
HBA1C MFR BLD: 6.6 % (ref 0–5.6)
HCT VFR BLD AUTO: 44.3 % (ref 35.8–46.3)
HCT VFR BLD AUTO: 46.1 % (ref 35.8–46.3)
HGB BLD-MCNC: 13.8 G/DL (ref 11.7–15.4)
HGB BLD-MCNC: 14.3 G/DL (ref 11.7–15.4)
IMM GRANULOCYTES # BLD AUTO: 0.03 K/UL (ref 0–0.5)
IMM GRANULOCYTES # BLD AUTO: 0.04 K/UL (ref 0–0.5)
IMM GRANULOCYTES NFR BLD AUTO: 0.3 % (ref 0–5)
IMM GRANULOCYTES NFR BLD AUTO: 0.4 % (ref 0–5)
INR PPP: 0.9
LYMPHOCYTES # BLD: 3.21 K/UL (ref 0.5–4.6)
LYMPHOCYTES # BLD: 3.22 K/UL (ref 0.5–4.6)
LYMPHOCYTES NFR BLD: 29.6 % (ref 13–44)
LYMPHOCYTES NFR BLD: 31.8 % (ref 13–44)
MCH RBC QN AUTO: 25.5 PG (ref 26.1–32.9)
MCH RBC QN AUTO: 25.7 PG (ref 26.1–32.9)
MCHC RBC AUTO-ENTMCNC: 31 G/DL (ref 31.4–35)
MCHC RBC AUTO-ENTMCNC: 31.2 G/DL (ref 31.4–35)
MCV RBC AUTO: 81.7 FL (ref 82–102)
MCV RBC AUTO: 82.9 FL (ref 82–102)
MONOCYTES # BLD: 0.62 K/UL (ref 0.1–1.3)
MONOCYTES # BLD: 0.71 K/UL (ref 0.1–1.3)
MONOCYTES NFR BLD: 5.7 % (ref 4–12)
MONOCYTES NFR BLD: 7 % (ref 4–12)
NEUTS SEG # BLD: 5.89 K/UL (ref 1.7–8.2)
NEUTS SEG # BLD: 6.71 K/UL (ref 1.7–8.2)
NEUTS SEG NFR BLD: 58.2 % (ref 43–78)
NEUTS SEG NFR BLD: 61.9 % (ref 43–78)
NRBC # BLD: 0 K/UL (ref 0–0.2)
NRBC # BLD: 0 K/UL (ref 0–0.2)
PLATELET # BLD AUTO: 307 K/UL (ref 150–450)
PLATELET # BLD AUTO: 326 K/UL (ref 150–450)
PMV BLD AUTO: 11.6 FL (ref 9.4–12.3)
PMV BLD AUTO: 12.5 FL (ref 9.4–12.3)
POTASSIUM SERPL-SCNC: 3.6 MMOL/L (ref 3.5–5.1)
POTASSIUM SERPL-SCNC: 3.9 MMOL/L (ref 3.5–5.1)
PROT SERPL-MCNC: 7.7 G/DL (ref 6.3–8.2)
PROT SERPL-MCNC: 8.3 G/DL (ref 6.3–8.2)
PROTHROMBIN TIME: 12.7 SEC (ref 11.3–14.9)
RBC # BLD AUTO: 5.42 M/UL (ref 4.05–5.2)
RBC # BLD AUTO: 5.56 M/UL (ref 4.05–5.2)
SODIUM SERPL-SCNC: 139 MMOL/L (ref 136–145)
SODIUM SERPL-SCNC: 141 MMOL/L (ref 136–145)
WBC # BLD AUTO: 10.1 K/UL (ref 4.3–11.1)
WBC # BLD AUTO: 10.8 K/UL (ref 4.3–11.1)

## 2025-04-01 PROCEDURE — 80053 COMPREHEN METABOLIC PANEL: CPT

## 2025-04-01 PROCEDURE — 93010 ELECTROCARDIOGRAM REPORT: CPT | Performed by: INTERNAL MEDICINE

## 2025-04-01 PROCEDURE — 85025 COMPLETE CBC W/AUTO DIFF WBC: CPT

## 2025-04-01 PROCEDURE — 87641 MR-STAPH DNA AMP PROBE: CPT

## 2025-04-01 PROCEDURE — 93005 ELECTROCARDIOGRAM TRACING: CPT | Performed by: ANESTHESIOLOGY

## 2025-04-01 PROCEDURE — 98960 EDU&TRN PT SELF-MGMT NQHP 1: CPT

## 2025-04-01 PROCEDURE — 85610 PROTHROMBIN TIME: CPT

## 2025-04-01 PROCEDURE — 94760 N-INVAS EAR/PLS OXIMETRY 1: CPT

## 2025-04-01 PROCEDURE — 83036 HEMOGLOBIN GLYCOSYLATED A1C: CPT

## 2025-04-01 PROCEDURE — 97161 PT EVAL LOW COMPLEX 20 MIN: CPT

## 2025-04-01 ASSESSMENT — HOOS JR
GOING UP OR DOWN STAIRS: SEVERE
HOOS JR TOTAL INTERVAL SCORE: 32.735
BENDING TO THE FLOOR TO PICK UP OBJECT: EXTREME
HOOS JR RAW SCORE: 18
SITTING: MODERATE
RISING FROM SITTING: SEVERE
WALKING ON UNEVEN SURFACE: SEVERE
HOOS JR RAW SCORE: 18
LYING IN BED (TURNING OVER, MAINTAINING HIP POSITION): SEVERE

## 2025-04-01 ASSESSMENT — PAIN SCALES - GENERAL
PAINLEVEL_OUTOF10: 3
PAINLEVEL_OUTOF10: 3

## 2025-04-01 ASSESSMENT — PAIN DESCRIPTION - ORIENTATION
ORIENTATION: RIGHT
ORIENTATION: RIGHT

## 2025-04-01 ASSESSMENT — PULMONARY FUNCTION TESTS
FEV1 (%PREDICTED): 56
FEV1 (LITERS): 1.31

## 2025-04-01 ASSESSMENT — PAIN DESCRIPTION - LOCATION
LOCATION: HIP;BACK
LOCATION: HIP

## 2025-04-01 NOTE — TELEPHONE ENCOUNTER
Pt stopped by our office requesting refill on MTX, Labs done 4/1     Primary Rheumatologist: Dr. Velazco    Last OV:12/2/2024   Next OV:4/15/2025    Medication Requested: MTX   Rx last written:  12/02/2024    Last eye exam: n/a    Plan:   Tricia was seen today for follow-up.  Diagnoses and all orders for this visit:  Seropositive rheumatoid arthritis +RF +14.3.3.ETA -CCP  This is a 54-year-old female with history of AVN bilateral hip, stroke, hypertension, hyperlipidemia, diabetes, EMILIA, obesity presenting for evaluation and management of 7 months of bilateral hand, foot pain and swelling.  Morning stiffness 15 to 20 minutes.  There is family history of sarcoidosis and patient's daughter who I manage with Humira.  Associated sicca and bilateral eyes for which she was started on Restasis.  She has been using ibuprofen or naproxen infrequently without improvement in symptoms.  Exam shows tenderness along all small joints in the absence of significant synovitis-there is synovial hypertrophy right #3 MCP.     Lab data shows RF positivity, quantitative, 14.3.3 ETA positivity, negative CCP.  She failed meloxicam due to rectal bleeding.  There is also evidence of hypercalcemia.  CT chest shows calcified granulomas. Started HCQ but d/c due to functional blindness. Change to mtx 12.5 mg weekly  + daily FA.     Discussed methotrexate dosage, usage, goals of therapy, and potential side effects/risks of treatment (Including but not limited to stomatitis, hair loss, nausea/vomitting, rash, increased risk of infection, liver damage, cytopenias, pneumonitis, and infertility).              Patient was advised not to get pregnant while on methotrexate.              Patient was advised to not drink ETOH while on methotrexate.              Patient was advised to hold the methotrexate during any infections/antibiotics              The potential SE of methotrexate were discussed and understood and the patient agrees to proceed with

## 2025-04-01 NOTE — PROGRESS NOTES
Patient verified name and     Order for consent was found in EHR and does match case posting; patient verified.     Type 3 surgery, Walk-in assessment complete.    Labs per surgeon: CBC W DIFF, CMP, PT/INR, A1C, MRSA; results pending  Labs per anesthesia protocol: T/S DOS, POC Glucose DOS  EK25, pending cardiology review    Patient provided with and instructed on educational handouts including Guide to Surgery, Preventing Surgical Site Infections, Pain Management, and [facility] Brochure.    Patient answered medical/surgical history questions at their best of ability. All prior to admission medications documented in EPIC. Original medication prescription bottle was not visualized during patient appointment.     Patient instructed to hold all vitamins 7 days prior to surgery and NSAIDS 5 days prior to surgery, patient verbalized understanding.     Patient teach back successful and patient demonstrates knowledge of instructions.     How to Use Your Incentive Spirometer       About Your Incentive Spirometer  An incentive spirometer is a device that will expand your lungs by helping you to breathe more deeply and fully. The parts of your incentive spirometer are labeled in Figure 1.    Using your incentive spirometer  When you're using your incentive spirometer, make sure to breathe through your mouth. If you breathe through your nose, the incentive spirometer won't work properly. You can hold your nose if you have trouble. DO NOT BLOW INTO THE DEVICE. If you feel dizzy at any time, stop and rest. Try again at a later time.  Sit upright in a chair or in bed. Hold the incentive spirometer at eye level.   Put the mouthpiece in your mouth and close your lips tightly around it. Slowly breathe out (exhale) completely.  Breathe in (inhale) slowly through your mouth as deeply as you can. As you take the breath, you will see the piston rise inside the large column. While the piston rises, the indicator on

## 2025-04-01 NOTE — PROGRESS NOTES
Tricia Sellers  : 1970  Primary: [employer]  Secondary:  [employer] [facility] [place] [address]  [phone]      Physical Therapy Prehab Evaluation Summary:2025   Time In/Out   PT Charge Capture  Episode     MEDICAL/REFERRING DIAGNOSIS: Unilateral primary osteoarthritis, right hip [M16.11]  REFERRING PHYSICIAN: Angel Mauro MD    Treatment Diagnosis:   Pain in Right Hip (M25.551)  Stiffness of Right Hip, Not elsewhere classified (M25.651)  Difficulty in walking, Not elsewhere classified (R26.2)  Other abnormalities of gait and mobility (R26.89)    DATE OF SURGERY: 25  Assessment:   COMMENTS:  Ms. Sellers is present for a Prehab Physical Therapy Assessment for their upcoming right WENDI . They are here alone. After discussing the surgical admission options and discharge plans, they are planning on discharging on day of surgery.    Pt presents alone. Pt lives with brother, who might be with pt one week. Or pt plans to return home with daughter in [place], and brother and daughters to assist following hospital stay.    PROBLEM LIST:   (Impacting functional limitations):  Ms. Sellers presents with the following lower extremity(s) problems:  Transfers  Gait  Strength  Range of Motion  Balance  Home Exercise Program  Pain INTERVENTIONS PLANNED:   (Benefits and precautions of physical therapy have been discussed with the patient.)  Home Exercise Program  Educational Discussion       GOALS: (Goals have been discussed and agreed upon with patient.)  Discharge Goals: Time Frame: 1 Day  Patient will demonstrate independence with a home exercise program designed to increase strength, range of motion, balance, coordination, functional technique, and pain control to minimize functional deficits and optimize patient for total joint replacement.    Subjective:   Past Medical History/Comorbidities:   Ms. Sellers  has a past medical history of

## 2025-04-01 NOTE — PROGRESS NOTES
CBC, CMP, PT/INR, and EKG within anesthesia guidelines, no follow-up required. Labs automatically routed to ordering provider via Epic documentation.    A1C remains pending. Chart flagged for CN f/u.

## 2025-04-01 NOTE — PROGRESS NOTES
04/01/25 1300   Treatment   Treatment Type Bedside spirometry   Breath Sounds   Breath Sounds Bilateral Diminished   Oxygen Therapy/Pulse Ox   O2 Therapy Room air   Pulse 79   SpO2 97 %   Pulse Oximeter Device Mode Intermittent   $Pulse Oximeter $Spot check (single)   Bedside Spirometry   FEV-1/Actual (Liters) 1.31 Liters   FEV-1/Predicted (Liters) 56 Liters     Initial respiratory Assessment completed with pt. Pt was interviewed and evaluated in [facility] prior to surgery.  Patient ID:  Tricia Sellers  [id number]  55 y.o.  1970  Surgeon: Dr. Mauro  Date of Surgery: [unfilled]4/24/2025  Procedure: Total Right Hip Arthroplasty  Primary Care Physician: Kellen Aguilera, APRN - [phone]  Specialists:[facility]  [facility]    Pt taught proper COUGH technique  IS REVIEWED WITH PT AS WELL AS BENEFITS OF USING IS IN SEDENTARY PTS.  DIAPHRAGMATIC BREATHING EXERCISE INSTRUCTIONS GIVEN    History of smoking:   DENIES                 Quit date:         Secondhand smoke:DENIES    Past procedures with Oxygen desaturation or delayed awakening:DENIES     Respiratory history:HX OF PNA 2-4 TIMES  PNA WITH COVID- WAS IN CCU   EMILIA- CPAP                                 SOB  ON EXERTION  - PT HAS NOT BEEN USING HER ALBUTEROL MDI AND HAS SOB . INSTRUCTED TO START USING AT LEAST TWICE A DAY WHEN SHE FEELS SOB. PURSED LIP BREATHING TAUGHT TO PT                                  Respiratory meds:  PT uses MDI PRN with PROAIR.   MDI instructions given verbally & written along with spacer.  Pt to use home MDI's morning of surgery & bring to Hospital.      FAMILY PRESENT:             NO     PAST SLEEP STUDY:        YES                2018  HX OF EMILIA:                        YES               PT HAD RECENT OVERNIGHT OXYMETRY        EMILIA assessment:     DANGERS OF UNTREATED EMILIA EXPLAINED TO PT.                                          SLEEPS ON SIDE         PHYSICAL EXAM   Body mass index is 46.36

## 2025-04-02 LAB
MRSA DNA SPEC QL NAA+PROBE: DETECTED
S AUREUS CPE NOSE QL NAA+PROBE: DETECTED

## 2025-04-02 ASSESSMENT — PROMIS GLOBAL HEALTH SCALE
IN THE PAST 7 DAYS, HOW WOULD YOU RATE YOUR FATIGUE ON AVERAGE [ON A SCALE FROM 1 (NONE) TO 5 (VERY SEVERE)]?: MODERATE
TO WHAT EXTENT ARE YOU ABLE TO CARRY OUT YOUR EVERYDAY PHYSICAL ACTIVITIES SUCH AS WALKING, CLIMBING STAIRS, CARRYING GROCERIES, OR MOVING A CHAIR [ON A SCALE OF 1 (NOT AT ALL) TO 5 (COMPLETELY)]?: NOT AT ALL
IN GENERAL, HOW WOULD YOU RATE YOUR MENTAL HEALTH, INCLUDING YOUR MOOD AND YOUR ABILITY TO THINK [ON A SCALE OF 1 (POOR) TO 5 (EXCELLENT)]?: EXCELLENT
SUM OF RESPONSES TO QUESTIONS 2, 4, 5, & 10: 18
IN GENERAL, WOULD YOU SAY YOUR QUALITY OF LIFE IS...[ON A SCALE OF 1 (POOR) TO 5 (EXCELLENT)]: VERY GOOD
IN GENERAL, HOW WOULD YOU RATE YOUR SATISFACTION WITH YOUR SOCIAL ACTIVITIES AND RELATIONSHIPS [ON A SCALE OF 1 (POOR) TO 5 (EXCELLENT)]?: EXCELLENT
WHO IS THE PERSON COMPLETING THE PROMIS V1.1 SURVEY?: SELF
IN GENERAL, PLEASE RATE HOW WELL YOU CARRY OUT YOUR USUAL SOCIAL ACTIVITIES (INCLUDES ACTIVITIES AT HOME, AT WORK, AND IN YOUR COMMUNITY, AND RESPONSIBILITIES AS A PARENT, CHILD, SPOUSE, EMPLOYEE, FRIEND, ETC) [ON A SCALE OF 1 (POOR) TO 5 (EXCELLENT)]?: VERY GOOD
SUM OF RESPONSES TO QUESTIONS 3, 6, 7, & 8: 15
IN GENERAL, WOULD YOU SAY YOUR HEALTH IS...[ON A SCALE OF 1 (POOR) TO 5 (EXCELLENT)]: GOOD
IN GENERAL, HOW WOULD YOU RATE YOUR PHYSICAL HEALTH [ON A SCALE OF 1 (POOR) TO 5 (EXCELLENT)]?: GOOD
IN THE PAST 7 DAYS, HOW WOULD YOU RATE YOUR PAIN ON AVERAGE [ON A SCALE FROM 0 (NO PAIN) TO 10 (WORST IMAGINABLE PAIN)]?: 8
IN THE PAST 7 DAYS, HOW OFTEN HAVE YOU BEEN BOTHERED BY EMOTIONAL PROBLEMS, SUCH AS FEELING ANXIOUS, DEPRESSED, OR IRRITABLE [ON A SCALE FROM 1 (NEVER) TO 5 (ALWAYS)]?: RARELY
HOW IS THE PROMIS V1.1 BEING ADMINISTERED?: PAPER

## 2025-04-02 NOTE — PROGRESS NOTES
Hemoglobin A1c  Order: [id number]   Status: Final result       Next appt: 04/15/2025 at 08:00 AM in Rheumatology (Morales Velazco, )       Dx: Arthritis of right hip    Test Result Released: Yes (seen)    0 Result Notes       1  Topic        Component  Ref Range & Units (hover) 4/1/25 1254 9/14/24 0334 9/13/23 2215   Hemoglobin A1C 6.6 High  6.7 High  CM 6.6 High  R   Comment: Reference Range  Normal       <5.7%  Prediabetes  5.7-6.4%  Diabetes     >6.4%   Estimated Avg Glucose 143 147 143 CM   Resulting Agency [facility], [facility]             Specimen Collected: 04/01/25 12:54 EDT Last Resulted: 04/01/25 20:37 EDT

## 2025-04-03 ENCOUNTER — RESULTS FOLLOW-UP (OUTPATIENT)
Dept: RHEUMATOLOGY | Age: 55
End: 2025-04-03

## 2025-04-03 RX ORDER — METHOTREXATE 2.5 MG/1
12.5 TABLET ORAL WEEKLY
Qty: 60 TABLET | Refills: 0 | Status: SHIPPED | OUTPATIENT
Start: 2025-04-03

## 2025-04-04 NOTE — RESULT ENCOUNTER NOTE
Called patient and spoke with them that the labs are stable except for a mild elevation in the CRP which is a marker of inflammation can be from autoimmune disease but also from infection.  If the patient has any signs or symptoms of infection such as fever, cough, shortness of breath, UTI symptoms they should contact their primary care provider for further evaluation management.  The methotrexate has been refilled. , they gave verbal undersstanding

## 2025-04-14 ASSESSMENT — RHEUMATOLOGY NEW PATIENT QUESTIONNAIRE
SKIN TIGHTNESS: N
EASY BRUISING: N
MORNING STIFFNESS IN LOWER BACK: Y
MEMORY LOSS: N
MORNING STIFFNESS: Y
FAINTING: N
SWOLLEN OR TENDER GLANDS: N
INCREASED SUSCEPTIBILITY TO INFECTION: N
COLOR CHANGES OF HANDS OR FEET IN THE COLD: N
DIFFICULTY STAYING ASLEEP: N
BLOOD IN STOOLS: N
CHEST PAIN: N
SUN SENSITIVE (SUN ALLERGY): N
HEARTBURN OR REFLUX: N
PAIN OR BURNING ON URINATION: N
MUSCLE WEAKNESS: Y
HOARSE VOICE: N
LOSS OF VISION: N
JAUNDICE: N
UNEXPLAINED WEIGHT CHANGE: N
SWOLLEN LEGS OR FEET: Y
UNUSUALLY RAPID OR SLOWED HEART RATE: N
SKIN REDNESS: N
VOMITING OF BLOOD OR COFFEE GROUND CONSISTENCY MATERIAL: N
NAUSEA: N
DIFFICULTY FALLING ASLEEP: N
SORES IN MOUTH OR NOSE: N
AGITATION: N
RASH OR ULCERS: N
EYE REDNESS: N
SHORTNESS OF BREATH: Y
DEPRESSION: N
STOMACH PAIN: N
UNUSUAL FATIGUE: Y
JOINT PAIN: Y
PERSISTENT DIARRHEA: N
LOSS OF CONSCIOUSNESS: N
DIFFICULTY SWALLOWING: N
VAGINAL DRYNESS: N
EYE DRYNESS: N
HOW WOULD YOU DESCRIBE YOUR STIFFNESS ON AVERAGE: SEVERE
EASILY LOSING TEMPER: N
DRYNESS OF MOUTH: Y
COUGH: N
UNEXPLAINED HEARING LOSS: N
BLACK STOOLS: N
ANXIETY: N
DIFFICULTY BREATHING LYING DOWN: Y
RASH: N
NODULES/BUMPS: N
NUMBNESS OR TINGLING IN HANDS OR FEET: Y
DOUBLE OR BLURRED VISION: N
EYE PAIN: Y
FEVER: N
SEIZURES: N
JOINT SWELLING: Y
HEADACHES: Y
EXCESSIVE HAIR LOSS (MORE THAN YOUR NORM): Y
UNUSUAL BLEEDING: N
ANEMIA: N
NIGHT SWEATS: N
BEHAVIORAL CHANGES: N
ABNORMAL URINE: N

## 2025-04-15 ENCOUNTER — OFFICE VISIT (OUTPATIENT)
Dept: RHEUMATOLOGY | Age: 55
End: 2025-04-15
Payer: COMMERCIAL

## 2025-04-15 VITALS
HEIGHT: 67 IN | DIASTOLIC BLOOD PRESSURE: 80 MMHG | OXYGEN SATURATION: 95 % | BODY MASS INDEX: 45.99 KG/M2 | SYSTOLIC BLOOD PRESSURE: 124 MMHG | WEIGHT: 293 LBS | HEART RATE: 80 BPM

## 2025-04-15 DIAGNOSIS — M25.561 CHRONIC PAIN OF BOTH KNEES: Chronic | ICD-10-CM

## 2025-04-15 DIAGNOSIS — Z79.899 LONG-TERM USE OF HIGH-RISK MEDICATION: Chronic | ICD-10-CM

## 2025-04-15 DIAGNOSIS — M25.562 CHRONIC PAIN OF BOTH KNEES: Chronic | ICD-10-CM

## 2025-04-15 DIAGNOSIS — M05.9 SEROPOSITIVE RHEUMATOID ARTHRITIS (HCC): Primary | Chronic | ICD-10-CM

## 2025-04-15 DIAGNOSIS — G89.29 CHRONIC PAIN OF BOTH KNEES: Chronic | ICD-10-CM

## 2025-04-15 DIAGNOSIS — M16.11 PRIMARY OSTEOARTHRITIS OF RIGHT HIP: Chronic | ICD-10-CM

## 2025-04-15 PROBLEM — E11.9 TYPE 2 DIABETES MELLITUS WITHOUT COMPLICATION: Chronic | Status: ACTIVE | Noted: 2018-12-05

## 2025-04-15 PROCEDURE — 99215 OFFICE O/P EST HI 40 MIN: CPT | Performed by: INTERNAL MEDICINE

## 2025-04-15 PROCEDURE — 3074F SYST BP LT 130 MM HG: CPT | Performed by: INTERNAL MEDICINE

## 2025-04-15 PROCEDURE — 3079F DIAST BP 80-89 MM HG: CPT | Performed by: INTERNAL MEDICINE

## 2025-04-15 ASSESSMENT — JOINT PAIN
TOTAL NUMBER OF SWOLLEN JOINTS: 4
TOTAL NUMBER OF TENDER JOINTS: 9

## 2025-04-15 ASSESSMENT — ROUTINE ASSESSMENT OF PATIENT INDEX DATA (RAPID3)
ON A SCALE OF ONE TO TEN, HOW MUCH OF A PROBLEM HAS UNUSUAL FATIGUE OR TIREDNESS BEEN FOR YOU OVER THE PAST WEEK?: 7
ON A SCALE OF ONE TO TEN, CONSIDERING ALL THE WAYS IN WHICH ILLNESS AND HEALTH CONDITIONS MAY AFFECT YOU AT THIS TIME, PLEASE INDICATE BELOW HOW YOU ARE DOING:: 5
WHEN YOU AWAKENED IN THE MORNING OVER THE LAST WEEK, PLEASE INDICATE THE AMOUNT OF TIME IT TAKES UNTIL YOU ARE AS LIMBER AS YOU WILL BE FOR THE DAY: 15 MIN
ON A SCALE OF ONE TO TEN, HOW MUCH PAIN HAVE YOU HAD BECAUSE OF YOUR CONDITION OVER THE PAST WEEK?: 6
ON A SCALE OF ONE TO TEN, HOW DIFFICULT WAS IT FOR YOU TO COMPLETE THE LISTED DAILY PHYSICAL TASKS OVER THE LAST WEEK: 1.5

## 2025-04-15 NOTE — PATIENT INSTRUCTIONS
Continue methotrexate 5 tabs weekly  Continue folic acid 2 mg daily  In 2 weeks repeat labs make appt w/check out desk  Get x-rays of the knees today  Notify me 2 weeks before you need a refill on your medications  Okay to continue methotrexate throughout total hip replacement

## 2025-04-15 NOTE — PROGRESS NOTES
Since last visit patient has been feeling: Horrible     Having total right hip replacement 4/24    Any Rheumatology medication side effects: None        Hospitalizations: None         Infections:  None      Vaccinations: Hep B         4/15/2025     8:00 AM   DMARD/Biologic   AM Stiffness 15 min   Pain 6   Fatigue 7   MDHAQ 1.5   Patient Global Score 5   Medication Name Methotrexate          REVIEW OF SYSTEMS:  A total of 10 systems (musculoskeletal system as stated in the HPI and the following 9 systems) were reviewed with patient today and were negative except as stated in the HPI and except for the following (depicted with an \"X\"):        \"X\" Constitutional  \"X\" HEENT /Mouth  \"X\" Cardiovascular and  Respiratory (2 systems)  \"X\" Gastrointestinal    Fever/chills   Hair loss  x Shortness of breath   Upset stomach   x Falls  x Dry mouth   Coughing   Diarrhea / constipation    Wt loss   Mouth sores   Wheezing   Heartburn    Wt gain   Ringing ears   Chest pain   Dark or bloody stools    Night sweats   Diff. swallowing   None of above   Nausea or vomiting    None of above   None of above     X None of above                \"X\" Integumentary  \"X\" Neurological  \"X\" Genitourinary  \"X\" Psychiatric    Easy bruising  x Numbness/ tingling   Female problems   Depression    Rashes  x Weakness   Problems with urination   Feeling anxious    Sun sensitivity   Headaches  X None of above   Problems sleeping   X None of above   None of above     X None of above            
MCV 04/01/2025 81.7 (L)  82.0 - 102.0 FL Final    MCH 04/01/2025 25.5 (L)  26.1 - 32.9 PG Final    MCHC 04/01/2025 31.2 (L)  31.4 - 35.0 g/dL Final    RDW 04/01/2025 15.9 (H)  11.9 - 14.6 % Final    Platelets 04/01/2025 307  150 - 450 K/uL Final    MPV 04/01/2025 11.6  9.4 - 12.3 FL Final    nRBC 04/01/2025 0.00  0.0 - 0.2 K/uL Final    **Note: Absolute NRBC parameter is now reported with Hemogram**    Differential Type 04/01/2025 AUTOMATED    Final    Neutrophils % 04/01/2025 58.2  43.0 - 78.0 % Final    Lymphocytes % 04/01/2025 31.8  13.0 - 44.0 % Final    Monocytes % 04/01/2025 7.0  4.0 - 12.0 % Final    Eosinophils % 04/01/2025 1.9  0.5 - 7.8 % Final    Basophils % 04/01/2025 0.7  0.0 - 2.0 % Final    Immature Granulocytes % 04/01/2025 0.4  0.0 - 5.0 % Final    Neutrophils Absolute 04/01/2025 5.89  1.70 - 8.20 K/UL Final    Lymphocytes Absolute 04/01/2025 3.22  0.50 - 4.60 K/UL Final    Monocytes Absolute 04/01/2025 0.71  0.10 - 1.30 K/UL Final    Eosinophils Absolute 04/01/2025 0.19  0.00 - 0.80 K/UL Final    Basophils Absolute 04/01/2025 0.07  0.00 - 0.20 K/UL Final    Immature Granulocytes Absolute 04/01/2025 0.04  0.0 - 0.5 K/UL Final   Orders Only on 04/01/2025   Component Date Value Ref Range Status    CRP 04/01/2025 1.4 (H)  0.0 - 0.4 mg/dL Final    Sed Rate, Automated 04/01/2025 3  0 - 30 mm/hr Final    Sodium 04/01/2025 141  136 - 145 mmol/L Final    Potassium 04/01/2025 3.9  3.5 - 5.1 mmol/L Final    Chloride 04/01/2025 101  98 - 107 mmol/L Final    CO2 04/01/2025 27  20 - 29 mmol/L Final    Anion Gap 04/01/2025 13  7 - 16 mmol/L Final    Glucose 04/01/2025 97  70 - 99 mg/dL Final    Comment: <70 mg/dL Consistent with, but not fully diagnostic of hypoglycemia.  100 - 125 mg/dL Impaired fasting glucose/consistent with pre-diabetes mellitus.  > 126 mg/dl Fasting glucose consistent with overt diabetes mellitus      BUN 04/01/2025 13  6 - 23 MG/DL Final    Creatinine 04/01/2025 0.83  0.60 - 1.10 MG/DL

## 2025-04-21 ENCOUNTER — CLINICAL DOCUMENTATION (OUTPATIENT)
Dept: ORTHOPEDIC SURGERY | Age: 55
End: 2025-04-21

## 2025-04-21 NOTE — H&P
H&P    Patient ID:  Tricia Sellers  [id number]  55 y.o.  1970  Surgeon:  Angel Mauro MD  Date of Surgery: * No surgery found *  Procedure: Right Total Hip Arthroplasty  Primary Care Physician: Kellen Aguilera, APRN - CNP        Subjective:  Tricia Sellers is a 55 y.o. Black /  female who presents with right hip pain.  They have a history of right hip pain for several months. Symptoms worse with walking long distances and relieved with rest. Conservative treatment consisting of  activity modification has not helped. The patient lives with their family. The patients goal after surgery is improved pain and function.        Past Medical History:   Diagnosis Date    Acute respiratory failure with hypoxia 06/24/2020    Arthritis     Avascular necrosis of bone of hip ([facility]) 01/30/2017    CHF (congestive heart failure) ([facility])     [address]11 Echo LVEF 55-65%    COVID-19 virus infection 06/24/2020    Current mild episode of major depressive disorder without prior episode 05/17/2018    Diabetes ([facility])     Diarrhea of presumed infectious origin 06/26/2020    Elevated intracranial pressure 09/20/2024    Functional blindness 09/20/2024    H/O echocardiogram 07/2021    LVEF 55-65%    History of avascular necrosis of capital femoral epiphysis 06/07/2021    Last Assessment & Plan:   Formatting of this note might be different from the original.  With patient's worsening hip pain, obtaining x-ray today.  Patient also referred back to orthopedist who has seen her previously to see if further work-up and management is indicated.  Formatting of this note might be different from the original.  Last Assessment & Plan:   Formatting of this note might be diff    Hypertension     Low back pain 01/2024    Obesity     EMILIA on CPAP     Respiratory failure 06/24/2020    Rheumatoid arthritis ([facility]) 4/30/24    Stroke ([facility]) 03/2018    residual R sided weakness    Stroke of unusual etiology ([facility]) 03/05/2018    Last Assessment & Plan:  Provider, Historical, MD     Allergies   Allergen Reactions    Metformin Rash and Angioedema    Penicillins Anaphylaxis and Rash     Throat swelling and thick tongue.        REVIEW OF SYSTEMS:  CONSTITUTIONAL: Denies fever, decreased appetite, weight loss/gain, night sweats or fatigue. HEENT: Denies vision or hearing changes. denies glasses. denies hearing aids. CARDIAC: Denies CP, palpitations, rheumatic fever, murmur, peripheral edema, carotid artery disease or syncopal episodes. RESPIRATORY: Denies dyspnea on exertion, asthma, COPD or orthopnea. GI: Denies GERD, history of GI bleed or melena, PUD, hepatitis or cirrhosis. : Denies dysuria, hematuria. denies BPH symptoms. HEMATOLOGIC: Denies anemia or blood disorders. ENDOCRINE: Denies thyroid disease. MUSCULOSKELETAL: See HPI. NEUROLOGIC: Denies seizure, peripheral neuropathy or memory loss. PSYCH: Denies depression, anxiety or insomnia. SKIN: Denies rash or open sores.     Objective:    PHYSICAL EXAM  GENERAL: No data found. EYES: PERRL. EOM intact. MOUTH:Teeth and Gums normal. NECK: Full ROM. Trachea midline. No thyromegaly or JVD. CARDIOVASCULAR: Regular rate and rhythm. No murmur or gallops. No carotid bruits. Peripheral pulses: radial 2 +, PT 2+, DP 2+ bilaterally. LUNGS: CTA bilaterally. No wheezes, rhonchi or rales. GI: positive BS. Abdomen nontender. NEUROLOGIC: Alert and oriented x 3. Bilateral equal strong had grasp and bilateral equal strong plantar flexion and dorsiflexion. GAIT: abnormal MUSCULOSKELETAL: ROM: full with pain. Tenderness: . Crepitus: not present. SKIN: No rash, bruising, swelling, redness or warmth.     Labs:  No results found for this or any previous visit (from the past 24 hours).    Xray: Right hip: joint space narrowing with advanced degenerative changes noted.    Assessment:  Advanced Right Hip Osteoarthritis.   Total Right Hip Arthroplasty Indicated.  Patient Active Problem List   Diagnosis    Obstructive sleep apnea syndrome     How Severe Is This Condition?: mild

## 2025-04-22 DIAGNOSIS — M16.11 PRIMARY OSTEOARTHRITIS OF RIGHT HIP: Primary | ICD-10-CM

## 2025-04-22 RX ORDER — CELECOXIB 200 MG/1
200 CAPSULE ORAL DAILY
Qty: 30 CAPSULE | Refills: 0 | Status: SHIPPED | OUTPATIENT
Start: 2025-04-22

## 2025-04-22 RX ORDER — ASPIRIN 81 MG/1
81 TABLET ORAL 2 TIMES DAILY
Qty: 60 TABLET | Refills: 0 | Status: SHIPPED | OUTPATIENT
Start: 2025-04-22

## 2025-04-22 RX ORDER — ONDANSETRON 4 MG/1
4 TABLET, FILM COATED ORAL EVERY 6 HOURS PRN
Qty: 30 TABLET | Refills: 0 | Status: SHIPPED | OUTPATIENT
Start: 2025-04-22

## 2025-04-22 RX ORDER — OXYCODONE HYDROCHLORIDE 5 MG/1
5-10 TABLET ORAL EVERY 4 HOURS PRN
Qty: 60 TABLET | Refills: 0 | Status: SHIPPED | OUTPATIENT
Start: 2025-04-22 | End: 2025-04-29

## 2025-04-22 RX ORDER — METHOCARBAMOL 750 MG/1
TABLET, FILM COATED ORAL
Qty: 40 TABLET | Refills: 0 | Status: SHIPPED | OUTPATIENT
Start: 2025-04-22

## 2025-04-23 ENCOUNTER — ANESTHESIA EVENT (OUTPATIENT)
Dept: SURGERY | Age: 55
End: 2025-04-23
Payer: COMMERCIAL

## 2025-04-23 RX ORDER — FENTANYL CITRATE 50 UG/ML
100 INJECTION, SOLUTION INTRAMUSCULAR; INTRAVENOUS
Refills: 0 | Status: CANCELLED | OUTPATIENT
Start: 2025-04-23

## 2025-04-23 RX ORDER — MIDAZOLAM HYDROCHLORIDE 2 MG/2ML
2 INJECTION, SOLUTION INTRAMUSCULAR; INTRAVENOUS
Status: CANCELLED | OUTPATIENT
Start: 2025-04-23

## 2025-04-24 ENCOUNTER — HOSPITAL ENCOUNTER (OUTPATIENT)
Age: 55
Discharge: HOME HEALTH | End: 2025-04-26
Attending: ORTHOPAEDIC SURGERY | Admitting: ORTHOPAEDIC SURGERY
Payer: COMMERCIAL

## 2025-04-24 ENCOUNTER — APPOINTMENT (OUTPATIENT)
Dept: GENERAL RADIOLOGY | Age: 55
End: 2025-04-24
Attending: ORTHOPAEDIC SURGERY
Payer: COMMERCIAL

## 2025-04-24 ENCOUNTER — ANESTHESIA (OUTPATIENT)
Dept: SURGERY | Age: 55
End: 2025-04-24
Payer: COMMERCIAL

## 2025-04-24 LAB
GLUCOSE BLD STRIP.AUTO-MCNC: 137 MG/DL (ref 65–100)
SERVICE CMNT-IMP: ABNORMAL

## 2025-04-24 PROCEDURE — 2580000003 HC RX 258: Performed by: ORTHOPAEDIC SURGERY

## 2025-04-24 PROCEDURE — 6370000000 HC RX 637 (ALT 250 FOR IP): Performed by: SURGERY

## 2025-04-24 PROCEDURE — 3600000005 HC SURGERY LEVEL 5 BASE: Performed by: ORTHOPAEDIC SURGERY

## 2025-04-24 PROCEDURE — 97535 SELF CARE MNGMENT TRAINING: CPT

## 2025-04-24 PROCEDURE — 2500000003 HC RX 250 WO HCPCS: Performed by: PHYSICIAN ASSISTANT

## 2025-04-24 PROCEDURE — 6370000000 HC RX 637 (ALT 250 FOR IP): Performed by: PHYSICIAN ASSISTANT

## 2025-04-24 PROCEDURE — 82962 GLUCOSE BLOOD TEST: CPT

## 2025-04-24 PROCEDURE — 6360000002 HC RX W HCPCS: Performed by: PHYSICIAN ASSISTANT

## 2025-04-24 PROCEDURE — 6360000002 HC RX W HCPCS: Performed by: NURSE ANESTHETIST, CERTIFIED REGISTERED

## 2025-04-24 PROCEDURE — C1776 JOINT DEVICE (IMPLANTABLE): HCPCS | Performed by: ORTHOPAEDIC SURGERY

## 2025-04-24 PROCEDURE — 7100000001 HC PACU RECOVERY - ADDTL 15 MIN: Performed by: ORTHOPAEDIC SURGERY

## 2025-04-24 PROCEDURE — 97165 OT EVAL LOW COMPLEX 30 MIN: CPT

## 2025-04-24 PROCEDURE — 94760 N-INVAS EAR/PLS OXIMETRY 1: CPT

## 2025-04-24 PROCEDURE — 7100000000 HC PACU RECOVERY - FIRST 15 MIN: Performed by: ORTHOPAEDIC SURGERY

## 2025-04-24 PROCEDURE — 94761 N-INVAS EAR/PLS OXIMETRY MLT: CPT

## 2025-04-24 PROCEDURE — 97110 THERAPEUTIC EXERCISES: CPT

## 2025-04-24 PROCEDURE — 6360000002 HC RX W HCPCS: Performed by: ORTHOPAEDIC SURGERY

## 2025-04-24 PROCEDURE — 27130 TOTAL HIP ARTHROPLASTY: CPT | Performed by: PHYSICIAN ASSISTANT

## 2025-04-24 PROCEDURE — 97530 THERAPEUTIC ACTIVITIES: CPT

## 2025-04-24 PROCEDURE — 3700000000 HC ANESTHESIA ATTENDED CARE: Performed by: ORTHOPAEDIC SURGERY

## 2025-04-24 PROCEDURE — 72170 X-RAY EXAM OF PELVIS: CPT

## 2025-04-24 PROCEDURE — 3700000001 HC ADD 15 MINUTES (ANESTHESIA): Performed by: ORTHOPAEDIC SURGERY

## 2025-04-24 PROCEDURE — 2580000003 HC RX 258: Performed by: SURGERY

## 2025-04-24 PROCEDURE — 2500000003 HC RX 250 WO HCPCS: Performed by: NURSE ANESTHETIST, CERTIFIED REGISTERED

## 2025-04-24 PROCEDURE — 2709999900 HC NON-CHARGEABLE SUPPLY: Performed by: ORTHOPAEDIC SURGERY

## 2025-04-24 PROCEDURE — 2580000003 HC RX 258: Performed by: NURSE ANESTHETIST, CERTIFIED REGISTERED

## 2025-04-24 PROCEDURE — 2720000010 HC SURG SUPPLY STERILE: Performed by: ORTHOPAEDIC SURGERY

## 2025-04-24 PROCEDURE — 6360000002 HC RX W HCPCS: Performed by: SURGERY

## 2025-04-24 PROCEDURE — 27130 TOTAL HIP ARTHROPLASTY: CPT | Performed by: ORTHOPAEDIC SURGERY

## 2025-04-24 PROCEDURE — 3600000015 HC SURGERY LEVEL 5 ADDTL 15MIN: Performed by: ORTHOPAEDIC SURGERY

## 2025-04-24 PROCEDURE — 2500000003 HC RX 250 WO HCPCS: Performed by: ORTHOPAEDIC SURGERY

## 2025-04-24 PROCEDURE — 97161 PT EVAL LOW COMPLEX 20 MIN: CPT

## 2025-04-24 DEVICE — BIOLOX DELTA CERAMIC FEMORAL HEAD +8.5 36MM DIA 12/14 TAPER
Type: IMPLANTABLE DEVICE | Site: HIP | Status: FUNCTIONAL
Brand: BIOLOX DELTA

## 2025-04-24 DEVICE — HIP H2 TOT ADV OTHER HD IMPL CAPPED SYNTHES: Type: IMPLANTABLE DEVICE | Site: HIP | Status: FUNCTIONAL

## 2025-04-24 DEVICE — PINNACLE HIP SOLUTIONS ALTRX POLYETHYLENE ACETABULAR LINER +4 NEUTRAL 36MM ID 54MM OD
Type: IMPLANTABLE DEVICE | Site: HIP | Status: FUNCTIONAL
Brand: PINNACLE ALTRX

## 2025-04-24 DEVICE — PINNACLE 100 ACETABULAR SHELL GRIPTION 100 54MM OD
Type: IMPLANTABLE DEVICE | Site: HIP | Status: FUNCTIONAL
Brand: PINNACLE GRIPTION

## 2025-04-24 DEVICE — APEX HOLE ELIMINATOR - PS
Type: IMPLANTABLE DEVICE | Site: HIP | Status: FUNCTIONAL
Brand: APEX

## 2025-04-24 DEVICE — ACTIS DUOFIX HIP PROSTHESIS (FEMORAL STEM 12/14 TAPER CEMENTLESS SIZE 4 STD COLLAR)  CE
Type: IMPLANTABLE DEVICE | Site: HIP | Status: FUNCTIONAL
Brand: ACTIS

## 2025-04-24 RX ORDER — ONDANSETRON 2 MG/ML
4 INJECTION INTRAMUSCULAR; INTRAVENOUS EVERY 6 HOURS PRN
Status: DISCONTINUED | OUTPATIENT
Start: 2025-04-24 | End: 2025-04-26 | Stop reason: HOSPADM

## 2025-04-24 RX ORDER — ACETAMINOPHEN 500 MG
1000 TABLET ORAL ONCE
Status: DISCONTINUED | OUTPATIENT
Start: 2025-04-24 | End: 2025-04-24 | Stop reason: SDUPTHER

## 2025-04-24 RX ORDER — SENNA AND DOCUSATE SODIUM 50; 8.6 MG/1; MG/1
1 TABLET, FILM COATED ORAL 2 TIMES DAILY
Status: DISCONTINUED | OUTPATIENT
Start: 2025-04-24 | End: 2025-04-26 | Stop reason: HOSPADM

## 2025-04-24 RX ORDER — SODIUM CHLORIDE 0.9 % (FLUSH) 0.9 %
5-40 SYRINGE (ML) INJECTION EVERY 12 HOURS SCHEDULED
Status: DISCONTINUED | OUTPATIENT
Start: 2025-04-24 | End: 2025-04-26 | Stop reason: HOSPADM

## 2025-04-24 RX ORDER — PROMETHAZINE HYDROCHLORIDE 25 MG/1
25 TABLET ORAL EVERY 6 HOURS PRN
Status: DISCONTINUED | OUTPATIENT
Start: 2025-04-24 | End: 2025-04-26 | Stop reason: HOSPADM

## 2025-04-24 RX ORDER — SODIUM CHLORIDE 9 MG/ML
INJECTION, SOLUTION INTRAVENOUS CONTINUOUS
Status: DISCONTINUED | OUTPATIENT
Start: 2025-04-24 | End: 2025-04-26 | Stop reason: HOSPADM

## 2025-04-24 RX ORDER — DIPHENHYDRAMINE HYDROCHLORIDE 50 MG/ML
25 INJECTION, SOLUTION INTRAMUSCULAR; INTRAVENOUS EVERY 6 HOURS PRN
Status: DISCONTINUED | OUTPATIENT
Start: 2025-04-24 | End: 2025-04-26 | Stop reason: HOSPADM

## 2025-04-24 RX ORDER — NALOXONE HYDROCHLORIDE 0.4 MG/ML
INJECTION, SOLUTION INTRAMUSCULAR; INTRAVENOUS; SUBCUTANEOUS PRN
Status: DISCONTINUED | OUTPATIENT
Start: 2025-04-24 | End: 2025-04-24 | Stop reason: HOSPADM

## 2025-04-24 RX ORDER — DEXAMETHASONE SODIUM PHOSPHATE 10 MG/ML
10 INJECTION, SOLUTION INTRA-ARTICULAR; INTRALESIONAL; INTRAMUSCULAR; INTRAVENOUS; SOFT TISSUE EVERY 6 HOURS
Status: DISPENSED | OUTPATIENT
Start: 2025-04-25 | End: 2025-04-25

## 2025-04-24 RX ORDER — LIDOCAINE HYDROCHLORIDE 10 MG/ML
1 INJECTION, SOLUTION INFILTRATION; PERINEURAL
Status: DISCONTINUED | OUTPATIENT
Start: 2025-04-24 | End: 2025-04-24 | Stop reason: HOSPADM

## 2025-04-24 RX ORDER — OXYCODONE HYDROCHLORIDE 5 MG/1
5 TABLET ORAL EVERY 4 HOURS PRN
Status: DISCONTINUED | OUTPATIENT
Start: 2025-04-24 | End: 2025-04-26 | Stop reason: HOSPADM

## 2025-04-24 RX ORDER — LABETALOL HYDROCHLORIDE 5 MG/ML
10 INJECTION, SOLUTION INTRAVENOUS
Status: DISCONTINUED | OUTPATIENT
Start: 2025-04-24 | End: 2025-04-24 | Stop reason: HOSPADM

## 2025-04-24 RX ORDER — OXYCODONE HYDROCHLORIDE 5 MG/1
5 TABLET ORAL
Status: COMPLETED | OUTPATIENT
Start: 2025-04-24 | End: 2025-04-24

## 2025-04-24 RX ORDER — NALOXONE HYDROCHLORIDE 0.4 MG/ML
0.4 INJECTION, SOLUTION INTRAMUSCULAR; INTRAVENOUS; SUBCUTANEOUS PRN
Status: DISCONTINUED | OUTPATIENT
Start: 2025-04-24 | End: 2025-04-26 | Stop reason: HOSPADM

## 2025-04-24 RX ORDER — MAGNESIUM HYDROXIDE/ALUMINUM HYDROXICE/SIMETHICONE 120; 1200; 1200 MG/30ML; MG/30ML; MG/30ML
15 SUSPENSION ORAL EVERY 6 HOURS PRN
Status: DISCONTINUED | OUTPATIENT
Start: 2025-04-24 | End: 2025-04-26 | Stop reason: HOSPADM

## 2025-04-24 RX ORDER — ROSUVASTATIN CALCIUM 20 MG/1
20 TABLET, COATED ORAL
Status: DISCONTINUED | OUTPATIENT
Start: 2025-04-25 | End: 2025-04-26 | Stop reason: HOSPADM

## 2025-04-24 RX ORDER — HALOPERIDOL 5 MG/ML
1 INJECTION INTRAMUSCULAR
Status: DISCONTINUED | OUTPATIENT
Start: 2025-04-24 | End: 2025-04-24 | Stop reason: HOSPADM

## 2025-04-24 RX ORDER — SODIUM CHLORIDE 9 MG/ML
INJECTION, SOLUTION INTRAVENOUS PRN
Status: DISCONTINUED | OUTPATIENT
Start: 2025-04-24 | End: 2025-04-26 | Stop reason: HOSPADM

## 2025-04-24 RX ORDER — ACETAMINOPHEN 325 MG/1
650 TABLET ORAL EVERY 6 HOURS
Status: DISCONTINUED | OUTPATIENT
Start: 2025-04-24 | End: 2025-04-26 | Stop reason: HOSPADM

## 2025-04-24 RX ORDER — SODIUM CHLORIDE 0.9 % (FLUSH) 0.9 %
5-40 SYRINGE (ML) INJECTION PRN
Status: DISCONTINUED | OUTPATIENT
Start: 2025-04-24 | End: 2025-04-24 | Stop reason: HOSPADM

## 2025-04-24 RX ORDER — HYDROCHLOROTHIAZIDE 25 MG/1
12.5 TABLET ORAL EVERY MORNING
Status: DISCONTINUED | OUTPATIENT
Start: 2025-04-25 | End: 2025-04-26 | Stop reason: HOSPADM

## 2025-04-24 RX ORDER — CELECOXIB 200 MG/1
200 CAPSULE ORAL DAILY
Status: DISCONTINUED | OUTPATIENT
Start: 2025-04-25 | End: 2025-04-26 | Stop reason: HOSPADM

## 2025-04-24 RX ORDER — SODIUM CHLORIDE 0.9 % (FLUSH) 0.9 %
5-40 SYRINGE (ML) INJECTION EVERY 12 HOURS SCHEDULED
Status: DISCONTINUED | OUTPATIENT
Start: 2025-04-24 | End: 2025-04-24 | Stop reason: HOSPADM

## 2025-04-24 RX ORDER — SODIUM CHLORIDE, SODIUM LACTATE, POTASSIUM CHLORIDE, CALCIUM CHLORIDE 600; 310; 30; 20 MG/100ML; MG/100ML; MG/100ML; MG/100ML
INJECTION, SOLUTION INTRAVENOUS
Status: DISCONTINUED | OUTPATIENT
Start: 2025-04-24 | End: 2025-04-24 | Stop reason: SDUPTHER

## 2025-04-24 RX ORDER — ROPIVACAINE HYDROCHLORIDE 2 MG/ML
INJECTION, SOLUTION EPIDURAL; INFILTRATION; PERINEURAL PRN
Status: DISCONTINUED | OUTPATIENT
Start: 2025-04-24 | End: 2025-04-24 | Stop reason: ALTCHOICE

## 2025-04-24 RX ORDER — ACETAMINOPHEN 500 MG
1000 TABLET ORAL ONCE
Status: COMPLETED | OUTPATIENT
Start: 2025-04-24 | End: 2025-04-24

## 2025-04-24 RX ORDER — SODIUM CHLORIDE 0.9 % (FLUSH) 0.9 %
5-40 SYRINGE (ML) INJECTION PRN
Status: DISCONTINUED | OUTPATIENT
Start: 2025-04-24 | End: 2025-04-26 | Stop reason: HOSPADM

## 2025-04-24 RX ORDER — OXYCODONE HYDROCHLORIDE 5 MG/1
10 TABLET ORAL EVERY 4 HOURS PRN
Status: DISCONTINUED | OUTPATIENT
Start: 2025-04-24 | End: 2025-04-26 | Stop reason: HOSPADM

## 2025-04-24 RX ORDER — HYDROMORPHONE HYDROCHLORIDE 1 MG/ML
0.5 INJECTION, SOLUTION INTRAMUSCULAR; INTRAVENOUS; SUBCUTANEOUS
Status: DISCONTINUED | OUTPATIENT
Start: 2025-04-24 | End: 2025-04-26 | Stop reason: HOSPADM

## 2025-04-24 RX ORDER — SODIUM CHLORIDE 9 MG/ML
INJECTION, SOLUTION INTRAVENOUS PRN
Status: DISCONTINUED | OUTPATIENT
Start: 2025-04-24 | End: 2025-04-24 | Stop reason: HOSPADM

## 2025-04-24 RX ORDER — HYDROMORPHONE HYDROCHLORIDE 1 MG/ML
1 INJECTION, SOLUTION INTRAMUSCULAR; INTRAVENOUS; SUBCUTANEOUS
Status: DISCONTINUED | OUTPATIENT
Start: 2025-04-24 | End: 2025-04-26 | Stop reason: HOSPADM

## 2025-04-24 RX ORDER — ASPIRIN 81 MG/1
81 TABLET ORAL 2 TIMES DAILY
Status: DISCONTINUED | OUTPATIENT
Start: 2025-04-24 | End: 2025-04-26 | Stop reason: HOSPADM

## 2025-04-24 RX ORDER — DIPHENHYDRAMINE HCL 25 MG
25 CAPSULE ORAL EVERY 6 HOURS PRN
Status: DISCONTINUED | OUTPATIENT
Start: 2025-04-24 | End: 2025-04-26 | Stop reason: HOSPADM

## 2025-04-24 RX ORDER — LIDOCAINE HYDROCHLORIDE 20 MG/ML
INJECTION, SOLUTION EPIDURAL; INFILTRATION; INTRACAUDAL; PERINEURAL
Status: DISCONTINUED | OUTPATIENT
Start: 2025-04-24 | End: 2025-04-24 | Stop reason: SDUPTHER

## 2025-04-24 RX ORDER — METHOCARBAMOL 750 MG/1
750 TABLET, FILM COATED ORAL 4 TIMES DAILY PRN
Status: DISCONTINUED | OUTPATIENT
Start: 2025-04-24 | End: 2025-04-26 | Stop reason: HOSPADM

## 2025-04-24 RX ORDER — SODIUM CHLORIDE, SODIUM LACTATE, POTASSIUM CHLORIDE, CALCIUM CHLORIDE 600; 310; 30; 20 MG/100ML; MG/100ML; MG/100ML; MG/100ML
INJECTION, SOLUTION INTRAVENOUS CONTINUOUS
Status: DISCONTINUED | OUTPATIENT
Start: 2025-04-24 | End: 2025-04-24 | Stop reason: HOSPADM

## 2025-04-24 RX ORDER — MIDAZOLAM HYDROCHLORIDE 1 MG/ML
INJECTION, SOLUTION INTRAMUSCULAR; INTRAVENOUS
Status: DISCONTINUED | OUTPATIENT
Start: 2025-04-24 | End: 2025-04-24 | Stop reason: SDUPTHER

## 2025-04-24 RX ORDER — PROPOFOL 10 MG/ML
INJECTION, EMULSION INTRAVENOUS
Status: DISCONTINUED | OUTPATIENT
Start: 2025-04-24 | End: 2025-04-24 | Stop reason: SDUPTHER

## 2025-04-24 RX ORDER — KETAMINE HCL IN NACL, ISO-OSM 20 MG/2 ML
SYRINGE (ML) INJECTION
Status: DISCONTINUED | OUTPATIENT
Start: 2025-04-24 | End: 2025-04-24 | Stop reason: SDUPTHER

## 2025-04-24 RX ADMIN — PROPOFOL 30 MG: 10 INJECTION, EMULSION INTRAVENOUS at 07:40

## 2025-04-24 RX ADMIN — OXYCODONE 10 MG: 5 TABLET ORAL at 10:51

## 2025-04-24 RX ADMIN — PROPOFOL 40 MG: 10 INJECTION, EMULSION INTRAVENOUS at 07:14

## 2025-04-24 RX ADMIN — Medication 2500 MG: at 06:22

## 2025-04-24 RX ADMIN — ASPIRIN 81 MG: 81 TABLET, COATED ORAL at 21:00

## 2025-04-24 RX ADMIN — SODIUM CHLORIDE, POTASSIUM CHLORIDE, SODIUM LACTATE AND CALCIUM CHLORIDE: 600; 310; 30; 20 INJECTION, SOLUTION INTRAVENOUS at 06:24

## 2025-04-24 RX ADMIN — ACETAMINOPHEN 650 MG: 325 TABLET, FILM COATED ORAL at 10:52

## 2025-04-24 RX ADMIN — CEFAZOLIN 3000 MG: 10 INJECTION, POWDER, FOR SOLUTION INTRAVENOUS at 15:16

## 2025-04-24 RX ADMIN — Medication 20 MG: at 07:41

## 2025-04-24 RX ADMIN — SODIUM CHLORIDE, SODIUM LACTATE, POTASSIUM CHLORIDE, AND CALCIUM CHLORIDE: 600; 310; 30; 20 INJECTION, SOLUTION INTRAVENOUS at 06:55

## 2025-04-24 RX ADMIN — METHOCARBAMOL 750 MG: 750 TABLET ORAL at 21:21

## 2025-04-24 RX ADMIN — ACETAMINOPHEN 1000 MG: 500 TABLET, FILM COATED ORAL at 05:55

## 2025-04-24 RX ADMIN — Medication 1000 MG: at 07:31

## 2025-04-24 RX ADMIN — MEPIVACAINE HYDROCHLORIDE 60 MG: 20 INJECTION, SOLUTION EPIDURAL; INFILTRATION at 07:11

## 2025-04-24 RX ADMIN — CEFAZOLIN 3000 MG: 10 INJECTION, POWDER, FOR SOLUTION INTRAVENOUS at 22:43

## 2025-04-24 RX ADMIN — SENNOSIDES, DOCUSATE SODIUM 1 TABLET: 50; 8.6 TABLET, FILM COATED ORAL at 21:00

## 2025-04-24 RX ADMIN — OXYCODONE 5 MG: 5 TABLET ORAL at 09:46

## 2025-04-24 RX ADMIN — HYDROMORPHONE HYDROCHLORIDE 1 MG: 1 INJECTION, SOLUTION INTRAMUSCULAR; INTRAVENOUS; SUBCUTANEOUS at 21:00

## 2025-04-24 RX ADMIN — Medication 3000 MG: at 07:26

## 2025-04-24 RX ADMIN — OXYCODONE 10 MG: 5 TABLET ORAL at 18:54

## 2025-04-24 RX ADMIN — PROPOFOL 50 MCG/KG/MIN: 10 INJECTION, EMULSION INTRAVENOUS at 07:15

## 2025-04-24 RX ADMIN — SODIUM CHLORIDE, SODIUM LACTATE, POTASSIUM CHLORIDE, AND CALCIUM CHLORIDE: 600; 310; 30; 20 INJECTION, SOLUTION INTRAVENOUS at 08:30

## 2025-04-24 RX ADMIN — MIDAZOLAM 2 MG: 1 INJECTION INTRAMUSCULAR; INTRAVENOUS at 06:58

## 2025-04-24 RX ADMIN — HYDROMORPHONE HYDROCHLORIDE 1 MG: 1 INJECTION, SOLUTION INTRAMUSCULAR; INTRAVENOUS; SUBCUTANEOUS at 14:50

## 2025-04-24 RX ADMIN — HYDROMORPHONE HYDROCHLORIDE 1 MG: 1 INJECTION, SOLUTION INTRAMUSCULAR; INTRAVENOUS; SUBCUTANEOUS at 11:32

## 2025-04-24 RX ADMIN — METHOCARBAMOL 750 MG: 750 TABLET ORAL at 13:43

## 2025-04-24 RX ADMIN — ACETAMINOPHEN 650 MG: 325 TABLET, FILM COATED ORAL at 22:42

## 2025-04-24 RX ADMIN — ACETAMINOPHEN 650 MG: 325 TABLET, FILM COATED ORAL at 18:36

## 2025-04-24 RX ADMIN — LIDOCAINE HYDROCHLORIDE 50 MG: 20 INJECTION, SOLUTION EPIDURAL; INFILTRATION; INTRACAUDAL; PERINEURAL at 07:14

## 2025-04-24 ASSESSMENT — PAIN DESCRIPTION - LOCATION
LOCATION: HIP
LOCATION: HIP;LEG

## 2025-04-24 ASSESSMENT — PAIN SCALES - GENERAL
PAINLEVEL_OUTOF10: 2
PAINLEVEL_OUTOF10: 10
PAINLEVEL_OUTOF10: 6
PAINLEVEL_OUTOF10: 10
PAINLEVEL_OUTOF10: 4
PAINLEVEL_OUTOF10: 10
PAINLEVEL_OUTOF10: 6
PAINLEVEL_OUTOF10: 4
PAINLEVEL_OUTOF10: 6
PAINLEVEL_OUTOF10: 9
PAINLEVEL_OUTOF10: 5
PAINLEVEL_OUTOF10: 8
PAINLEVEL_OUTOF10: 3

## 2025-04-24 ASSESSMENT — PAIN - FUNCTIONAL ASSESSMENT
PAIN_FUNCTIONAL_ASSESSMENT: PREVENTS OR INTERFERES SOME ACTIVE ACTIVITIES AND ADLS
PAIN_FUNCTIONAL_ASSESSMENT: ACTIVITIES ARE NOT PREVENTED

## 2025-04-24 ASSESSMENT — HOOS JR
LYING IN BED (TURNING OVER, MAINTAINING HIP POSITION): SEVERE
HOOS JR RAW SCORE: 18
RISING FROM SITTING: SEVERE
HOOS JR TOTAL INTERVAL SCORE: 32.735
BENDING TO THE FLOOR TO PICK UP OBJECT: EXTREME
GOING UP OR DOWN STAIRS: SEVERE
WALKING ON UNEVEN SURFACE: SEVERE
HOOS JR RAW SCORE: 18
SITTING: MODERATE

## 2025-04-24 ASSESSMENT — PAIN SCALES - WONG BAKER
WONGBAKER_NUMERICALRESPONSE: HURTS A LITTLE BIT
WONGBAKER_NUMERICALRESPONSE: HURTS WHOLE LOT
WONGBAKER_NUMERICALRESPONSE: HURTS LITTLE MORE
WONGBAKER_NUMERICALRESPONSE: HURTS LITTLE MORE

## 2025-04-24 ASSESSMENT — PAIN DESCRIPTION - ORIENTATION
ORIENTATION: RIGHT

## 2025-04-24 ASSESSMENT — PAIN DESCRIPTION - DESCRIPTORS
DESCRIPTORS: PRESSURE
DESCRIPTORS: PRESSURE;SHARP
DESCRIPTORS: ACHING

## 2025-04-24 NOTE — PROGRESS NOTES
4 Eyes Skin Assessment     NAME:  Tricia Sellers  YOB: 1970  MEDICAL RECORD NUMBER:  [id number]    The patient is being assessed for  Post-Op Surgical    I agree that at least one RN has performed a thorough Head to Toe Skin Assessment on the patient. ALL assessment sites listed below have been assessed.      Areas assessed by both nurses:    Head, Face, Ears, Shoulders, Back, Chest, Arms, Elbows, Hands, Sacrum. Buttock, Coccyx, Ischium, Legs. Feet and Heels, and Under Medical Devices         Does the Patient have a Wound? No noted wound(s)       Andrei Prevention initiated by RN: Yes  Wound Care Orders initiated by RN: No    Pressure Injury (Stage 3,4, Unstageable, DTI, NWPT, and Complex wounds) if present, place Wound referral order by RN under : No    New Ostomies, if present place, Ostomy referral order under : No     Nurse 1 eSignature: Electronically signed by MELINA BRENNAN RN on 4/24/25 at 10:02 AM EDT    **SHARE this note so that the co-signing nurse can place an eSignature**    Nurse 2 eSignature: Electronically signed by Kay Damon RN on 4/24/25 at 10:10 AM EDT

## 2025-04-24 NOTE — PROGRESS NOTES
ACUTE PHYSICAL THERAPY GOALS:   (Developed with and agreed upon by patient and/or caregiver.)  GOALS (1-4 days):  (1.)Ms. Sellers will move from supine to sit and sit to supine  in bed with STAND BY ASSIST.    (2.)Ms. Sellers will transfer from bed to chair and chair to bed with STAND BY ASSIST using the least restrictive device.    (3.)Ms. Sellers will ambulate with CONTACT GUARD ASSIST for 225 feet with the least restrictive device.   (4.)Ms. Sellers will ambulate up/down 3 steps with bilateral  railing with CONTACT GUARD ASSIST.    ________________________________________________________________________________________________     PHYSICAL THERAPY: TOTAL HIP ARTHROPLASTY Initial Assessment  (Link to Caseload Tracking: PT Visit Days : 1  Acknowledge Orders  Time In/Out  PT Charge Capture  Rehab Caseload Tracker  Episode   Tricia Sellers is a 55 y.o. female   PRIMARY DIAGNOSIS: Primary osteoarthritis of right hip  Primary osteoarthritis of right hip [M16.11]  Procedure(s) (LRB):  HIP TOTAL ARTHROPLASTY-RIGHT (Right)  * Day of Surgery *  Reason for Referral: Pain in Right Hip (M25.551)  Stiffness of Right Hip, Not elsewhere classified (M25.651)  Difficulty in walking, Not elsewhere classified (R26.2)  Other abnormalities of gait and mobility (R26.89)  Outpatient in a bed: Payor: [employer] [facility] [employer] / Plan: [employer] [facility] [employer] / Product Type: *No Product type* /     REHAB RECOMMENDATIONS:   Recommendation to date pending progress:  Setting:  Home Health Therapy    Equipment:    None     GAIT: I Mod I S SBA CGA Min Mod Max Total  NT x2 Comments:   Level of Assistance [] [] [] [] [] [x] [] [] [] [] []    Weightbearing Status  Right Lower Extremity Weight Bearing: Weight Bearing As Tolerated    Distance  5 (X 3) feet    Gait Quality Antalgic, Decreased jorge , Decreased step clearance, Decreased step length, and Decreased stance    DME Rolling Walker     Stairs      Ramp     I=Independent, Mod

## 2025-04-24 NOTE — CARE COORDINATION
Patient is a 55 y.o. year old female admitted for Right WENDI . Pt is a [facility].  Patient plans to return home on discharge. Order received to arrange home health. Patient requesting [facility] if able to take her ins. Referral sent to [facility] . Patient denies any equipment needs as patient has a walker. Will follow until discharge.      04/24/25 143   Service Assessment   Patient Orientation Alert and Oriented   Cognition Alert   History Provided By Patient   Primary Caregiver Self   Services At/After Discharge   Transition of Care Consult (CM Consult) Home Health   Internal Home Health No   Reason Outside Agency Chosen Out of service area   Services At/After Discharge Home Health;PT   Mode of Transport at Discharge Self   Confirm Follow Up Transport Self   Condition of Participation: Discharge Planning   The Plan for Transition of Care is related to the following treatment goals: improve mobility   The Patient and/or Patient Representative was provided with a Choice of Provider? Patient   The Patient and/Or Patient Representative agree with the Discharge Plan? Yes   Freedom of Choice list was provided with basic dialogue that supports the patient's individualized plan of care/goals, treatment preferences, and shares the quality data associated with the providers?  Yes

## 2025-04-24 NOTE — OP NOTE
[facility]  Total Hip Procedure Note      Name: Tricia Sellers  YOB: 1970  Gender: female  MRN: [id number]    Pre-operative Diagnosis:  Primary osteoarthritis of right hip [M16.11]  Post-operative Diagnosis: Primary osteoarthritis of right hip [M16.11]    Surgeon: LUIS ALBERTO TRONCOSO MD  Assistant:Agusto Cook PA-C    This surgical assistant was present for the procedure and vital for the performance of the procedure. He assisted with exposure and retraction during the procedure as well as wound closure and dressing application after the procedure.       Anesthesia: Spinal      Procedure: Total Hip Arthroplasty   The complexity of the total joint surgery requires the use of a first assistant for positioning, retraction and assistance in closure. The patient's Body mass index is 47.17 kg/m²., BMI's greater then 40 make surgical exposure and retraction extremely difficult and increase operative time.    In this particular case, the patient's body habitus added approximately 45 additional minutes to a usual procedure time for exposure and , particularly closure.      Tricia Sellers was brought to the operating room and positioned on the operating table.  She was anesthestized .   IV antibiotics per CMS protocol were administered. Prior to the incision being made a timeout was called identifying the patient, procedure ,operative side and surgeon.     Tricia Sellers was positioned in the lateral decubitus position with the  right  side up.The limb was prepped and draped in the usual sterile fashion.  The direct lateral approach was utilized to expose the hip.  The incision was carried through the subcutaneous tissue and underlying fascia with homeostasis obtained using the bovie cauterization.  A Charnley retractor was inserted.  The abductors were taken down at the junction of the anterior and middle third.  The sciatic nerve was palpated and identified.  Following comparison of  MTL ON  [facility] [email or web address] [id number] Right 1 Implanted   SHELL ACET [id number] HIP TI GRIPTION GABRIELA SLD NADEEM RNG WIHOUT H - [id number]  SHELL ACET [id number] HIP TI GRIPTION GABRIELA SLD NADEEM RNG WIHOUT H  [facility] [employer] [id number] Right 1 Implanted   ELIMINATOR H APEX FOR 48-60MM PINN HIP SHELL - [id number]  ELIMINATOR H APEX FOR 48-60MM PINN HIP SHELL  [facility] [employer] [id number] Right 1 Implanted   STEM FEM SZ 4 L103MM 12/14 TAPR STD OFFSET HIP DUOFIX CLLRD - [id number]  STEM FEM SZ 4 L103MM 12/14 TAPR STD OFFSET HIP DUOFIX CLLRD  [facility] [email or web address] [id number] Right 1 Implanted   HEAD FEM [id number] +8MM OFFSET 12/14 TAPR HIP CERAMIC BIOLOX - [id number]  HEAD FEM [id number] +8MM OFFSET 12/14 TAPR HIP CERAMIC BIOLOX  [facility] [email or web address] [id number] Right 1 Implanted     Signed By: LUIS ALBERTO TRONCOSO MD

## 2025-04-24 NOTE — ANESTHESIA PRE PROCEDURE
GI/Hepatic/Renal:   (+) morbid obesity (BMI 47)          Endo/Other:    (+) DiabetesType II DM, : arthritis (denies neck involvement): rheumatoid..                 Abdominal: normal exam            Vascular:          Other Findings:        Cardiac testing    TTE 07/2021  · The left ventricular systolic function is normal (55-65%).   · Normal left ventricular diastolic function.   · There is mild concentric left ventricular hypertrophy present.   · The right ventricular systolic function is normal.   · Mild mitral valve regurgitation.   · Mild tricuspid valve regurgitation.     Stress Test 09/2023    Stress Combined Conclusion: Normal pharmacological myocardial perfusion study. Findings suggest a low risk of cardiac events.    Stress Function: Left ventricular function post-stress is normal. Post-stress ejection fraction is 70%.    Perfusion Comments: LV perfusion is normal.    ECG: Resting ECG demonstrates normal sinus rhythm.    ECG: The ECG was not diagnostic due to failure to achieve target heart rate.    Stress Test: A pharmacological stress test was performed using lexiscan. Patient complained of leg discomfort that felt like a \"trembling feeling\" after Lexiscan administered. Patient had immediate relief at rest. No complaints of chest pain or discomfort.       Anesthesia Plan      spinal     ASA 3             Anesthetic plan and risks discussed with patient and spouse.      Plan discussed with CRNA.                MRSA positive, vancomycin ordered by surgical team.  Glucopse 134. Never started mounjaro.  Hgb 14.3    Tricia Sellers is a 55 y.o. female who presents for right WENDI  Lab Results   Component Value Date    HGB 14.3 04/01/2025     04/01/2025     Denies blood thinners (other than bASA) or lower back surgery.    Specific risks discussed with Tricia Sellers include   sore throat, nausea, corneal abrasion, oral injury (including lip and teeth), adverse reaction to medication, and

## 2025-04-24 NOTE — ANESTHESIA PROCEDURE NOTES
Spinal Block    Patient location during procedure: OR  End time: 4/24/2025 7:11 AM  Reason for block: primary anesthetic  Staffing  Performed: anesthesiologist   Anesthesiologist: Lanre Majano MD  Performed by: Lanre Majano MD  Authorized by: Lanre Majano MD    Spinal Block  Patient position: sitting  Prep: ChloraPrep  Patient monitoring: cardiac monitor, continuous pulse ox and frequent blood pressure checks  Approach: midline  Location: L4/L5  Provider prep: mask and sterile gloves  Local infiltration: lidocaine  Needle  Needle type: pencil-tip   Needle gauge: 24 G  Needle length: 4 in  Assessment  Events: SAB placement uncomplicated.  No paresthesia.  Swirl obtained: Yes  CSF: clear  Attempts: 1  Hemodynamics: stable  Preanesthetic Checklist  Completed: patient identified, IV checked, risks and benefits discussed, equipment checked, pre-op evaluation, timeout performed, anesthesia consent given, oxygen available and monitors applied/VS acknowledged

## 2025-04-24 NOTE — DISCHARGE SUMMARY
[facility]  Total Joint Discharge Summary      Patient ID:  Tricia Sellers  [id number]  55 y.o.  1970    Admit date: 4/24/2025  Discharge date and time: 04/24/25   Admitting Physician: Angel Mauro MD  Surgeon: Same  Admission Diagnoses: Primary osteoarthritis of right hip [M16.11]  Discharge Diagnoses: Principal Problem:    Primary osteoarthritis of right hip  Resolved Problems:    * No resolved hospital problems. *                              Perioperative Antibiotics: Ancef 1 to 3 g was given depending on patient's weight. If allergic to Ancef or due to other indications, patient was given Vancomycin/Gent per protocol      Hospital Medications given:   [START ON 4/25/2025] celecoxib, 200 mg, Daily  [START ON 4/25/2025] hydroCHLOROthiazide, 12.5 mg, QAM  [START ON 4/25/2025] rosuvastatin, 20 mg, QHS  sodium chloride flush, 5-40 mL, 2 times per day  ceFAZolin (ANCEF) IV, 3,000 mg, Q8H  acetaminophen, 650 mg, Q6H  sennosides-docusate sodium, 1 tablet, BID  aspirin, 81 mg, BID  [START ON 4/25/2025] dexAMETHasone, 10 mg, Q6H      sodium chloride  sodium chloride      sodium chloride flush, 5-40 mL, PRN  sodium chloride, , PRN  oxyCODONE, 5 mg, Q4H PRN   Or  oxyCODONE, 10 mg, Q4H PRN  HYDROmorphone, 0.5 mg, Q3H PRN   Or  HYDROmorphone, 1 mg, Q3H PRN  promethazine, 25 mg, Q6H PRN   Or  ondansetron, 4 mg, Q6H PRN  aluminum & magnesium hydroxide-simethicone, 15 mL, Q6H PRN  diphenhydrAMINE, 25 mg, Q6H PRN   Or  diphenhydrAMINE, 25 mg, Q6H PRN  melatonin, 3 mg, Nightly PRN  sodium chloride, 1 spray, Q4H PRN  methocarbamol, 750 mg, 4x Daily PRN  naloxone, 0.4 mg, PRN        Discharge Medications given:  Current Discharge Medication List        CONTINUE these medications which have NOT CHANGED    Details   oxyCODONE (ROXICODONE) 5 MG immediate release tablet Take 1-2 tablets by mouth every 4 hours as needed for Pain for up to 7 days. Max Daily Amount: 60 mg  Qty: 60 tablet, Refills: 0

## 2025-04-24 NOTE — PROGRESS NOTES
TRANSFER - IN REPORT:    Verbal report received from Chandu on Tricia Sellers  being received from PACU for routine post-op      Report consisted of patient's Situation, Background, Assessment and   Recommendations(SBAR).     Information from the following report(s) Nurse Handoff Report was reviewed with the receiving nurse.    Opportunity for questions and clarification was provided.      Assessment completed upon patient's arrival to unit and care assumed.

## 2025-04-24 NOTE — PROGRESS NOTES
Pt received to Rm 338. Pt alert and oriented. Oriented pt to room and bed controls. Family at bedside.

## 2025-04-24 NOTE — ANESTHESIA POSTPROCEDURE EVALUATION
Department of Anesthesiology  Postprocedure Note    Patient: Tricia Sellers  MRN: [id number]  YOB: 1970  Date of evaluation: 4/24/2025    Procedure Summary       Date: 04/24/25 Room / Location: [facility] MAIN OR 08 / [facility] MAIN OR    Anesthesia Start: 0655 Anesthesia Stop: 0903    Procedure: HIP TOTAL ARTHROPLASTY-RIGHT (Right: Hip) Diagnosis:       Primary osteoarthritis of right hip      (Primary osteoarthritis of right hip [M16.11])    Surgeons: Angel Mauro MD Responsible Provider: Lanre Majano MD    Anesthesia Type: Spinal ASA Status: 3            Anesthesia Type: Spinal    Asiya Phase I: Asiya Score: 10    Asiya Phase II:      Anesthesia Post Evaluation    Patient location during evaluation: PACU  Patient participation: complete - patient participated  Level of consciousness: awake and alert  Airway patency: patent  Nausea & Vomiting: no nausea and no vomiting  Cardiovascular status: hemodynamically stable  Respiratory status: acceptable, nonlabored ventilation and spontaneous ventilation  Hydration status: euvolemic  Comments: /68   Pulse 59   Temp 98.1 °F (36.7 °C) (Temporal)   Resp 16   Ht 1.702 m (5' 7\")   Wt (!) 136.6 kg (301 lb 3.2 oz)   SpO2 100%   BMI 47.17 kg/m²     Multimodal analgesia pain management approach  Pain management: adequate and satisfactory to patient    No notable events documented.

## 2025-04-24 NOTE — INTERVAL H&P NOTE
Update History & Physical    The patient's History and Physical of April 21, 2025 was reviewed with the patient and I examined the patient. There was no change. The surgical site was confirmed by the patient and me.     Plan: The risks, benefits, expected outcome, and alternative to the recommended procedure have been discussed with the patient. Patient understands and wants to proceed with the procedure.     Electronically signed by LUIS ALBERTO TRONCOSO MD on 4/24/2025 at 6:34 AM

## 2025-04-24 NOTE — PROGRESS NOTES
OCCUPATIONAL THERAPY Initial Assessment and Daily Note      (Link to Caseload Tracking: OT Visit Days: 1  OT Orders   Time  OT Charge Capture  Rehab Caseload Tracker  Episode     Tricia Sellers is a 55 y.o. female   PRIMARY DIAGNOSIS: Primary osteoarthritis of right hip  Primary osteoarthritis of right hip [M16.11]  Procedure(s) (LRB):  HIP TOTAL ARTHROPLASTY-RIGHT (Right)  * Day of Surgery *  Reason for Referral: Pain in Right Hip (M25.551)  Stiffness of Right Hip, Not elsewhere classified (M25.651)  Outpatient in a bed: Payor: [employer]IL[employer]PH[employer] [facility] EMPLOYEES / Plan: [employer] [facility] EMPLOYEES / Product Type: *No Product type* /     ASSESSMENT:     REHAB RECOMMENDATIONS:   Recommendation to date pending progress:  Setting:  No further skilled occupational therapy after discharge from hospital    Equipment:    None     ASSESSMENT:  Ms. Sellers is s/p right WENDI and presents with decreased independence with functional mobility and activities of daily living as compared to baseline level of function and safety. Patient would benefit from skilled Occupational Therapy to maximize independence and safety with self-care task and functional mobility.   Patient able to complete  toileting at  [facility]  with moderate assist . Pt was in extreme amounts of pain, inconsolable and unable to get comfortable, RN at bedside, assisted getting pt as comfortable as possible in recliner.  Returned in PM after pain meds and pt Mobilized from hospital bed to recliner/chair using a rolling walker with min A to negotiate walker safely.  She reports being more comfortable sitting on commode. Assisted positioning pt seated in recliner so she could sit upright. Patient is hopeful to spend the night to better prepare for safe discharge home       [facility] AM-PAC™ “6 Clicks” Daily Activity Inpatient Short Form:     AM-PAC Daily Activity - Inpatient   How much help is needed for putting on and taking off regular lower body clothing?: A  activity with minimal assist required to demonstrate improved functional mobility and safety.  3.  Ms. Sellers will perform toileting activity with  contact guard assist to demonstrate improved functional mobility and safety.  4.  Ms. Sellers will perform all functional transfers transfer with contact guard assist to demonstrate improved functional mobility and safety.      PROBLEM LIST:   (Skilled intervention is medically necessary to address:)  Decreased ADL/Functional Activities  Decreased Activity Tolerance  Decreased Strength  Decreased Transfer Abilities  Increased Pain   INTERVENTIONS PLANNED:   (Benefits and precautions of occupational therapy have been discussed with the patient.)  Self Care Training  Therapeutic Activity  Therapeutic Exercise/HEP  Neuromuscular Re-education  Manual Therapy  Education         TREATMENT:     EVALUATION: LOW COMPLEXITY: (Untimed Charge)    TREATMENT:   Self Care (18 minutes): Patient participated in toileting, functional mobility, functional transfer, and assistive device in supported sitting, unsupported sitting, and standing with moderate visual, verbal, and tactile cueing to increase independence, increase activity tolerance, and increase safety awareness. The patient was educated on role of occupational therapy, strategies to improve safety, proper use of assistive device, and transfer training and safety and patient will need reinforcement at next session.     AFTER TREATMENT PRECAUTIONS: Bed/Chair Locked, Call light within reach, Chair, Needs within reach, and Visitors at bedside    INTERDISCIPLINARY COLLABORATION:  RN/ PCT and PT/ PTA    Interdisciplinary Patient Education  (Reference education tab)    [] Safe And Effective Hygiene  [x] Fall Precautions  [x] Precautions  [] D/C Instruction Review [] Self Care Training and Home Safety  [x] Walker Management/Safety  [] Adaptive Equipment as Needed  [] Therapeutic Resting Position of Joint     TOTAL TREATMENT DURATION AND

## 2025-04-25 PROCEDURE — 97535 SELF CARE MNGMENT TRAINING: CPT

## 2025-04-25 PROCEDURE — 2500000003 HC RX 250 WO HCPCS: Performed by: PHYSICIAN ASSISTANT

## 2025-04-25 PROCEDURE — 6370000000 HC RX 637 (ALT 250 FOR IP): Performed by: PHYSICIAN ASSISTANT

## 2025-04-25 PROCEDURE — 94760 N-INVAS EAR/PLS OXIMETRY 1: CPT

## 2025-04-25 PROCEDURE — 97112 NEUROMUSCULAR REEDUCATION: CPT

## 2025-04-25 PROCEDURE — 97530 THERAPEUTIC ACTIVITIES: CPT

## 2025-04-25 RX ORDER — KETOROLAC TROMETHAMINE 30 MG/ML
30 INJECTION, SOLUTION INTRAMUSCULAR; INTRAVENOUS ONCE
Status: DISCONTINUED | OUTPATIENT
Start: 2025-04-25 | End: 2025-04-26 | Stop reason: HOSPADM

## 2025-04-25 RX ADMIN — ACETAMINOPHEN 650 MG: 325 TABLET, FILM COATED ORAL at 17:13

## 2025-04-25 RX ADMIN — SODIUM CHLORIDE, PRESERVATIVE FREE 10 ML: 5 INJECTION INTRAVENOUS at 07:53

## 2025-04-25 RX ADMIN — OXYCODONE 10 MG: 5 TABLET ORAL at 09:02

## 2025-04-25 RX ADMIN — OXYCODONE 10 MG: 5 TABLET ORAL at 19:54

## 2025-04-25 RX ADMIN — ACETAMINOPHEN 650 MG: 325 TABLET, FILM COATED ORAL at 13:02

## 2025-04-25 RX ADMIN — ACETAMINOPHEN 650 MG: 325 TABLET, FILM COATED ORAL at 05:24

## 2025-04-25 RX ADMIN — ASPIRIN 81 MG: 81 TABLET, COATED ORAL at 19:55

## 2025-04-25 RX ADMIN — OXYCODONE 10 MG: 5 TABLET ORAL at 04:11

## 2025-04-25 RX ADMIN — SENNOSIDES, DOCUSATE SODIUM 1 TABLET: 50; 8.6 TABLET, FILM COATED ORAL at 09:02

## 2025-04-25 RX ADMIN — ASPIRIN 81 MG: 81 TABLET, COATED ORAL at 09:02

## 2025-04-25 RX ADMIN — HYDROCHLOROTHIAZIDE 12.5 MG: 25 TABLET ORAL at 09:02

## 2025-04-25 RX ADMIN — SENNOSIDES, DOCUSATE SODIUM 1 TABLET: 50; 8.6 TABLET, FILM COATED ORAL at 19:55

## 2025-04-25 RX ADMIN — OXYCODONE 10 MG: 5 TABLET ORAL at 13:02

## 2025-04-25 RX ADMIN — ROSUVASTATIN CALCIUM 20 MG: 20 TABLET, FILM COATED ORAL at 19:55

## 2025-04-25 RX ADMIN — DIPHENHYDRAMINE HYDROCHLORIDE 25 MG: 25 CAPSULE ORAL at 07:53

## 2025-04-25 RX ADMIN — CELECOXIB 200 MG: 200 CAPSULE ORAL at 09:03

## 2025-04-25 ASSESSMENT — PAIN SCALES - GENERAL
PAINLEVEL_OUTOF10: 0
PAINLEVEL_OUTOF10: 4
PAINLEVEL_OUTOF10: 5
PAINLEVEL_OUTOF10: 5
PAINLEVEL_OUTOF10: 2
PAINLEVEL_OUTOF10: 6
PAINLEVEL_OUTOF10: 7
PAINLEVEL_OUTOF10: 6
PAINLEVEL_OUTOF10: 3

## 2025-04-25 ASSESSMENT — PAIN DESCRIPTION - ORIENTATION
ORIENTATION: RIGHT

## 2025-04-25 ASSESSMENT — PAIN DESCRIPTION - ONSET: ONSET: GRADUAL

## 2025-04-25 ASSESSMENT — PAIN DESCRIPTION - DESCRIPTORS
DESCRIPTORS: ACHING
DESCRIPTORS: ACHING;PRESSURE

## 2025-04-25 ASSESSMENT — PAIN DESCRIPTION - LOCATION
LOCATION: HIP

## 2025-04-25 ASSESSMENT — PAIN SCALES - WONG BAKER: WONGBAKER_NUMERICALRESPONSE: HURTS A LITTLE BIT

## 2025-04-25 ASSESSMENT — PAIN DESCRIPTION - PAIN TYPE: TYPE: SURGICAL PAIN

## 2025-04-25 ASSESSMENT — PAIN DESCRIPTION - FREQUENCY: FREQUENCY: INTERMITTENT

## 2025-04-25 NOTE — PROGRESS NOTES
2025         Post Op day: 1 Day Post-Op     Admit Date: 2025  Admit Diagnosis: Primary osteoarthritis of right hip [M16.11]        Subjective: Doing well. No complaints. No SOB, No Chest Pain, No Nausea or Vomiting     Objective:   Vital Signs are Stable, No Acute Distress, Alert and Oriented, Dressing is Dry,  Neurovascular exam is normal.     Assessment / Plan :  Patient Active Problem List   Diagnosis    Obstructive sleep apnea syndrome    Hypertension    Femoroacetabular impingement of left hip    Hypertriglyceridemia    Morbid obesity (HCC)    Venous stasis    Vitamin D deficiency    History of stroke    Type 2 diabetes mellitus with diabetic neuropathy    Calcified lymph nodes    Rheumatoid arthritis (HCC)    Primary osteoarthritis of right hip    Type 2 diabetes mellitus without complication (HCC)    Patient Vitals for the past 8 hrs:   BP Temp Temp src Pulse Resp SpO2   25 0735 129/68 97.9 °F (36.6 °C) -- 81 16 92 %   25 0441 -- -- -- -- 16 --   25 0352 (!) 152/80 99.7 °F (37.6 °C) Oral 95 16 98 %    Temp (24hrs), Av.7 °F (37.1 °C), Min:97.9 °F (36.6 °C), Max:99.7 °F (37.6 °C)    Body mass index is 47.17 kg/m².    Lab Results   Component Value Date/Time    HGB 14.3 2025 02:27 PM      Pt seen by and discussed with Supervising Physician   Continue PT  WBAT  Aspirin for DVT prophylaxis  Home today       Signed By: LAURA Jaramillo

## 2025-04-25 NOTE — PROGRESS NOTES
ACUTE PHYSICAL THERAPY GOALS:   (Developed with and agreed upon by patient and/or caregiver.)  GOALS (1-4 days):  (1.)Ms. Sellers will move from supine to sit and sit to supine  in bed with STAND BY ASSIST.    (2.)Ms. Sellers will transfer from bed to chair and chair to bed with STAND BY ASSIST using the least restrictive device.    (3.)Ms. Sellers will ambulate with CONTACT GUARD ASSIST for 225 feet with the least restrictive device.   (4.)Ms. Sellers will ambulate up/down 3 steps with bilateral  railing with CONTACT GUARD ASSIST.    ________________________________________________________________________________________________     PHYSICAL THERAPY: TOTAL HIP ARTHROPLASTY Daily Note and AM  (Link to Caseload Tracking: PT Visit Days : 2  Acknowledge Orders  Time In/Out  PT Charge Capture  Rehab Caseload Tracker  Episode   Tricia Sellers is a 55 y.o. female   PRIMARY DIAGNOSIS: Primary osteoarthritis of right hip  Primary osteoarthritis of right hip [M16.11]  Procedure(s) (LRB):  HIP TOTAL ARTHROPLASTY-RIGHT (Right)  1 Day Post-Op  Reason for Referral: Pain in Right Hip (M25.551)  Stiffness of Right Hip, Not elsewhere classified (M25.651)  Difficulty in walking, Not elsewhere classified (R26.2)  Other abnormalities of gait and mobility (R26.89)  Outpatient in a bed: Payor: [employer]IL[employer]PH[employer] [facility] EMPLOYEES / Plan: [employer] [facility] EMPLOYEES / Product Type: *No Product type* /     REHAB RECOMMENDATIONS:   Recommendation to date pending progress:  Setting:  Home Health Therapy    Equipment:    None     GAIT: I Mod I S SBA CGA Min Mod Max Total  NT x2 Comments:   Level of Assistance [] [] [] [] [] [x] [] [] [] [] []    Weightbearing Status  Right Lower Extremity Weight Bearing: Weight Bearing As Tolerated    Distance  8 feet    Gait Quality Antalgic, Decreased jorge , Decreased step clearance, Decreased step length, and Decreased stance    DME Rolling Walker     Stairs      Ramp     I=Independent, Mod I=Modified

## 2025-04-25 NOTE — DISCHARGE INSTRUCTIONS
ice it or anytime you sit or lie down. Try to keep it above the level of your heart. This will help reduce swelling.   Other instructions    Wear compression stockings if your doctor told you to. These may help to prevent blood clots. Your doctor will tell you how long you need to keep wearing the compression stockings.     Try to prevent falls. To avoid falling:  Arrange furniture so that you will not trip on it.  Get rid of throw rugs, and move electrical cords out of the way.  Walk only in areas with plenty of light.  Put grab bars in showers and bathtubs.  Try to avoid icy or snowy sidewalks. Choose shoes with good traction, or consider using traction devices that attach to your shoes.  Wear shoes with sturdy, flat soles.   Follow-up care is a key part of your treatment and safety. Be sure to make and go to all appointments, and call your doctor if you are having problems. It's also a good idea to know your test results and keep a list of the medicines you take.  When should you call for help?   Call 911 anytime you think you may need emergency care. For example, call if:    You passed out (lost consciousness).     You have severe trouble breathing.     You have sudden chest pain and shortness of breath, or you cough up blood.   Call your doctor now or seek immediate medical care if:    You have signs that your hip may be dislocated, including:  Severe pain and not being able to stand.  A crooked leg that looks like your hip is out of position.  Not being able to bend or straighten your leg.     Your leg or foot is cool or pale or changes color.     You cannot feel or move your leg.     You have signs of a blood clot, such as:  Pain in your calf, back of the knee, thigh, or groin.  Redness and swelling in your leg or groin.     Your incision comes open and begins to bleed, or the bleeding increases.     You feel like your heart is racing or beating irregularly.     You have signs of infection, such  as:  Increased pain, swelling, warmth, or redness.  Red streaks leading from the incision.  Pus draining from the incision.  A fever.   Watch closely for changes in your health, and be sure to contact your doctor if:    You do not have a bowel movement after taking a laxative.     You do not get better as expected.   Where can you learn more?  Go to https://www.TraceLink.net/patientEd and enter Q746 to learn more about \"Hip Replacement Surgery (Posterior): What to Expect at Home.\"  Current as of: July 31, 2024  Content Version: 14.4  © 5752-2415 [employer].   Care instructions adapted under license by [employer]. If you have questions about a medical condition or this instruction, always ask your healthcare professional. [employer] disclaims any warranty or liability for your use of this information.      Information obtained by :  I understand that if any problems occur once I am at home I am to contact my physician.    I understand and acknowledge receipt of the instructions indicated above.                                                                                                                                           Physician's or R.N.'s Signature                                                                  Date/Time                                                                                                                                              Patient or Representative Signature                                                          Date/Time

## 2025-04-25 NOTE — PROGRESS NOTES
OCCUPATIONAL THERAPY Daily Note and AM      (Link to Caseload Tracking: OT Visit Days: 1  OT Orders   Time  OT Charge Capture  Rehab Caseload Tracker  Episode     Tricia Sellers is a 55 y.o. female   PRIMARY DIAGNOSIS: Primary osteoarthritis of right hip  Primary osteoarthritis of right hip [M16.11]  Procedure(s) (LRB):  HIP TOTAL ARTHROPLASTY-RIGHT (Right)  1 Day Post-Op  Reason for Referral: Pain in Right Hip (M25.551)  Stiffness of Right Hip, Not elsewhere classified (M25.651)  Outpatient in a bed: Payor: CHILANGO STEPHENS [facility] EMPLOYEES / Plan: [employer] [facility] EMPLOYEES / Product Type: *No Product type* /     ASSESSMENT:     REHAB RECOMMENDATIONS:   Recommendation to date pending progress:  Setting:  No further skilled occupational therapy after discharge from hospital    Equipment:    None     ASSESSMENT:  Ms. Sellers is s/p right WENDI and presents with decreased independence with functional mobility and activities of daily living as compared to baseline level of function and safety. Patient would benefit from skilled Occupational Therapy to maximize independence and safety with self-care task and functional mobility.     4/25/2025: Patient upon arrival seated on edge of bed. Patient stood with rolling walker with contact guard assist, patient prompted to use rolling walker to get to bathroom yet unable to advance right foot. Patient sat back down edge of bed and prompted some R LE exercises yet patient had difficulty completing. Patient then stood with rolling walker and completed pivot to chair with min assist. OT then returned to room for a second session with PT. Patient needed max assist for doffing and donning socks. She stood with rolling walker contact guard assist from chair and was able to take a few steps with extended time and max cues for technique and initiation. Patient then returned to chair. Patient is planning on spending another night in the hospital, OT will follow up with patient  proper use of assistive device, recommended equipment, and transfer training and safety and patient verbalized understanding.       AFTER TREATMENT PRECAUTIONS: Bed/Chair Locked, Call light within reach, Chair, Needs within reach, and Visitors at bedside    INTERDISCIPLINARY COLLABORATION:  RN/ PCT and PT/ PTA    Interdisciplinary Patient Education  (Reference education tab)    [] Safe And Effective Hygiene  [x] Fall Precautions  [x] Precautions  [] D/C Instruction Review [] Self Care Training and Home Safety  [x] Walker Management/Safety  [x] Adaptive Equipment as Needed  [x] Therapeutic Resting Position of Joint     TOTAL TREATMENT DURATION AND TIME:    Time In: 0910  Time Out: 0925  Minutes: 15    Time In: 1030  Time Out: 1055  Minutes: 25    Total minutes:40    Graciela Linn, OT

## 2025-04-25 NOTE — PROGRESS NOTES
ACUTE PHYSICAL THERAPY GOALS:   (Developed with and agreed upon by patient and/or caregiver.)  GOALS (1-4 days):  (1.)Ms. Sellers will move from supine to sit and sit to supine  in bed with STAND BY ASSIST.    (2.)Ms. Sellers will transfer from bed to chair and chair to bed with STAND BY ASSIST using the least restrictive device.    (3.)Ms. Sellers will ambulate with CONTACT GUARD ASSIST for 225 feet with the least restrictive device.   (4.)Ms. Sellers will ambulate up/down 3 steps with bilateral  railing with CONTACT GUARD ASSIST.    ________________________________________________________________________________________________     PHYSICAL THERAPY: TOTAL HIP ARTHROPLASTY Daily Note and PM  (Link to Caseload Tracking: PT Visit Days : 2  Acknowledge Orders  Time In/Out  PT Charge Capture  Rehab Caseload Tracker  Episode   Tricia Sellers is a 55 y.o. female   PRIMARY DIAGNOSIS: Primary osteoarthritis of right hip  Primary osteoarthritis of right hip [M16.11]  Procedure(s) (LRB):  HIP TOTAL ARTHROPLASTY-RIGHT (Right)  1 Day Post-Op  Reason for Referral: Pain in Right Hip (M25.551)  Stiffness of Right Hip, Not elsewhere classified (M25.651)  Difficulty in walking, Not elsewhere classified (R26.2)  Other abnormalities of gait and mobility (R26.89)  Outpatient in a bed: Payor: CHILANGO STEPHENS [facility] EMPLOYEES / Plan: [employer] [facility] EMPLOYEES / Product Type: *No Product type* /     REHAB RECOMMENDATIONS:   Recommendation to date pending progress:  Setting:  Home Health Therapy    Equipment:    None     GAIT: I Mod I S SBA CGA Min Mod Max Total  NT x2 Comments:   Level of Assistance [] [] [] [] [x] [x] [] [] [] [] []    Weightbearing Status       Distance  6 feet    Gait Quality Antalgic, Decreased jorge , Decreased step clearance, Decreased step length, and Decreased stance    DME Rolling Walker     Stairs      Ramp     I=Independent, Mod I=Modified Independent, S=Supervision, SBA=Standby Assistance, CGA=Contact

## 2025-04-26 VITALS
WEIGHT: 293 LBS | HEART RATE: 81 BPM | RESPIRATION RATE: 19 BRPM | DIASTOLIC BLOOD PRESSURE: 76 MMHG | OXYGEN SATURATION: 96 % | HEIGHT: 67 IN | TEMPERATURE: 98 F | BODY MASS INDEX: 45.99 KG/M2 | SYSTOLIC BLOOD PRESSURE: 132 MMHG

## 2025-04-26 PROCEDURE — 6370000000 HC RX 637 (ALT 250 FOR IP): Performed by: PHYSICIAN ASSISTANT

## 2025-04-26 PROCEDURE — 97535 SELF CARE MNGMENT TRAINING: CPT

## 2025-04-26 PROCEDURE — 97530 THERAPEUTIC ACTIVITIES: CPT

## 2025-04-26 RX ADMIN — HYDROCHLOROTHIAZIDE 12.5 MG: 25 TABLET ORAL at 09:43

## 2025-04-26 RX ADMIN — ASPIRIN 81 MG: 81 TABLET, COATED ORAL at 09:43

## 2025-04-26 RX ADMIN — OXYCODONE 10 MG: 5 TABLET ORAL at 05:40

## 2025-04-26 RX ADMIN — ACETAMINOPHEN 650 MG: 325 TABLET, FILM COATED ORAL at 00:52

## 2025-04-26 RX ADMIN — CELECOXIB 200 MG: 200 CAPSULE ORAL at 09:43

## 2025-04-26 RX ADMIN — SENNOSIDES, DOCUSATE SODIUM 1 TABLET: 50; 8.6 TABLET, FILM COATED ORAL at 09:43

## 2025-04-26 RX ADMIN — ACETAMINOPHEN 650 MG: 325 TABLET, FILM COATED ORAL at 05:40

## 2025-04-26 ASSESSMENT — PAIN DESCRIPTION - PAIN TYPE
TYPE: ACUTE PAIN;SURGICAL PAIN
TYPE: SURGICAL PAIN

## 2025-04-26 ASSESSMENT — PAIN DESCRIPTION - FREQUENCY: FREQUENCY: INTERMITTENT

## 2025-04-26 ASSESSMENT — PAIN DESCRIPTION - ORIENTATION
ORIENTATION: RIGHT

## 2025-04-26 ASSESSMENT — PAIN DESCRIPTION - DESCRIPTORS
DESCRIPTORS: ACHING;SORE
DESCRIPTORS: ACHING
DESCRIPTORS: ACHING;SORE

## 2025-04-26 ASSESSMENT — PAIN DESCRIPTION - LOCATION
LOCATION: HIP

## 2025-04-26 ASSESSMENT — PAIN DESCRIPTION - ONSET: ONSET: ON-GOING

## 2025-04-26 ASSESSMENT — PAIN SCALES - GENERAL
PAINLEVEL_OUTOF10: 4

## 2025-04-26 ASSESSMENT — PAIN - FUNCTIONAL ASSESSMENT: PAIN_FUNCTIONAL_ASSESSMENT: PREVENTS OR INTERFERES SOME ACTIVE ACTIVITIES AND ADLS

## 2025-04-26 NOTE — RT PROTOCOL NOTE
1998; 43:719-723   L Therapist Driven Respiratory Care Protocols - A Practitioner's Guide for Criteria-Based Respiratory Care by Pramod Issa M.D., and SHAZIA Siegel RN, RRT.   L The rationale for therapist-driven protocols: an update. Respiratory Care 1998; 43:719-723.   N   [facility] Clinical Practice Guidelines.          The RCP will perform a respiratory assessment in the following manner:  Perform hand hygiene per hospital policy utilizing Standard Precautions for all patients and following transmission-based isolation as indicated per policy.  Identify patient via ID bracelet verifying patient name and birth date.  General observations: color, pattern and effort of breathing, chest expansion, (symmetrical and bilateral), level of consciousness and the ability to ambulate.  The RCP will assess patient’s cough ability and determine if Nasotracheal suctioning is needed. If patient is unable to produce sputum, at that time, the RCP should question the patient with regard to their sputum: production, color consistency, frequency and amount.  Auscultation: Using a stethoscope, the RCP will listen and note quality of breath sounds and presence or absence of adventitious breath sounds in all lung fields, both anteriorly and posteriorly.  Upon completing the EMR review and physical assessment, the RCP will document findings in the “RT Assessment section” of the EMR. The score level will be provided and will be used to determine the frequency of therapy.    V. Indications:   A.  Indications for Bronchial Hygiene Protocol will include:  Potential for or presence of atelectasis.   Need for hydration and removal of retained secretions.  Need for improvement of cough effectiveness.  Presence of conditions associated with disorder of pulmonary clearance:  Cystic fibrosis  Bronchiectasis   Indications for Aerosolized Medication(s) Protocol should include:  Treatment of bronchospasm/wheezing  Improvement of mucociliary  the most effective method of delivery to the patient.    III. Patient Type: All patients who are determined to meet aerosolized medication criteria as          outlined in this protocol.    IV. Responsibility: Director, Respiratory Care Services, registered Respiratory Care Practitioners (RCP's) with documented competency in the performance of                                     respiratory therapeutic techniques.    V. Equipment needed:  Stethoscope  Pulse oximeter  AeroEclipse nebulizer  Meter Dose Inhaler (MDI)    VI. Protocol:   The following conditions are accepted indications for aerosolized medication therapy.   Bronchospasm/wheezing  Impaired mucociliary clearance  Tracheobronchial mucosal congestion/and laryngeal stridor  Diseases which commonly require aerosolized medication therapy include, but are not limited to:  Asthma/reactive airway disease  Bronchitis/emphysema (COPD)  Cystic fibrosis  Severe laryngitis/tracheitis  Bronchiectasis  Smoke inhalation or chemical trauma to the lung or upper airway  Physical trauma to the upper airway  Laryngotracheobronchitis  Bronchiolitis  Non-specific wheezing              B. Indications for bronchodilator medications will include:  Bronchospasm/ wheezing  Asthma/reactive airway disease  Chronic obstructive pulmonary disease  Obstructive defect on pulmonary function testing  Administration of medications  If a bronchodilator or any other type of respiratory medication is needed, a physician order must be indicated in the medication section in the patient's EMR.   When the physician specifies the medication and dosage at the time of request, the ordered medication will be used as part of the care plan.  The following guidelines will be utilized in the evaluation and selection of the appropriate delivery device for indicated medication(s):  MDI is the preferred method of medication delivery via common canister.  Patient should be alert/cooperative  Able to perform 3

## 2025-04-26 NOTE — PROGRESS NOTES
above and performed stair training.  Pt initially sliding right lower extremity on floor during swing phase, but was able to lift right lower extremity for clearance during swing after she took a few steps.  Pt then was able to perform HEP as below.  Pt educated on continued mobility and HEP at home, pt verbalized understanding and agreed. Pt with anticipated discharge home later today.       Outcome Measure:   HOOS-JR:  Total Raw Score (0-24 Scale): 18    SUBJECTIVE:   Ms. Sellers agreeable to therapy    Home Environment/Prior Level of Function Lives With: Family, Daughter (brother)  Home Layout: One level  Home Access: Stairs to enter without rails  Entrance Stairs - Number of Steps: 1  Bathroom Shower/Tub: Walk-in shower  Bathroom Toilet: Bedside commode  Bathroom Equipment: Shower chair  Home Equipment: Crutches, Walker - Rolling    OBJECTIVE:     PAIN: VITAL SIGNS: LINES/DRAINS:   Pre Treatment:   4/10, pain did not pull over from flowsheet, please see flowsheet for full pain assessment      Post Treatment: 4/10 Vitals        Oxygen        None    RESTRICTIONS/PRECAUTIONS:  Restrictions/Precautions: Weight Bearing  Right Lower Extremity Weight Bearing: Weight Bearing As Tolerated        Restrictions/Precautions: Weight Bearing        LOWER EXTREMITY GROSS EVALUATION:   RIGHT LE   Within Functional Limits   Abnormal   Comments   Strength [] []  Decreased; S/P R WENDI   Range of Motion [] []  Decreased; S/P R WENDI      LEFT LE Within Functional Limits   Abnormal   Comments   Strength [] []     Range of Motion [x] []       UPPER EXTREMITY GROSS EVALUATION:     Within Functional Limits   Abnormal   Comments   Strength [] []    Range of Motion [x] []      SENSATION  Sensation [x]       COGNITION/  PERCEPTION: Intact Impaired (Comments):   Orientation [x]     Vision [x]     Hearing [x]     Cognition  [x]       MOBILITY: I Mod I S SBA CGA Min Mod Max Total  NT x2 Comments:   Bed Mobility    Rolling [] [] [] [] [x] [] []  AM PM AM PM    [] [] [] [] [] []   Ankle Pumps  10 15 15 15    Quad Sets  10 15 15 15    Gluteal Sets  10 15 15 15    Hip Abd/ADduction  10 AA 10AA 15 aa 15 AA    Knee Slides  10 AA 10AA 15 aa 15 AA    Short Arc Quads  10 AA 10AA 15  15    Long Arc Quads    15 15                               B = bilateral; AA = active assistive; A = active; P = passive      Educated patient and/or family/caregiver on the following: Adaptive Equipment as Needed, Cryocuff/Icepacks, D/C Instruction Review, Fall Precautions, Home Exercises, and Walker Management/Safety    Interdisciplinary Patient Education   (Reference education tab)    AFTER TREATMENT PRECAUTIONS: Bed/Chair Locked, Call light within reach, Chair, Needs within reach, RN notified, and Visitors at bedside    INTERDISCIPLINARY COLLABORATION:  RN/ PCT and OT/ [facility]OLCOM[facility]    Compliance with Program/Exercises: compliant all of the time, Will assess as treatment progresses.    Recommendations/Intent for next treatment session:  Treatment next visit will focus on increasing Ms. Sellers's independence with bed mobility, transfers, gait training, strength/ROM exercises, modalities for pain, and patient education.     TIME IN/OUT:  Time In: 0900  Time Out: 0945  Minutes: 45    CAPRI TABARES PT

## 2025-04-26 NOTE — PROGRESS NOTES
[facility]        2025         Post Op day: 2 Days Post-Op Procedure(s) (LRB):  HIP TOTAL ARTHROPLASTY-RIGHT (Right)      Admit Date: 2025  Admit Diagnosis: Primary osteoarthritis of right hip [M16.11]     Principle Problem: Primary osteoarthritis of right hip.       Subjective: Doing well, No complaints, No SOB, No Chest Pain, No Nausea or Vomiting     Objective:   Vital Signs are Stable, No Acute Distress, Alert,  Dressing is Dry,  Neurovascular exam is normal.     Assessment / Plan :  Patient Active Problem List   Diagnosis    Obstructive sleep apnea syndrome    Hypertension    Femoroacetabular impingement of left hip    Hypertriglyceridemia    Morbid obesity (HCC)    Venous stasis    Vitamin D deficiency    History of stroke    Type 2 diabetes mellitus with diabetic neuropathy    Calcified lymph nodes    Rheumatoid arthritis (HCC)    Primary osteoarthritis of right hip    Type 2 diabetes mellitus without complication ([facility])    Patient Vitals for the past 8 hrs:   BP Temp Temp src Pulse Resp SpO2   25 0812 132/76 98 °F (36.7 °C) Axillary 81 19 96 %    Temp (24hrs), Av °F (37.2 °C), Min:98 °F (36.7 °C), Max:99.9 °F (37.7 °C)    Body mass index is 47.17 kg/m².    Lab Results   Component Value Date/Time    HGB 14.3 2025 02:27 PM      S/P Procedure(s) (LRB):  HIP TOTAL ARTHROPLASTY-RIGHT (Right)    WBAT  Aspirin for DVT prophylaxis  Home today       Signed By: LAURA Arreguin  2025,  11:12 AM

## 2025-04-26 NOTE — CARE COORDINATION
CM note:    Chart reviewed for updates. Pt is discharging today. CM has contacted [facility] [phone] and spoke with Ree who was notified that pt is discharging today. Pt given updates at bedside. Family to transport. No further CM needs identified.      04/26/25 1214   Services At/After Discharge   Transition of Care Consult (CM Consult) Home Health   Internal Home Health No   Reason Outside Agency Chosen Script used patient chose alternate agency   Services At/After Discharge Home Health;PT    Resource Information Provided? No   Mode of Transport at Discharge Self   Confirm Follow Up Transport Family   Condition of Participation: Discharge Planning   The Plan for Transition of Care is related to the following treatment goals: Pt is discharging home with [facility] PT and family support   The Patient and/or Patient Representative was provided with a Choice of Provider? Patient   The Patient and/Or Patient Representative agree with the Discharge Plan? Yes   Freedom of Choice list was provided with basic dialogue that supports the patient's individualized plan of care/goals, treatment preferences, and shares the quality data associated with the providers?  Yes     GUERLINE Arriaga

## 2025-04-26 NOTE — PLAN OF CARE
Problem: Safety - Adult  Goal: Free from fall injury  Outcome: Progressing  Flowsheets (Taken 4/25/2025 1951)  Free From Fall Injury: Instruct family/caregiver on patient safety     Problem: Chronic Conditions and Co-morbidities  Goal: Patient's chronic conditions and co-morbidity symptoms are monitored and maintained or improved  Outcome: Progressing  Flowsheets (Taken 4/25/2025 1951)  Care Plan - Patient's Chronic Conditions and Co-Morbidity Symptoms are Monitored and Maintained or Improved: Monitor and assess patient's chronic conditions and comorbid symptoms for stability, deterioration, or improvement     Problem: Discharge Planning  Goal: Discharge to home or other facility with appropriate resources  Outcome: Progressing  Flowsheets (Taken 4/25/2025 1951)  Discharge to home or other facility with appropriate resources:   Identify barriers to discharge with patient and caregiver   Arrange for needed discharge resources and transportation as appropriate   Identify discharge learning needs (meds, wound care, etc)   Refer to discharge planning if patient needs post-hospital services based on physician order or complex needs related to functional status, cognitive ability or social support system     Problem: Pain  Goal: Verbalizes/displays adequate comfort level or baseline comfort level  Outcome: Progressing  Flowsheets (Taken 4/25/2025 1954)  Verbalizes/displays adequate comfort level or baseline comfort level: Encourage patient to monitor pain and request assistance     Problem: ABCDS Injury Assessment  Goal: Absence of physical injury  Outcome: Progressing  Flowsheets (Taken 4/25/2025 1951)  Absence of Physical Injury: Implement safety measures based on patient assessment     Problem: Respiratory - Adult  Goal: Achieves optimal ventilation and oxygenation  Outcome: Progressing  Flowsheets (Taken 4/25/2025 1951)  Achieves optimal ventilation and oxygenation: Assess for changes in respiratory status      Problem: Skin/Tissue Integrity - Adult  Goal: Incisions, wounds, or drain sites healing without S/S of infection  Outcome: Progressing  Flowsheets (Taken 4/25/2025 1951)  Incisions, Wounds, or Drain Sites Healing Without Sign and Symptoms of Infection: ADMISSION and DAILY: Assess and document risk factors for pressure ulcer development     Problem: Musculoskeletal - Adult  Goal: Return mobility to safest level of function  Outcome: Progressing  Flowsheets (Taken 4/25/2025 1951)  Return Mobility to Safest Level of Function: Assess patient stability and activity tolerance for standing, transferring and ambulating with or without assistive devices     Problem: Musculoskeletal - Adult  Goal: Maintain proper alignment of affected body part  Outcome: Progressing  Flowsheets (Taken 4/25/2025 1951)  Maintain proper alignment of affected body part: Support and protect limb and body alignment per provider's orders

## 2025-04-26 NOTE — PROGRESS NOTES
OCCUPATIONAL THERAPY Daily Note and AM      (Link to Caseload Tracking: OT Visit Days: 3  OT Orders   Time  OT Charge Capture  Rehab Caseload Tracker  Episode     Tricia Sellers is a 55 y.o. female   PRIMARY DIAGNOSIS: Primary osteoarthritis of right hip  Primary osteoarthritis of right hip [M16.11]  Procedure(s) (LRB):  HIP TOTAL ARTHROPLASTY-RIGHT (Right)  2 Days Post-Op  Reason for Referral: Pain in Right Hip (M25.551)  Stiffness of Right Hip, Not elsewhere classified (M25.651)  Outpatient in a bed: Payor: [employer] [facility] [facility] [employer] / Plan: [id number] [facility] [employer] / Product Type: *No Product type* /     ASSESSMENT:     REHAB RECOMMENDATIONS:   Recommendation to date pending progress:  Setting:  No further skilled occupational therapy after discharge from hospital    Equipment:    None     ASSESSMENT:  Ms. Sellers is s/p right WENDI and presents with decreased independence with functional mobility and activities of daily living as compared to baseline level of function and safety. Patient would benefit from skilled Occupational Therapy to maximize independence and safety with self-care task and functional mobility.     4/25/2025: Patient upon arrival seated on edge of bed. Patient stood with rolling walker with contact guard assist, patient prompted to use rolling walker to get to bathroom yet unable to advance right foot. Patient sat back down edge of bed and prompted some R LE exercises yet patient had difficulty completing. Patient then stood with rolling walker and completed pivot to chair with min assist. OT then returned to room for a second session with PT. Patient needed max assist for doffing and donning socks. She stood with rolling walker contact guard assist from chair and was able to take a few steps with extended time and max cues for technique and initiation. Patient then returned to chair. Patient is planning on spending another night in the hospital, OT will follow up with patient  []    Grooming [] [] [x] [] [] [] [] [] [] []    Personal Device Care [] [] [] [] [] [] [] [] [] []    Functional Mobility [] [] [] [] [x] [] [] [] [] []     I=Independent, Mod I=Modified Independent, S=Supervision/Setup, SBA=Standby Assistance, CGA=Contact Guard Assistance, Min=Minimal Assistance, Mod=Moderate Assistance, Max=Maximal Assistance, Total=Total Assistance, NT=Not Tested    PLAN:     FREQUENCY/DURATION     for duration of hospital stay or until stated goals are met, whichever comes first.    ACUTE OCCUPATIONAL THERAPY GOALS:   (Developed with and agreed upon by patient and/or caregiver.)    GOALS:   DISCHARGE GOALS (in preparation for going home/rehab):  3 days  1.  Ms. Sellers will perform lower body dressing activity with minimal assist required to demonstrate improved functional mobility and safety.   -GOAL MET 4/26/25    2.  Ms. Sellers will perform bathing activity with minimal assist required to demonstrate improved functional mobility and safety.-GOAL MET 4/26/25    3.  Ms. Sellers will perform toileting activity with  contact guard assist to demonstrate improved functional mobility and safety.-GOAL MET 4/26/25    4.  Ms. Sellers will perform all functional transfers transfer with contact guard assist to demonstrate improved functional mobility and safety.-GOAL MET 4/26/25        PROBLEM LIST:   (Skilled intervention is medically necessary to address:)  Decreased ADL/Functional Activities  Decreased Activity Tolerance  Decreased Strength  Decreased Transfer Abilities  Increased Pain   INTERVENTIONS PLANNED:   (Benefits and precautions of occupational therapy have been discussed with the patient.)  Self Care Training  Therapeutic Activity  Therapeutic Exercise/HEP  Neuromuscular Re-education  Manual Therapy  Education         TREATMENT:       TREATMENT:   Self Care (40 minutes): Patient participated in upper body bathing, lower body bathing, toileting, upper body dressing, lower body dressing, self

## 2025-05-02 ENCOUNTER — TELEPHONE (OUTPATIENT)
Dept: ORTHOPEDIC SURGERY | Age: 55
End: 2025-05-02

## 2025-05-02 NOTE — TELEPHONE ENCOUNTER
Manuela from [facility]  would like to know if she can get verbal orders to change this patient's dressings.

## 2025-05-09 ENCOUNTER — TELEMEDICINE (OUTPATIENT)
Dept: SLEEP MEDICINE | Age: 55
End: 2025-05-09
Payer: COMMERCIAL

## 2025-05-09 DIAGNOSIS — G47.10 HYPERSOMNIA: ICD-10-CM

## 2025-05-09 DIAGNOSIS — G47.33 OSA (OBSTRUCTIVE SLEEP APNEA): Primary | ICD-10-CM

## 2025-05-09 DIAGNOSIS — G47.34 NOCTURNAL HYPOXEMIA: ICD-10-CM

## 2025-05-09 PROCEDURE — 99213 OFFICE O/P EST LOW 20 MIN: CPT | Performed by: NURSE PRACTITIONER

## 2025-05-09 ASSESSMENT — SLEEP AND FATIGUE QUESTIONNAIRES
HOW LIKELY ARE YOU TO NOD OFF OR FALL ASLEEP WHILE SITTING AND TALKING TO SOMEONE: WOULD NEVER DOZE
HOW LIKELY ARE YOU TO NOD OFF OR FALL ASLEEP WHILE SITTING AND READING: HIGH CHANCE OF DOZING
HOW LIKELY ARE YOU TO NOD OFF OR FALL ASLEEP WHILE WATCHING TV: HIGH CHANCE OF DOZING
ESS TOTAL SCORE: 12
HOW LIKELY ARE YOU TO NOD OFF OR FALL ASLEEP WHEN YOU ARE A PASSENGER IN A CAR FOR AN HOUR WITHOUT A BREAK: HIGH CHANCE OF DOZING
HOW LIKELY ARE YOU TO NOD OFF OR FALL ASLEEP IN A CAR, WHILE STOPPED FOR A FEW MINUTES IN TRAFFIC: WOULD NEVER DOZE
HOW LIKELY ARE YOU TO NOD OFF OR FALL ASLEEP WHILE SITTING INACTIVE IN A PUBLIC PLACE: WOULD NEVER DOZE
HOW LIKELY ARE YOU TO NOD OFF OR FALL ASLEEP WHILE LYING DOWN TO REST IN THE AFTERNOON WHEN CIRCUMSTANCES PERMIT: WOULD NEVER DOZE
HOW LIKELY ARE YOU TO NOD OFF OR FALL ASLEEP WHILE SITTING QUIETLY AFTER LUNCH WITHOUT ALCOHOL: HIGH CHANCE OF DOZING

## 2025-05-09 NOTE — PROGRESS NOTES
Stroke of unusual etiology (HCC) 03/05/2018    Last Assessment & Plan:   Formatting of this note might be different from the original.  Symptoms started Saturday evening (3/3/2018).  Patient initially reported feeling dizzy and having some vague right lower extremity numbness and tingling and some faint weakness.  Symptoms persisted off and on and came to be evaluated in clinic on Monday afternoon on 3/5/2018.  At the time in clinic patient wa    Type 2 diabetes mellitus without complication (HCC) 6/2020    No meds, No BS checks, No hypoglycemia; Last A1C 6.3 1/15/25    Weakness of right lower extremity 03/21/2018       Patient Active Problem List   Diagnosis    Obstructive sleep apnea syndrome    Hypertension    Femoroacetabular impingement of left hip    Hypertriglyceridemia    Morbid obesity (HCC)    Venous stasis    Vitamin D deficiency    History of stroke    Type 2 diabetes mellitus with diabetic neuropathy    Calcified lymph nodes    Rheumatoid arthritis (HCC)    Primary osteoarthritis of right hip    Type 2 diabetes mellitus without complication (HCC)           Past Surgical History:   Procedure Laterality Date    CHOLECYSTECTOMY  2008    COLONOSCOPY N/A 12/07/2023    COLORECTAL CANCER SCREENING, NOT HIGH RISK/Polyp performed by Rodo Garcias MD at [facility] ENDOSCOPY    HYSTERECTOMY (CERVIX STATUS UNKNOWN)  2007    HYSTERECTOMY, TOTAL ABDOMINAL (CERVIX REMOVED)      MOUTH SURGERY  2000    Chin, metal plate    ORTHOPEDIC SURGERY  2015    left hip ligament repair    ORTHOPEDIC SURGERY  2017    Right hip ligament repair    TONSILLECTOMY  1999    TOTAL HIP ARTHROPLASTY  2016    TOTAL HIP ARTHROPLASTY Right 4/24/2025    HIP TOTAL ARTHROPLASTY-RIGHT performed by Angel Mauro MD at [facility] OR    TUBAL LIGATION  11/26/1989    UPPER GASTROINTESTINAL ENDOSCOPY N/A 09/16/2024    ESOPHAGOGASTRODUODENOSCOPY BIOPSY performed by Susie Suarez MD at [facility] ENDOSCOPY           Social History     Socioeconomic History

## 2025-05-19 ENCOUNTER — TELEPHONE (OUTPATIENT)
Dept: ORTHOPEDIC SURGERY | Age: 55
End: 2025-05-19

## 2025-05-19 NOTE — TELEPHONE ENCOUNTER
Spoke with patient. She informed me that she still has her zipline on.Patient stated that HH did not remove it before discharging her last week. I instructed patient to remove her zipline. I also let patient know that for her back pain she would need to follow up with a back care provider.

## 2025-05-19 NOTE — TELEPHONE ENCOUNTER
Appointment Request  (Newest Message First)  Tricia Sellers  [facility]  Staff (supporting LAURA Jaramillo)2 hours ago (7:30 AM)     EP  Appointment Request From: Tricia Sellers     With Provider: LAURA Chan [[facility]]     Preferred Date Range: 5/19/2025 - 5/19/2025     Preferred Times: Any Time     Reason for visit: Request an Appointment     Health Maintenance Topic:      Comments:  Zip ties are coming alose, back pain.  Can you see me today.

## 2025-05-22 ENCOUNTER — OFFICE VISIT (OUTPATIENT)
Dept: FAMILY MEDICINE CLINIC | Facility: CLINIC | Age: 55
End: 2025-05-22
Payer: COMMERCIAL

## 2025-05-22 VITALS
TEMPERATURE: 98.7 F | DIASTOLIC BLOOD PRESSURE: 86 MMHG | OXYGEN SATURATION: 98 % | SYSTOLIC BLOOD PRESSURE: 126 MMHG | WEIGHT: 285 LBS | BODY MASS INDEX: 44.64 KG/M2 | HEART RATE: 84 BPM

## 2025-05-22 DIAGNOSIS — I10 PRIMARY HYPERTENSION: ICD-10-CM

## 2025-05-22 DIAGNOSIS — E66.01 MORBID OBESITY (HCC): ICD-10-CM

## 2025-05-22 DIAGNOSIS — E11.40 TYPE 2 DIABETES MELLITUS WITH DIABETIC NEUROPATHY, WITHOUT LONG-TERM CURRENT USE OF INSULIN (HCC): ICD-10-CM

## 2025-05-22 DIAGNOSIS — M16.11 PRIMARY OSTEOARTHRITIS OF RIGHT HIP: ICD-10-CM

## 2025-05-22 DIAGNOSIS — M05.9 RHEUMATOID ARTHRITIS WITH POSITIVE RHEUMATOID FACTOR, INVOLVING UNSPECIFIED SITE (HCC): ICD-10-CM

## 2025-05-22 DIAGNOSIS — Z86.73 HISTORY OF STROKE: ICD-10-CM

## 2025-05-22 DIAGNOSIS — E78.1 HYPERTRIGLYCERIDEMIA: ICD-10-CM

## 2025-05-22 DIAGNOSIS — Z12.31 BREAST CANCER SCREENING BY MAMMOGRAM: ICD-10-CM

## 2025-05-22 DIAGNOSIS — M79.89 LEG SWELLING: ICD-10-CM

## 2025-05-22 DIAGNOSIS — I87.8 VENOUS STASIS: ICD-10-CM

## 2025-05-22 DIAGNOSIS — E55.9 VITAMIN D DEFICIENCY: Primary | ICD-10-CM

## 2025-05-22 DIAGNOSIS — G47.33 OBSTRUCTIVE SLEEP APNEA SYNDROME: ICD-10-CM

## 2025-05-22 PROCEDURE — 3074F SYST BP LT 130 MM HG: CPT | Performed by: NURSE PRACTITIONER

## 2025-05-22 PROCEDURE — 99214 OFFICE O/P EST MOD 30 MIN: CPT | Performed by: NURSE PRACTITIONER

## 2025-05-22 PROCEDURE — 3079F DIAST BP 80-89 MM HG: CPT | Performed by: NURSE PRACTITIONER

## 2025-05-22 PROCEDURE — 3044F HG A1C LEVEL LT 7.0%: CPT | Performed by: NURSE PRACTITIONER

## 2025-05-22 RX ORDER — BENZONATATE 100 MG/1
100-200 CAPSULE ORAL 2 TIMES DAILY PRN
Qty: 28 CAPSULE | Refills: 0 | Status: SHIPPED | OUTPATIENT
Start: 2025-05-22 | End: 2025-05-29

## 2025-05-22 RX ORDER — OXYCODONE HYDROCHLORIDE 5 MG/1
5 TABLET ORAL EVERY 4 HOURS PRN
COMMUNITY

## 2025-05-22 RX ORDER — ALBUTEROL SULFATE 90 UG/1
INHALANT RESPIRATORY (INHALATION)
COMMUNITY
Start: 2025-05-19

## 2025-05-22 RX ORDER — ROSUVASTATIN CALCIUM 20 MG/1
20 TABLET, COATED ORAL
Qty: 90 TABLET | Refills: 2 | Status: SHIPPED | OUTPATIENT
Start: 2025-05-22

## 2025-05-22 RX ORDER — PREDNISONE 20 MG/1
TABLET ORAL
COMMUNITY
Start: 2025-05-19

## 2025-05-22 RX ORDER — BLOOD PRESSURE TEST KIT
KIT MISCELLANEOUS
Qty: 1 KIT | Refills: 0 | Status: SHIPPED | OUTPATIENT
Start: 2025-05-22

## 2025-05-22 RX ORDER — HYDROCHLOROTHIAZIDE 12.5 MG/1
12.5 CAPSULE ORAL EVERY MORNING
Qty: 90 CAPSULE | Refills: 2 | Status: SHIPPED | OUTPATIENT
Start: 2025-05-22

## 2025-05-22 RX ORDER — DOXYCYCLINE 100 MG/1
CAPSULE ORAL
COMMUNITY
Start: 2025-05-19

## 2025-05-22 ASSESSMENT — ENCOUNTER SYMPTOMS: COUGH: 1

## 2025-05-22 NOTE — ASSESSMENT & PLAN NOTE
Chronic, at goal (stable), changes made today: add ozempic, medication adherence emphasized, and lifestyle modifications recommended    Orders:    Semaglutide,0.25 or 0.5MG/DOS, 2 MG/3ML SOPN; Inject 0.25 mg into the skin every 7 days    rosuvastatin (CRESTOR) 20 MG tablet; Take 1 tablet by mouth nightly

## 2025-05-22 NOTE — PROGRESS NOTES
NOTICE FOR THE PATIENT: This clinical note is not designed to be interpreted by patients.  These notes may contain candid and (unintentionally) offensive descriptions, which are sometimes required for accurate documentation. If you would like more information about your healthcare, please obtain it directly by myself or my staff. Thank you for your understanding and cooperation.     Tricia Sellers is a 55 y.o. female who presents today for the following:  Chief Complaint   Patient presents with    Follow-up     Pt presents today for FU. Pt went to  on Monday was dx with bronchitis. Having sx since Saturday. Pt is concerned with her diabetes.          Allergies   Allergen Reactions    Metformin Rash and Angioedema    Penicillins Anaphylaxis and Rash     Throat swelling and thick tongue.       Current Outpatient Medications   Medication Sig Dispense Refill    albuterol sulfate HFA (PROVENTIL;VENTOLIN;PROAIR) 108 (90 Base) MCG/ACT inhaler       doxycycline hyclate (VIBRAMYCIN) 100 MG capsule       oxyCODONE (ROXICODONE) 5 MG immediate release tablet Take 1 tablet by mouth every 4 hours as needed.      predniSONE (DELTASONE) 20 MG tablet       benzonatate (TESSALON) 100 MG capsule Take 1-2 capsules by mouth 2 times daily as needed for Cough 28 capsule 0    Semaglutide,0.25 or 0.5MG/DOS, 2 MG/3ML SOPN Inject 0.25 mg into the skin every 7 days 3 mL 1    Blood Pressure KIT Dx.  HTN 1 kit 0    rosuvastatin (CRESTOR) 20 MG tablet Take 1 tablet by mouth nightly 90 tablet 2    hydroCHLOROthiazide 12.5 MG capsule Take 1 capsule by mouth every morning 90 capsule 2    methocarbamol (ROBAXIN-750) 750 MG tablet Take 1 tablet by mouth every 6 hours as needed for spasms. 40 tablet 0    aspirin 81 MG EC tablet Take 1 tablet by mouth 2 times daily 60 tablet 0    celecoxib (CELEBREX) 200 MG capsule Take 1 capsule by mouth daily 30 capsule 0    Handicap Placard MISC by Does not apply route 1 each 0    Misc. Devices (CPAP MACHINE)

## 2025-05-22 NOTE — ASSESSMENT & PLAN NOTE
Monitored by specialist- no acute findings meriting change in the plan    Orders:    benzonatate (TESSALON) 100 MG capsule; Take 1-2 capsules by mouth 2 times daily as needed for Cough

## 2025-05-22 NOTE — ASSESSMENT & PLAN NOTE
Chronic, at goal (stable), continue current treatment plan    Orders:    Blood Pressure KIT; Dx.  HTN    hydroCHLOROthiazide 12.5 MG capsule; Take 1 capsule by mouth every morning

## 2025-05-22 NOTE — ASSESSMENT & PLAN NOTE
Chronic, not at goal (unstable), continue current plan pending work up below    Orders:    Vitamin D 25 Hydroxy; Future

## 2025-05-22 NOTE — ASSESSMENT & PLAN NOTE
Chronic, not at goal (unstable), changes made today: add ozempic, medication adherence emphasized, and lifestyle modifications recommended

## 2025-06-02 ENCOUNTER — OFFICE VISIT (OUTPATIENT)
Dept: ORTHOPEDIC SURGERY | Age: 55
End: 2025-06-02

## 2025-06-02 DIAGNOSIS — Z96.641 S/P TOTAL RIGHT HIP ARTHROPLASTY: Primary | ICD-10-CM

## 2025-06-02 PROCEDURE — 99024 POSTOP FOLLOW-UP VISIT: CPT | Performed by: PHYSICIAN ASSISTANT

## 2025-06-02 NOTE — PROGRESS NOTES
Name: Tricia Sellers  YOB: 1970  Gender: female  MRN: [id number]    RIGHT Post-Op WENDI: 4 weeks    This patient returns now 4 weeks s/p WENDI.  They are doing well.  There have been no significant issues since last visit.  Patient is anxious to increase level of activity.  Today they complain of no significant symptoms.      PE: On exam today, incision looks good.  The patient is ambulating with a steady gait with the use of NO ASSISTIVE DEVICE.  There is minimal discomfort with gentle range of motion of the operative hip.     RADIOGRAPHS:  AP pelvis and table lateral of the RIGHT hip which demonstrate well fixed implants in good position.  No sign of fracture or concern.    RADIOGRAPHIC IMPRESSION:  Stable RIGHTTHA.    CLINICAL IMPRESSION AND PLAN:  Now four weeks s/p RIGHT WENDI. I advised them to continue to wean from their current assistive device and to resume activities as tolerated.   Further activity was discussed with the patient as was further pain medications.  The patient will return in 4-5 months.    Work/Activity Restrictions: OUT OF WORK-returning as a  on July 13    LAURA Jaramillo

## 2025-07-11 ENCOUNTER — CLINICAL DOCUMENTATION (OUTPATIENT)
Dept: ORTHOPEDIC SURGERY | Age: 55
End: 2025-07-11

## 2025-07-14 ENCOUNTER — HOSPITAL ENCOUNTER (OUTPATIENT)
Dept: GENERAL RADIOLOGY | Age: 55
Discharge: HOME | End: 2025-07-16
Payer: COMMERCIAL

## 2025-07-14 DIAGNOSIS — G89.29 CHRONIC PAIN OF BOTH KNEES: Chronic | ICD-10-CM

## 2025-07-14 DIAGNOSIS — M25.562 CHRONIC PAIN OF BOTH KNEES: Chronic | ICD-10-CM

## 2025-07-14 DIAGNOSIS — M05.9 SEROPOSITIVE RHEUMATOID ARTHRITIS (HCC): Chronic | ICD-10-CM

## 2025-07-14 DIAGNOSIS — Z79.899 LONG-TERM USE OF HIGH-RISK MEDICATION: Chronic | ICD-10-CM

## 2025-07-14 DIAGNOSIS — M25.561 CHRONIC PAIN OF BOTH KNEES: Chronic | ICD-10-CM

## 2025-07-14 LAB
ALBUMIN SERPL-MCNC: 3.8 G/DL (ref 3.5–5)
ALBUMIN/GLOB SERPL: 1 (ref 1–1.9)
ALP SERPL-CCNC: 117 U/L (ref 35–104)
ALT SERPL-CCNC: 29 U/L (ref 8–45)
ANION GAP SERPL CALC-SCNC: 14 MMOL/L (ref 7–16)
AST SERPL-CCNC: 24 U/L (ref 15–37)
BASOPHILS # BLD: 0.1 K/UL (ref 0–0.2)
BASOPHILS NFR BLD: 0.9 % (ref 0–2)
BILIRUB SERPL-MCNC: 0.3 MG/DL (ref 0–1.2)
BUN SERPL-MCNC: 10 MG/DL (ref 6–23)
CALCIUM SERPL-MCNC: 10 MG/DL (ref 8.8–10.2)
CHLORIDE SERPL-SCNC: 103 MMOL/L (ref 98–107)
CO2 SERPL-SCNC: 25 MMOL/L (ref 20–29)
CREAT SERPL-MCNC: 0.74 MG/DL (ref 0.6–1.1)
CRP SERPL-MCNC: 1.3 MG/DL (ref 0–0.4)
DIFFERENTIAL METHOD BLD: ABNORMAL
EOSINOPHIL # BLD: 0.35 K/UL (ref 0–0.8)
EOSINOPHIL NFR BLD: 3.1 % (ref 0.5–7.8)
ERYTHROCYTE [DISTWIDTH] IN BLOOD BY AUTOMATED COUNT: 17.2 % (ref 11.9–14.6)
ERYTHROCYTE [SEDIMENTATION RATE] IN BLOOD: 14 MM/HR (ref 0–30)
GLOBULIN SER CALC-MCNC: 3.7 G/DL (ref 2.3–3.5)
GLUCOSE SERPL-MCNC: 103 MG/DL (ref 70–99)
HCT VFR BLD AUTO: 41.4 % (ref 35.8–46.3)
HGB BLD-MCNC: 13 G/DL (ref 11.7–15.4)
IMM GRANULOCYTES # BLD AUTO: 0.02 K/UL (ref 0–0.5)
IMM GRANULOCYTES NFR BLD AUTO: 0.2 % (ref 0–5)
LYMPHOCYTES # BLD: 3.58 K/UL (ref 0.5–4.6)
LYMPHOCYTES NFR BLD: 31.4 % (ref 13–44)
MCH RBC QN AUTO: 25.6 PG (ref 26.1–32.9)
MCHC RBC AUTO-ENTMCNC: 31.4 G/DL (ref 31.4–35)
MCV RBC AUTO: 81.7 FL (ref 82–102)
MONOCYTES # BLD: 0.7 K/UL (ref 0.1–1.3)
MONOCYTES NFR BLD: 6.1 % (ref 4–12)
NEUTS SEG # BLD: 6.64 K/UL (ref 1.7–8.2)
NEUTS SEG NFR BLD: 58.3 % (ref 43–78)
NRBC # BLD: 0 K/UL (ref 0–0.2)
PLATELET # BLD AUTO: 333 K/UL (ref 150–450)
PMV BLD AUTO: 12.3 FL (ref 9.4–12.3)
POTASSIUM SERPL-SCNC: 3.3 MMOL/L (ref 3.5–5.1)
PROT SERPL-MCNC: 7.5 G/DL (ref 6.3–8.2)
RBC # BLD AUTO: 5.07 M/UL (ref 4.05–5.2)
SODIUM SERPL-SCNC: 142 MMOL/L (ref 136–145)
WBC # BLD AUTO: 11.4 K/UL (ref 4.3–11.1)

## 2025-07-14 PROCEDURE — 73564 X-RAY EXAM KNEE 4 OR MORE: CPT

## 2025-07-15 ENCOUNTER — OFFICE VISIT (OUTPATIENT)
Dept: RHEUMATOLOGY | Age: 55
End: 2025-07-15
Payer: COMMERCIAL

## 2025-07-15 VITALS
OXYGEN SATURATION: 98 % | DIASTOLIC BLOOD PRESSURE: 84 MMHG | BODY MASS INDEX: 45.48 KG/M2 | HEIGHT: 67 IN | HEART RATE: 71 BPM | WEIGHT: 289.8 LBS | SYSTOLIC BLOOD PRESSURE: 120 MMHG

## 2025-07-15 DIAGNOSIS — M06.9 RHEUMATOID ARTHRITIS, INVOLVING UNSPECIFIED SITE, UNSPECIFIED WHETHER RHEUMATOID FACTOR PRESENT (HCC): ICD-10-CM

## 2025-07-15 DIAGNOSIS — M87.00 IDIOPATHIC ASEPTIC NECROSIS OF UNSPECIFIED BONE (HCC): Primary | ICD-10-CM

## 2025-07-15 DIAGNOSIS — M16.11 PRIMARY OSTEOARTHRITIS OF RIGHT HIP: ICD-10-CM

## 2025-07-15 DIAGNOSIS — Z79.899 LONG-TERM USE OF HIGH-RISK MEDICATION: ICD-10-CM

## 2025-07-15 PROCEDURE — 3074F SYST BP LT 130 MM HG: CPT | Performed by: INTERNAL MEDICINE

## 2025-07-15 PROCEDURE — 99214 OFFICE O/P EST MOD 30 MIN: CPT | Performed by: INTERNAL MEDICINE

## 2025-07-15 PROCEDURE — 3079F DIAST BP 80-89 MM HG: CPT | Performed by: INTERNAL MEDICINE

## 2025-07-15 RX ORDER — CELECOXIB 200 MG/1
200 CAPSULE ORAL DAILY
Qty: 90 CAPSULE | Refills: 0 | Status: SHIPPED | OUTPATIENT
Start: 2025-07-15 | End: 2025-10-13

## 2025-07-15 RX ORDER — FOLIC ACID 1 MG/1
1 TABLET ORAL DAILY
Qty: 90 TABLET | Refills: 0 | Status: SHIPPED | OUTPATIENT
Start: 2025-07-15 | End: 2025-10-13

## 2025-07-15 RX ORDER — METHOTREXATE 2.5 MG/1
20 TABLET ORAL WEEKLY
Qty: 96 TABLET | Refills: 0 | Status: SHIPPED | OUTPATIENT
Start: 2025-07-15 | End: 2025-10-13

## 2025-07-15 RX ORDER — ACETAMINOPHEN 500 MG
1000 TABLET ORAL 2 TIMES DAILY
COMMUNITY

## 2025-07-15 ASSESSMENT — ROUTINE ASSESSMENT OF PATIENT INDEX DATA (RAPID3)
ON A SCALE OF ONE TO TEN, HOW MUCH PAIN HAVE YOU HAD BECAUSE OF YOUR CONDITION OVER THE PAST WEEK?: 5
ON A SCALE OF ONE TO TEN, CONSIDERING ALL THE WAYS IN WHICH ILLNESS AND HEALTH CONDITIONS MAY AFFECT YOU AT THIS TIME, PLEASE INDICATE BELOW HOW YOU ARE DOING:: 7
ON A SCALE OF ONE TO TEN, HOW DIFFICULT WAS IT FOR YOU TO COMPLETE THE LISTED DAILY PHYSICAL TASKS OVER THE LAST WEEK: 1.4
WHEN YOU AWAKENED IN THE MORNING OVER THE LAST WEEK, PLEASE INDICATE THE AMOUNT OF TIME IT TAKES UNTIL YOU ARE AS LIMBER AS YOU WILL BE FOR THE DAY: 15 MIN
ON A SCALE OF ONE TO TEN, HOW MUCH OF A PROBLEM HAS UNUSUAL FATIGUE OR TIREDNESS BEEN FOR YOU OVER THE PAST WEEK?: 5

## 2025-07-15 ASSESSMENT — JOINT PAIN
TOTAL NUMBER OF SWOLLEN JOINTS: 4
TOTAL NUMBER OF TENDER JOINTS: 17

## 2025-07-15 NOTE — PROGRESS NOTES
Since last visit patient has been feeling: Fair    Any Rheumatology medication side effects:       Hospitalizations: 04/2025;Total hip replacement         Infections:  N/A     Vaccinations: 05/2025-Hepatitis B         7/15/2025    12:00 PM   DMARD/Biologic   AM Stiffness 15 min   Pain 5   Fatigue 5   MDHAQ 1.4   Patient Global Score 7          REVIEW OF SYSTEMS:  A total of 10 systems (musculoskeletal system as stated in the HPI and the following 9 systems) were reviewed with patient today and were negative except as stated in the HPI and except for the following (depicted with an \"X\"):        \"X\" Constitutional  \"X\" HEENT /Mouth  \"X\" Cardiovascular and  Respiratory (2 systems)  \"X\" Gastrointestinal    Fever/chills   Hair loss  x Shortness of breath   Upset stomach   x Falls   Dry mouth   Coughing   Diarrhea / constipation    Wt loss   Mouth sores   Wheezing   Heartburn   x Wt gain   Ringing ears   Chest pain   Dark or bloody stools   x Night sweats   Diff. swallowing   None of above   Nausea or vomiting    None of above  X None of above     X None of above                \"X\" Integumentary  \"X\" Neurological  \"X\" Genitourinary  \"X\" Psychiatric    Easy bruising  x Numbness/ tingling   Female problems   Depression    Rashes   Weakness   Problems with urination   Feeling anxious    Sun sensitivity   Headaches  X None of above   Problems sleeping   X None of above   None of above     X None of above

## 2025-07-15 NOTE — PROGRESS NOTES
[facility]  [address] , [address]   [place], [place]-[address]  Office : [phone], Fax: [phone]     RHEUMATOLOGY OFFICE VISIT NOTE  Date of Visit:  7/15/2025 1:50 PM      SUBJECTIVE:   Tricia Sellers 55 y.o. presents for follow up of:  Seropositive rheumatoid arthritis +RF +14.3.3.ETA -CCP              Manifestations: Bilateral hand and foot arthritis              MTX 12.5 mg po qwk (started 4/2025)              Failed:              HCQ - functional blindness     Keratoconjunctivitis sicca              Restasis      PMH  AVN bilateral hip Dx 2015, 2016, s/p arthroscopic surgery bilaterally  HTN  DM2  CVA  EMILIA    Fam hx:  Sarcoidosis - daughter  Autoimmune disease (unknown) - brother    Surgical hx:  hysterectomy due to fibroid in 2007   R THR 2025      Last seen 4/15/2025     History of Present Illness  Since last visit patient has been feeling: Fair    She adheres to her medication regimen, which includes methotrexate 5 tablets weekly and daily folic acid, with no side effects. The swelling and pain levels are consistent, with soreness in her toes and feet. She has difficulty making a fist due to swelling in her right hand knuckles and experiences discomfort and swelling in her left MCP joint. Both of her feet are swollen, and all of her toes are tender. The pain in her hands and feet persists throughout the day and is not alleviated by movement. Morning stiffness lasts for 15-20 minutes. Her knees improve with rest but worsen with activity, leading to buckling and two falls. She has not had an orthopedic consultation for her knees.    She completed physical therapy post right hip replacement and returned to work yesterday. She struggles with buttoning, opening jars, cooking, and cleaning due to breathing difficulties and issues with her hands and feet. Her son assists her at home. She uses a shower chair and manages toileting independently. She uses

## 2025-07-15 NOTE — PATIENT INSTRUCTIONS
- increase the methotrexate to 8 tablets once a week (4 in the morning and 4 at night on Wednesdays)  - continue folic acid 1 mg daily  - continue celebrex 200 mg daily but not on methotrexate days, if there is any swelling in the legs reduce celebrex (can worsen heart failure symptoms)  - take tylenol 1000 mg twice a day  - repeat labs in 1 month  - notify me in 2 months from now if methotrexate is not helping and we need to start humira

## 2025-07-16 ENCOUNTER — CLINICAL DOCUMENTATION (OUTPATIENT)
Dept: ORTHOPEDIC SURGERY | Age: 55
End: 2025-07-16

## 2025-07-18 ENCOUNTER — PATIENT MESSAGE (OUTPATIENT)
Dept: FAMILY MEDICINE CLINIC | Facility: CLINIC | Age: 55
End: 2025-07-18

## 2025-07-18 DIAGNOSIS — E11.40 TYPE 2 DIABETES MELLITUS WITH DIABETIC NEUROPATHY, WITHOUT LONG-TERM CURRENT USE OF INSULIN (HCC): ICD-10-CM

## 2025-07-21 ENCOUNTER — TELEPHONE (OUTPATIENT)
Dept: RHEUMATOLOGY | Age: 55
End: 2025-07-21

## 2025-07-21 DIAGNOSIS — M06.9 RHEUMATOID ARTHRITIS, INVOLVING UNSPECIFIED SITE, UNSPECIFIED WHETHER RHEUMATOID FACTOR PRESENT (HCC): Primary | ICD-10-CM

## 2025-07-21 NOTE — TELEPHONE ENCOUNTER
Rx for Humira 40 mg/0.4 ml to inject every 14 days, entered and pended for review and sig to go to [facility]. Will most likely require biosimilar.

## 2025-07-21 NOTE — TELEPHONE ENCOUNTER
----- Message from Dr. Morales Velazco DO sent at 7/21/2025  1:52 PM EDT -----  Regarding: RE: humira  Yes please send a script for humira biosimilar.  ----- Message -----  From: Rylie Taylor MA  Sent: 7/21/2025  10:53 AM EDT  To: Rylie Taylor MA; Morales Velazco DO  Subject: RE: humira                                       Patient has [facility] insurance. Will have coverage with copay card, but will be required to use adalimumab biosimilar. Let me know if you want me to put a script in for her to send to [facility].   Rylie Hogan  ----- Message -----  From: Rylie Taylor MA  Sent: 7/15/2025   1:23 PM EDT  To: Rylie Taylor MA  Subject: Morales Finn DO Lipscomb, Shara Y, MA  Please check pharmacy benefits for humira 40 mg sc every other week. Not starting yet but may need to. Has seropositive RA, on MTX now.

## 2025-07-22 RX ORDER — ADALIMUMAB 40MG/0.4ML
40 KIT SUBCUTANEOUS
Qty: 0.8 ML | Refills: 3 | Status: SHIPPED | OUTPATIENT
Start: 2025-07-22 | End: 2025-07-24

## 2025-07-23 ENCOUNTER — OFFICE VISIT (OUTPATIENT)
Dept: FAMILY MEDICINE CLINIC | Facility: CLINIC | Age: 55
End: 2025-07-23
Payer: COMMERCIAL

## 2025-07-23 VITALS
WEIGHT: 293 LBS | BODY MASS INDEX: 46.52 KG/M2 | TEMPERATURE: 98.3 F | SYSTOLIC BLOOD PRESSURE: 100 MMHG | OXYGEN SATURATION: 92 % | HEART RATE: 83 BPM | DIASTOLIC BLOOD PRESSURE: 56 MMHG

## 2025-07-23 DIAGNOSIS — I10 PRIMARY HYPERTENSION: ICD-10-CM

## 2025-07-23 DIAGNOSIS — E11.40 TYPE 2 DIABETES MELLITUS WITH DIABETIC NEUROPATHY, WITHOUT LONG-TERM CURRENT USE OF INSULIN (HCC): Primary | ICD-10-CM

## 2025-07-23 DIAGNOSIS — E55.9 VITAMIN D DEFICIENCY: ICD-10-CM

## 2025-07-23 DIAGNOSIS — E78.1 HYPERTRIGLYCERIDEMIA: ICD-10-CM

## 2025-07-23 DIAGNOSIS — Z12.31 BREAST CANCER SCREENING BY MAMMOGRAM: ICD-10-CM

## 2025-07-23 DIAGNOSIS — G47.33 OBSTRUCTIVE SLEEP APNEA SYNDROME: ICD-10-CM

## 2025-07-23 DIAGNOSIS — R71.8 LOW MEAN CORPUSCULAR VOLUME (MCV): ICD-10-CM

## 2025-07-23 DIAGNOSIS — E66.01 MORBID OBESITY (HCC): ICD-10-CM

## 2025-07-23 DIAGNOSIS — J98.4 CALCIFIED GRANULOMA OF LUNG: ICD-10-CM

## 2025-07-23 DIAGNOSIS — I89.8 CALCIFIED LYMPH NODES: ICD-10-CM

## 2025-07-23 DIAGNOSIS — I87.8 VENOUS STASIS: ICD-10-CM

## 2025-07-23 PROBLEM — M25.852 FEMOROACETABULAR IMPINGEMENT OF LEFT HIP: Status: RESOLVED | Noted: 2017-04-03 | Resolved: 2025-07-23

## 2025-07-23 PROBLEM — M87.00: Status: RESOLVED | Noted: 2025-07-15 | Resolved: 2025-07-23

## 2025-07-23 PROBLEM — M16.11 PRIMARY OSTEOARTHRITIS OF RIGHT HIP: Status: RESOLVED | Noted: 2025-03-05 | Resolved: 2025-07-23

## 2025-07-23 LAB
25(OH)D3 SERPL-MCNC: 23 NG/ML (ref 30–100)
CHOLEST SERPL-MCNC: 144 MG/DL (ref 0–200)
FERRITIN SERPL-MCNC: 143 NG/ML (ref 8–388)
HBA1C MFR BLD: 6 %
HDLC SERPL-MCNC: 35 MG/DL (ref 40–60)
HDLC SERPL: 4.1 (ref 0–5)
IRON SATN MFR SERPL: 13 % (ref 20–50)
IRON SERPL-MCNC: 49 UG/DL (ref 35–100)
LDLC SERPL CALC-MCNC: 53 MG/DL (ref 0–100)
TIBC SERPL-MCNC: 366 UG/DL (ref 240–450)
TRIGL SERPL-MCNC: 280 MG/DL (ref 0–150)
UIBC SERPL-MCNC: 317 UG/DL (ref 112–347)
VLDLC SERPL CALC-MCNC: 56 MG/DL (ref 6–23)

## 2025-07-23 PROCEDURE — 99214 OFFICE O/P EST MOD 30 MIN: CPT | Performed by: NURSE PRACTITIONER

## 2025-07-23 PROCEDURE — 83036 HEMOGLOBIN GLYCOSYLATED A1C: CPT | Performed by: NURSE PRACTITIONER

## 2025-07-23 PROCEDURE — 3074F SYST BP LT 130 MM HG: CPT | Performed by: NURSE PRACTITIONER

## 2025-07-23 PROCEDURE — 3078F DIAST BP <80 MM HG: CPT | Performed by: NURSE PRACTITIONER

## 2025-07-23 PROCEDURE — 3044F HG A1C LEVEL LT 7.0%: CPT | Performed by: NURSE PRACTITIONER

## 2025-07-23 NOTE — ASSESSMENT & PLAN NOTE
Chronic, at goal (stable), compression stockings, elevation, and lifestyle modifications recommended

## 2025-07-23 NOTE — PATIENT INSTRUCTIONS
oils to 2 to 3 servings each day. A serving is 1 teaspoon of vegetable oil or 2 tablespoons of salad dressing.  Limit sweets and added sugars to 5 servings or less a week. A serving is 1 tablespoon jelly or jam, 1/2 cup sorbet, or 1 cup of lemonade.  Eat less than 2,300 milligrams (mg) of sodium a day. If you limit your sodium to 1,500 mg a day, you can lower your blood pressure even more.  Be aware that all of these are the suggested number of servings for people who eat 1,800 to 2,000 calories a day. Your recommended number of servings may be different if you need more or fewer calories.  Tips for success  Start small. Make small changes, and stick with them. Once those changes become habit, add a few more changes.  Try some of the following:  Make it a goal to eat a fruit or vegetable at every meal and at snacks. This will make it easy to get the recommended amount of fruits and vegetables each day.  Try yogurt topped with fruit and nuts for a snack or healthy dessert.  Add lettuce, tomato, cucumber, and onion to sandwiches.  Have a variety of cut-up vegetables with a low-fat dip as an appetizer instead of chips and dip.  Sprinkle sunflower seeds or chopped almonds over salads. Or try adding chopped walnuts or almonds to cooked vegetables.  Try some vegetarian meals using beans and peas. Add garbanzo or kidney beans to salads. Make burritos and tacos with mashed glover beans or black beans.  Where can you learn more?  Go to https://www.[employer].net/patientEd and enter H967 to learn more about \"DASH Diet: Care Instructions.\"  Current as of: October 7, 2024  Content Version: 14.5  © 2024-2025 [employer].   Care instructions adapted under license by [email or web address]. If you have questions about a medical condition or this instruction, always ask your healthcare professional. [email or web address] disclaims any warranty or liability for your use of this information.

## 2025-07-23 NOTE — ASSESSMENT & PLAN NOTE
Chronic, at goal (stable), continue current treatment plan, medication adherence emphasized, and lifestyle modifications recommended    Orders:    AMB POC HEMOGLOBIN A1C

## 2025-07-23 NOTE — ASSESSMENT & PLAN NOTE
Chronic, not at goal (unstable), continue current plan, medication adherence emphasized, and lifestyle modifications recommended

## 2025-07-23 NOTE — PROGRESS NOTES
NOTICE FOR THE PATIENT: This clinical note is not designed to be interpreted by patients.  These notes may contain candid and (unintentionally) offensive descriptions, which are sometimes required for accurate documentation. If you would like more information about your healthcare, please obtain it directly by myself or my staff. Thank you for your understanding and cooperation.     Tricia Sellers is a 55 y.o. female who presents today for the following:  Chief Complaint   Patient presents with    Follow-up     Pt presents for a follow-up         Allergies   Allergen Reactions    Metformin Rash and Angioedema    Penicillins Anaphylaxis and Rash     Throat swelling and thick tongue.       Current Outpatient Medications   Medication Sig Dispense Refill    adalimumab (HUMIRA, 2 PEN,) 40 MG/0.4ML AJKT injection pen kit Inject 0.4 mLs into the skin every 14 days 0.8 mL 3    Semaglutide,0.25 or 0.5MG/DOS, 2 MG/3ML SOPN Inject 0.25 mg into the skin every 7 days 3 mL 1    acetaminophen (TYLENOL) 500 MG tablet Take 2 tablets by mouth 2 times daily      celecoxib (CELEBREX) 200 MG capsule Take 1 capsule by mouth daily 90 capsule 0    methotrexate (RHEUMATREX) 2.5 MG chemo tablet Take 8 tablets by mouth once a week 96 tablet 0    folic acid (FOLVITE) 1 MG tablet Take 1 tablet by mouth daily 90 tablet 0    albuterol sulfate HFA (PROVENTIL;VENTOLIN;PROAIR) 108 (90 Base) MCG/ACT inhaler       oxyCODONE (ROXICODONE) 5 MG immediate release tablet Take 1 tablet by mouth every 4 hours as needed.      Blood Pressure KIT Dx.  HTN 1 kit 0    rosuvastatin (CRESTOR) 20 MG tablet Take 1 tablet by mouth nightly 90 tablet 2    hydroCHLOROthiazide 12.5 MG capsule Take 1 capsule by mouth every morning 90 capsule 2    aspirin 81 MG EC tablet Take 1 tablet by mouth 2 times daily 60 tablet 0    Handicap Placard MISC by Does not apply route 1 each 0    Misc. Devices (CPAP MACHINE) MISC        No current facility-administered medications for

## 2025-07-23 NOTE — TELEPHONE ENCOUNTER
Email from Kirstin:  The Humira PA for patient Tricia Sellers (1970) has been denied. The plan wants the patient to try and fail at least one of the biosimilar, I have uploaded her denial letter to her media tab    Preferred biosimilars are Amjevita, Cyltezo, Hadlima.

## 2025-07-24 RX ORDER — ADALIMUMAB-ATTO 40 MG/.4ML
40 INJECTION SUBCUTANEOUS
Qty: 0.8 ML | Refills: 3 | Status: SHIPPED | OUTPATIENT
Start: 2025-07-24

## 2025-07-29 ENCOUNTER — RESULTS FOLLOW-UP (OUTPATIENT)
Dept: FAMILY MEDICINE CLINIC | Facility: CLINIC | Age: 55
End: 2025-07-29

## 2025-07-29 DIAGNOSIS — E55.9 VITAMIN D DEFICIENCY: Primary | ICD-10-CM

## 2025-07-29 NOTE — RESULT ENCOUNTER NOTE
Iron is stable, encourage iron rich foods.  Cholesterol looks stable.  Avoid fried, fast, or processed foods and foods high in saturated fat/cholesterol.  Consume dietary fiber 20-30 grams daily.  Exercise 150 minutes/week. Vit d low.  Weekly vit d 50,000 units orally for 24 weeks sent to the pharmacy to replete low vit d.  On week 25, start over the counter supplement daily 800-1000 units daily through diet and/or supplement.  Include calcium rich foods as well.        Please let me know if patient has any further questions or concerns.

## 2025-07-31 RX ORDER — ERGOCALCIFEROL 1.25 MG/1
50000 CAPSULE, LIQUID FILLED ORAL WEEKLY
Qty: 12 CAPSULE | Refills: 1 | Status: SHIPPED | OUTPATIENT
Start: 2025-07-31

## 2025-08-28 ENCOUNTER — TELEPHONE (OUTPATIENT)
Dept: RHEUMATOLOGY | Age: 55
End: 2025-08-28

## (undated) DEVICE — CONNECTOR TBNG OD5-7MM O2 END DISP

## (undated) DEVICE — ENDOSCOPIC KIT 1.1+ OP4 CA DE 2 GWN AAMI LEVEL 3

## (undated) DEVICE — BANDAGE COBAN 4 IN COMPR W4INXL5YD FOAM COHESIVE QUIK STK SELF ADH SFT

## (undated) DEVICE — SYRINGE MEDICAL 3ML CLEAR PLASTIC STANDARD NON CONTROL LUERLOCK TIP DISPOSABLE

## (undated) DEVICE — ZIP 16 SURGICAL SKIN CLOSURE DEVICE: Brand: ZIP 16 SURGICAL SKIN CLOSURE DEVICE

## (undated) DEVICE — SYRINGE MED 10ML LUERLOCK TIP W/O SFTY DISP

## (undated) DEVICE — CONTAINER FORMALIN PREFILLED 10% NBF 60ML

## (undated) DEVICE — FOAM BUMP, LARGE: Brand: MEDLINE INDUSTRIES, INC.

## (undated) DEVICE — SUTURE PDS II SZ 1 L96IN ABSRB VLT TP-1 L65MM 1/2 CIR Z880G

## (undated) DEVICE — BIPOLAR SEALER 23-112-1 AQM 6.0: Brand: AQUAMANTYS ®

## (undated) DEVICE — SUTURE VICRYL SZ 1 L27IN ABSRB UD L36MM CP-1 1/2 CIR REV CUT J268H

## (undated) DEVICE — KENDALL RADIOLUCENT FOAM MONITORING ELECTRODE RECTANGULAR SHAPE: Brand: KENDALL

## (undated) DEVICE — MOUTHPIECE ENDOSCP L CTRL OPN AND SIDE PORTS DISP

## (undated) DEVICE — YANKAUER,FLEXIBLE HANDLE,REGLR CAPACITY: Brand: MEDLINE INDUSTRIES, INC.

## (undated) DEVICE — GLOVE SURG SZ 8 L12IN FNGR THK79MIL GRN LTX FREE

## (undated) DEVICE — HEWSON SUTURE RETRIEVER: Brand: HEWSON SUTURE RETRIEVER

## (undated) DEVICE — SOLUTION IRRIG 1000ML 0.9% SOD CHL USP POUR PLAS BTL

## (undated) DEVICE — AIRLIFE™ OXYGEN TUBING 7 FEET (2.1 M) CRUSH RESISTANT OXYGEN TUBING, VINYL TIPPED: Brand: AIRLIFE™

## (undated) DEVICE — DRAPE,TOP,102X53,STERILE: Brand: MEDLINE

## (undated) DEVICE — STERILE SYNTHETIC POLYISOPRENE POWDER-FREE SURGICAL GLOVES WITH HYDROGEL COATING, SMOOTH FINISH, STRAIGHT FINGER: Brand: PROTEXIS

## (undated) DEVICE — SOLUTION IV 250ML 0.9% SOD CHL PH 5 INJ USP VIAFLX PLAS

## (undated) DEVICE — FORCEPS BX L240CM JAW DIA2.8MM L CAP W/ NDL MIC MESH TOOTH

## (undated) DEVICE — DRESSING HYDROFIBER AQUACEL AG ADVANTAGE 3.5X12 IN

## (undated) DEVICE — GLOVE ORANGE PI 8   MSG9080

## (undated) DEVICE — GAUZE,SPONGE,4"X4",12PLY,WOVEN,NS,LF: Brand: MEDLINE

## (undated) DEVICE — SINGLE-USE POLYPECTOMY SNARE: Brand: CAPTIVATOR

## (undated) DEVICE — COVER,MAYO STAND,XL,STERILE: Brand: MEDLINE

## (undated) DEVICE — SOLUTION WND IRRIGATION 450 ML 0.5 PVP-I 0.9 NACL

## (undated) DEVICE — SUTURE ABS ANTIBACT 1-0 CTX 24IN STRATAFIX PDS+ SXPP1A445

## (undated) DEVICE — SINGLE PORT MANIFOLD: Brand: NEPTUNE 2

## (undated) DEVICE — STRYKER PERFORMANCE SERIES SAGITTAL BLADE: Brand: STRYKER PERFORMANCE SERIES

## (undated) DEVICE — SUIT SURG ISOLATN ZIP TOGA XL T7 +

## (undated) DEVICE — SOLUTION IRRIG 1000ML STRL H2O USP PLAS POUR BTL

## (undated) DEVICE — TUBE SUCTION FRAZIER 12 FRX5.5 IN MALL ANGLED W/ HOLE

## (undated) DEVICE — GLOVE SURG SZ 65 THK91MIL LTX FREE SYN POLYISOPRENE

## (undated) DEVICE — SUTURE ETHIBOND EXCEL SZ 5 L30IN NONABSORBABLE GRN L40MM V-37 MB66G

## (undated) DEVICE — ZIP 24 SURGICAL SKIN CLOSURE DEVICE: Brand: ZIP 24 SURGICAL SKIN CLOSURE DEVICE

## (undated) DEVICE — SUTURE VICRYL SZ 1 L36IN ABSRB UD CTX L48MM 1/2 CIR J977H

## (undated) DEVICE — TOTAL HIP PACK: Brand: MEDLINE INDUSTRIES, INC.

## (undated) DEVICE — BLOCK BITE AD 60FR W/ VELC STRP ADDRESSES MOST PT AND

## (undated) DEVICE — YANKAUER,BULB TIP,W/O VENT,RIGID,STERILE: Brand: MEDLINE

## (undated) DEVICE — SYRINGE MED 50ML LUERLOCK TIP

## (undated) DEVICE — NEEDLE SPNL 22GA L3.5IN BLK HUB S STL REG WALL FIT STYL

## (undated) DEVICE — HOOD: Brand: T7PLUS

## (undated) DEVICE — 3M™ IOBAN™ 2 ANTIMICROBIAL INCISE DRAPE 6651EZ: Brand: IOBAN™ 2

## (undated) DEVICE — SUTURE VICRYL + SZ 2-0 L27IN ABSRB UD CP-1 1/2 CIR REV CUT VCP266H

## (undated) DEVICE — STERILE PVP: Brand: MEDLINE INDUSTRIES, INC.

## (undated) DEVICE — GLOVE SURG SZ 75 CRM LTX FREE POLYISOPRENE POLYMER BEAD ANTI

## (undated) DEVICE — NEEDLE HYPO 21GA L1.5IN INTRAMUSCULAR S STL LATCH BVL UP

## (undated) DEVICE — 3M™ STERI-DRAPE™ INSTRUMENT POUCH 1018: Brand: STERI-DRAPE™

## (undated) DEVICE — CELLERATERX® SURGICAL ACTIVATED COLLAGEN® POWDER IS TYPE I BOVINE HYDROLYZED COLLAGEN AND CONTAINS NO ADDITIVES.: Brand: CELLERATERX SURGICAL

## (undated) DEVICE — TRAP SPEC POLYP REM STRNR CLN DSGN MAGNIFYING WIND DISP

## (undated) DEVICE — SYRINGE, LUER SLIP, STERILE, 60ML: Brand: MEDLINE

## (undated) DEVICE — NEEDLE SYR 18GA L1.5IN RED PLAS HUB S STL BLNT FILL W/O

## (undated) DEVICE — CANNULA NSL ORAL AD FOR CAPNOFLEX CO2 O2 AIRLFE

## (undated) DEVICE — SYRINGE CATH TIP 50ML

## (undated) DEVICE — GOWN,REINFORCED,POLY,SIRUS,XLNG/XXLG: Brand: MEDLINE

## (undated) DEVICE — SOLUTION IRRIG 3000ML 0.9% SOD CHL USP UROMATIC PLAS CONT

## (undated) DEVICE — ZIP DS DRESSING SHIELD: Brand: ZIP DS DRESSING SHIELD